# Patient Record
Sex: FEMALE | Race: WHITE | NOT HISPANIC OR LATINO | Employment: OTHER | URBAN - METROPOLITAN AREA
[De-identification: names, ages, dates, MRNs, and addresses within clinical notes are randomized per-mention and may not be internally consistent; named-entity substitution may affect disease eponyms.]

---

## 2017-04-11 ENCOUNTER — HOSPITAL ENCOUNTER (OUTPATIENT)
Dept: FAMILY MEDICINE CLINIC | Facility: CLINIC | Age: 72
Setting detail: SPECIMEN
Discharge: HOME | End: 2017-04-11
Attending: FAMILY MEDICINE | Admitting: FAMILY MEDICINE

## 2017-04-11 LAB
ALBUMIN SERPL-MCNC: 3.7 G/DL (ref 3.5–4.8)
ALBUMIN/GLOB SERPL: 1.6 {RATIO} (ref 1–1.7)
ALP SERPL-CCNC: 148 IU/L (ref 32–91)
ALT SERPL-CCNC: 21 IU/L (ref 14–54)
ANION GAP SERPL CALC-SCNC: 13.1 MMOL/L (ref 10–20)
AST SERPL-CCNC: 24 IU/L (ref 15–41)
BASOPHILS # BLD AUTO: 0 10*3/UL (ref 0–0.2)
BASOPHILS NFR BLD AUTO: 0 % (ref 0–2)
BILIRUB SERPL-MCNC: 0.7 MG/DL (ref 0.3–1.2)
BILIRUB UR QL STRIP: NEGATIVE MG/DL
BUN SERPL-MCNC: 17 MG/DL (ref 8–20)
BUN/CREAT SERPL: 18.9 (ref 5.4–26.2)
CALCIUM SERPL-MCNC: 9.5 MG/DL (ref 8.9–10.3)
CASTS URNS QL MICRO: ABNORMAL /[LPF]
CHLORIDE SERPL-SCNC: 105 MMOL/L (ref 101–111)
CHOLEST SERPL-MCNC: 179 MG/DL
CHOLEST/HDLC SERPL: 2.9 {RATIO}
COLOR UR: YELLOW
CONV BACTERIA IN URINE MICRO: NEGATIVE
CONV CLARITY OF URINE: CLEAR
CONV CO2: 27 MMOL/L (ref 22–32)
CONV HYALINE CASTS IN URINE MICRO: 0 /[LPF] (ref 0–5)
CONV LDL CHOLESTEROL DIRECT: 104 MG/DL (ref 0–100)
CONV PROTEIN IN URINE BY AUTOMATED TEST STRIP: NEGATIVE MG/DL
CONV SMALL ROUND CELLS: ABNORMAL /[HPF]
CONV TOTAL PROTEIN: 6 G/DL (ref 6.1–7.9)
CONV UROBILINOGEN IN URINE BY AUTOMATED TEST STRIP: 0.2 MG/DL
CREAT UR-MCNC: 0.9 MG/DL (ref 0.4–1)
CULTURE INDICATED?: ABNORMAL
DIFFERENTIAL METHOD BLD: (no result)
EOSINOPHIL # BLD AUTO: 0.1 10*3/UL (ref 0–0.3)
EOSINOPHIL # BLD AUTO: 2 % (ref 0–3)
ERYTHROCYTE [DISTWIDTH] IN BLOOD BY AUTOMATED COUNT: 14 % (ref 11.5–14.5)
GLOBULIN UR ELPH-MCNC: 2.3 G/DL (ref 2.5–3.8)
GLUCOSE SERPL-MCNC: 98 MG/DL (ref 65–99)
GLUCOSE UR QL: NEGATIVE MG/DL
HCT VFR BLD AUTO: 38.9 % (ref 35–49)
HDLC SERPL-MCNC: 63 MG/DL
HGB BLD-MCNC: 12.8 G/DL (ref 12–15)
HGB UR QL STRIP: NEGATIVE
KETONES UR QL STRIP: NEGATIVE MG/DL
LDLC/HDLC SERPL: 1.6 {RATIO}
LEUKOCYTE ESTERASE UR QL STRIP: ABNORMAL
LIPID INTERPRETATION: ABNORMAL
LYMPHOCYTES # BLD AUTO: 2.5 10*3/UL (ref 0.8–4.8)
LYMPHOCYTES NFR BLD AUTO: 40 % (ref 18–42)
MCH RBC QN AUTO: 28.7 PG (ref 26–32)
MCHC RBC AUTO-ENTMCNC: 32.9 G/DL (ref 32–36)
MCV RBC AUTO: 87.5 FL (ref 80–94)
MONOCYTES # BLD AUTO: 0.6 10*3/UL (ref 0.1–1.3)
MONOCYTES NFR BLD AUTO: 10 % (ref 2–11)
NEUTROPHILS # BLD AUTO: 3.1 10*3/UL (ref 2.3–8.6)
NEUTROPHILS NFR BLD AUTO: 48 % (ref 50–75)
NITRITE UR QL STRIP: NEGATIVE
NRBC BLD AUTO-RTO: 0 /100{WBCS}
NRBC/RBC NFR BLD MANUAL: 0 10*3/UL
PH UR STRIP.AUTO: 5.5 [PH] (ref 4.5–8)
PLATELET # BLD AUTO: 244 10*3/UL (ref 150–450)
PMV BLD AUTO: 9.4 FL (ref 7.4–10.4)
POTASSIUM SERPL-SCNC: 4.1 MMOL/L (ref 3.6–5.1)
RBC # BLD AUTO: 4.45 10*6/UL (ref 4–5.4)
RBC #/AREA URNS HPF: 1 /[HPF] (ref 0–3)
SODIUM SERPL-SCNC: 141 MMOL/L (ref 136–144)
SP GR UR: 1.02 (ref 1–1.03)
SPERM URNS QL MICRO: ABNORMAL /[HPF]
SQUAMOUS SPT QL MICRO: 6 /[HPF] (ref 0–5)
TRIGL SERPL-MCNC: 84 MG/DL
TSH SERPL-ACNC: 3.72 UIU/ML (ref 0.34–5.6)
UNIDENT CRYS URNS QL MICRO: ABNORMAL /[HPF]
VLDLC SERPL CALC-MCNC: 12.7 MG/DL
WBC # BLD AUTO: 6.4 10*3/UL (ref 4.5–11.5)
WBC #/AREA URNS HPF: 9 /[HPF] (ref 0–5)
YEAST SPEC QL WET PREP: ABNORMAL /[HPF]

## 2017-04-13 ENCOUNTER — ON CAMPUS - OUTPATIENT (AMBULATORY)
Dept: URBAN - METROPOLITAN AREA HOSPITAL 2 | Facility: HOSPITAL | Age: 72
End: 2017-04-13
Payer: MEDICARE

## 2017-04-13 VITALS
OXYGEN SATURATION: 97 % | SYSTOLIC BLOOD PRESSURE: 140 MMHG | SYSTOLIC BLOOD PRESSURE: 148 MMHG | SYSTOLIC BLOOD PRESSURE: 158 MMHG | DIASTOLIC BLOOD PRESSURE: 60 MMHG | SYSTOLIC BLOOD PRESSURE: 101 MMHG | HEIGHT: 66 IN | HEART RATE: 109 BPM | DIASTOLIC BLOOD PRESSURE: 57 MMHG | HEART RATE: 113 BPM | SYSTOLIC BLOOD PRESSURE: 174 MMHG | HEART RATE: 67 BPM | OXYGEN SATURATION: 99 % | DIASTOLIC BLOOD PRESSURE: 68 MMHG | TEMPERATURE: 97.4 F | HEART RATE: 98 BPM | HEART RATE: 105 BPM | SYSTOLIC BLOOD PRESSURE: 124 MMHG | DIASTOLIC BLOOD PRESSURE: 94 MMHG | HEART RATE: 104 BPM | RESPIRATION RATE: 16 BRPM | OXYGEN SATURATION: 95 % | RESPIRATION RATE: 18 BRPM | DIASTOLIC BLOOD PRESSURE: 105 MMHG | OXYGEN SATURATION: 96 % | DIASTOLIC BLOOD PRESSURE: 65 MMHG | HEART RATE: 66 BPM | SYSTOLIC BLOOD PRESSURE: 121 MMHG | WEIGHT: 254 LBS | OXYGEN SATURATION: 98 %

## 2017-04-13 DIAGNOSIS — Z86.010 PERSONAL HISTORY OF COLONIC POLYPS: ICD-10-CM

## 2017-04-13 PROCEDURE — G0105 COLORECTAL SCRN; HI RISK IND: HCPCS

## 2017-04-13 RX ADMIN — PROPOFOL: 10 INJECTION, EMULSION INTRAVENOUS at 09:14

## 2017-05-23 ENCOUNTER — HOSPITAL ENCOUNTER (OUTPATIENT)
Dept: FAMILY MEDICINE CLINIC | Facility: CLINIC | Age: 72
Setting detail: SPECIMEN
Discharge: HOME | End: 2017-05-23
Attending: FAMILY MEDICINE | Admitting: FAMILY MEDICINE

## 2017-05-23 LAB
ANION GAP SERPL CALC-SCNC: 15.2 MMOL/L (ref 10–20)
BUN SERPL-MCNC: 18 MG/DL (ref 8–20)
BUN/CREAT SERPL: 20 (ref 5.4–26.2)
CALCIUM SERPL-MCNC: 9.8 MG/DL (ref 8.9–10.3)
CHLORIDE SERPL-SCNC: 101 MMOL/L (ref 101–111)
CONV CO2: 29 MMOL/L (ref 22–32)
CREAT UR-MCNC: 0.9 MG/DL (ref 0.4–1)
GLUCOSE SERPL-MCNC: 101 MG/DL (ref 65–99)
POTASSIUM SERPL-SCNC: 4.2 MMOL/L (ref 3.6–5.1)
SODIUM SERPL-SCNC: 141 MMOL/L (ref 136–144)

## 2017-10-09 ENCOUNTER — HOSPITAL ENCOUNTER (OUTPATIENT)
Dept: FAMILY MEDICINE CLINIC | Facility: CLINIC | Age: 72
Setting detail: SPECIMEN
Discharge: HOME | End: 2017-10-09
Attending: FAMILY MEDICINE | Admitting: FAMILY MEDICINE

## 2017-10-09 LAB
AMPICILLIN SUSC ISLT: NORMAL
AZTREONAM SUSC ISLT: NORMAL
BACTERIA ISLT: NORMAL
BACTERIA SPEC AEROBE CULT: NORMAL
BILIRUB UR QL STRIP: NEGATIVE MG/DL
CASTS URNS QL MICRO: ABNORMAL /[LPF]
CEFAZOLIN SUSC ISLT: NORMAL
CEFEPIME SUSC ISLT: NORMAL
CEFTRIAXONE SUSC ISLT: NORMAL
CIPROFLOXACIN SUSC ISLT: NORMAL
COLONY COUNT: NORMAL
COLOR UR: YELLOW
CONV BACTERIA IN URINE MICRO: ABNORMAL
CONV CLARITY OF URINE: ABNORMAL
CONV HYALINE CASTS IN URINE MICRO: 4 /[LPF] (ref 0–5)
CONV PROTEIN IN URINE BY AUTOMATED TEST STRIP: NEGATIVE MG/DL
CONV SMALL ROUND CELLS: ABNORMAL /[HPF]
CONV UROBILINOGEN IN URINE BY AUTOMATED TEST STRIP: 0.2 MG/DL
CULTURE INDICATED?: ABNORMAL
ERTAPENEM SUSC ISLT: NORMAL
GLUCOSE UR QL: NEGATIVE MG/DL
HGB UR QL STRIP: ABNORMAL
KETONES UR QL STRIP: NEGATIVE MG/DL
LEUKOCYTE ESTERASE UR QL STRIP: ABNORMAL
LEVOFLOXACIN SUSC ISLT: NORMAL
Lab: NORMAL
MEROPENEM SUSC ISLT: NORMAL
MICRO REPORT STATUS: NORMAL
NITRITE UR QL STRIP: NEGATIVE
NITROFURANTOIN SUSC ISLT: NORMAL
PH UR STRIP.AUTO: 7 [PH] (ref 4.5–8)
PIP+TAZO SUSC ISLT: NORMAL
RBC #/AREA URNS HPF: 3 /[HPF] (ref 0–3)
SP GR UR: 1.01 (ref 1–1.03)
SPECIMEN SOURCE: NORMAL
SPERM URNS QL MICRO: ABNORMAL /[HPF]
SQUAMOUS SPT QL MICRO: 1 /[HPF] (ref 0–5)
SUSC METH SPEC: NORMAL
TETRACYCLINE SUSC ISLT: NORMAL
TOBRAMYCIN SUSC ISLT: NORMAL
TRIMETHOPRIM/SULFA: NORMAL
UNIDENT CRYS URNS QL MICRO: ABNORMAL /[HPF]
WBC #/AREA URNS HPF: ABNORMAL /[HPF] (ref 0–5)
YEAST SPEC QL WET PREP: ABNORMAL /[HPF]

## 2017-10-10 ENCOUNTER — HOSPITAL ENCOUNTER (OUTPATIENT)
Dept: FAMILY MEDICINE CLINIC | Facility: CLINIC | Age: 72
Setting detail: SPECIMEN
Discharge: HOME | End: 2017-10-10
Attending: FAMILY MEDICINE | Admitting: FAMILY MEDICINE

## 2017-10-10 LAB
ALBUMIN SERPL-MCNC: 4.1 G/DL (ref 3.5–4.8)
ALBUMIN/GLOB SERPL: 1.6 {RATIO} (ref 1–1.7)
ALP SERPL-CCNC: 162 IU/L (ref 32–91)
ALT SERPL-CCNC: 21 IU/L (ref 14–54)
ANION GAP SERPL CALC-SCNC: 13.9 MMOL/L (ref 10–20)
AST SERPL-CCNC: 32 IU/L (ref 15–41)
BILIRUB SERPL-MCNC: 1.1 MG/DL (ref 0.3–1.2)
BUN SERPL-MCNC: 13 MG/DL (ref 8–20)
BUN/CREAT SERPL: 13 (ref 5.4–26.2)
CALCIUM SERPL-MCNC: 9.5 MG/DL (ref 8.9–10.3)
CHLORIDE SERPL-SCNC: 101 MMOL/L (ref 101–111)
CHOLEST SERPL-MCNC: 161 MG/DL
CHOLEST/HDLC SERPL: 3.1 {RATIO}
CONV CO2: 26 MMOL/L (ref 22–32)
CONV LDL CHOLESTEROL DIRECT: 95 MG/DL (ref 0–100)
CONV TOTAL PROTEIN: 6.7 G/DL (ref 6.1–7.9)
CREAT UR-MCNC: 1 MG/DL (ref 0.4–1)
GLOBULIN UR ELPH-MCNC: 2.6 G/DL (ref 2.5–3.8)
GLUCOSE SERPL-MCNC: 116 MG/DL (ref 65–99)
HDLC SERPL-MCNC: 52 MG/DL
LDLC/HDLC SERPL: 1.8 {RATIO}
LIPID INTERPRETATION: NORMAL
POTASSIUM SERPL-SCNC: 3.9 MMOL/L (ref 3.6–5.1)
SODIUM SERPL-SCNC: 137 MMOL/L (ref 136–144)
TRIGL SERPL-MCNC: 135 MG/DL
TSH SERPL-ACNC: 2.24 UIU/ML (ref 0.34–5.6)
VLDLC SERPL CALC-MCNC: 14.1 MG/DL

## 2017-10-11 LAB
HCV AB SER DONR QL: NORMAL
HCV AB SER DONR QL: NORMAL

## 2017-10-24 ENCOUNTER — HOSPITAL ENCOUNTER (OUTPATIENT)
Dept: FAMILY MEDICINE CLINIC | Facility: CLINIC | Age: 72
Setting detail: SPECIMEN
Discharge: HOME | End: 2017-10-24
Attending: FAMILY MEDICINE | Admitting: FAMILY MEDICINE

## 2018-01-04 ENCOUNTER — HOSPITAL ENCOUNTER (OUTPATIENT)
Dept: ORTHOPEDIC SURGERY | Facility: CLINIC | Age: 73
Discharge: HOME | End: 2018-01-04
Attending: ORTHOPAEDIC SURGERY | Admitting: ORTHOPAEDIC SURGERY

## 2018-01-25 ENCOUNTER — HOSPITAL ENCOUNTER (OUTPATIENT)
Dept: FAMILY MEDICINE CLINIC | Facility: CLINIC | Age: 73
Setting detail: SPECIMEN
Discharge: HOME | End: 2018-01-25
Attending: FAMILY MEDICINE | Admitting: FAMILY MEDICINE

## 2018-01-25 LAB
AMPICILLIN SUSC ISLT: NORMAL
AZTREONAM SUSC ISLT: NORMAL
BACTERIA ISLT: NORMAL
BACTERIA SPEC AEROBE CULT: NORMAL
CEFAZOLIN SUSC ISLT: NORMAL
CEFEPIME SUSC ISLT: NORMAL
CEFTRIAXONE SUSC ISLT: NORMAL
CIPROFLOXACIN SUSC ISLT: NORMAL
COLONY COUNT: NORMAL
ERTAPENEM SUSC ISLT: NORMAL
LEVOFLOXACIN SUSC ISLT: NORMAL
Lab: NORMAL
MEROPENEM SUSC ISLT: NORMAL
MICRO REPORT STATUS: NORMAL
NITROFURANTOIN SUSC ISLT: NORMAL
PIP+TAZO SUSC ISLT: NORMAL
SPECIMEN SOURCE: NORMAL
SUSC METH SPEC: NORMAL
TETRACYCLINE SUSC ISLT: NORMAL
TOBRAMYCIN SUSC ISLT: NORMAL
TRIMETHOPRIM/SULFA: NORMAL

## 2018-04-09 ENCOUNTER — HOSPITAL ENCOUNTER (OUTPATIENT)
Dept: FAMILY MEDICINE CLINIC | Facility: CLINIC | Age: 73
Setting detail: SPECIMEN
Discharge: HOME | End: 2018-04-09
Attending: FAMILY MEDICINE | Admitting: FAMILY MEDICINE

## 2018-04-09 LAB
ALBUMIN SERPL-MCNC: 3.9 G/DL (ref 3.5–4.8)
ALBUMIN/GLOB SERPL: 1.4 {RATIO} (ref 1–1.7)
ALP SERPL-CCNC: 147 IU/L (ref 32–91)
ALT SERPL-CCNC: 24 IU/L (ref 14–54)
ANION GAP SERPL CALC-SCNC: 11.2 MMOL/L (ref 10–20)
AST SERPL-CCNC: 29 IU/L (ref 15–41)
BASOPHILS # BLD AUTO: 0 10*3/UL (ref 0–0.2)
BASOPHILS NFR BLD AUTO: 0 % (ref 0–2)
BILIRUB SERPL-MCNC: 0.7 MG/DL (ref 0.3–1.2)
BUN SERPL-MCNC: 18 MG/DL (ref 8–20)
BUN/CREAT SERPL: 18 (ref 5.4–26.2)
CALCIUM SERPL-MCNC: 9.7 MG/DL (ref 8.9–10.3)
CHLORIDE SERPL-SCNC: 103 MMOL/L (ref 101–111)
CHOLEST SERPL-MCNC: 193 MG/DL
CHOLEST/HDLC SERPL: 3.8 {RATIO}
CONV CO2: 30 MMOL/L (ref 22–32)
CONV LDL CHOLESTEROL DIRECT: 106 MG/DL (ref 0–100)
CONV TOTAL PROTEIN: 6.7 G/DL (ref 6.1–7.9)
CREAT UR-MCNC: 1 MG/DL (ref 0.4–1)
DIFFERENTIAL METHOD BLD: (no result)
EOSINOPHIL # BLD AUTO: 0.1 10*3/UL (ref 0–0.3)
EOSINOPHIL # BLD AUTO: 2 % (ref 0–3)
ERYTHROCYTE [DISTWIDTH] IN BLOOD BY AUTOMATED COUNT: 13.9 % (ref 11.5–14.5)
GLOBULIN UR ELPH-MCNC: 2.8 G/DL (ref 2.5–3.8)
GLUCOSE SERPL-MCNC: 104 MG/DL (ref 65–99)
HCT VFR BLD AUTO: 42.1 % (ref 35–49)
HDLC SERPL-MCNC: 50 MG/DL
HGB BLD-MCNC: 14 G/DL (ref 12–15)
LDLC/HDLC SERPL: 2.1 {RATIO}
LIPID INTERPRETATION: ABNORMAL
LYMPHOCYTES # BLD AUTO: 2 10*3/UL (ref 0.8–4.8)
LYMPHOCYTES NFR BLD AUTO: 35 % (ref 18–42)
MCH RBC QN AUTO: 29.1 PG (ref 26–32)
MCHC RBC AUTO-ENTMCNC: 33.3 G/DL (ref 32–36)
MCV RBC AUTO: 87.3 FL (ref 80–94)
MONOCYTES # BLD AUTO: 0.7 10*3/UL (ref 0.1–1.3)
MONOCYTES NFR BLD AUTO: 12 % (ref 2–11)
NEUTROPHILS # BLD AUTO: 2.9 10*3/UL (ref 2.3–8.6)
NEUTROPHILS NFR BLD AUTO: 51 % (ref 50–75)
NRBC BLD AUTO-RTO: 0 /100{WBCS}
NRBC/RBC NFR BLD MANUAL: 0 10*3/UL
PLATELET # BLD AUTO: 263 10*3/UL (ref 150–450)
PMV BLD AUTO: 8.9 FL (ref 7.4–10.4)
POTASSIUM SERPL-SCNC: 4.2 MMOL/L (ref 3.6–5.1)
RBC # BLD AUTO: 4.83 10*6/UL (ref 4–5.4)
SODIUM SERPL-SCNC: 140 MMOL/L (ref 136–144)
TRIGL SERPL-MCNC: 255 MG/DL
VLDLC SERPL CALC-MCNC: 36.8 MG/DL
WBC # BLD AUTO: 5.8 10*3/UL (ref 4.5–11.5)

## 2018-06-01 ENCOUNTER — HOSPITAL ENCOUNTER (OUTPATIENT)
Dept: FAMILY MEDICINE CLINIC | Facility: CLINIC | Age: 73
Setting detail: SPECIMEN
Discharge: HOME | End: 2018-06-01
Attending: FAMILY MEDICINE | Admitting: FAMILY MEDICINE

## 2018-11-12 ENCOUNTER — HOSPITAL ENCOUNTER (OUTPATIENT)
Dept: SLEEP MEDICINE | Facility: HOSPITAL | Age: 73
Discharge: HOME | End: 2018-11-12
Attending: INTERNAL MEDICINE | Admitting: INTERNAL MEDICINE

## 2018-11-27 ENCOUNTER — HOSPITAL ENCOUNTER (OUTPATIENT)
Dept: SLEEP MEDICINE | Facility: HOSPITAL | Age: 73
Discharge: HOME | End: 2018-11-27
Attending: INTERNAL MEDICINE | Admitting: INTERNAL MEDICINE

## 2018-12-03 ENCOUNTER — CONVERSION ENCOUNTER (OUTPATIENT)
Dept: LAB | Facility: HOSPITAL | Age: 73
End: 2018-12-03

## 2018-12-03 LAB
ALBUMIN SERPL-MCNC: 4 G/DL (ref 3.5–4.8)
ALBUMIN/GLOB SERPL: 1.5 {RATIO} (ref 1–1.7)
ALP SERPL-CCNC: 141 IU/L (ref 32–91)
ALT SERPL-CCNC: 27 IU/L (ref 14–54)
ANION GAP SERPL CALC-SCNC: 12.2 MMOL/L (ref 10–20)
AST SERPL-CCNC: 28 IU/L (ref 15–41)
BASOPHILS # BLD AUTO: 0 10*3/UL (ref 0–0.2)
BASOPHILS NFR BLD AUTO: 1 % (ref 0–2)
BILIRUB SERPL-MCNC: 0.9 MG/DL (ref 0.3–1.2)
BUN SERPL-MCNC: 17 MG/DL (ref 8–20)
BUN/CREAT SERPL: 15.5 (ref 5.4–26.2)
CALCIUM SERPL-MCNC: 9.4 MG/DL (ref 8.9–10.3)
CHLORIDE SERPL-SCNC: 102 MMOL/L (ref 101–111)
CONV CO2: 28 MMOL/L (ref 22–32)
CONV TOTAL PROTEIN: 6.7 G/DL (ref 6.1–7.9)
CREAT UR-MCNC: 1.1 MG/DL (ref 0.4–1)
DIFFERENTIAL METHOD BLD: (no result)
EOSINOPHIL # BLD AUTO: 0.1 10*3/UL (ref 0–0.3)
EOSINOPHIL # BLD AUTO: 1 % (ref 0–3)
ERYTHROCYTE [DISTWIDTH] IN BLOOD BY AUTOMATED COUNT: 14.5 % (ref 11.5–14.5)
GLOBULIN UR ELPH-MCNC: 2.7 G/DL (ref 2.5–3.8)
GLUCOSE SERPL-MCNC: 102 MG/DL (ref 65–99)
HCT VFR BLD AUTO: 42.4 % (ref 35–49)
HGB BLD-MCNC: 14.1 G/DL (ref 12–15)
INR PPP: 1.1
LYMPHOCYTES # BLD AUTO: 1.8 10*3/UL (ref 0.8–4.8)
LYMPHOCYTES NFR BLD AUTO: 29 % (ref 18–42)
MAGNESIUM SERPL-MCNC: 1.8 MG/DL (ref 1.8–2.5)
MCH RBC QN AUTO: 30.5 PG (ref 26–32)
MCHC RBC AUTO-ENTMCNC: 33.3 G/DL (ref 32–36)
MCV RBC AUTO: 91.5 FL (ref 80–94)
MONOCYTES # BLD AUTO: 0.6 10*3/UL (ref 0.1–1.3)
MONOCYTES NFR BLD AUTO: 10 % (ref 2–11)
NEUTROPHILS # BLD AUTO: 3.6 10*3/UL (ref 2.3–8.6)
NEUTROPHILS NFR BLD AUTO: 59 % (ref 50–75)
NRBC BLD AUTO-RTO: 0 /100{WBCS}
NRBC/RBC NFR BLD MANUAL: 0 10*3/UL
PLATELET # BLD AUTO: 276 10*3/UL (ref 150–450)
PMV BLD AUTO: 8.8 FL (ref 7.4–10.4)
POTASSIUM SERPL-SCNC: 4.2 MMOL/L (ref 3.6–5.1)
PROTHROMBIN TIME: 11 SEC (ref 9.6–11.7)
RBC # BLD AUTO: 4.64 10*6/UL (ref 4–5.4)
SODIUM SERPL-SCNC: 138 MMOL/L (ref 136–144)
TSH SERPL-ACNC: 1.88 UIU/ML (ref 0.34–5.6)
WBC # BLD AUTO: 6.1 10*3/UL (ref 4.5–11.5)

## 2018-12-31 ENCOUNTER — OFFICE VISIT (OUTPATIENT)
Dept: CARDIAC SURGERY | Facility: CLINIC | Age: 73
End: 2018-12-31

## 2018-12-31 VITALS
BODY MASS INDEX: 43.01 KG/M2 | HEIGHT: 66 IN | WEIGHT: 267.6 LBS | RESPIRATION RATE: 20 BRPM | OXYGEN SATURATION: 94 % | HEART RATE: 75 BPM | DIASTOLIC BLOOD PRESSURE: 84 MMHG | TEMPERATURE: 97.4 F | SYSTOLIC BLOOD PRESSURE: 136 MMHG

## 2018-12-31 DIAGNOSIS — I48.0 PAROXYSMAL ATRIAL FIBRILLATION (HCC): Primary | ICD-10-CM

## 2018-12-31 PROCEDURE — 99213 OFFICE O/P EST LOW 20 MIN: CPT | Performed by: THORACIC SURGERY (CARDIOTHORACIC VASCULAR SURGERY)

## 2019-01-03 NOTE — PROGRESS NOTES
Mrs. Vance returns for planned follow up. Our intial encounter was on 12/5/18. She has a well established history of recurrent atrial fibrillation/flutter and recently underwent an ablation procedure; she was placed on Tikosyn following her ablation. When evaluated by me on 12/5/18, she was in SR and her Eliquis had been restarted. Since that encounter, she has remained in SR (confirmed on recent visit to Dr. Case's office on 12/17/18). She denies any palpitations, dyspnea, or syncope.    T 97.4 P 75 /84 RR 18 O2sat 94% on RA.    NCAT  RRR, 2/6 systolic murmur.  CTA B/L  Soft, NT, ND, normal BS. Obese.  1+ B/L edema  GCS 15, alert and oriented x3    I explained to Mrs. Vance that should she fail medical treatment, she cold be considered for a Convergent procedure. She will continue to follow up with Dr. Case; I will discuss her case again with him.

## 2019-01-08 ENCOUNTER — HOSPITAL ENCOUNTER (OUTPATIENT)
Dept: FAMILY MEDICINE CLINIC | Facility: CLINIC | Age: 74
Setting detail: SPECIMEN
Discharge: HOME | End: 2019-01-08
Attending: FAMILY MEDICINE | Admitting: FAMILY MEDICINE

## 2019-01-08 LAB
CHOLEST SERPL-MCNC: 172 MG/DL
CHOLEST/HDLC SERPL: 2.6 {RATIO}
CONV LDL CHOLESTEROL DIRECT: 92 MG/DL (ref 0–100)
HDLC SERPL-MCNC: 67 MG/DL
LDLC/HDLC SERPL: 1.4 {RATIO}
LIPID INTERPRETATION: NORMAL
TRIGL SERPL-MCNC: 89 MG/DL
VLDLC SERPL CALC-MCNC: 12.7 MG/DL

## 2019-02-18 ENCOUNTER — HOSPITAL ENCOUNTER (OUTPATIENT)
Dept: SLEEP MEDICINE | Facility: HOSPITAL | Age: 74
Discharge: HOME | End: 2019-02-18
Attending: INTERNAL MEDICINE | Admitting: INTERNAL MEDICINE

## 2019-05-15 ENCOUNTER — HOSPITAL ENCOUNTER (OUTPATIENT)
Dept: SLEEP MEDICINE | Facility: HOSPITAL | Age: 74
Discharge: HOME | End: 2019-05-15
Attending: INTERNAL MEDICINE | Admitting: INTERNAL MEDICINE

## 2019-07-01 ENCOUNTER — OFFICE VISIT (OUTPATIENT)
Dept: CARDIOLOGY | Facility: CLINIC | Age: 74
End: 2019-07-01

## 2019-07-01 VITALS
DIASTOLIC BLOOD PRESSURE: 83 MMHG | BODY MASS INDEX: 38.09 KG/M2 | OXYGEN SATURATION: 96 % | SYSTOLIC BLOOD PRESSURE: 132 MMHG | WEIGHT: 236 LBS | HEART RATE: 73 BPM

## 2019-07-01 DIAGNOSIS — E78.2 MIXED HYPERLIPIDEMIA: ICD-10-CM

## 2019-07-01 DIAGNOSIS — E66.01 MORBIDLY OBESE (HCC): ICD-10-CM

## 2019-07-01 DIAGNOSIS — I10 ESSENTIAL HYPERTENSION: ICD-10-CM

## 2019-07-01 DIAGNOSIS — I48.0 PAROXYSMAL ATRIAL FIBRILLATION (HCC): Primary | ICD-10-CM

## 2019-07-01 PROBLEM — I48.91 ATRIAL FIBRILLATION: Status: ACTIVE | Noted: 2017-04-07

## 2019-07-01 PROBLEM — I51.9 LEFT VENTRICULAR DIASTOLIC DYSFUNCTION: Status: ACTIVE | Noted: 2017-05-23

## 2019-07-01 PROBLEM — E78.5 HYPERLIPIDEMIA: Status: ACTIVE | Noted: 2017-04-07

## 2019-07-01 PROCEDURE — 93000 ELECTROCARDIOGRAM COMPLETE: CPT | Performed by: INTERNAL MEDICINE

## 2019-07-01 PROCEDURE — 99214 OFFICE O/P EST MOD 30 MIN: CPT | Performed by: INTERNAL MEDICINE

## 2019-07-01 RX ORDER — AMLODIPINE BESYLATE 5 MG/1
5 TABLET ORAL DAILY
COMMUNITY
End: 2019-12-30

## 2019-07-01 RX ORDER — DOFETILIDE 0.5 MG/1
500 CAPSULE ORAL 2 TIMES DAILY
Qty: 180 CAPSULE | Refills: 3 | Status: SHIPPED | OUTPATIENT
Start: 2019-07-01 | End: 2020-05-22

## 2019-07-01 RX ORDER — FUROSEMIDE 40 MG/1
TABLET ORAL
Qty: 90 TABLET | Refills: 6 | Status: SHIPPED | OUTPATIENT
Start: 2019-07-01 | End: 2020-05-26

## 2019-07-01 RX ORDER — FUROSEMIDE 20 MG/1
20 TABLET ORAL 2 TIMES DAILY
COMMUNITY
End: 2019-07-09

## 2019-07-01 RX ORDER — DOFETILIDE 0.5 MG/1
500 CAPSULE ORAL 2 TIMES DAILY
COMMUNITY
End: 2019-07-01 | Stop reason: SDUPTHER

## 2019-07-01 RX ORDER — POTASSIUM CHLORIDE 20 MEQ/1
TABLET, EXTENDED RELEASE ORAL
Qty: 90 TABLET | Refills: 5 | Status: SHIPPED | OUTPATIENT
Start: 2019-07-01 | End: 2019-12-18

## 2019-07-01 NOTE — PROGRESS NOTES
CC--Atrial fibrillation    SUB-- patient is 74 years old white female whose past medical history significant for hypertension, Persistent atrial fibrillation,  obesity  underwent AF ablation with early recurrence with initiation of Tikosyn to sinus rhythm with resolution of symptoms and comes in for evaluation   patient failed prior cardioversion and amiodarone   during hospitalization CT surgery consult was also requested for possible future convergence procedure because of severe left atrial enlargement  In 2009, patient was diagnosed with paroxysmal atrial fibrillation.  Patient was treated with beta-blockers initially.  Patient was started on flecainide later on.  In  2017, patient underwent cardiac catheterization at [facility] and coronary arteries were normal.  LV function was preserved.  Patient was started on Eliquis because of recurrence of atrial fibrillation.   chads vasc  score---3   patient was hypertensive and started on amlodipine  and HAD lower extremity edema in the Past resolved with Lasix and potassium   no recurrent symptoms since last visit     assessment plan   recurrent persistent atrial fibrillation and failed medical treatment with symptoms--  post cryoablation to sinus rhythm with early recurrence on Tikosyn to sinus rhythm   obesity   hypertension-- on amlodipine   hypothyroidism   leg edema-- resolved current medications   follow-up in 3 months  Lasix and potassium to be taken as needed basis  Refill for Eliquis and Tikosyn sent to [facility]      Vital Signs:    Blood pressure 132/83, pulse rate is 64 bpm patient is afebrile and respiratory rate is 12 times a minute  Medications: Medications were reviewed with the patient during this visit.  Allergies: Allergies were reviewed with the patient during this visit.  No Known Allergy.        Past Medical History:     Reviewed history from 01/08/2019 and no changes required:        HTN        Paroxysmal  ALEXANDRA-pretty -   Parish        Seasonal allergies - on allergy shots per ENT        Hyperlipidemia        Hypothyroidism        Chronic low back pain        Colonoscopy in 4/13/2017        Mammogram in 7/31/2018 - negative        Pneumonia shot in the past - both Pneumovax and Prevnar        Shingle shot in the past        Osteopenia - DEXA on 6/23/2017 - L/S: +0.5 and Hip: -0.8        GYN HM per GYN - Dr. Hopkins        Negative Hepatitis C screening in 10/2017                            Past Surgical History:     Reviewed history from 01/25/2018 and no changes required:        Spinal fusion in 2016 - Dr. Lester        Knee repair in 2009        Cataract surgery in 2007        Bladder repair in 2001 in [place]        Partial hysterectomy in 1977 due to fibroids        Appendectomy        Heart cath in 4/2017 - Dr. Penelope Wilkins - no significant disease        cataract surgery 2007        Social History:     Reviewed history from 11/02/2018 and no changes required:        Patient has never smoked.        Passive Smoke: N        Alcohol Use: Y        Drug Use: N        HIV/High Risk: N        Regular Exercise: N            Review of Systems   General: No fatigue or tiredness, No change in weight   Eyes: No redness  Cardiovascular: No chest pain, no palpitations  Respiratory: No shortness of breath  Gastrointestinal: No nausea or vomiting, bleeding  Genitourinary: no hematuria or dysuria  Musculoskeletal: No arthralgia or myalgia  Skin: No rash  Neurologic: No numbness, tingling, syncope  Hematologic/Lymphatic: No abnormal bleeding      Physical Exam    General:      well developed, well nourished, in no acute distress.    Head:      normocephalic and atraumatic.    Eyes:      PERRL/EOM intact, conjunctiva and sclera clear with out nystagmus.    Neck:      no masses, thyromegaly, trachea central with normal respiratory effort  Lungs:      clear bilaterally to auscultation.    Heart:      non-displaced PMI, chest non-tender; regular rate  and rhythm, S1, S2 without murmurs, rubs, or gallops  Skin:      intact without lesions or rashes.    Psych:      alert and cooperative; normal mood and affect; normal attention span and concentration.      Extremities with trace ankle edema without any cyanosis or clubbing    Muscular skeletal patient walks with a cane with mild kyphosis    Neurological no focal deficits and alert oriented x3    Abdomen soft nontender no hepatomegaly    EKG  Sinus rhythm with left atrial enlargement with QTc interval of 453 ms

## 2019-07-09 ENCOUNTER — OFFICE VISIT (OUTPATIENT)
Dept: ORTHOPEDIC SURGERY | Facility: CLINIC | Age: 74
End: 2019-07-09

## 2019-07-09 VITALS
HEART RATE: 76 BPM | SYSTOLIC BLOOD PRESSURE: 129 MMHG | WEIGHT: 261 LBS | HEIGHT: 66 IN | DIASTOLIC BLOOD PRESSURE: 80 MMHG | BODY MASS INDEX: 41.95 KG/M2

## 2019-07-09 DIAGNOSIS — E66.01 MORBID OBESITY (HCC): ICD-10-CM

## 2019-07-09 DIAGNOSIS — M70.61 TROCHANTERIC BURSITIS OF RIGHT HIP: ICD-10-CM

## 2019-07-09 DIAGNOSIS — M16.11 OSTEOARTHRITIS OF RIGHT HIP, UNSPECIFIED OSTEOARTHRITIS TYPE: Primary | ICD-10-CM

## 2019-07-09 PROBLEM — M17.11 DEGENERATIVE JOINT DISEASE OF RIGHT KNEE: Status: ACTIVE | Noted: 2018-01-04

## 2019-07-09 PROBLEM — E03.9 HYPOTHYROIDISM: Status: ACTIVE | Noted: 2017-04-07

## 2019-07-09 PROBLEM — R73.9 HYPERGLYCEMIA: Status: ACTIVE | Noted: 2017-10-24

## 2019-07-09 PROBLEM — M85.80 OSTEOPENIA: Status: ACTIVE | Noted: 2017-04-07

## 2019-07-09 PROBLEM — K21.9 GASTROESOPHAGEAL REFLUX DISEASE: Status: ACTIVE | Noted: 2017-04-07

## 2019-07-09 PROBLEM — R60.0 EDEMA LEG: Status: ACTIVE | Noted: 2019-02-25

## 2019-07-09 PROCEDURE — 99213 OFFICE O/P EST LOW 20 MIN: CPT | Performed by: PHYSICIAN ASSISTANT

## 2019-07-09 RX ORDER — ELECTROLYTES/DEXTROSE
SOLUTION, ORAL ORAL
COMMUNITY
Start: 2018-01-04

## 2019-07-09 RX ORDER — METHYLPREDNISOLONE 4 MG/1
TABLET ORAL
Qty: 21 TABLET | Refills: 0 | Status: SHIPPED | OUTPATIENT
Start: 2019-07-09 | End: 2019-08-21

## 2019-07-09 NOTE — PROGRESS NOTES
Referring Provider: Establish patient  Primary Care Provider: Dr. Guerrier         Subjective:  Chief Complaint:  Chief Complaint   Patient presents with   • Right Hip - Pain       HPI:  Sheree Vance is a 74 y.o. female who presents for her initial visit with right hip pain ongoing since December.  She denies any injury.  She does have a history of lumbar spine surgery.  She describes sharp, shooting pain, mainly in the buttocks and outer thigh area, with radiation to the knee, but no numbness and tingling.  She reports soreness to the touch on her thigh and buttocks area.  She is unable to take anti-inflammatories on a regular basis due to blood thinners, however she has taken Aleve occasionally with very little benefit.  Stretching does seem to help.  She also has pain in the right knee area with a known history of right knee DJD.        Past Medical History:   Diagnosis Date   • A-fib (CMS/HCC) Since 2001 Dx in [place]   • Allergic rhinitis     Hx Allergy Shots x 4 Yrs   • Aortic valve calcification 12/04/2018    Noted on SARA   • Arrhythmia    • Arthritis     Lumbar Spine   • Atrial flutter (CMS/HCC)    • Breast mass seen on mammogram 03/19/2014    L 4CM Mass--Benign & Followed   • Bruxism, sleep-related    • Chronic anticoagulation     Eliquis    • Chronic low back pain     Hx Back Sx in 2016   • DJD (degenerative joint disease), lumbar    • Dyslipidemia     w/Hypertriglyceridemia--Statin/Fish Oils   • Family history of premature CAD    • Heart disease    • History of bone density study 03/19/14-[facility]    T-Score of 1.4 Indicating Osteopenia   • History of EKG 04/2018 & 08/2018 & 12/2018    First Degree AV Block/Multiple Atrial Premature Blocks Noted on All   • Hormone replacement therapy (HRT)     Premarin-0.625MG   • Hyperlipidemia     Controlled w/Meds   • Hypertension     Controlled w/Losartan   • Hypothyroidism     Synthroid   • Insomnia     Takes OTC Sleep Aid PRN   • Left atrial enlargement 12/04/2018     Severe Noted on Cardioversion Report/SARA   • Left breast mass 03/19/2014    4CM Noted on Mammogram--Benign    • Mild tricuspid valve regurgitation 12/04/2018    Noted on SARA   • Mitral valve annular calcification 12/04/2018    Moderate Noted on SARA   • Mitral valve regurgitation 12/04/2018    Moderate Noted on SARA   • Obesity 12/04/2018    BMI-43.3    • SHANAE (obstructive sleep apnea)     w/Snoring/Dyspnea @ Night/Occ Dreams/Seldom Nightmares/Day-Time Sleepiness & Dozing Off   • Osteopenia 03/19/2014    Noted on Dexa Report   • Restless sleeper     Tosses and Turns All Night Per Pt   • Sinus congestion    • Tricuspid leaflet thickened 12/04/2018    Noted on SARA       Past Surgical History:   Procedure Laterality Date   • APPENDECTOMY     • BACK SURGERY  2016    Spinal Fusion   • BLADDER REPAIR  2001 in [place]   • CARDIAC CATHETERIZATION  2015   • CARDIOVERSION  08/7/18 & 4/17/18-[facility]    Dr. Villegas--For Chronic A-Fib   • CARDIOVERSION  12/4/28-[facility]    Dr. Case--See Report   • CATARACT EXTRACTION  2007   • HYSTERECTOMY      Partial   • KNEE SURGERY Right 2009   • OTHER SURGICAL HISTORY  12/4/18-[facility]    Fluoroscopy/Trans-Septal Access to L Atrium/Selective Venography of L Superior Pulm Vein/Add Lienier Radiofrequency Ablation Along the Coronary Sinus/Synchronized Cardioversion--Dr. Case   • SARA  12/4/18-[facility]    Mild Concentric L Ventricular Hypertrophy Noted; EF 55-60%; L Atrial Moderate-Severely Dilated, Moderat Mitral Annular Calcification with Moderate MVR Noted; Mild TVR; Normal LV Function       Family History   Problem Relation Age of Onset   • Heart failure Mother    • Alcohol abuse Mother    • No Known Problems Father        Social History     Occupational History   • Not on file   Tobacco Use   • Smoking status: Never Smoker   • Smokeless tobacco: Never Used   Substance and Sexual Activity   • Alcohol use: Yes     Frequency: Monthly or less     Comment: Occ Wine w/Dinner   • Drug use: No   • Sexual activity:  Defer     Birth control/protection: Surgical     Comment: Hyst        Medications:    Current Outpatient Medications:   •  amLODIPine (NORVASC) 5 MG tablet, Take 5 mg by mouth Daily., Disp: , Rfl:   •  apixaban (ELIQUIS) 5 MG tablet tablet, Take 1 tablet by mouth Every 12 (Twelve) Hours., Disp: 180 tablet, Rfl: 3  •  atorvastatin (LIPITOR) 10 MG tablet, Take 10 mg by mouth Daily., Disp: , Rfl:   •  Cholecalciferol 2000 units capsule, Take  by mouth., Disp: , Rfl:   •  Chromium 200 MCG capsule, Take 100 mg by mouth Daily., Disp: , Rfl:   •  coenzyme Q10 100 MG capsule, Take 100 mg by mouth Daily., Disp: , Rfl:   •  dofetilide (TIKOSYN) 500 MCG capsule, Take 1 capsule by mouth 2 (Two) Times a Day., Disp: 180 capsule, Rfl: 3  •  estrogens, conjugated, (PREMARIN) 0.625 MG tablet, Take 0.625 mg by mouth Daily. Take daily for 21 days then do not take for 7 days., Disp: , Rfl:   •  furosemide (LASIX) 40 MG tablet, TAKE 1 TABLET BY MOUTH EVERY DAY AS NEEDED, Disp: 90 tablet, Rfl: 6  •  levothyroxine (SYNTHROID, LEVOTHROID) 137 MCG tablet, Take 137 mcg by mouth Daily., Disp: , Rfl:   •  losartan (COZAAR) 100 MG tablet, Take 100 mg by mouth Daily., Disp: , Rfl:   •  Magnesium 200 MG tablet, Take  by mouth Daily., Disp: , Rfl:   •  Multiple Vitamins-Minerals (MULTIVITAMIN ADULT) tablet, MULTIVITAMIN ADULT TABS, Disp: , Rfl:   •  Omega-3 1000 MG capsule, Take  by mouth Daily., Disp: , Rfl:   •  potassium chloride (K-DUR,KLOR-CON) 20 MEQ CR tablet, TAKE 1 TABLET BY MOUTH DAILY AS NEEDED, Disp: 90 tablet, Rfl: 5  •  vitamin B-12 (CYANOCOBALAMIN) 1000 MCG tablet, Take 1,000 mcg by mouth Daily., Disp: , Rfl:   •  Calcium-Magnesium-Vitamin D (CALCIUM 500 PO), Take  by mouth Daily., Disp: , Rfl:   •  methylPREDNISolone (MEDROL, SEE,) 4 MG tablet, Use as directed by package instructions, Disp: 21 tablet, Rfl: 0    Allergies:  No Known Allergies      Review of Systems:  Gen -no fever, chills , sweats, headache   Eyes - no irritation or  "discharge   ENT -  no ear pain , runny nose , sore throat , difficulty swallowing   Resp - no cough , congestion , excessive expectoration   CVS - no chest pain , palpitations.   Abd - no pain , nausea , vomiting , diarrhea   Skin - no rash , lesions.   Neuro - no dizziness       Objective   Objective:    /80 (BP Location: Left arm, Patient Position: Sitting, Cuff Size: Adult)   Pulse 76   Ht 167.6 cm (66\")   Wt 118 kg (261 lb)   BMI 42.13 kg/m²     Physical Examination:  Alert, oriented, morbidly obese individual in no acute distress, ambulating unassisted  Right lower extremity shows no appreciable swelling in the thigh area. It is grossly well aligned, and the patient is neurovascularly intact distally. Plantar and dorsiflexion is 5/5. There is mild tenderness to palpation over the greater troch and with range of motion, which is smooth and WNL. Negative KENTON and FADIR. Tenderness along the IT band.         Imaging:  Imaging done today shows mild to moderate DJD in the right hip.            Assessment:  1. Osteoarthritis of right hip, unspecified osteoarthritis type    2. Trochanteric bursitis of right hip    3. Morbid obesity (CMS/[facility])                 Plan:  We will try Medrol Dosepak and home exercises.  Weight loss is highly recommended.  We have also discussed formal physical therapy and she will call if she would like to proceed with this.  I would like to see her back in 6 weeks if there is no improvement.             SARBJIT Lopez  07/09/19  1:30 PM    "

## 2019-07-29 ENCOUNTER — OFFICE VISIT (OUTPATIENT)
Dept: ORTHOPEDIC SURGERY | Facility: CLINIC | Age: 74
End: 2019-07-29

## 2019-07-29 VITALS
HEIGHT: 66 IN | SYSTOLIC BLOOD PRESSURE: 125 MMHG | BODY MASS INDEX: 41.14 KG/M2 | WEIGHT: 256 LBS | DIASTOLIC BLOOD PRESSURE: 79 MMHG | HEART RATE: 79 BPM

## 2019-07-29 DIAGNOSIS — M16.11 PRIMARY OSTEOARTHRITIS OF RIGHT HIP: ICD-10-CM

## 2019-07-29 DIAGNOSIS — M47.818 ARTHRITIS OF RIGHT SACROILIAC JOINT: ICD-10-CM

## 2019-07-29 DIAGNOSIS — E66.01 MORBID OBESITY (HCC): ICD-10-CM

## 2019-07-29 DIAGNOSIS — M70.61 TROCHANTERIC BURSITIS OF RIGHT HIP: Primary | ICD-10-CM

## 2019-07-29 PROBLEM — M46.1 ARTHRITIS OF RIGHT SACROILIAC JOINT: Status: ACTIVE | Noted: 2019-07-29

## 2019-07-29 PROCEDURE — 99213 OFFICE O/P EST LOW 20 MIN: CPT | Performed by: PHYSICIAN ASSISTANT

## 2019-07-29 NOTE — PROGRESS NOTES
Subjective:    HPI:   Sheree Vance is a 74 y.o. female who presents in follow-up for her right hip bursitis and DJD.  She reports very little improvement with the Medrol Dosepak and home exercises.  She would like to proceed with formal physical therapy at this time.  Previous notes and imaging were reviewed.  She also reports that she has started trying to lose weight.    Past Medical History:   Diagnosis Date   • A-fib (CMS/HCC) Since 2001 Dx in [place]   • Allergic rhinitis     Hx Allergy Shots x 4 Yrs   • Aortic valve calcification 12/04/2018    Noted on SARA   • Arrhythmia    • Arthritis     Lumbar Spine   • Atrial flutter (CMS/HCC)    • Breast mass seen on mammogram 03/19/2014    L 4CM Mass--Benign & Followed   • Bruxism, sleep-related    • Chronic anticoagulation     Eliquis    • Chronic low back pain     Hx Back Sx in 2016   • DJD (degenerative joint disease), lumbar    • Dyslipidemia     w/Hypertriglyceridemia--Statin/Fish Oils   • Family history of premature CAD    • Heart disease    • History of bone density study 03/19/14-[facility]    T-Score of 1.4 Indicating Osteopenia   • History of EKG 04/2018 & 08/2018 & 12/2018    First Degree AV Block/Multiple Atrial Premature Blocks Noted on All   • Hormone replacement therapy (HRT)     Premarin-0.625MG   • Hyperlipidemia     Controlled w/Meds   • Hypertension     Controlled w/Losartan   • Hypothyroidism     Synthroid   • Insomnia     Takes OTC Sleep Aid PRN   • Left atrial enlargement 12/04/2018    Severe Noted on Cardioversion Report/SARA   • Left breast mass 03/19/2014    4CM Noted on Mammogram--Benign    • Mild tricuspid valve regurgitation 12/04/2018    Noted on SARA   • Mitral valve annular calcification 12/04/2018    Moderate Noted on SARA   • Mitral valve regurgitation 12/04/2018    Moderate Noted on SARA   • Obesity 12/04/2018    BMI-43.3    • SHANAE (obstructive sleep apnea)     w/Snoring/Dyspnea @ Night/Occ Dreams/Seldom Nightmares/Day-Time Sleepiness & Dozing  Off   • Osteoarthritis of right hip 7/9/2019   • Osteopenia 03/19/2014    Noted on Dexa Report   • Restless sleeper     Tosses and Turns All Night Per Pt   • Sinus congestion    • Tricuspid leaflet thickened 12/04/2018    Noted on SARA       Past Surgical History:   Procedure Laterality Date   • APPENDECTOMY     • BACK SURGERY  2016    Spinal Fusion   • BLADDER REPAIR  2001 in [place]   • CARDIAC CATHETERIZATION  2015   • CARDIOVERSION  08/7/18 & 4/17/18-[facility]    Dr. Vlilegas--For Chronic A-Fib   • CARDIOVERSION  12/4/28-[facility]    Dr. Case--See Report   • CATARACT EXTRACTION  2007   • HYSTERECTOMY      Partial   • KNEE SURGERY Right 2009   • OTHER SURGICAL HISTORY  12/4/18-[facility]    Fluoroscopy/Trans-Septal Access to L Atrium/Selective Venography of L Superior Pulm Vein/Add Lienier Radiofrequency Ablation Along the Coronary Sinus/Synchronized Cardioversion--Dr. Case   • SARA  12/4/18-[facility]    Mild Concentric L Ventricular Hypertrophy Noted; EF 55-60%; L Atrial Moderate-Severely Dilated, Moderat Mitral Annular Calcification with Moderate MVR Noted; Mild TVR; Normal LV Function       Social History     Occupational History   • Not on file   Tobacco Use   • Smoking status: Never Smoker   • Smokeless tobacco: Never Used   Substance and Sexual Activity   • Alcohol use: Yes     Frequency: Monthly or less     Comment: Occ Wine w/Dinner   • Drug use: No   • Sexual activity: Defer     Birth control/protection: Surgical     Comment: Hyst        Medications:    Current Outpatient Medications:   •  amLODIPine (NORVASC) 5 MG tablet, Take 5 mg by mouth Daily., Disp: , Rfl:   •  apixaban (ELIQUIS) 5 MG tablet tablet, Take 1 tablet by mouth Every 12 (Twelve) Hours., Disp: 180 tablet, Rfl: 3  •  atorvastatin (LIPITOR) 10 MG tablet, Take 10 mg by mouth Daily., Disp: , Rfl:   •  Calcium-Magnesium-Vitamin D (CALCIUM 500 PO), Take  by mouth Daily., Disp: , Rfl:   •  Cholecalciferol 2000 units capsule, Take  by mouth., Disp: , Rfl:   •  Chromium  "200 MCG capsule, Take 100 mg by mouth Daily., Disp: , Rfl:   •  coenzyme Q10 100 MG capsule, Take 100 mg by mouth Daily., Disp: , Rfl:   •  dofetilide (TIKOSYN) 500 MCG capsule, Take 1 capsule by mouth 2 (Two) Times a Day., Disp: 180 capsule, Rfl: 3  •  estrogens, conjugated, (PREMARIN) 0.625 MG tablet, Take 0.625 mg by mouth Daily. Take daily for 21 days then do not take for 7 days., Disp: , Rfl:   •  furosemide (LASIX) 40 MG tablet, TAKE 1 TABLET BY MOUTH EVERY DAY AS NEEDED, Disp: 90 tablet, Rfl: 6  •  levothyroxine (SYNTHROID, LEVOTHROID) 137 MCG tablet, Take 137 mcg by mouth Daily., Disp: , Rfl:   •  losartan (COZAAR) 100 MG tablet, Take 100 mg by mouth Daily., Disp: , Rfl:   •  Magnesium 200 MG tablet, Take  by mouth Daily., Disp: , Rfl:   •  methylPREDNISolone (MEDROL, SEE,) 4 MG tablet, Use as directed by package instructions, Disp: 21 tablet, Rfl: 0  •  Multiple Vitamins-Minerals (MULTIVITAMIN ADULT) tablet, MULTIVITAMIN ADULT TABS, Disp: , Rfl:   •  Omega-3 1000 MG capsule, Take  by mouth Daily., Disp: , Rfl:   •  potassium chloride (K-DUR,KLOR-CON) 20 MEQ CR tablet, TAKE 1 TABLET BY MOUTH DAILY AS NEEDED, Disp: 90 tablet, Rfl: 5  •  vitamin B-12 (CYANOCOBALAMIN) 1000 MCG tablet, Take 1,000 mcg by mouth Daily., Disp: , Rfl:     Allergies:  No Known Allergies    Review of Systems:  Gen -no fever, chills , sweats, headache   Eyes - no irritation or discharge   ENT -  no ear pain , runny nose , sore throat , difficulty swallowing   Resp - no cough , congestion , excessive expectoration   CVS - no chest pain , palpitations.   Abd - no pain , nausea , vomiting , diarrhea   Skin - no rash , lesions.   Neuro - no dizziness       Objective   Objective:    /79 (BP Location: Left arm, Patient Position: Sitting, Cuff Size: Adult)   Pulse 79   Ht 167.6 cm (66\")   Wt 116 kg (256 lb)   BMI 41.32 kg/m²     Physical Examination:  Alert, oriented, morbidly obese individual in no acute distress, ambulating " unassisted  Right lower extremity shows no appreciable swelling in the thigh area. It is grossly well aligned, and the patient is neurovascularly intact distally. Plantar and dorsiflexion is 5/5. There is mild tenderness to palpation over the greater troch and with range of motion, which is smooth and WNL.          Imaging:            Assessment:  1. Trochanteric bursitis of right hip    2. Primary osteoarthritis of right hip    3. Arthritis of right sacroiliac joint (CMS/HCC)    4. Morbid obesity (CMS/HCC)                 Plan:  She will continue her weight loss efforts.  We will try formal physical therapy at this time.  I would like to see her back in 3 months for recheck.  She may need to have a reevaluation by her spine surgeon at some point.             SARBJIT Lopez  07/29/19  10:40 AM

## 2019-07-29 NOTE — PATIENT INSTRUCTIONS
Preventing Health Risks of Being Overweight  Maintaining a healthy body weight is an important part of your overall health. Your healthy body weight depends on your age, gender, and height. Being overweight puts you at risk for many health problems, including:  · Heart disease.  · Diabetes.  · Problems sleeping.  · Joint problems.    You can make changes to your diet and lifestyle to prevent these risks. Consider working with a health care provider or a dietitian to make these changes.  What nutrition changes can be made?  · Eat only as much as your body needs. In most cases, this is about 2,000 calories a day, but the amount varies depending on your height, gender, and activity level. Ask your health care provider how many calories you should have each day. Eating more than your body needs on a regular basis can cause you to become overweight or obese.  · Eat slowly, and stop eating when you feel full.  · Choose healthy foods, including:  ? Fruits and vegetables.  ? Lean meats.  ? Low-fat dairy products.  ? High-fiber foods, such as whole grains and beans.  ? Healthy snacks like vegetable sticks, a piece of fruit, or a small amount of yogurt or cheese.  · Avoid foods and drinks that are high in sugar, salt (sodium), saturated fat, or trans fat. This includes:  ? Many desserts such as candy, cookies, and ice cream.  ? Soda.  ? Fried foods.  ? Processed meats such as hot dogs or lunch meats.  ? Prepackaged snack foods.  What lifestyle changes can be made?  · Exercise for at least 150 minutes a week to prevent weight gain, or as often as recommended by your health care provider. Do moderate-intensity exercise, such as brisk walking.  ? Spread it out by exercising for 30 minutes 5 days a week, or in short 10-minute bursts several times a day.  · Find other ways to stay active and burn calories, such as yard work or a hobby that involves physical activity.  · Get at least 8 hours of sleep each night. When you are  well-rested, you are more likely to be active and make healthy choices during the day. To sleep better:  ? Try to go to bed and wake up at about the same time every day.  ? Keep your bedroom dark, quiet, and cool.  ? Make sure that your bed is comfortable.  ? Avoid stimulating activities, such as watching television or exercising, for at least one hour before bedtime.  Why are these changes important?  Eating healthy and being active helps you lose weight and prevent health problems caused by being overweight. Making these changes can also help you manage stress, feel better mentally, and connect with friends and family.  What can happen if changes are not made?  Being overweight can affect you for your entire life. You may develop joint or bone problems that make it painful or difficult for you to play sports or do activities you enjoy. Being overweight puts stress on your heart and lungs and can lead to medical problems like diabetes, heart disease, and sleeping problems.  Where to find support  You can get support for preventing health risks of being overweight from:  · Your health care provider or a dietitian. They can provide guidance about healthy eating and healthy lifestyle choices.  · Weight loss support groups, online or in-person.    Where to find more information  · MyPlate: www.choosemyplate.gov  ? This an online tool that provides personalized recommendations about foods to eat each day.  · The Centers for Disease Control and Prevention: www.cdc.gov/healthyweight  ? This resource gives tips for managing weight and having an active lifestyle.  Summary  · To prevent unhealthy weight gain, it is important to maintain a healthy diet high in vegetables and whole grains, exercise regularly, and get at least 8 hours of sleep each night.  · Making these changes helps prevent many long-term (chronic) health conditions that can shorten your life, such as diabetes, heart disease, and stroke.  This information is  not intended to replace advice given to you by your health care provider. Make sure you discuss any questions you have with your health care provider.  Document Released: 11/14/2018 Document Revised: 11/14/2018 Document Reviewed: 11/14/2018  [employer] Interactive Patient Education © 2019 [employer]    Serving Sizes  A serving size is a measured amount of food or drink, such as one slice of bread, that has an associated nutrient content. Knowing the serving size of a food or drink can help you determine how much of that food you should consume.  What is the size of one serving?  The size of one healthy serving depends on the food or drink. To determine a serving size, read the food label. If the food or drink does not have a food label, try to find serving size information online. Or, use the following to estimate the size of one adult serving:  Grain  1 slice bread. ½ bagel. ½ cup pasta.  Vegetable  ½ cup cooked or canned vegetables. 1 cup raw, leafy greens.  Fruit  ½ cup canned fruit. 1 medium fruit. ¼ cup dried fruit.  Meat and Other Protein Sources  1 oz meat, poultry, or fish. ¼ cup cooked beans. 1 egg. ¼ cup nuts or seeds. 1 Tbsp nut butter. ¼ cup tofu or tempeh. 2 Tbsp hummus.  Dairy  An individual container of yogurt (6-8 oz). 1 piece of cheese the size of your thumb (1 oz). 1 cup (8 oz) milk or milk alternative.  Fat  A piece the size of one dice. 1 tsp soft margarine. 1 Tbsp mayonnaise. 1 tsp vegetable oil. 1 Tbsp regular salad dressing. 2 Tbsp low-fat salad dressing.  How many servings should I eat from each food group each day?  The following are the suggested number of servings to try and have every day from each food group. You can also look at your eating throughout the week and aim for meeting these requirements on most days for overall healthy eating.  Grain  6-8 servings. Try to have half of your grains from whole grains, such as whole wheat bread, corn tortillas, oatmeal, brown rice, whole wheat  pasta, and bulgur.  Vegetable  At least 2½-3 servings.  Fruit  2 servings.  Meat and Other Protein Foods  5-6 servings. Aim to have lean proteins, such as chicken, turkey, fish, beans, or tofu.  Dairy  3 servings. Choose low-fat or nonfat if you are trying to control your weight.  Fat  2-3 servings.  Is a serving the same thing as a portion?  No. A portion is the actual amount you eat, which may be more than one serving. Knowing the specific serving size of a food and the nutritional information that goes with it can help you make a healthy decision on what size portion to eat.  What are some tips to help me learn healthy serving sizes?  · Check food labels for serving sizes. Many foods that come as a single portion actually contain multiple servings.  · Determine the serving size of foods you commonly eat and figure out how large a portion you usually eat.  · Measure the number of servings that can be held by the bowls, glasses, cups, and plates you typically use. For example, pour your breakfast cereal into your regular bowl and then pour it into a measuring cup.  · For 1-2 days, measure the serving sizes of all the foods you eat.  · Practice estimating serving sizes and determining how big your portions should be.  This information is not intended to replace advice given to you by your health care provider. Make sure you discuss any questions you have with your health care provider.  Document Released: 09/15/2004 Document Revised: 08/12/2017 Document Reviewed: 03/17/2015  [employer] Interactive Patient Education © 2018 [employer]

## 2019-08-16 RX ORDER — LEVOTHYROXINE SODIUM 137 UG/1
TABLET ORAL
Qty: 90 TABLET | Refills: 0 | Status: SHIPPED | OUTPATIENT
Start: 2019-08-16 | End: 2019-11-05 | Stop reason: SDUPTHER

## 2019-08-20 PROBLEM — Z00.00 ENCOUNTER FOR GENERAL ADULT MEDICAL EXAMINATION WITHOUT ABNORMAL FINDINGS: Status: ACTIVE | Noted: 2018-04-09

## 2019-08-20 PROBLEM — N39.0 RECURRENT URINARY TRACT INFECTION: Status: ACTIVE | Noted: 2018-01-25

## 2019-08-20 PROBLEM — Z12.31 VISIT FOR SCREENING MAMMOGRAM: Status: ACTIVE | Noted: 2017-04-07

## 2019-08-20 PROBLEM — J30.9 ALLERGIC RHINITIS: Status: ACTIVE | Noted: 2017-04-07

## 2019-08-21 ENCOUNTER — OFFICE VISIT (OUTPATIENT)
Dept: FAMILY MEDICINE CLINIC | Facility: CLINIC | Age: 74
End: 2019-08-21

## 2019-08-21 VITALS
BODY MASS INDEX: 41.14 KG/M2 | DIASTOLIC BLOOD PRESSURE: 77 MMHG | HEIGHT: 66 IN | RESPIRATION RATE: 16 BRPM | HEART RATE: 80 BPM | OXYGEN SATURATION: 96 % | WEIGHT: 256 LBS | TEMPERATURE: 97.2 F | SYSTOLIC BLOOD PRESSURE: 138 MMHG

## 2019-08-21 DIAGNOSIS — R74.8 ELEVATED LIVER ENZYMES: Primary | ICD-10-CM

## 2019-08-21 DIAGNOSIS — R74.8 ELEVATED LIVER ENZYMES: ICD-10-CM

## 2019-08-21 DIAGNOSIS — E55.9 VITAMIN D DEFICIENCY: ICD-10-CM

## 2019-08-21 DIAGNOSIS — Z78.0 POSTMENOPAUSAL: ICD-10-CM

## 2019-08-21 DIAGNOSIS — Z00.01 ENCOUNTER FOR GENERAL ADULT MEDICAL EXAMINATION WITH ABNORMAL FINDINGS: Primary | ICD-10-CM

## 2019-08-21 DIAGNOSIS — E78.2 MIXED HYPERLIPIDEMIA: ICD-10-CM

## 2019-08-21 DIAGNOSIS — E03.9 ACQUIRED HYPOTHYROIDISM: ICD-10-CM

## 2019-08-21 DIAGNOSIS — N39.0 RECURRENT URINARY TRACT INFECTION: ICD-10-CM

## 2019-08-21 DIAGNOSIS — Z12.31 VISIT FOR SCREENING MAMMOGRAM: ICD-10-CM

## 2019-08-21 LAB
ALBUMIN SERPL-MCNC: 4.1 G/DL (ref 3.5–4.8)
ALBUMIN/GLOB SERPL: 1.5 G/DL (ref 1–1.7)
ALP SERPL-CCNC: 164 U/L (ref 32–91)
ALT SERPL W P-5'-P-CCNC: 20 U/L (ref 14–54)
ANION GAP SERPL CALCULATED.3IONS-SCNC: 16.4 MMOL/L (ref 5–15)
ARTICHOKE IGE QN: 100 MG/DL (ref 0–100)
AST SERPL-CCNC: 25 U/L (ref 15–41)
BACTERIA UR QL AUTO: ABNORMAL /HPF
BASOPHILS # BLD AUTO: 0 10*3/MM3 (ref 0–0.2)
BASOPHILS NFR BLD AUTO: 0.3 % (ref 0–1.5)
BILIRUB SERPL-MCNC: 0.9 MG/DL (ref 0.3–1.2)
BILIRUB UR QL STRIP: NEGATIVE
BUN BLD-MCNC: 12 MG/DL (ref 8–20)
BUN/CREAT SERPL: 15 (ref 5.4–26.2)
CALCIUM SPEC-SCNC: 9.6 MG/DL (ref 8.9–10.3)
CHLORIDE SERPL-SCNC: 105 MMOL/L (ref 101–111)
CHOLEST SERPL-MCNC: 165 MG/DL
CLARITY UR: ABNORMAL
CO2 SERPL-SCNC: 24 MMOL/L (ref 22–32)
COD CRY URNS QL: ABNORMAL /HPF
COLOR UR: YELLOW
CREAT BLD-MCNC: 0.8 MG/DL (ref 0.4–1)
DEPRECATED RDW RBC AUTO: 44.2 FL (ref 37–54)
EOSINOPHIL # BLD AUTO: 0 10*3/MM3 (ref 0–0.4)
EOSINOPHIL NFR BLD AUTO: 0.7 % (ref 0.3–6.2)
ERYTHROCYTE [DISTWIDTH] IN BLOOD BY AUTOMATED COUNT: 13.9 % (ref 12.3–15.4)
GFR SERPL CREATININE-BSD FRML MDRD: 71 ML/MIN/1.73
GGT SERPL-CCNC: 21 U/L (ref 7–50)
GLOBULIN UR ELPH-MCNC: 2.8 GM/DL (ref 2.5–3.8)
GLUCOSE BLD-MCNC: 100 MG/DL (ref 65–99)
GLUCOSE UR STRIP-MCNC: NEGATIVE MG/DL
HCT VFR BLD AUTO: 40.6 % (ref 34–46.6)
HDLC SERPL QL: 2.84
HDLC SERPL-MCNC: 58 MG/DL
HGB BLD-MCNC: 13.3 G/DL (ref 12–15.9)
HGB UR QL STRIP.AUTO: NEGATIVE
HYALINE CASTS UR QL AUTO: ABNORMAL /LPF
KETONES UR QL STRIP: NEGATIVE
LDLC/HDLC SERPL: 1.49 {RATIO}
LEUKOCYTE ESTERASE UR QL STRIP.AUTO: ABNORMAL
LYMPHOCYTES # BLD AUTO: 1.9 10*3/MM3 (ref 0.7–3.1)
LYMPHOCYTES NFR BLD AUTO: 29.2 % (ref 19.6–45.3)
MCH RBC QN AUTO: 29.6 PG (ref 26.6–33)
MCHC RBC AUTO-ENTMCNC: 32.8 G/DL (ref 31.5–35.7)
MCV RBC AUTO: 90.1 FL (ref 79–97)
MONOCYTES # BLD AUTO: 0.6 10*3/MM3 (ref 0.1–0.9)
MONOCYTES NFR BLD AUTO: 9.5 % (ref 5–12)
NEUTROPHILS # BLD AUTO: 4 10*3/MM3 (ref 1.7–7)
NEUTROPHILS NFR BLD AUTO: 60.3 % (ref 42.7–76)
NITRITE UR QL STRIP: NEGATIVE
NRBC BLD AUTO-RTO: 0.1 /100 WBC (ref 0–0.2)
PH UR STRIP.AUTO: 5.5 [PH] (ref 5–8)
PLATELET # BLD AUTO: 266 10*3/MM3 (ref 140–450)
PMV BLD AUTO: 9.8 FL (ref 6–12)
POTASSIUM BLD-SCNC: 4.4 MMOL/L (ref 3.6–5.1)
PROT SERPL-MCNC: 6.9 G/DL (ref 6.1–7.9)
PROT UR QL STRIP: NEGATIVE
RBC # BLD AUTO: 4.51 10*6/MM3 (ref 3.77–5.28)
RBC # UR: ABNORMAL /HPF
REF LAB TEST METHOD: ABNORMAL
SODIUM BLD-SCNC: 141 MMOL/L (ref 136–144)
SP GR UR STRIP: 1.02 (ref 1–1.03)
SQUAMOUS #/AREA URNS HPF: ABNORMAL /HPF
TRIGL SERPL-MCNC: 104 MG/DL
TSH SERPL DL<=0.05 MIU/L-ACNC: 0.62 MIU/ML (ref 0.34–5.6)
UROBILINOGEN UR QL STRIP: ABNORMAL
VLDLC SERPL-MCNC: 20.8 MG/DL
WBC NRBC COR # BLD: 6.6 10*3/MM3 (ref 3.4–10.8)
WBC UR QL AUTO: ABNORMAL /HPF

## 2019-08-21 PROCEDURE — 80061 LIPID PANEL: CPT | Performed by: FAMILY MEDICINE

## 2019-08-21 PROCEDURE — G0439 PPPS, SUBSEQ VISIT: HCPCS | Performed by: FAMILY MEDICINE

## 2019-08-21 PROCEDURE — 36415 COLL VENOUS BLD VENIPUNCTURE: CPT | Performed by: FAMILY MEDICINE

## 2019-08-21 PROCEDURE — 85025 COMPLETE CBC W/AUTO DIFF WBC: CPT | Performed by: FAMILY MEDICINE

## 2019-08-21 PROCEDURE — 80053 COMPREHEN METABOLIC PANEL: CPT | Performed by: FAMILY MEDICINE

## 2019-08-21 PROCEDURE — 87086 URINE CULTURE/COLONY COUNT: CPT | Performed by: FAMILY MEDICINE

## 2019-08-21 PROCEDURE — 81001 URINALYSIS AUTO W/SCOPE: CPT | Performed by: FAMILY MEDICINE

## 2019-08-21 PROCEDURE — 82306 VITAMIN D 25 HYDROXY: CPT | Performed by: FAMILY MEDICINE

## 2019-08-21 PROCEDURE — 84443 ASSAY THYROID STIM HORMONE: CPT | Performed by: FAMILY MEDICINE

## 2019-08-21 PROCEDURE — 82977 ASSAY OF GGT: CPT | Performed by: FAMILY MEDICINE

## 2019-08-21 PROCEDURE — 96160 PT-FOCUSED HLTH RISK ASSMT: CPT | Performed by: FAMILY MEDICINE

## 2019-08-21 RX ORDER — EPINEPHRINE 0.3 MG/.3ML
INJECTION SUBCUTANEOUS
Refills: 3 | COMMUNITY
Start: 2019-08-16

## 2019-08-21 RX ORDER — AZELASTINE HCL 205.5 UG/1
SPRAY NASAL
Refills: 3 | COMMUNITY
Start: 2019-08-16 | End: 2019-10-04

## 2019-08-21 RX ORDER — MONTELUKAST SODIUM 10 MG/1
TABLET ORAL
Refills: 3 | COMMUNITY
Start: 2019-08-16

## 2019-08-21 RX ORDER — TRIAMCINOLONE ACETONIDE 55 UG/1
SPRAY, METERED NASAL
Refills: 3 | COMMUNITY
Start: 2019-08-16 | End: 2019-10-04

## 2019-08-21 NOTE — PROGRESS NOTES
Subsequent Medicare Wellness Visit   The ABC's of the Annual Wellness Visit    Chief Complaint   Patient presents with   • Medicare Wellness-subsequent       HPI:  Sheree Vance, -1945, is a 74 y.o. female who presents for a Subsequent Medicare Wellness Visit.  She reports doing well and she denies any new issues.  She has some on and off issues with joint pains due to arthritis.  She will be starting physical therapy soon for her orthopedist.  She denies any issues with balance or falls.  She denies any memory or depression issues.  She denies any hearing issues.  Her medications are being reviewed.Patient denies any chest pain, shortness of breath, dizziness, nausea, vomiting, or diarrhea. No visual issues reported. No headaches, numbness or tingling. No urinary issues. Patient has good appetite and denies any weight changes. No swelling reported. No emotional issues.      Recent Hospitalizations:  No hospitalization(s) within the last year..    Current Medical Providers:  Patient Care Team:  Latia Guerrier MD as PCP - General  Latia Guerrier MD as PCP - Family Medicine  Dre Case MD (Cardiology)  Dre Case MD as Consulting Physician (Cardiology)  Yarelis Araya PA as Physician Assistant (Physician Assistant)  Jet Godwin MD as Consulting Physician (Ophthalmology)  Antonio Fine MD as Consulting Physician (Allergy)    Health Habits and Functional and Cognitive Screening and Depression Screening:  Functional & Cognitive Status 2019   Do you have difficulty preparing food and eating? No   Do you have difficulty bathing yourself, getting dressed or grooming yourself? No   Do you have difficulty using the toilet? No   Do you have difficulty moving around from place to place? No   Do you have trouble with steps or getting out of a bed or a chair? No   Current Diet Well Balanced Diet   Dental Exam Up to date   Eye Exam Up to date   Do you need help using the phone?   No   Are you deaf or do you have serious difficulty hearing?  No   Do you need help with transportation? No   Do you need help shopping? No   Do you need help preparing meals?  No   Do you need help with housework?  No   Do you need help with laundry? No   Do you need help taking your medications? No   Do you need help managing money? No   Have you felt unusual stress, anger or loneliness in the last month? No   Who do you live with? Spouse   If you need help, do you have trouble finding someone available to you? No   Have you been bothered in the last four weeks by sexual problems? No   Do you have difficulty concentrating, remembering or making decisions? No       Compared to one year ago, the patient feels her physical health is the same and her mental health is the same.    Depression Screen:  PHQ-2/PHQ-9 Depression Screening 8/21/2019   Little interest or pleasure in doing things 0   Feeling down, depressed, or hopeless 0   Total Score 0         Past Medical/Family/Social History:  The following portions of the patient's history were reviewed and updated as appropriate: allergies, current medications, past family history, past medical history, past social history, past surgical history and problem list.    No Known Allergies      Current Outpatient Medications:   •  amLODIPine (NORVASC) 5 MG tablet, Take 5 mg by mouth Daily., Disp: , Rfl:   •  apixaban (ELIQUIS) 5 MG tablet tablet, Take 1 tablet by mouth Every 12 (Twelve) Hours., Disp: 180 tablet, Rfl: 3  •  atorvastatin (LIPITOR) 10 MG tablet, Take 10 mg by mouth Daily., Disp: , Rfl:   •  azelastine (ASTEPRO) 0.15 % solution nasal spray, , Disp: , Rfl: 3  •  Cholecalciferol 2000 units capsule, Take  by mouth., Disp: , Rfl:   •  Chromium 200 MCG capsule, Take 100 mg by mouth Daily., Disp: , Rfl:   •  coenzyme Q10 100 MG capsule, Take 100 mg by mouth Daily., Disp: , Rfl:   •  dofetilide (TIKOSYN) 500 MCG capsule, Take 1 capsule by mouth 2 (Two) Times a Day.,  Disp: 180 capsule, Rfl: 3  •  EPINEPHrine (EPIPEN) 0.3 MG/0.3ML solution auto-injector injection, USE AS DIRECTED FOR ACUTE ALLERGIC REACTION, Disp: , Rfl: 3  •  furosemide (LASIX) 40 MG tablet, TAKE 1 TABLET BY MOUTH EVERY DAY AS NEEDED, Disp: 90 tablet, Rfl: 6  •  levothyroxine (SYNTHROID, LEVOTHROID) 137 MCG tablet, TAKE 1 TABLET BY MOUTH EVERY DAY, Disp: 90 tablet, Rfl: 0  •  losartan (COZAAR) 100 MG tablet, Take 100 mg by mouth Daily., Disp: , Rfl:   •  Magnesium 200 MG tablet, Take  by mouth Daily., Disp: , Rfl:   •  montelukast (SINGULAIR) 10 MG tablet, TK 1 T PO HS, Disp: , Rfl: 3  •  Multiple Vitamins-Minerals (MULTIVITAMIN ADULT) tablet, MULTIVITAMIN ADULT TABS, Disp: , Rfl:   •  NASACORT ALLERGY 24HR 55 MCG/ACT nasal inhaler, U 2 SPRAYS IEN D, Disp: , Rfl: 3  •  Omega-3 1000 MG capsule, Take  by mouth Daily., Disp: , Rfl:   •  potassium chloride (K-DUR,KLOR-CON) 20 MEQ CR tablet, TAKE 1 TABLET BY MOUTH DAILY AS NEEDED, Disp: 90 tablet, Rfl: 5  •  vitamin B-12 (CYANOCOBALAMIN) 1000 MCG tablet, Take 1,000 mcg by mouth Daily., Disp: , Rfl:     Aspirin use counseling: Taking ASA appropriately as indicated    Current medication list contains no high risk medications.  No harmful drug interactions have been identified.     Family History   Problem Relation Age of Onset   • Heart failure Mother    • Alcohol abuse Mother    • No Known Problems Father        Social History     Tobacco Use   • Smoking status: Never Smoker   • Smokeless tobacco: Never Used   Substance Use Topics   • Alcohol use: Yes     Frequency: Monthly or less     Comment: Occ Wine w/Dinner       Past Surgical History:   Procedure Laterality Date   • APPENDECTOMY     • BACK SURGERY  2016    Spinal Fusion   • BLADDER REPAIR  2001 in [place]   • CARDIAC CATHETERIZATION  2015   • CARDIOVERSION  08/7/18 & 4/17/18-[facility]    Dr. Villegas--For Chronic A-Fib   • CARDIOVERSION  12/4/28-[facility]    Dr. Case--See Report   • CATARACT EXTRACTION  2007   • COLONOSCOPY   04/13/2017   • HYSTERECTOMY      Partial   • KNEE SURGERY Right 2009   • OTHER SURGICAL HISTORY  12/4/18-[facility]    Fluoroscopy/Trans-Septal Access to L Atrium/Selective Venography of L Superior Pulm Vein/Add Lienier Radiofrequency Ablation Along the Coronary Sinus/Synchronized Cardioversion--Dr. Case   • SARA  12/4/18-[facility]    Mild Concentric L Ventricular Hypertrophy Noted; EF 55-60%; L Atrial Moderate-Severely Dilated, Moderat Mitral Annular Calcification with Moderate MVR Noted; Mild TVR; Normal LV Function       Patient Active Problem List   Diagnosis   • Atrial fibrillation (CMS/HCC)   • Hypertension   • Hyperlipidemia   • Left ventricular diastolic dysfunction   • Morbid obesity (CMS/HCC)   • Degenerative joint disease of right knee   • Gastroesophageal reflux disease   • Hyperglycemia   • Hypothyroidism   • Edema leg   • Knee pain, right   • Lumbosacral spondylosis without myelopathy   • Osteopenia   • Lumbar radiculopathy   • Spinal stenosis of lumbar region   • S/P lumbar fusion   • Primary osteoarthritis of right hip   • Trochanteric bursitis of right hip   • Arthritis of right sacroiliac joint (CMS/HCC)   • Allergic rhinitis   • Encounter for general adult medical examination with abnormal findings   • Recurrent urinary tract infection   • Visit for screening mammogram   • Postmenopausal   • Vitamin D deficiency       Review of Systems   Constitutional: Negative for activity change, fatigue and fever.   Respiratory: Negative for cough, shortness of breath and wheezing.    Cardiovascular: Negative for chest pain, palpitations and leg swelling.   Gastrointestinal: Negative for constipation, diarrhea and indigestion.   Skin: Negative for color change, dry skin and rash.   Neurological: Negative for tremors and headache.       Objective     Vitals:    08/21/19 1430   BP: 138/77   BP Location: Left arm   Patient Position: Sitting   Cuff Size: Adult   Pulse: 80   Resp: 16   Temp: 97.2 °F (36.2 °C)   TempSrc:  "Oral   SpO2: 96%   Weight: 116 kg (256 lb)   Height: 167.6 cm (66\")       Patient's Body mass index is 41.32 kg/m². BMI is above normal parameters. Recommendations include: exercise counseling.      No exam data present    The patient has no evidence of cognitve impairment.     Physical Exam   Constitutional: She is oriented to person, place, and time. She appears well-developed and well-nourished.   HENT:   Head: Normocephalic and atraumatic.   Eyes: Conjunctivae and EOM are normal. Pupils are equal, round, and reactive to light.   Neck: Normal range of motion. Neck supple.   Cardiovascular: Normal rate, regular rhythm, normal heart sounds and intact distal pulses. Exam reveals no gallop.   No murmur heard.  Pulmonary/Chest: Effort normal and breath sounds normal. No respiratory distress. She has no rales. She exhibits no tenderness.   Musculoskeletal: Normal range of motion. She exhibits no edema or deformity.   Neurological: She is alert and oriented to person, place, and time.   Skin: Skin is warm and dry.   Nursing note and vitals reviewed.      Recent Lab Results:     Lab Results   Component Value Date    CHOL 172 01/08/2019    TRIG 89 01/08/2019    HDL 67 01/08/2019    LDLHDL 1.4 01/08/2019       Assessment/Plan   Age-appropriate Screening Schedule:  Refer to the list below for future screening recommendations based on patient's age, sex and/or medical conditions.      Health Maintenance   Topic Date Due   • TDAP/TD VACCINES (1 - Tdap) 05/17/1966   • ZOSTER VACCINE (1 of 2) 05/17/1997   • PNEUMOCOCCAL VACCINES (65+ LOW/MEDIUM RISK) (1 of 2 - PCV13) 05/17/2012   • DXA SCAN  12/07/2018   • COLONOSCOPY  12/07/2018   • INFLUENZA VACCINE  08/01/2019   • LIPID PANEL  01/08/2020       Medicare Risks and Personalized Health Plan:  Advance Directive Discussion  Breast Cancer/Mammogram Screening  Cardiovascular risk  Colon Cancer Screening  Dementia/Memory   Depression/Dysphoria  Fall Risk  Hearing " Problem  Obesity/Overweight   Osteoprorosis Risk  Polypharmacy      CMS-Preventive Services Quick Reference  Medicare Preventive Services Addressed:  Annual Wellness Visit (AWV)  Bone Density Measurements  Screening Mammography     Advance Care Planning:  Patient does not have an advance directive - not interested in additional information    Diagnoses and all orders for this visit:    1. Encounter for general adult medical examination with abnormal findings (Primary)    2. Visit for screening mammogram  -     Mammo Screening Digital Tomosynthesis Bilateral With CAD; Future    3. Postmenopausal  -     DEXA Bone Density Axial; Future    4. Mixed hyperlipidemia  -     CBC Auto Differential  -     Comprehensive Metabolic Panel  -     Lipid Panel    5. Vitamin D deficiency  -     Vitamin D 25 Hydroxy    6. Acquired hypothyroidism  -     CBC Auto Differential  -     Comprehensive Metabolic Panel  -     TSH    7. Recurrent urinary tract infection  -     Urinalysis With Culture If Indicated - Urine, Clean Catch      Medicare wellness exam was done today.  I will be getting fasting blood work.  I reviewed her health maintenance.  I gave her order for mammogram and bone density and she is to schedule those tests.  She is up to speed with her colonoscopy.  We will also reviewed her immunization and she is up to speed with her pneumonia shots.  Shingrix was discussed and recommended and she was advised to talk to her pharmacist about that.  Healthy lifestyle was discussed in general and recommended.  Fall prevention was also discussed and stressed.      An After Visit Summary and PPPS with all of these plans were given to the patient.      Follow Up:  Return in about 6 months (around 2/21/2020) for Next scheduled follow up.

## 2019-08-22 LAB
25(OH)D3 SERPL-MCNC: 45.4 NG/ML (ref 30–100)
BACTERIA SPEC AEROBE CULT: NORMAL

## 2019-08-22 RX ORDER — CEPHALEXIN 500 MG/1
500 TABLET ORAL 2 TIMES DAILY
Qty: 14 TABLET | Refills: 0 | Status: SHIPPED | OUTPATIENT
Start: 2019-08-22 | End: 2019-10-04

## 2019-09-03 RX ORDER — LOSARTAN POTASSIUM 100 MG/1
TABLET ORAL
Qty: 90 TABLET | Refills: 1 | Status: SHIPPED | OUTPATIENT
Start: 2019-09-03 | End: 2019-12-30

## 2019-09-15 RX ORDER — ATORVASTATIN CALCIUM 10 MG/1
TABLET, FILM COATED ORAL
Qty: 90 TABLET | Refills: 1 | Status: SHIPPED | OUTPATIENT
Start: 2019-09-15 | End: 2019-12-30

## 2019-10-01 ENCOUNTER — TELEPHONE (OUTPATIENT)
Dept: ORTHOPEDIC SURGERY | Facility: CLINIC | Age: 74
End: 2019-10-01

## 2019-10-01 NOTE — TELEPHONE ENCOUNTER
Patient called requesting for a return call. I tried to call the patient back but had to leave a message.

## 2019-10-02 ENCOUNTER — TELEPHONE (OUTPATIENT)
Dept: ORTHOPEDIC SURGERY | Facility: CLINIC | Age: 74
End: 2019-10-02

## 2019-10-02 DIAGNOSIS — M47.818 ARTHRITIS OF RIGHT SACROILIAC JOINT: ICD-10-CM

## 2019-10-02 DIAGNOSIS — M70.61 TROCHANTERIC BURSITIS OF RIGHT HIP: Primary | ICD-10-CM

## 2019-10-02 DIAGNOSIS — M16.11 PRIMARY OSTEOARTHRITIS OF RIGHT HIP: ICD-10-CM

## 2019-10-02 NOTE — TELEPHONE ENCOUNTER
It is okay to go ahead and order the MRI for the patient.  However I would prefer that Yarelis speak with the patient because I do not know the details of her clinical presentation.

## 2019-10-02 NOTE — TELEPHONE ENCOUNTER
Patient called and stated PT is not helping and would like to get an MRI before her appt on 10/29/2019 with Yarelis. Please call patient when you get time please

## 2019-10-04 ENCOUNTER — OFFICE VISIT (OUTPATIENT)
Dept: CARDIOLOGY | Facility: CLINIC | Age: 74
End: 2019-10-04

## 2019-10-04 VITALS
HEART RATE: 83 BPM | OXYGEN SATURATION: 95 % | BODY MASS INDEX: 41.25 KG/M2 | DIASTOLIC BLOOD PRESSURE: 82 MMHG | SYSTOLIC BLOOD PRESSURE: 141 MMHG | RESPIRATION RATE: 18 BRPM | WEIGHT: 255.6 LBS

## 2019-10-04 DIAGNOSIS — I10 ESSENTIAL HYPERTENSION: ICD-10-CM

## 2019-10-04 DIAGNOSIS — E78.2 MIXED HYPERLIPIDEMIA: ICD-10-CM

## 2019-10-04 DIAGNOSIS — I48.19 PERSISTENT ATRIAL FIBRILLATION (HCC): Primary | ICD-10-CM

## 2019-10-04 PROCEDURE — 99213 OFFICE O/P EST LOW 20 MIN: CPT | Performed by: INTERNAL MEDICINE

## 2019-10-04 PROCEDURE — 93000 ELECTROCARDIOGRAM COMPLETE: CPT | Performed by: INTERNAL MEDICINE

## 2019-10-04 RX ORDER — LEVOCETIRIZINE DIHYDROCHLORIDE 5 MG/1
1 TABLET, FILM COATED ORAL DAILY
Refills: 0 | COMMUNITY
Start: 2019-08-21 | End: 2022-12-05

## 2019-10-04 RX ORDER — IPRATROPIUM BROMIDE 42 UG/1
2 SPRAY, METERED NASAL DAILY
Refills: 3 | COMMUNITY
Start: 2019-09-13 | End: 2020-10-05

## 2019-10-04 NOTE — PROGRESS NOTES
CC--Atrial fibrillation    SUB-- patient is 74 years old white female whose past medical history significant for hypertension, Persistent atrial fibrillation,  obesity  underwent AF ablation with early recurrence with initiation of Tikosyn to sinus rhythm with resolution of symptoms and comes in for evaluation   patient failed prior cardioversion and amiodarone   during hospitalization CT surgery consult was also requested for possible future convergence procedure because of severe left atrial enlargement  In 2009, patient was diagnosed with paroxysmal atrial fibrillation.  Patient was treated with beta-blockers initially.  Patient was started on flecainide later on.  In  2017, patient underwent cardiac catheterization at [facility] and coronary arteries were normal.  LV function was preserved.  Patient was started on Eliquis because of recurrence of atrial fibrillation.   chads vasc  score---3   patient was hypertensive and started on amlodipine  and HAD lower extremity edema in the Past resolved with Lasix and potassium   no recurrent symptoms since last visit     assessment plan   recurrent persistent atrial fibrillation and failed medical treatment with symptoms--  post cryoablation to sinus rhythm with early recurrence on Tikosyn to sinus rhythm   obesity   hypertension-- on amlodipine   hypothyroidism   leg edema-- resolved current medications   follow-up in 3 months  Lasix and potassium to be taken as needed basis        Vital Signs:    Blood pressure 141/82, pulse rate is 69 bpm patient is afebrile and respiratory rate is 12 times a minute  Medications: Medications were reviewed with the patient during this visit.  Allergies: Allergies were reviewed with the patient during this visit.  No Known Allergy.        Past Medical History:     Reviewed history from 01/08/2019 and no changes required:        HTN        Paroxysmal  A-fib - Dr. Lugo        Seasonal allergies - on allergy shots per ENT         Hyperlipidemia        Hypothyroidism        Chronic low back pain        Colonoscopy in 4/13/2017        Mammogram in 7/31/2018 - negative        Pneumonia shot in the past - both Pneumovax and Prevnar        Shingle shot in the past        Osteopenia - DEXA on 6/23/2017 - L/S: +0.5 and Hip: -0.8        GYN HM per GYN - Dr. Hopkins        Negative Hepatitis C screening in 10/2017                            Past Surgical History:     Reviewed history from 01/25/2018 and no changes required:        Spinal fusion in 2016 - Dr. Lester        Knee repair in 2009        Cataract surgery in 2007        Bladder repair in 2001 in [place]        Partial hysterectomy in 1977 due to fibroids        Appendectomy        Heart cath in 4/2017 - Dr. Penelope Wilkins - no significant disease        cataract surgery 2007        Social History:     Reviewed history from 11/02/2018 and no changes required:        Patient has never smoked.        Passive Smoke: N        Alcohol Use: Y        Drug Use: N        HIV/High Risk: N        Regular Exercise: N            Review of Systems   General: No fatigue or tiredness, No change in weight   Eyes: No redness  Cardiovascular: No chest pain, no palpitations  Respiratory: No shortness of breath  Gastrointestinal: No nausea or vomiting, bleeding  Genitourinary: no hematuria or dysuria  Musculoskeletal: No arthralgia or myalgia  Skin: No rash  Neurologic: No numbness, tingling, syncope  Hematologic/Lymphatic: No abnormal bleeding      Physical Exam    General:      well developed, well nourished, in no acute distress.    Head:      normocephalic and atraumatic.    Eyes:      PERRL/EOM intact, conjunctiva and sclera clear with out nystagmus.    Neck:      no masses, thyromegaly, trachea central with normal respiratory effort  Lungs:      clear bilaterally to auscultation.    Heart:      non-displaced PMI, chest non-tender; regular rate and rhythm, S1, S2 without murmurs, rubs, or gallops  Skin:       intact without lesions or rashes.    Psych:      alert and cooperative; normal mood and affect; normal attention span and concentration.      Extremities with trace ankle edema without any cyanosis or clubbing    Muscular skeletal patient walks with a cane with mild kyphosis    Neurological no focal deficits and alert oriented x3    Abdomen soft nontender no hepatomegaly    EKG  Sinus rhythm with left atrial enlargement with QTc interval of 432 ms--1 PAC noted and no significant changes compared to prior ECG and ECG indication includes atrial arrhythmia and Tikosyn therapy NJ interval is 199 heart rate of 73 with a normal axis

## 2019-10-29 ENCOUNTER — OFFICE VISIT (OUTPATIENT)
Dept: ORTHOPEDIC SURGERY | Facility: CLINIC | Age: 74
End: 2019-10-29

## 2019-10-29 VITALS
HEART RATE: 86 BPM | WEIGHT: 260.6 LBS | HEIGHT: 65 IN | SYSTOLIC BLOOD PRESSURE: 140 MMHG | DIASTOLIC BLOOD PRESSURE: 81 MMHG | BODY MASS INDEX: 43.42 KG/M2

## 2019-10-29 DIAGNOSIS — M47.818 ARTHRITIS OF RIGHT SACROILIAC JOINT: Primary | ICD-10-CM

## 2019-10-29 DIAGNOSIS — Z98.1 S/P LUMBAR FUSION: ICD-10-CM

## 2019-10-29 DIAGNOSIS — E66.01 MORBID OBESITY (HCC): ICD-10-CM

## 2019-10-29 DIAGNOSIS — M16.11 PRIMARY OSTEOARTHRITIS OF RIGHT HIP: ICD-10-CM

## 2019-10-29 DIAGNOSIS — M54.16 LUMBAR RADICULOPATHY: ICD-10-CM

## 2019-10-29 PROCEDURE — 99213 OFFICE O/P EST LOW 20 MIN: CPT | Performed by: PHYSICIAN ASSISTANT

## 2019-11-01 NOTE — PROGRESS NOTES
Subjective:    HPI:   Sheree Vance is a 74 y.o. female who presents in follow-up for her right hip pain.  She has been in formal PT and reports that her pain in the troch area has improved slightly but she continues to have pain in her entire right leg. She has spine hardware that has not been evaluated in years.    Past Medical History:   Diagnosis Date   • A-fib (CMS/HCC) Since 2001 Dx in [place]   • Allergic rhinitis     Hx Allergy Shots x 4 Yrs   • Aortic valve calcification 12/04/2018    Noted on SARA   • Arrhythmia    • Arthritis     Lumbar Spine   • Atrial flutter (CMS/HCC)    • Breast mass seen on mammogram 03/19/2014    L 4CM Mass--Benign & Followed   • Bruxism, sleep-related    • Chronic anticoagulation     Eliquis    • Chronic low back pain     Hx Back Sx in 2016   • DJD (degenerative joint disease), lumbar    • Dyslipidemia     w/Hypertriglyceridemia--Statin/Fish Oils   • Family history of premature CAD    • Heart disease    • History of bone density study 03/19/14-[facility]    T-Score of 1.4 Indicating Osteopenia   • History of EKG 04/2018 & 08/2018 & 12/2018    First Degree AV Block/Multiple Atrial Premature Blocks Noted on All   • Hormone replacement therapy (HRT)     Premarin-0.625MG   • Hyperlipidemia     Controlled w/Meds   • Hypertension     Controlled w/Losartan   • Hypothyroidism     Synthroid   • Insomnia     Takes OTC Sleep Aid PRN   • Left atrial enlargement 12/04/2018    Severe Noted on Cardioversion Report/SARA   • Left breast mass 03/19/2014    4CM Noted on Mammogram--Benign    • Mild tricuspid valve regurgitation 12/04/2018    Noted on SARA   • Mitral valve annular calcification 12/04/2018    Moderate Noted on SARA   • Mitral valve regurgitation 12/04/2018    Moderate Noted on SARA   • Obesity 12/04/2018    BMI-43.3    • SHANAE (obstructive sleep apnea)     w/Snoring/Dyspnea @ Night/Occ Dreams/Seldom Nightmares/Day-Time Sleepiness & Dozing Off   • Osteoarthritis of right hip 7/9/2019   •  Osteopenia 03/19/2014    Noted on Dexa Report   • Restless sleeper     Tosses and Turns All Night Per Pt   • Sinus congestion    • Tricuspid leaflet thickened 12/04/2018    Noted on SARA       Past Surgical History:   Procedure Laterality Date   • APPENDECTOMY     • BACK SURGERY  2016    Spinal Fusion   • BLADDER REPAIR  2001 in [place]   • CARDIAC CATHETERIZATION  2015   • CARDIOVERSION  08/7/18 & 4/17/18-[facility]    Dr. Villegas--For Chronic A-Fib   • CARDIOVERSION  12/4/28-[facility]    Dr. Case--See Report   • CATARACT EXTRACTION  2007   • COLONOSCOPY  04/13/2017   • HYSTERECTOMY      Partial   • KNEE SURGERY Right 2009   • OTHER SURGICAL HISTORY  12/4/18-[facility]    Fluoroscopy/Trans-Septal Access to L Atrium/Selective Venography of L Superior Pulm Vein/Add Lienier Radiofrequency Ablation Along the Coronary Sinus/Synchronized Cardioversion--Dr. Case   • SARA  12/4/18-[facility]    Mild Concentric L Ventricular Hypertrophy Noted; EF 55-60%; L Atrial Moderate-Severely Dilated, Moderat Mitral Annular Calcification with Moderate MVR Noted; Mild TVR; Normal LV Function       Social History     Occupational History   • Not on file   Tobacco Use   • Smoking status: Never Smoker   • Smokeless tobacco: Never Used   Substance and Sexual Activity   • Alcohol use: Yes     Frequency: Monthly or less     Comment: Occ Wine w/Dinner   • Drug use: No   • Sexual activity: Defer     Birth control/protection: Surgical     Comment: Hyst        Medications:    Current Outpatient Medications:   •  amLODIPine (NORVASC) 5 MG tablet, Take 5 mg by mouth Daily., Disp: , Rfl:   •  apixaban (ELIQUIS) 5 MG tablet tablet, Take 1 tablet by mouth Every 12 (Twelve) Hours., Disp: 180 tablet, Rfl: 3  •  atorvastatin (LIPITOR) 10 MG tablet, TAKE 1 TABLET AT BEDTIME, Disp: 90 tablet, Rfl: 1  •  Cholecalciferol 2000 units capsule, Take  by mouth., Disp: , Rfl:   •  Chromium 200 MCG capsule, Take 100 mg by mouth Daily., Disp: , Rfl:   •  coenzyme Q10 100 MG capsule, Take 100  "mg by mouth Daily., Disp: , Rfl:   •  dofetilide (TIKOSYN) 500 MCG capsule, Take 1 capsule by mouth 2 (Two) Times a Day., Disp: 180 capsule, Rfl: 3  •  EPINEPHrine (EPIPEN) 0.3 MG/0.3ML solution auto-injector injection, USE AS DIRECTED FOR ACUTE ALLERGIC REACTION, Disp: , Rfl: 3  •  ipratropium (ATROVENT) 0.06 % nasal spray, 2 sprays into the nostril(s) as directed by provider Daily., Disp: , Rfl: 3  •  levocetirizine (XYZAL) 5 MG tablet, Take 1 tablet by mouth Daily., Disp: , Rfl: 0  •  levothyroxine (SYNTHROID, LEVOTHROID) 137 MCG tablet, TAKE 1 TABLET BY MOUTH EVERY DAY, Disp: 90 tablet, Rfl: 0  •  losartan (COZAAR) 100 MG tablet, TAKE 1 TABLET EVERY DAY, Disp: 90 tablet, Rfl: 1  •  Magnesium 200 MG tablet, Take  by mouth Daily., Disp: , Rfl:   •  montelukast (SINGULAIR) 10 MG tablet, TK 1 T PO HS, Disp: , Rfl: 3  •  Multiple Vitamins-Minerals (MULTIVITAMIN ADULT) tablet, MULTIVITAMIN ADULT TABS, Disp: , Rfl:   •  Omega-3 1000 MG capsule, Take  by mouth Daily., Disp: , Rfl:   •  potassium chloride (K-DUR,KLOR-CON) 20 MEQ CR tablet, TAKE 1 TABLET BY MOUTH DAILY AS NEEDED, Disp: 90 tablet, Rfl: 5  •  vitamin B-12 (CYANOCOBALAMIN) 1000 MCG tablet, Take 1,000 mcg by mouth Daily., Disp: , Rfl:   •  furosemide (LASIX) 40 MG tablet, TAKE 1 TABLET BY MOUTH EVERY DAY AS NEEDED, Disp: 90 tablet, Rfl: 6    Allergies:  No Known Allergies    Review of Systems:  Gen -no fever, chills , sweats, headache   Eyes - no irritation or discharge   ENT -  no ear pain , runny nose , sore throat , difficulty swallowing   Resp - no cough , congestion , excessive expectoration   CVS - no chest pain , palpitations.   Abd - no pain , nausea , vomiting , diarrhea   Skin - no rash , lesions.   Neuro - no dizziness       Objective   Objective:    /81   Pulse 86   Ht 165.1 cm (65\")   Wt 118 kg (260 lb 9.6 oz)   BMI 43.37 kg/m²     Physical Examination:  Alert, oriented, morbidly obese individual in no acute distress, ambulating " unassisted  Right lower extremity shows no appreciable swelling in the thigh area. It is grossly well aligned, and the patient is neurovascularly intact distally. Plantar and dorsiflexion is 5/5. There is mild to moderate tenderness to palpation over the entire right leg and with range of motion, which is smooth and WNL.  Minimal tenderness to palpation over the right SI joint.         Imaging:            Assessment:  1. Arthritis of right sacroiliac joint    2. S/P lumbar fusion    3. Lumbar radiculopathy    4. Primary osteoarthritis of right hip    5. Morbid obesity (CMS/HCC)    ? fibromyalgia            Plan:  At this time, my recommendation is for her to have her spinal hardware evaluated by a spine surgeon, as well as her SI joint.  I do not think she needs an MRI of her hip at this time.  She should continue her weight loss efforts.  She may follow-up with us as needed.             SARBJIT Lopez  11/01/19  9:50 AM

## 2019-11-05 RX ORDER — LEVOTHYROXINE SODIUM 137 UG/1
TABLET ORAL
Qty: 90 TABLET | Refills: 0 | Status: SHIPPED | OUTPATIENT
Start: 2019-11-05 | End: 2019-12-30 | Stop reason: SDUPTHER

## 2019-11-13 ENCOUNTER — OFFICE VISIT (OUTPATIENT)
Dept: SLEEP MEDICINE | Facility: HOSPITAL | Age: 74
End: 2019-11-13

## 2019-11-13 VITALS
SYSTOLIC BLOOD PRESSURE: 151 MMHG | HEIGHT: 65 IN | DIASTOLIC BLOOD PRESSURE: 83 MMHG | WEIGHT: 240.6 LBS | HEART RATE: 82 BPM | BODY MASS INDEX: 40.08 KG/M2 | OXYGEN SATURATION: 95 %

## 2019-11-13 DIAGNOSIS — G47.33 OBSTRUCTIVE SLEEP APNEA, ADULT: Primary | ICD-10-CM

## 2019-11-13 PROCEDURE — G0463 HOSPITAL OUTPT CLINIC VISIT: HCPCS

## 2019-11-13 NOTE — PROGRESS NOTES
SLEEP MEDICINE CONSULT      Patient Identification:     Name: Sheree Vance   Age: 74 y.o.   Gender: female   : 1945   MRN: [id number]     Date of consult: 2019    PCP/Referring Physician(s):     PCP: Latia Guerrier MD  Referring Provider: Adenike Araujo MD      Chief Complaint:   Obstructive  of sleep apnea.  6 months follow-up for compliance.    History of Present Illness:   This is a very pleasant lady who is here for 6 months follow-up for compliance after using the mask with given pressures on regular basis.  Memory card has been downloaded.    She has used a full facemask which she has found quite uncomfortable.  Her mask is a year old.  Sometimes air leaks bothers her.  The pressures are quite comfortable for her.  She uses modifier on regular basis.    Her bedtime nowadays is between midnight to 1 AM.  She usually dozes off within 30 minutes.  She wakes up between 8 AM to 9 AM to start her day.  She has woken up once or twice at night with pain in her legs bothering her.  Knee hurts at night.  Occasionally she finds it difficult to sleep at night due to pain.  She denies symptoms of headache or heartburn at night or in the morning.  No dry mouth at night or in the morning.  Occasional postnasal drainage in the morning.  .  She does not snore with the mask in place.    She has woken up in the morning awake alert and rested if she has slept well the previous night.  She likes to have 3 cups of coffee in the morning.  She has occasionally dozed off around 4 PM.  She has felt much better after the nap.  She does not use her mask during her naps    Allergies, Medications, and Past History:   Allergies:    No Known Allergies  Current Medications:    Prior to Admission medications    Medication Sig Start Date End Date Taking? Authorizing Provider   amLODIPine (NORVASC) 5 MG tablet Take 5 mg by mouth Daily.   Yes Provider, MD César   apixaban (ELIQUIS) 5 MG tablet tablet Take 1 tablet  by mouth Every 12 (Twelve) Hours. 7/1/19  Yes Dre Case MD   atorvastatin (LIPITOR) 10 MG tablet TAKE 1 TABLET AT BEDTIME 9/15/19  Yes Latia Guerrier MD   Cholecalciferol 2000 units capsule Take  by mouth.   Yes César Martel MD   Chromium 200 MCG capsule Take 100 mg by mouth Daily.   Yes César Martel MD   coenzyme Q10 100 MG capsule Take 100 mg by mouth Daily.   Yes César Martel MD   dofetilide (TIKOSYN) 500 MCG capsule Take 1 capsule by mouth 2 (Two) Times a Day. 7/1/19  Yes Dre Case MD   EPINEPHrine (EPIPEN) 0.3 MG/0.3ML solution auto-injector injection USE AS DIRECTED FOR ACUTE ALLERGIC REACTION 8/16/19  Yes César Martel MD   furosemide (LASIX) 40 MG tablet TAKE 1 TABLET BY MOUTH EVERY DAY AS NEEDED 7/1/19  Yes Dre Case MD   ipratropium (ATROVENT) 0.06 % nasal spray 2 sprays into the nostril(s) as directed by provider Daily. 9/13/19  Yes César Martel MD   levocetirizine (XYZAL) 5 MG tablet Take 1 tablet by mouth Daily. 8/21/19  Yes César Martel MD   levothyroxine (SYNTHROID, LEVOTHROID) 137 MCG tablet TAKE 1 TABLET BY MOUTH EVERY DAY 11/5/19  Yes Latia Guerrier MD   losartan (COZAAR) 100 MG tablet TAKE 1 TABLET EVERY DAY 9/3/19  Yes Latia Guerrier MD   Magnesium 200 MG tablet Take  by mouth Daily.   Yes César Martel MD   montelukast (SINGULAIR) 10 MG tablet TK 1 T PO HS 8/16/19  Yes César Martel MD   Multiple Vitamins-Minerals (MULTIVITAMIN ADULT) tablet MULTIVITAMIN ADULT TABS 1/4/18  Yes César Martel MD   Omega-3 1000 MG capsule Take  by mouth Daily.   Yes César Martel MD   potassium chloride (K-DUR,KLOR-CON) 20 MEQ CR tablet TAKE 1 TABLET BY MOUTH DAILY AS NEEDED 7/1/19  Yes rDe Case MD   vitamin B-12 (CYANOCOBALAMIN) 1000 MCG tablet Take 1,000 mcg by mouth Daily.   Yes Provider, Historical, MD     Past Medical History:    Past Medical History:   Diagnosis Date   • A-fib  (CMS/HCC) Since 2001 Dx in [place]   • Allergic rhinitis     Hx Allergy Shots x 4 Yrs   • Aortic valve calcification 12/04/2018    Noted on SARA   • Arrhythmia    • Arthritis     Lumbar Spine   • Atrial flutter (CMS/HCC)    • Breast mass seen on mammogram 03/19/2014    L 4CM Mass--Benign & Followed   • Bruxism, sleep-related    • Chronic anticoagulation     Eliquis    • Chronic low back pain     Hx Back Sx in 2016   • DJD (degenerative joint disease), lumbar    • Dyslipidemia     w/Hypertriglyceridemia--Statin/Fish Oils   • Family history of premature CAD    • Heart disease    • History of bone density study 03/19/14-[facility]    T-Score of 1.4 Indicating Osteopenia   • History of EKG 04/2018 & 08/2018 & 12/2018    First Degree AV Block/Multiple Atrial Premature Blocks Noted on All   • Hormone replacement therapy (HRT)     Premarin-0.625MG   • Hyperlipidemia     Controlled w/Meds   • Hypertension     Controlled w/Losartan   • Hypothyroidism     Synthroid   • Insomnia     Takes OTC Sleep Aid PRN   • Left atrial enlargement 12/04/2018    Severe Noted on Cardioversion Report/SARA   • Left breast mass 03/19/2014    4CM Noted on Mammogram--Benign    • Mild tricuspid valve regurgitation 12/04/2018    Noted on SARA   • Mitral valve annular calcification 12/04/2018    Moderate Noted on SARA   • Mitral valve regurgitation 12/04/2018    Moderate Noted on SARA   • Obesity 12/04/2018    BMI-43.3    • SHANAE (obstructive sleep apnea)     w/Snoring/Dyspnea @ Night/Occ Dreams/Seldom Nightmares/Day-Time Sleepiness & Dozing Off   • Osteoarthritis of right hip 7/9/2019   • Osteopenia 03/19/2014    Noted on Dexa Report   • Restless sleeper     Tosses and Turns All Night Per Pt   • Sinus congestion    • Tricuspid leaflet thickened 12/04/2018    Noted on SARA      Past Surgical History:    Past Surgical History:   Procedure Laterality Date   • APPENDECTOMY     • BACK SURGERY  2016    Spinal Fusion   • BLADDER REPAIR  2001 in [place]   • CARDIAC CATHETERIZATION   2015   • CARDIOVERSION  08/7/18 & 4/17/18-[facility]    Dr. Villegas--For Chronic A-Fib   • CARDIOVERSION  12/4/28-[facility]    Dr. Case--See Report   • CATARACT EXTRACTION  2007   • COLONOSCOPY  04/13/2017   • HYSTERECTOMY      Partial   • KNEE SURGERY Right 2009   • OTHER SURGICAL HISTORY  12/4/18-[facility]    Fluoroscopy/Trans-Septal Access to L Atrium/Selective Venography of L Superior Pulm Vein/Add Lienier Radiofrequency Ablation Along the Coronary Sinus/Synchronized Cardioversion--Dr. Case   • SARA  12/4/18-[facility]    Mild Concentric L Ventricular Hypertrophy Noted; EF 55-60%; L Atrial Moderate-Severely Dilated, Moderat Mitral Annular Calcification with Moderate MVR Noted; Mild TVR; Normal LV Function      Social History:    Social History     Tobacco Use   • Smoking status: Never Smoker   • Smokeless tobacco: Never Used   Substance Use Topics   • Alcohol use: Yes     Frequency: Monthly or less     Comment: Occ Wine w/Dinner   • Drug use: No     Family Medical History:  Family History   Problem Relation Age of Onset   • Heart failure Mother    • Alcohol abuse Mother    • No Known Problems Father          Review of Systems:   Constitutional:  Denies fever or chills and weight gain  Eyes:  Denies change in visual acuity   HENT: She has nasal congestion, sore throat or post nasal drainage .  She has been tested for allergies.  She has been taking allergy immunization for last 2-1/2 months.  Respiratory:  Denies cough, shortness of breath or dyspnea on exertion    Cardiovascular:  Denies chest pain or edema .  Atrial fibrillation resolved.  She continues to take medications.  GI:  Denies abdominal pain, nausea, vomiting, bloody stools or diarrhea .  No aerophagia.  :  Denies dysuria or nocturia   Musculoskeletal:  Denies back pain or joint pain   Integument:  Denies rash   Neurologic:  Denies headache, focal weakness or sensory changes. No history of stroke or seizures.   Endocrine:  Denies polyuria or polydipsia   Lymphatic:   "Denies swollen glands   Psychiatric:  Denies depression, anxiety or claustrophobia      Physical Exam:     Vitals:    11/13/19 1251   BP: 151/83   Pulse: 82   SpO2: 95%   Weight: 109 kg (240 lb 9.6 oz)   Height: 165.1 cm (65\")     HEENT: Head is normocephalic, atraumatic, normal distribution of hair. Pupils reactive to light. Ocular movements intact. No nasal congestion on sniff test. No deviated nasal septum. No micrognathia.  Throat: Mallapatti stage 4, tongue midline, mucosa moist.  Neck: no JVD, thyromegaly, mass, +midline trachea, Neck supple no lymphadenopathy.  Lungs: clear, no wheeze, rhonchi, crackles  Cardiovascular: S1, S2, RRR no murmurs, rubs or gallops  Abd: +BS's soft NT/ND, no CVA tenderness.    Ext: no edema, cyanosis, clubbing, pulses intact  Neuro: Awake alert, oriented to time place and person. Grossly intact to motor, sensory cerebral, and cerebellar (without focal deficit)  Psych: No overt julio, depression, psychosis or inappropriate behavior  Skin: No rash, cellulitis, or ulcerations.  No facial rash or erosions    DIAGNOSTIC DATA  Memory card download shows data from 5/15/2019 to 11/12/2019.  Overall 182 days of data reviewed.  99.5% of use noted.  Maximum hours use 9 hours 25 minutes.  Minimum time used 4 hours 7 minutes.  99.5% of the time the mask has been used for more than 4 hours.  The average apnea hypopnea index is 2.8 events per hour an auto CPAP mode of therapy with a pressure range between 5-20 CWP.  Assessment/Plan:   Obstructive sleep apnea:  Is a very pleasant lady who has been diagnosed with obstructive sleep apnea.  She uses an auto CPAP mode of therapy.  She has remained compliant with therapy.  She is getting full benefit from it.    The results of the memory card download were discussed in detail with the patient all questions were answered.  Recommendation.  She is advised to continue using her mask with given pressures on a nightly basis.  She is advised to use the mask " with given pressures for at least 4 hours a minimum benefit or as long as she sleeps a maximum benefit.  She is advised to use the mask with given pressures if she takes a nap during the daytime.  Allergic rhinitis:  She has remained symptomatic.  She underwent allergy testing.  Presently she is on immunization.  She is getting some benefit from it.  Recommendation to continue immunization regimen.  Insomnia:  Most likely related to her underlying chronic pain syndrome.  Recommendation.  Aggressive therapy for chronic pain syndrome is recommended  Obesity;  Based on BMI of 40.  Recommendation.  Weight loss is recommended.  Driving instructions. Patient is advised to avoid driving if tired or somnolent. To avoid all alcoholic beverages or medications causing somnolence.         Diagnosis Plan   1. Obstructive sleep apnea, adult  CPAP Therapy     No orders of the defined types were placed in this encounter.    Orders Placed This Encounter   Procedures   • CPAP Therapy     Please provide DREAMWARE FULL FACE MASK. Size  To fit.     Order Specific Question:   PAP Machine Brand     Answer:   Respironics     Order Specific Question:   PAP Tubing (1 per 3 months)     Answer:    6' Standard tubing     Order Specific Question:   PAP Mask (1 per 3 months)     Answer:    Full face mask     Order Specific Question:   Mask interface (1 per month)     Answer:    Face Mask Interface     Order Specific Question:   PAP Accessories     Answer:    Water Chamber for PAP Humidifier (1 per 6 month) &  Filter PAP Disposable (2 per month) &  Filter PAP Non-Disposable (1 per 6 months) &  Chinstrap to be used with PAP (1 per 6 months) &  Headgear to be used with PAP (1 per 6 mo)     Order Specific Question:   Does the patient have Obstructive Sleep Apnea or other qualifying diagnosis for PAP ordered?     Answer:   Yes     Order Specific Question:   Does the patient require humidification?     Answer:    Yes     Order Specific Question:   Humidifier     Answer:    Humidifier Heated for CPAP or BiPAP     Order Specific Question:   The patient requires a PAP Device & continued use of Supplies to administer effective PAP therapy at the frequency of use ordered above     Answer:   Yes     Order Specific Question:   Initial Supplies and refills authorized for 12 months?     Answer:   Yes     Order Specific Question:   The face to face evaluation was performed on     Answer:   11/13/2019     Order Specific Question:   Which [employer] company is this being sent to?     Answer:   [facility] [[id number]]     Order Specific Question:   Length of Need (99 Months = Lifetime)     Answer:   99 Months = Lifetime       This document has been electronically signed by:  Adenike Araujo MD  11/13/2019  6:31 PM

## 2019-11-19 ENCOUNTER — OFFICE VISIT (OUTPATIENT)
Dept: ORTHOPEDIC SURGERY | Facility: CLINIC | Age: 74
End: 2019-11-19

## 2019-11-19 VITALS
WEIGHT: 257 LBS | HEIGHT: 65 IN | BODY MASS INDEX: 42.82 KG/M2 | HEART RATE: 87 BPM | DIASTOLIC BLOOD PRESSURE: 78 MMHG | SYSTOLIC BLOOD PRESSURE: 147 MMHG

## 2019-11-19 DIAGNOSIS — M25.561 CHRONIC PAIN OF RIGHT KNEE: ICD-10-CM

## 2019-11-19 DIAGNOSIS — G89.29 CHRONIC PAIN OF RIGHT KNEE: ICD-10-CM

## 2019-11-19 DIAGNOSIS — M43.16 SPONDYLOLISTHESIS OF LUMBAR REGION: ICD-10-CM

## 2019-11-19 DIAGNOSIS — M54.16 LUMBAR RADICULOPATHY: Primary | ICD-10-CM

## 2019-11-19 PROCEDURE — 20610 DRAIN/INJ JOINT/BURSA W/O US: CPT | Performed by: PHYSICIAN ASSISTANT

## 2019-11-19 PROCEDURE — 99214 OFFICE O/P EST MOD 30 MIN: CPT | Performed by: PHYSICIAN ASSISTANT

## 2019-11-19 NOTE — PROGRESS NOTES
[facility] New patient    Patient Name: Sheree Vance    History of Present Illness:  Sheree Vance is a 74 y.o. year old female here today with chief complaint of had concerns including Pain and Initial Evaluation of the Right Hip and Pain and Initial Evaluation of the Right Knee..history of back pain starting in 2009 got pretty bad in 2014 and proceeded with surgery at [facility]. L4-S1 fusion. She did really well. Began having rle pain in dec of last year. radiates from her right buttock to her right knee. No back pain now. She has been to physical therapy for five weeks with no progress.    Severe right knee pain with ambulation. Has been treated for knee arthritis in the past.  She had a previous right knee arthroscopic surgery for meniscal tear several years ago.    Imaging: My interpretation of AP and lateral lumbar x-ray Tlif L4-S1 solid. She has adjacent level degen with retrolisthesis of L3-4. significant right hip pain.  She has multiple level disc degeneration in the thoracolumbar region.    Impression:   1. Lumbar radiculopathy    2. Spondylolisthesis of lumbar region    3. Chronic pain of right knee           Plan: MRI of lumbar spine.  Because this patient also has severe right knee pain, popping and clicking in her right knee consistent with right knee arthritis I have offered to give her a right intra-articular knee injection for both pain relief and to help differentiate sciatica versus primary osteoarthritis the right knee.  Follow-up after MRI.    Right knee injection was performed using an anterior lateral approach inferior to the patella on the right side.  Area cleaned copiously with chlorhexidine, topical numbing spray was utilized and needle inserted without resistance.  Injected with a total of 80 mg Depo-Medrol and 4 cc of lidocaine without incident.  Dressed with Band-Aid.  Patient tolerated this well and was observed for several minutes prior to leaving the  office to ensure no adverse reaction.    Methylprednisolone 80 mg  NDC 9401-0498-55  LOT #[id number]  Expiration 11/20    Xylocaine MPF 5 ml  NDC 94588-158-45  Lot #[id number]  Expiration 1/23    Vitals:  Vitals:    11/19/19 0807   BP: 147/78   Pulse: 87       Patient's Family and Social History:  Family History   Problem Relation Age of Onset   • Heart failure Mother    • Alcohol abuse Mother    • No Known Problems Father      Social History     Socioeconomic History   • Marital status:      Spouse name: Papi   • Number of children: 1   • Years of education: Not on file   • Highest education level: Not on file   Tobacco Use   • Smoking status: Never Smoker   • Smokeless tobacco: Never Used   Substance and Sexual Activity   • Alcohol use: Yes     Frequency: Monthly or less     Comment: Occ Wine w/Dinner   • Drug use: No   • Sexual activity: Defer     Birth control/protection: Surgical     Comment: Hyst       Patients Medical and Surgical History:  Past Medical History:   Diagnosis Date   • A-fib (CMS/HCC) Since 2001 Dx in [place]   • Allergic rhinitis     Hx Allergy Shots x 4 Yrs   • Aortic valve calcification 12/04/2018    Noted on SARA   • Arrhythmia    • Arthritis     Lumbar Spine   • Atrial flutter (CMS/HCC)    • Breast mass seen on mammogram 03/19/2014    L 4CM Mass--Benign & Followed   • Bruxism, sleep-related    • Chronic anticoagulation     Eliquis    • Chronic low back pain     Hx Back Sx in 2016   • DJD (degenerative joint disease), lumbar    • Dyslipidemia     w/Hypertriglyceridemia--Statin/Fish Oils   • Family history of premature CAD    • Heart disease    • History of bone density study 03/19/14-[facility]    T-Score of 1.4 Indicating Osteopenia   • History of EKG 04/2018 & 08/2018 & 12/2018    First Degree AV Block/Multiple Atrial Premature Blocks Noted on All   • Hormone replacement therapy (HRT)     Premarin-0.625MG   • Hyperlipidemia     Controlled w/Meds   • Hypertension     Controlled w/Losartan   •  Hypothyroidism     Synthroid   • Insomnia     Takes OTC Sleep Aid PRN   • Left atrial enlargement 12/04/2018    Severe Noted on Cardioversion Report/SARA   • Left breast mass 03/19/2014    4CM Noted on Mammogram--Benign    • Mild tricuspid valve regurgitation 12/04/2018    Noted on SARA   • Mitral valve annular calcification 12/04/2018    Moderate Noted on SARA   • Mitral valve regurgitation 12/04/2018    Moderate Noted on SARA   • Obesity 12/04/2018    BMI-43.3    • SHANAE (obstructive sleep apnea)     w/Snoring/Dyspnea @ Night/Occ Dreams/Seldom Nightmares/Day-Time Sleepiness & Dozing Off   • Osteoarthritis of right hip 7/9/2019   • Osteopenia 03/19/2014    Noted on Dexa Report   • Restless sleeper     Tosses and Turns All Night Per Pt   • Sinus congestion    • Tricuspid leaflet thickened 12/04/2018    Noted on SARA     Past Surgical History:   Procedure Laterality Date   • APPENDECTOMY     • BACK SURGERY  2016    Spinal Fusion   • BLADDER REPAIR  2001 in [place]   • CARDIAC CATHETERIZATION  2015   • CARDIOVERSION  08/7/18 & 4/17/18-[facility]    Dr. Villegas--For Chronic A-Fib   • CARDIOVERSION  12/4/28-[facility]    Dr. Case--See Report   • CATARACT EXTRACTION  2007   • COLONOSCOPY  04/13/2017   • HYSTERECTOMY      Partial   • KNEE SURGERY Right 2009   • OTHER SURGICAL HISTORY  12/4/18-[facility]    Fluoroscopy/Trans-Septal Access to L Atrium/Selective Venography of L Superior Pulm Vein/Add Lienier Radiofrequency Ablation Along the Coronary Sinus/Synchronized Cardioversion--Dr. Case   • SARA  12/4/18-[facility]    Mild Concentric L Ventricular Hypertrophy Noted; EF 55-60%; L Atrial Moderate-Severely Dilated, Moderat Mitral Annular Calcification with Moderate MVR Noted; Mild TVR; Normal LV Function       Review of Systems   Constitutional: Negative for activity change, appetite change and fatigue.   HENT: Negative for congestion.    Eyes: Negative for visual disturbance.   Respiratory: Negative for shortness of breath.    Gastrointestinal: Negative  for abdominal pain.   Genitourinary: Negative for flank pain.   Musculoskeletal: Positive for arthralgias, back pain, gait problem and joint swelling.   Skin: Negative for wound.   Neurological: Negative for weakness and headaches.   Psychiatric/Behavioral: Negative for behavioral problems. The patient is not nervous/anxious.        Physical Exam   Constitutional: She is oriented to person, place, and time. She appears well-developed.   HENT:   Head: Normocephalic and atraumatic.   Eyes: Conjunctivae are normal.   Neck: Normal range of motion.   Cardiovascular: Normal rate.   Pulmonary/Chest: Effort normal.   Abdominal: There is no guarding.   Musculoskeletal: She exhibits tenderness.        Right hip: Normal.        Left hip: Normal.        Lumbar back: She exhibits decreased range of motion, tenderness and pain.   Neurological: She is oriented to person, place, and time.   Skin: Skin is warm.   Psychiatric: Her behavior is normal.   Vitals reviewed.    Right Knee Exam     Tenderness   The patient is experiencing tenderness in the lateral joint line and medial joint line.    Tests   Varus: positive Valgus: positive      Right Hip Exam   Right hip exam is normal.       Back Exam     Tenderness   The patient is experiencing tenderness in the lumbar.    Range of Motion   Extension: abnormal   Flexion: abnormal   Lateral bend right: abnormal   Lateral bend left: abnormal     Reflexes   Patellar: normal  Achilles: normal    Other   Toe walk: normal  Heel walk: normal  Gait: antalgic             Orders Placed This Encounter   Procedures   • XR Spine Lumbar 2 or 3 View     Scheduling Instructions:      rm 19      LSpine ap/lat      NP - LBP, mainly right hip and knee pain, referred from [facility], previous fusion years ago      8:08     Order Specific Question:   Reason for Exam:     Answer:   low back pain

## 2019-12-03 ENCOUNTER — HOSPITAL ENCOUNTER (OUTPATIENT)
Dept: MRI IMAGING | Facility: HOSPITAL | Age: 74
Discharge: HOME | End: 2019-12-03
Admitting: PHYSICIAN ASSISTANT

## 2019-12-03 PROCEDURE — 72148 MRI LUMBAR SPINE W/O DYE: CPT

## 2019-12-05 ENCOUNTER — HOSPITAL ENCOUNTER (OUTPATIENT)
Dept: OTHER | Facility: HOSPITAL | Age: 74
Discharge: HOME | End: 2019-12-05

## 2019-12-05 DIAGNOSIS — Z00.6 EXAMINATION FOR NORMAL COMPARISON OR CONTROL IN CLINICAL RESEARCH: ICD-10-CM

## 2019-12-18 ENCOUNTER — OFFICE VISIT (OUTPATIENT)
Dept: ORTHOPEDIC SURGERY | Facility: CLINIC | Age: 74
End: 2019-12-18

## 2019-12-18 VITALS
HEIGHT: 65 IN | WEIGHT: 257.2 LBS | DIASTOLIC BLOOD PRESSURE: 83 MMHG | BODY MASS INDEX: 42.85 KG/M2 | SYSTOLIC BLOOD PRESSURE: 149 MMHG | HEART RATE: 91 BPM

## 2019-12-18 DIAGNOSIS — M51.36 DDD (DEGENERATIVE DISC DISEASE), LUMBAR: Primary | ICD-10-CM

## 2019-12-18 PROCEDURE — 99213 OFFICE O/P EST LOW 20 MIN: CPT | Performed by: ORTHOPAEDIC SURGERY

## 2019-12-18 NOTE — PROGRESS NOTES
Visit Type: Follow Up  Referring Provider: No ref. provider found  Primary Care Provider: Latia Guerrier MD    Patient Name: Sheree Vance  Patient Age: 74 y.o.  Chief Complaint: had concerns including Follow-up of the Lumbar Spine (MRI Lspine).    Subjective   History of Present Illness: This patient is a pleasant 74 years old overweight white female who complains of axial lower back pain with radiation of pain to her right knee the patient is a status post L4-S1 fusion by Dr. Floyd a couple of years ago the patient had MRI which is demonstrating spinal stenosis at level of L3-L4 and L1-L2.  The patient tried physical therapy without any benefit the patient is here for further evaluation and treatment.      Review of Systems   Musculoskeletal: Positive for arthralgias, back pain and myalgias.   Neurological: Positive for weakness.       The following portions of the patient's history were reviewed and updated as appropriate: allergies, current medications, past family history, past medical history, past social history, past surgical history and problem list.    Past Medical History:   Diagnosis Date   • A-fib (CMS/[facility]) Since 2001 Dx in [place]   • Allergic rhinitis     Hx Allergy Shots x 4 Yrs   • Aortic valve calcification 12/04/2018    Noted on SARA   • Arrhythmia    • Arthritis     Lumbar Spine   • Atrial flutter (CMS/[facility])    • Breast mass seen on mammogram 03/19/2014    L 4CM Mass--Benign & Followed   • Bruxism, sleep-related    • Chronic anticoagulation     Eliquis    • Chronic low back pain     Hx Back Sx in 2016   • DJD (degenerative joint disease), lumbar    • Dyslipidemia     w/Hypertriglyceridemia--Statin/Fish Oils   • Family history of premature CAD    • Heart disease    • History of bone density study 03/19/14-[facility]    T-Score of 1.4 Indicating Osteopenia   • History of EKG 04/2018 & 08/2018 & 12/2018    First Degree AV Block/Multiple Atrial Premature Blocks Noted on All   • Hormone replacement  therapy (HRT)     Premarin-0.625MG   • Hyperlipidemia     Controlled w/Meds   • Hypertension     Controlled w/Losartan   • Hypothyroidism     Synthroid   • Insomnia     Takes OTC Sleep Aid PRN   • Left atrial enlargement 12/04/2018    Severe Noted on Cardioversion Report/SARA   • Left breast mass 03/19/2014    4CM Noted on Mammogram--Benign    • Mild tricuspid valve regurgitation 12/04/2018    Noted on SARA   • Mitral valve annular calcification 12/04/2018    Moderate Noted on SARA   • Mitral valve regurgitation 12/04/2018    Moderate Noted on SARA   • Obesity 12/04/2018    BMI-43.3    • SHANAE (obstructive sleep apnea)     w/Snoring/Dyspnea @ Night/Occ Dreams/Seldom Nightmares/Day-Time Sleepiness & Dozing Off   • Osteoarthritis of right hip 7/9/2019   • Osteopenia 03/19/2014    Noted on Dexa Report   • Restless sleeper     Tosses and Turns All Night Per Pt   • Sinus congestion    • Tricuspid leaflet thickened 12/04/2018    Noted on SARA       Past Surgical History:   Procedure Laterality Date   • APPENDECTOMY     • BACK SURGERY  2016    Spinal Fusion   • BLADDER REPAIR  2001 in [place]   • CARDIAC CATHETERIZATION  2015   • CARDIOVERSION  08/7/18 & 4/17/18-[facility]    Dr. Villegas--For Chronic A-Fib   • CARDIOVERSION  12/4/28-[facility]    Dr. Case--See Report   • CATARACT EXTRACTION  2007   • COLONOSCOPY  04/13/2017   • HYSTERECTOMY      Partial   • KNEE SURGERY Right 2009   • OTHER SURGICAL HISTORY  12/4/18-[facility]    Fluoroscopy/Trans-Septal Access to L Atrium/Selective Venography of L Superior Pulm Vein/Add Lienier Radiofrequency Ablation Along the Coronary Sinus/Synchronized Cardioversion--Dr. Case   • SARA  12/4/18-[facility]    Mild Concentric L Ventricular Hypertrophy Noted; EF 55-60%; L Atrial Moderate-Severely Dilated, Moderat Mitral Annular Calcification with Moderate MVR Noted; Mild TVR; Normal LV Function       Vitals:    12/18/19 1233   BP: 149/83   Pulse: 91       Patient Active Problem List   Diagnosis   • Atrial fibrillation  (CMS/[facility])   • Hypertension   • Hyperlipidemia   • Left ventricular diastolic dysfunction   • Morbid obesity (CMS/HCC)   • Degenerative joint disease of right knee   • Gastroesophageal reflux disease   • Hyperglycemia   • Hypothyroidism   • Edema leg   • Knee pain, right   • Lumbosacral spondylosis without myelopathy   • Osteopenia   • Lumbar radiculopathy   • Spinal stenosis of lumbar region   • S/P lumbar fusion   • Primary osteoarthritis of right hip   • Trochanteric bursitis of right hip   • Arthritis of right sacroiliac joint   • Allergic rhinitis   • Encounter for general adult medical examination with abnormal findings   • Recurrent urinary tract infection   • Visit for screening mammogram   • Postmenopausal   • Vitamin D deficiency       Family History Summary:  family history includes Alcohol abuse in her mother; Heart failure in her mother; No Known Problems in her father.    Social History     Socioeconomic History   • Marital status:      Spouse name: Papi   • Number of children: 1   • Years of education: Not on file   • Highest education level: Not on file   Tobacco Use   • Smoking status: Never Smoker   • Smokeless tobacco: Never Used   Substance and Sexual Activity   • Alcohol use: Yes     Frequency: Monthly or less     Comment: Occ Wine w/Dinner   • Drug use: No   • Sexual activity: Defer     Birth control/protection: Surgical     Comment: Hyst         Current Outpatient Medications:   •  amLODIPine (NORVASC) 5 MG tablet, Take 5 mg by mouth Daily., Disp: , Rfl:   •  apixaban (ELIQUIS) 5 MG tablet tablet, Take 1 tablet by mouth Every 12 (Twelve) Hours., Disp: 180 tablet, Rfl: 3  •  atorvastatin (LIPITOR) 10 MG tablet, TAKE 1 TABLET AT BEDTIME, Disp: 90 tablet, Rfl: 1  •  Cholecalciferol 2000 units capsule, Take  by mouth., Disp: , Rfl:   •  Chromium 200 MCG capsule, Take 100 mg by mouth Daily., Disp: , Rfl:   •  coenzyme Q10 100 MG capsule, Take 100 mg by mouth Daily., Disp: , Rfl:   •   dofetilide (TIKOSYN) 500 MCG capsule, Take 1 capsule by mouth 2 (Two) Times a Day., Disp: 180 capsule, Rfl: 3  •  EPINEPHrine (EPIPEN) 0.3 MG/0.3ML solution auto-injector injection, USE AS DIRECTED FOR ACUTE ALLERGIC REACTION, Disp: , Rfl: 3  •  furosemide (LASIX) 40 MG tablet, TAKE 1 TABLET BY MOUTH EVERY DAY AS NEEDED, Disp: 90 tablet, Rfl: 6  •  ipratropium (ATROVENT) 0.06 % nasal spray, 2 sprays into the nostril(s) as directed by provider Daily., Disp: , Rfl: 3  •  levocetirizine (XYZAL) 5 MG tablet, Take 1 tablet by mouth Daily., Disp: , Rfl: 0  •  levothyroxine (SYNTHROID, LEVOTHROID) 137 MCG tablet, TAKE 1 TABLET BY MOUTH EVERY DAY, Disp: 90 tablet, Rfl: 0  •  losartan (COZAAR) 100 MG tablet, TAKE 1 TABLET EVERY DAY, Disp: 90 tablet, Rfl: 1  •  Magnesium 200 MG tablet, Take  by mouth Daily., Disp: , Rfl:   •  montelukast (SINGULAIR) 10 MG tablet, TK 1 T PO HS, Disp: , Rfl: 3  •  Multiple Vitamins-Minerals (MULTIVITAMIN ADULT) tablet, MULTIVITAMIN ADULT TABS, Disp: , Rfl:   •  Omega-3 1000 MG capsule, Take  by mouth Daily., Disp: , Rfl:   •  vitamin B-12 (CYANOCOBALAMIN) 1000 MCG tablet, Take 1,000 mcg by mouth Daily., Disp: , Rfl:     No Known Allergies        Objective   Physical Exam  Back Exam     Tenderness   The patient is experiencing tenderness in the lumbar.    Range of Motion   Extension:  30 abnormal   Flexion:  80 abnormal   Lateral bend right:  50 abnormal   Lateral bend left:  50 abnormal   Rotation right:  40 abnormal   Rotation left:  40 abnormal     Muscle Strength   The patient has normal back strength.  Right Quadriceps:  4/5   Left Quadriceps:  5/5   Right Hamstrings:  5/5   Left Hamstrings:  5/5     Tests   Straight leg raise right: negative  Straight leg raise left: negative    Reflexes   Patellar: Hyporeflexic  Achilles: Hyporeflexic  Biceps: Hyporeflexic  Babinski's sign: normal     Other   Toe walk: normal  Gait: varus thrust   Scars: present              Impression and Plan: This  patient is here today for follow-up the patient is a status post L4-S1 fusion in another facility patient is complaining of axial lower back pain with radiation of pain to her right lower extremity the patient has significant right knee pain the patient has flexion contracture of her right knee about 10 degrees, her sagittal alignment of lumbar spine is not normal her 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  Her lumbar MRIs demonstrating stenosis of L3-L4 and L1- L2 and knee x-rays compatible with significant right knee osteoarthritis especially at lateral compartment.  Plan; my recommendation is to proceed with a lumbar epidural block of L3-L4 the patient is going to see a knee surgeon for possible total knee arthroplasty and if her back pain did not get better after arthroplasty the patient need surgical intervention for her lumbar spine the patient would like to see her previous spine surgeon.    DDD (degenerative disc disease), lumbar [M51.36]     Orders Placed This Encounter   Procedures   • Ambulatory Referral to Pain Management     Referral Priority:   Routine     Referral Type:   Pain Management     Referral Reason:   Specialty Services Required     Requested Specialty:   Pain Medicine     Number of Visits Requested:   1               Olga Carr MD  12/18/19  1:27 PM

## 2019-12-30 ENCOUNTER — OFFICE VISIT (OUTPATIENT)
Dept: FAMILY MEDICINE CLINIC | Facility: CLINIC | Age: 74
End: 2019-12-30

## 2019-12-30 VITALS
SYSTOLIC BLOOD PRESSURE: 174 MMHG | WEIGHT: 258 LBS | OXYGEN SATURATION: 94 % | DIASTOLIC BLOOD PRESSURE: 88 MMHG | HEIGHT: 65 IN | HEART RATE: 92 BPM | TEMPERATURE: 97.8 F | BODY MASS INDEX: 42.99 KG/M2

## 2019-12-30 DIAGNOSIS — N39.0 RECURRENT URINARY TRACT INFECTION: ICD-10-CM

## 2019-12-30 DIAGNOSIS — I10 ESSENTIAL HYPERTENSION: Primary | ICD-10-CM

## 2019-12-30 DIAGNOSIS — E03.9 ACQUIRED HYPOTHYROIDISM: ICD-10-CM

## 2019-12-30 DIAGNOSIS — R30.0 DYSURIA: ICD-10-CM

## 2019-12-30 DIAGNOSIS — N95.2 ATROPHIC VAGINITIS: ICD-10-CM

## 2019-12-30 LAB
BILIRUB BLD-MCNC: NEGATIVE MG/DL
CLARITY, POC: ABNORMAL
COLOR UR: YELLOW
GLUCOSE UR STRIP-MCNC: NEGATIVE MG/DL
KETONES UR QL: NEGATIVE
LEUKOCYTE EST, POC: ABNORMAL
NITRITE UR-MCNC: NEGATIVE MG/ML
PH UR: 5.5 [PH] (ref 5–8)
PROT UR STRIP-MCNC: NEGATIVE MG/DL
RBC # UR STRIP: ABNORMAL /UL
SP GR UR: 1.01 (ref 1–1.03)
UROBILINOGEN UR QL: NORMAL

## 2019-12-30 PROCEDURE — 99214 OFFICE O/P EST MOD 30 MIN: CPT | Performed by: FAMILY MEDICINE

## 2019-12-30 PROCEDURE — 81003 URINALYSIS AUTO W/O SCOPE: CPT | Performed by: FAMILY MEDICINE

## 2019-12-30 RX ORDER — LOSARTAN POTASSIUM 100 MG/1
TABLET ORAL
Qty: 90 TABLET | Refills: 1 | Status: SHIPPED | OUTPATIENT
Start: 2019-12-30 | End: 2020-02-21

## 2019-12-30 RX ORDER — AMLODIPINE BESYLATE 10 MG/1
10 TABLET ORAL DAILY
Qty: 30 TABLET | Refills: 0 | Status: SHIPPED | OUTPATIENT
Start: 2019-12-30 | End: 2020-01-27 | Stop reason: SDUPTHER

## 2019-12-30 RX ORDER — SULFAMETHOXAZOLE AND TRIMETHOPRIM 800; 160 MG/1; MG/1
1 TABLET ORAL 2 TIMES DAILY
Qty: 14 TABLET | Refills: 0 | Status: SHIPPED | OUTPATIENT
Start: 2019-12-30 | End: 2020-01-06

## 2019-12-30 RX ORDER — AMLODIPINE BESYLATE 10 MG/1
10 TABLET ORAL DAILY
Qty: 90 TABLET | OUTPATIENT
Start: 2019-12-30

## 2019-12-30 RX ORDER — LEVOTHYROXINE SODIUM 137 UG/1
137 TABLET ORAL DAILY
Qty: 90 TABLET | Refills: 0 | Status: SHIPPED | OUTPATIENT
Start: 2019-12-30 | End: 2020-03-09

## 2019-12-30 RX ORDER — ATORVASTATIN CALCIUM 10 MG/1
TABLET, FILM COATED ORAL
Qty: 90 TABLET | Refills: 1 | Status: SHIPPED | OUTPATIENT
Start: 2019-12-30 | End: 2020-09-07

## 2019-12-30 NOTE — PROGRESS NOTES
Subjective   Chief Complaint   Patient presents with   • Urinary Tract Infection   • Hypertension   • Hypothyroidism     refills     Sheree Vance is a 74 y.o. female.     Patient Care Team:  Latia Guerrier MD as PCP - General  Latia Guerrier MD as PCP - Family Medicine  Dre Case MD (Cardiology)  Yarelis Araya PA as Physician Assistant (Physician Assistant)  Jet Godwin MD as Consulting Physician (Ophthalmology)  Antonio Fine MD as Consulting Physician (Allergy)    She is coming in today to discuss few issues. She reports that she had sexual encounter with her  about 2 weeks ago and it was pretty painful due to vaginal dryness. She later on noted even some mild bloody discharge from her vagina. She used to be on vaginal estrogen cream in the past and she would like to go back on it.  Few days after a sexual encounter she started noticing some urinary symptoms including frequency, urgency, and some burning upon urination.  She denies any blood in urine.  She denies any abdominal pain, nausea, and vomiting.  While in the office today she also wants to follow-up on her hypertension and hypothyroidism.  She currently forgot her medication.  She has noted that her blood pressure has been running pretty high lately.  She is currently on amlodipine 5 mg a day.  She denies any chest pains or shortness of breath.  She denies any heat or cold intolerance.       The following portions of the patient's history were reviewed and updated as appropriate: allergies, current medications, past family history, past medical history, past social history, past surgical history and problem list.  Past Medical History:   Diagnosis Date   • A-fib (CMS/[facility]) Since 2001 Dx in [place]   • Allergic rhinitis     Hx Allergy Shots x 4 Yrs   • Aortic valve calcification 12/04/2018    Noted on SARA   • Arrhythmia    • Arthritis     Lumbar Spine   • Atrial flutter (CMS/[facility])    • Breast mass seen on mammogram  03/19/2014    L 4CM Mass--Benign & Followed   • Bruxism, sleep-related    • Chronic anticoagulation     Eliquis    • Chronic low back pain     Hx Back Sx in 2016   • DJD (degenerative joint disease), lumbar    • Dyslipidemia     w/Hypertriglyceridemia--Statin/Fish Oils   • Family history of premature CAD    • Heart disease    • History of bone density study 03/19/14-[facility]    T-Score of 1.4 Indicating Osteopenia   • History of EKG 04/2018 & 08/2018 & 12/2018    First Degree AV Block/Multiple Atrial Premature Blocks Noted on All   • Hormone replacement therapy (HRT)     Premarin-0.625MG   • Hyperlipidemia     Controlled w/Meds   • Hypertension     Controlled w/Losartan   • Hypothyroidism     Synthroid   • Insomnia     Takes OTC Sleep Aid PRN   • Left atrial enlargement 12/04/2018    Severe Noted on Cardioversion Report/SARA   • Left breast mass 03/19/2014    4CM Noted on Mammogram--Benign    • Mild tricuspid valve regurgitation 12/04/2018    Noted on SARA   • Mitral valve annular calcification 12/04/2018    Moderate Noted on SARA   • Mitral valve regurgitation 12/04/2018    Moderate Noted on SARA   • Obesity 12/04/2018    BMI-43.3    • SHANAE (obstructive sleep apnea)     w/Snoring/Dyspnea @ Night/Occ Dreams/Seldom Nightmares/Day-Time Sleepiness & Dozing Off   • Osteoarthritis of right hip 7/9/2019   • Osteopenia 03/19/2014    Noted on Dexa Report   • Restless sleeper     Tosses and Turns All Night Per Pt   • Sinus congestion    • Tricuspid leaflet thickened 12/04/2018    Noted on SARA     Past Surgical History:   Procedure Laterality Date   • APPENDECTOMY     • BACK SURGERY  2016    Spinal Fusion   • BLADDER REPAIR  2001 in [place]   • CARDIAC CATHETERIZATION  2015   • CARDIOVERSION  08/7/18 & 4/17/18-[facility]    Dr. Villegas--For Chronic A-Fib   • CARDIOVERSION  12/4/28-[facility]    Dr. Case--See Report   • CATARACT EXTRACTION  2007   • COLONOSCOPY  04/13/2017   • HYSTERECTOMY      Partial   • KNEE SURGERY Right 2009   • OTHER SURGICAL  "HISTORY  12/4/18-F    Fluoroscopy/Trans-Septal Access to L Atrium/Selective Venography of L Superior Pulm Vein/Add Lienier Radiofrequency Ablation Along the Coronary Sinus/Synchronized Cardioversion--Dr. Case   • SARA  12/4/18-F    Mild Concentric L Ventricular Hypertrophy Noted; EF 55-60%; L Atrial Moderate-Severely Dilated, Moderat Mitral Annular Calcification with Moderate MVR Noted; Mild TVR; Normal LV Function     The patient has a family history of  Family History   Problem Relation Age of Onset   • Heart failure Mother    • Alcohol abuse Mother    • No Known Problems Father      Social History     Socioeconomic History   • Marital status:      Spouse name: Papi   • Number of children: 1   • Years of education: Not on file   • Highest education level: Not on file   Tobacco Use   • Smoking status: Never Smoker   • Smokeless tobacco: Never Used   Substance and Sexual Activity   • Alcohol use: Yes     Frequency: Monthly or less     Comment: Occ Wine w/Dinner   • Drug use: No   • Sexual activity: Defer     Birth control/protection: Surgical     Comment: Hyst       Review of Systems   Constitutional: Negative for fatigue and fever.   Gastrointestinal: Negative for abdominal pain, diarrhea, nausea and vomiting.   Endocrine: Negative for cold intolerance and heat intolerance.   Genitourinary: Positive for dysuria, frequency, urgency and vaginal pain. Negative for flank pain, hematuria, pelvic pain, pelvic pressure, urinary incontinence, vaginal bleeding and vaginal discharge.   Skin: Negative for dry skin and rash.     Visit Vitals  /88 (BP Location: Left arm, Patient Position: Sitting, Cuff Size: Adult)   Pulse 92   Temp 97.8 °F (36.6 °C) (Axillary)   Ht 165.1 cm (65\")   Wt 117 kg (258 lb)   SpO2 94%   BMI 42.93 kg/m²       Current Outpatient Medications:   •  amLODIPine (NORVASC) 10 MG tablet, Take 1 tablet by mouth Daily., Disp: 30 tablet, Rfl: 0  •  apixaban (ELIQUIS) 5 MG tablet tablet, " Take 1 tablet by mouth Every 12 (Twelve) Hours., Disp: 180 tablet, Rfl: 3  •  atorvastatin (LIPITOR) 10 MG tablet, TAKE 1 TABLET AT BEDTIME, Disp: 90 tablet, Rfl: 1  •  Cholecalciferol 2000 units capsule, Take  by mouth., Disp: , Rfl:   •  Chromium 200 MCG capsule, Take 100 mg by mouth Daily., Disp: , Rfl:   •  coenzyme Q10 100 MG capsule, Take 100 mg by mouth Daily., Disp: , Rfl:   •  conjugated estrogens (PREMARIN) 0.625 MG/GM vaginal cream, Use per vagina 2-3 times a week, Disp: 3 each, Rfl: 0  •  dofetilide (TIKOSYN) 500 MCG capsule, Take 1 capsule by mouth 2 (Two) Times a Day., Disp: 180 capsule, Rfl: 3  •  EPINEPHrine (EPIPEN) 0.3 MG/0.3ML solution auto-injector injection, USE AS DIRECTED FOR ACUTE ALLERGIC REACTION, Disp: , Rfl: 3  •  furosemide (LASIX) 40 MG tablet, TAKE 1 TABLET BY MOUTH EVERY DAY AS NEEDED, Disp: 90 tablet, Rfl: 6  •  ipratropium (ATROVENT) 0.06 % nasal spray, 2 sprays into the nostril(s) as directed by provider Daily., Disp: , Rfl: 3  •  levocetirizine (XYZAL) 5 MG tablet, Take 1 tablet by mouth Daily., Disp: , Rfl: 0  •  levothyroxine (SYNTHROID, LEVOTHROID) 137 MCG tablet, Take 1 tablet by mouth Daily., Disp: 90 tablet, Rfl: 0  •  losartan (COZAAR) 100 MG tablet, TAKE 1 TABLET EVERY DAY, Disp: 90 tablet, Rfl: 1  •  Magnesium 200 MG tablet, Take  by mouth Daily., Disp: , Rfl:   •  montelukast (SINGULAIR) 10 MG tablet, TK 1 T PO HS, Disp: , Rfl: 3  •  Multiple Vitamins-Minerals (MULTIVITAMIN ADULT) tablet, MULTIVITAMIN ADULT TABS, Disp: , Rfl:   •  Omega-3 1000 MG capsule, Take  by mouth Daily., Disp: , Rfl:   •  sulfamethoxazole-trimethoprim (BACTRIM DS) 800-160 MG per tablet, Take 1 tablet by mouth 2 (Two) Times a Day for 7 days., Disp: 14 tablet, Rfl: 0  •  vitamin B-12 (CYANOCOBALAMIN) 1000 MCG tablet, Take 1,000 mcg by mouth Daily., Disp: , Rfl:     Objective   Physical Exam   Constitutional: She is oriented to person, place, and time. She appears well-developed and well-nourished.    HENT:   Head: Normocephalic and atraumatic.   Eyes: Pupils are equal, round, and reactive to light. Conjunctivae and EOM are normal.   Neck: Normal range of motion. Neck supple.   Cardiovascular: Normal rate, regular rhythm, normal heart sounds and intact distal pulses.   Pulmonary/Chest: Effort normal and breath sounds normal.   Abdominal: Soft. Bowel sounds are normal.   Musculoskeletal: Normal range of motion. She exhibits no edema or deformity.   Neurological: She is alert and oriented to person, place, and time.   Skin: Skin is warm and dry.   Nursing note and vitals reviewed.      Mri Lumbar Spine Without Contrast    Result Date: 12/6/2019  Impression:  1. Posterior and interbody fusion construct at L4-S1 without complicating features. There is mild residual spondylolisthesis. 2. Moderate multilevel disc and facet joint degeneration resulting in varying degrees of neural foraminal and spinal canal stenosis as described in detail above, most pronounced at the L4-L5 level, where there is moderate narrowing of the spinal canal and moderate to severe bilateral neural foraminal stenosis. 3. Atrophy of the paraspinal musculature.  Electronically Signed By-Bogdan Lopez On:12/6/2019 12:11 PM This report was finalized on [id number] by  Bogdan Lopez, .      Office Visit on 12/30/2019   Component Date Value Ref Range Status   • Color 12/30/2019 Yellow  Yellow, Straw, Dark Yellow, Aminata Final   • Clarity, UA 12/30/2019 Cloudy* Clear Final   • Specific Gravity  12/30/2019 1.015  1.005 - 1.030 Final   • pH, Urine 12/30/2019 5.5  5.0 - 8.0 Final   • Leukocytes 12/30/2019 Large (3+)* Negative Final   • Nitrite, UA 12/30/2019 Negative  Negative Final   • Protein, POC 12/30/2019 Negative  Negative mg/dL Final   • Glucose, UA 12/30/2019 Negative  Negative, 1000 mg/dL (3+) mg/dL Final   • Ketones, UA 12/30/2019 Negative  Negative Final   • Urobilinogen, UA 12/30/2019 Normal  Normal Final   • Bilirubin 12/30/2019 Negative   Negative Final   • Blood, UA 12/30/2019 Trace* Negative Final                 Assessment/Plan   Problems Addressed this Visit        Cardiovascular and Mediastinum    Hypertension - Primary    Relevant Medications    amLODIPine (NORVASC) 10 MG tablet       Endocrine    Hypothyroidism    Relevant Medications    levothyroxine (SYNTHROID, LEVOTHROID) 137 MCG tablet       Nervous and Auditory    Dysuria    Relevant Orders    POC Urinalysis Dipstick, Automated (Completed)    Urine Culture - Urine, Urine, Clean Catch       Genitourinary    Recurrent urinary tract infection    Atrophic vaginitis        Her urinalysis looks suspicious for UTI.  I will obtain urine culture.  I will start antibiotics.  She was advised to keep up with fluid intake.  I will start her back on vaginal estrogen due to worsening cough her atrophic vaginitis symptoms.  She is a status post hysterectomy.  Her blood pressure is not well controlled.  I will increase the dose of her amlodipine from 5mg to 10 mg and she will continue to monitor her blood pressure.  She is to follow-up with her cardiologist in about a month and her blood pressure will be reevaluated at that time.  Her hypothyroidism is well controlled with her current dose of medication.  She will continue the             Requested Prescriptions     Signed Prescriptions Disp Refills   • conjugated estrogens (PREMARIN) 0.625 MG/GM vaginal cream 3 each 0     Sig: Use per vagina 2-3 times a week   • sulfamethoxazole-trimethoprim (BACTRIM DS) 800-160 MG per tablet 14 tablet 0     Sig: Take 1 tablet by mouth 2 (Two) Times a Day for 7 days.   • levothyroxine (SYNTHROID, LEVOTHROID) 137 MCG tablet 90 tablet 0     Sig: Take 1 tablet by mouth Daily.   • amLODIPine (NORVASC) 10 MG tablet 30 tablet 0     Sig: Take 1 tablet by mouth Daily.

## 2019-12-31 PROCEDURE — 87186 SC STD MICRODIL/AGAR DIL: CPT | Performed by: FAMILY MEDICINE

## 2019-12-31 PROCEDURE — 87086 URINE CULTURE/COLONY COUNT: CPT | Performed by: FAMILY MEDICINE

## 2019-12-31 PROCEDURE — 87088 URINE BACTERIA CULTURE: CPT | Performed by: FAMILY MEDICINE

## 2020-01-02 LAB — BACTERIA SPEC AEROBE CULT: ABNORMAL

## 2020-01-27 ENCOUNTER — OFFICE VISIT (OUTPATIENT)
Dept: CARDIOLOGY | Facility: CLINIC | Age: 75
End: 2020-01-27

## 2020-01-27 VITALS
HEART RATE: 78 BPM | BODY MASS INDEX: 42.93 KG/M2 | WEIGHT: 258 LBS | DIASTOLIC BLOOD PRESSURE: 76 MMHG | SYSTOLIC BLOOD PRESSURE: 152 MMHG

## 2020-01-27 DIAGNOSIS — G47.33 OBSTRUCTIVE SLEEP APNEA SYNDROME: ICD-10-CM

## 2020-01-27 DIAGNOSIS — I10 ESSENTIAL HYPERTENSION: Primary | ICD-10-CM

## 2020-01-27 DIAGNOSIS — E78.2 MIXED HYPERLIPIDEMIA: ICD-10-CM

## 2020-01-27 DIAGNOSIS — I48.0 PAROXYSMAL ATRIAL FIBRILLATION (HCC): ICD-10-CM

## 2020-01-27 PROCEDURE — 93000 ELECTROCARDIOGRAM COMPLETE: CPT | Performed by: INTERNAL MEDICINE

## 2020-01-27 PROCEDURE — 99214 OFFICE O/P EST MOD 30 MIN: CPT | Performed by: INTERNAL MEDICINE

## 2020-01-27 RX ORDER — AMLODIPINE BESYLATE 10 MG/1
10 TABLET ORAL DAILY
Qty: 90 TABLET | Refills: 3 | Status: SHIPPED | OUTPATIENT
Start: 2020-01-27 | End: 2020-05-26 | Stop reason: SDUPTHER

## 2020-01-27 RX ORDER — SPIRONOLACTONE 25 MG/1
25 TABLET ORAL DAILY
Qty: 90 TABLET | Refills: 1 | Status: SHIPPED | OUTPATIENT
Start: 2020-01-27 | End: 2020-09-08

## 2020-01-27 RX ORDER — SPIRONOLACTONE 25 MG/1
25 TABLET ORAL DAILY
Qty: 30 TABLET | Refills: 11 | Status: SHIPPED | OUTPATIENT
Start: 2020-01-27 | End: 2020-01-27

## 2020-01-27 NOTE — PROGRESS NOTES
CC--Atrial fibrillation, hypertension    SUB-- patient is 74 years old white female whose past medical history significant for hypertension, Persistent atrial fibrillation,  obesity  underwent AF ablation with early recurrence with initiation of Tikosyn to sinus rhythm with resolution of symptoms and comes in for evaluation. patient failed prior cardioversion and amiodarone   during hospitalization CT surgery consult was also requested for possible future convergence procedure because of severe left atrial enlargement  In 2009, patient was diagnosed with paroxysmal atrial fibrillation.  Patient was treated with beta-blockers initially.  Patient was started on flecainide later on.  In  2017, patient underwent cardiac catheterization at [facility] and coronary arteries were normal.  LV function was preserved.  Patient was started on Eliquis because of recurrence of atrial fibrillation.   chads vasc  score---3   patient was hypertensive and started on amlodipine  and continues to have elevated blood pressure despite amlodipine, losartan   no recurrent symptoms since last visit     assessment plan   recurrent persistent atrial fibrillation and failed medical treatment with symptoms--  post cryoablation to sinus rhythm with early recurrence on Tikosyn to sinus rhythm   obesity   hypertension-- on amlodipine, losartan--hypertension not well optimized--- Aldactone added and BMP recommended in 2 weeks and refill of amlodipine given to optimize hypertension treatment   hypothyroidism   leg edema-- resolved current medications   follow-up in few months  Lasix and potassium to be taken as needed basis  Treated sleep apnea        Vital Signs: Blood pressure 152/76 pulse rate is 78 patient is afebrile respiration 12 times a minute  EKG shows sinus rhythm with artifacts with left atrial enlargement with heart rate of 78 QRS of 126  and indication for EKG includes Tikosyn therapy and atrial fibrillation and no  significant EKG changes compared to prior EKG        Past Medical History:     Reviewed history from 01/08/2019 and no changes required:        HTN        Paroxysmal  A-fib - Dr. Lugo        Seasonal allergies - on allergy shots per ENT        Hyperlipidemia        Hypothyroidism        Chronic low back pain        Colonoscopy in 4/13/2017        Mammogram in 7/31/2018 - negative        Pneumonia shot in the past - both Pneumovax and Prevnar        Shingle shot in the past        Osteopenia - DEXA on 6/23/2017 - L/S: +0.5 and Hip: -0.8        GYN HM per GYN - Dr. Hopkins        Negative Hepatitis C screening in 10/2017                            Past Surgical History:     Reviewed history from 01/25/2018 and no changes required:        Spinal fusion in 2016 - Dr. Lester        Knee repair in 2009        Cataract surgery in 2007        Bladder repair in 2001 in [place]        Partial hysterectomy in 1977 due to fibroids        Appendectomy        Heart cath in 4/2017 - Dr. Penelope Wilkins - no significant disease        cataract surgery 2007             Review of Systems   General: No fatigue or tiredness, No change in weight   Eyes: No redness  Cardiovascular: No chest pain, no palpitations  Respiratory: No shortness of breath  Gastrointestinal: No nausea or vomiting, bleeding  Genitourinary: no hematuria or dysuria  Musculoskeletal: Positive for arthritis without  myalgia  Skin: No rash  Neurologic: No numbness, tingling, syncope  Hematologic/Lymphatic: No abnormal bleeding      Physical Exam    General:      well developed, well nourished, in no acute distress.    Head:      normocephalic and atraumatic.    Eyes:      PERRL/EOM intact, conjunctiva and sclera clear with out nystagmus.    Neck:      no masses, thyromegaly, trachea central with normal respiratory effort  Lungs:      clear bilaterally to auscultation.    Heart:      non-displaced PMI, chest non-tender; regular rate and rhythm, S1, S2 without murmurs,  rubs, or gallops  Skin:      intact without lesions or rashes.    Psych:      alert and cooperative; normal mood and affect; normal attention span and concentration.      Extremities with trace ankle edema without any cyanosis or clubbing    Muscular skeletal patient walks with a cane with mild kyphosis    Neurological no focal deficits and alert oriented x3    Abdomen soft nontender no hepatomegaly    Electronically signed by Dre Case MD, 01/27/20, 3:15 PM.

## 2020-02-21 ENCOUNTER — OFFICE VISIT (OUTPATIENT)
Dept: FAMILY MEDICINE CLINIC | Facility: CLINIC | Age: 75
End: 2020-02-21

## 2020-02-21 VITALS
RESPIRATION RATE: 16 BRPM | OXYGEN SATURATION: 94 % | HEART RATE: 90 BPM | TEMPERATURE: 98 F | WEIGHT: 259 LBS | DIASTOLIC BLOOD PRESSURE: 81 MMHG | SYSTOLIC BLOOD PRESSURE: 161 MMHG | BODY MASS INDEX: 43.15 KG/M2 | HEIGHT: 65 IN

## 2020-02-21 DIAGNOSIS — N95.2 ATROPHIC VAGINITIS: ICD-10-CM

## 2020-02-21 DIAGNOSIS — E78.2 MIXED HYPERLIPIDEMIA: ICD-10-CM

## 2020-02-21 DIAGNOSIS — I10 ESSENTIAL HYPERTENSION: Primary | ICD-10-CM

## 2020-02-21 DIAGNOSIS — E55.9 VITAMIN D DEFICIENCY: ICD-10-CM

## 2020-02-21 DIAGNOSIS — E03.9 ACQUIRED HYPOTHYROIDISM: ICD-10-CM

## 2020-02-21 DIAGNOSIS — I48.0 PAROXYSMAL ATRIAL FIBRILLATION (HCC): ICD-10-CM

## 2020-02-21 PROBLEM — R30.0 DYSURIA: Status: RESOLVED | Noted: 2019-12-30 | Resolved: 2020-02-21

## 2020-02-21 PROBLEM — N39.0 RECURRENT URINARY TRACT INFECTION: Status: RESOLVED | Noted: 2018-01-25 | Resolved: 2020-02-21

## 2020-02-21 PROBLEM — M70.61 TROCHANTERIC BURSITIS OF RIGHT HIP: Status: RESOLVED | Noted: 2019-07-09 | Resolved: 2020-02-21

## 2020-02-21 PROBLEM — Z00.01 ENCOUNTER FOR GENERAL ADULT MEDICAL EXAMINATION WITH ABNORMAL FINDINGS: Status: RESOLVED | Noted: 2018-04-09 | Resolved: 2020-02-21

## 2020-02-21 PROCEDURE — 84443 ASSAY THYROID STIM HORMONE: CPT | Performed by: FAMILY MEDICINE

## 2020-02-21 PROCEDURE — 80048 BASIC METABOLIC PNL TOTAL CA: CPT | Performed by: FAMILY MEDICINE

## 2020-02-21 PROCEDURE — 99214 OFFICE O/P EST MOD 30 MIN: CPT | Performed by: FAMILY MEDICINE

## 2020-02-21 PROCEDURE — 36415 COLL VENOUS BLD VENIPUNCTURE: CPT | Performed by: FAMILY MEDICINE

## 2020-02-21 PROCEDURE — 80061 LIPID PANEL: CPT | Performed by: FAMILY MEDICINE

## 2020-02-21 PROCEDURE — 82306 VITAMIN D 25 HYDROXY: CPT | Performed by: FAMILY MEDICINE

## 2020-02-21 RX ORDER — TELMISARTAN 80 MG/1
80 TABLET ORAL DAILY
Qty: 30 TABLET | Refills: 0 | Status: SHIPPED | OUTPATIENT
Start: 2020-02-21 | End: 2020-03-16

## 2020-02-21 RX ORDER — TELMISARTAN 80 MG/1
80 TABLET ORAL DAILY
COMMUNITY
End: 2020-02-21 | Stop reason: SDUPTHER

## 2020-02-21 NOTE — PROGRESS NOTES
Subjective   Chief Complaint   Patient presents with   • Follow-up     6 month   • Hypertension   • Hyperlipidemia   • Coronary Artery Disease   • Atrial Fibrillation   • Hypothyroidism     Sheree Vance is a 74 y.o. female.     Patient Care Team:  Latia Guerrier MD as PCP - General  Latia Guerrier MD as PCP - Family Medicine  Dre Case MD (Cardiology)  Yarelis Araya PA as Physician Assistant (Physician Assistant)  Jet Godwin MD as Consulting Physician (Ophthalmology)  Antonio Fine MD as Consulting Physician (Allergy)    She is coming in today to follow-up on her medical problems.  We are addressing her hypertension, hyperlipidemia, heart issues including atrial fibrillation, hypothyroidism, and vitamin D deficiency.  She wants to talk about her hypertension.  Her blood pressure has been running elevated for some time.  She was seen here few months ago and the dose of amlodipine at that time was increased from 5 to 10 mg.  Later on she was seen by her cardiologist who added 25 mg of Spironolactone.  She has been monitoring her blood pressure and it still stays elevated, at this point she has been on Spironolactone for about 3 weeks.  Patient denies any chest pain, shortness of breath, dizziness, nausea, vomiting, or diarrhea. No visual issues reported. No headaches, numbness or tingling. No urinary issues. Patient has good appetite and denies any weight changes. No swelling reported. No emotional issues.  We are also addressing her atrophic vaginitis.  She was restarted on vaginal estrogen cream few months ago and she reports feeling much better since then.         The following portions of the patient's history were reviewed and updated as appropriate: allergies, current medications, past family history, past medical history, past social history, past surgical history and problem list.  Past Medical History:   Diagnosis Date   • A-fib (CMS/HCC) Since 2001 Dx in [place]   • Allergic  2014    Seasonal   • Allergic rhinitis     Hx Allergy Shots x 4 Yrs   • Aortic valve calcification 12/04/2018    Noted on SARA   • Arrhythmia    • Arthritis     Lumbar Spine   • Atrial flutter (CMS/HCC)    • Breast mass seen on mammogram 03/19/2014    L 4CM Mass--Benign & Followed   • Bruxism, sleep-related    • Cataract    • Chronic anticoagulation     Eliquis    • Chronic low back pain     Hx Back Sx in 2016   • Colon polyp    • Coronary artery disease 2009    AFIB   • DJD (degenerative joint disease), lumbar    • Dyslipidemia     w/Hypertriglyceridemia--Statin/Fish Oils   • Family history of premature CAD    • Glaucoma    • Heart disease    • History of bone density study 03/19/14-[facility]    T-Score of 1.4 Indicating Osteopenia   • History of EKG 04/2018 & 08/2018 & 12/2018    First Degree AV Block/Multiple Atrial Premature Blocks Noted on All   • Hormone replacement therapy (HRT)     Premarin-0.625MG   • Hyperlipidemia     Controlled w/Meds   • Hypertension     Controlled w/Losartan   • Hypothyroidism     Synthroid   • Insomnia     Takes OTC Sleep Aid PRN   • Left atrial enlargement 12/04/2018    Severe Noted on Cardioversion Report/SARA   • Left breast mass 03/19/2014    4CM Noted on Mammogram--Benign    • Mild tricuspid valve regurgitation 12/04/2018    Noted on SARA   • Mitral valve annular calcification 12/04/2018    Moderate Noted on SARA   • Mitral valve regurgitation 12/04/2018    Moderate Noted on SARA   • Obesity 12/04/2018    BMI-43.3    • SHANAE (obstructive sleep apnea)     w/Snoring/Dyspnea @ Night/Occ Dreams/Seldom Nightmares/Day-Time Sleepiness & Dozing Off   • Osteoarthritis of right hip 7/9/2019   • Osteopenia 03/19/2014    Noted on Dexa Report   • Restless sleeper     Tosses and Turns All Night Per Pt   • Sinus congestion    • Tricuspid leaflet thickened 12/04/2018    Noted on SARA   • Urinary tract infection      Past Surgical History:   Procedure Laterality Date   • APPENDECTOMY     • BACK SURGERY  2016     Spinal Fusion   • BLADDER REPAIR  2001 in [place]   • CARDIAC CATHETERIZATION  2015   • CARDIOVERSION  08/7/18 & 4/17/18-[facility]    Dr. Villegas--For Chronic A-Fib   • CARDIOVERSION  12/4/28-[facility]    Dr. Case--See Report   • CATARACT EXTRACTION  2007   • COLONOSCOPY  04/13/2017   • HYSTERECTOMY      Partial   • KNEE SURGERY Right 2009   • OTHER SURGICAL HISTORY  12/4/18-[facility]    Fluoroscopy/Trans-Septal Access to L Atrium/Selective Venography of L Superior Pulm Vein/Add Lienier Radiofrequency Ablation Along the Coronary Sinus/Synchronized Cardioversion--Dr. Case   • SPINE SURGERY     • SARA  12/4/18-[facility]    Mild Concentric L Ventricular Hypertrophy Noted; EF 55-60%; L Atrial Moderate-Severely Dilated, Moderat Mitral Annular Calcification with Moderate MVR Noted; Mild TVR; Normal LV Function   • TONSILLECTOMY       The patient has a family history of  Family History   Problem Relation Age of Onset   • Heart failure Mother    • Alcohol abuse Mother    • Early death Mother    • Miscarriages / Stillbirths Mother    • No Known Problems Father      Social History     Socioeconomic History   • Marital status:      Spouse name: Papi   • Number of children: 1   • Years of education: Not on file   • Highest education level: Not on file   Tobacco Use   • Smoking status: Never Smoker   • Smokeless tobacco: Never Used   Substance and Sexual Activity   • Alcohol use: Yes     Frequency: Monthly or less     Comment: Infrequently   • Drug use: No   • Sexual activity: Yes     Partners: Male     Birth control/protection: Surgical, None     Comment: Hyst       Review of Systems   Constitutional: Negative for activity change, fatigue and fever.   Respiratory: Negative for cough, shortness of breath and wheezing.    Cardiovascular: Negative for chest pain, palpitations and leg swelling.   Gastrointestinal: Negative for constipation, diarrhea and indigestion.   Skin: Negative for color change, dry skin and rash.   Neurological: Negative  "for tremors and headache.     Visit Vitals  /81 (BP Location: Left arm, Patient Position: Sitting, Cuff Size: Large Adult)   Pulse 90   Temp 98 °F (36.7 °C) (Oral)   Resp 16   Ht 165.1 cm (65\")   Wt 117 kg (259 lb)   SpO2 94%   BMI 43.10 kg/m²       Current Outpatient Medications:   •  telmisartan (MICARDIS) 80 MG tablet, Take 1 tablet by mouth Daily., Disp: 30 tablet, Rfl: 0  •  amLODIPine (NORVASC) 10 MG tablet, Take 1 tablet by mouth Daily., Disp: 90 tablet, Rfl: 3  •  apixaban (ELIQUIS) 5 MG tablet tablet, Take 1 tablet by mouth Every 12 (Twelve) Hours., Disp: 180 tablet, Rfl: 3  •  atorvastatin (LIPITOR) 10 MG tablet, TAKE 1 TABLET AT BEDTIME, Disp: 90 tablet, Rfl: 1  •  Cholecalciferol 2000 units capsule, Take  by mouth., Disp: , Rfl:   •  Chromium 200 MCG capsule, Take 100 mg by mouth Daily., Disp: , Rfl:   •  coenzyme Q10 100 MG capsule, Take 100 mg by mouth Daily., Disp: , Rfl:   •  conjugated estrogens (PREMARIN) 0.625 MG/GM vaginal cream, Use 0.5 grams per vagina 2-3 times a week, Disp: 3 each, Rfl: 0  •  dofetilide (TIKOSYN) 500 MCG capsule, Take 1 capsule by mouth 2 (Two) Times a Day., Disp: 180 capsule, Rfl: 3  •  EPINEPHrine (EPIPEN) 0.3 MG/0.3ML solution auto-injector injection, USE AS DIRECTED FOR ACUTE ALLERGIC REACTION, Disp: , Rfl: 3  •  furosemide (LASIX) 40 MG tablet, TAKE 1 TABLET BY MOUTH EVERY DAY AS NEEDED, Disp: 90 tablet, Rfl: 6  •  ipratropium (ATROVENT) 0.06 % nasal spray, 2 sprays into the nostril(s) as directed by provider Daily., Disp: , Rfl: 3  •  levocetirizine (XYZAL) 5 MG tablet, Take 1 tablet by mouth Daily., Disp: , Rfl: 0  •  levothyroxine (SYNTHROID, LEVOTHROID) 137 MCG tablet, Take 1 tablet by mouth Daily., Disp: 90 tablet, Rfl: 0  •  Magnesium 200 MG tablet, Take  by mouth Daily., Disp: , Rfl:   •  montelukast (SINGULAIR) 10 MG tablet, TK 1 T PO HS, Disp: , Rfl: 3  •  Multiple Vitamins-Minerals (MULTIVITAMIN ADULT) tablet, MULTIVITAMIN ADULT TABS, Disp: , Rfl:   •  " Omega-3 1000 MG capsule, Take  by mouth Daily., Disp: , Rfl:   •  spironolactone (ALDACTONE) 25 MG tablet, TAKE 1 TABLET BY MOUTH DAILY, Disp: 90 tablet, Rfl: 1  •  vitamin B-12 (CYANOCOBALAMIN) 1000 MCG tablet, Take 1,000 mcg by mouth Daily., Disp: , Rfl:     Objective   Physical Exam   Constitutional: She is oriented to person, place, and time. She appears well-developed and well-nourished.   HENT:   Head: Normocephalic and atraumatic.   Eyes: Pupils are equal, round, and reactive to light. Conjunctivae and EOM are normal.   Neck: Normal range of motion. Neck supple.   Cardiovascular: Normal rate, regular rhythm, normal heart sounds and intact distal pulses. Exam reveals no gallop.   No murmur heard.  Pulmonary/Chest: Effort normal and breath sounds normal. No respiratory distress. She has no rales. She exhibits no tenderness.   Musculoskeletal: Normal range of motion. She exhibits no edema or deformity.   Neurological: She is alert and oriented to person, place, and time.   Skin: Skin is warm and dry.   Nursing note and vitals reviewed.           No visits with results within 7 Day(s) from this visit.   Latest known visit with results is:   Office Visit on 12/30/2019   Component Date Value Ref Range Status   • Color 12/30/2019 Yellow  Yellow, Straw, Dark Yellow, Aminata Final   • Clarity, UA 12/30/2019 Cloudy* Clear Final   • Specific Gravity  12/30/2019 1.015  1.005 - 1.030 Final   • pH, Urine 12/30/2019 5.5  5.0 - 8.0 Final   • Leukocytes 12/30/2019 Large (3+)* Negative Final   • Nitrite, UA 12/30/2019 Negative  Negative Final   • Protein, POC 12/30/2019 Negative  Negative mg/dL Final   • Glucose, UA 12/30/2019 Negative  Negative, 1000 mg/dL (3+) mg/dL Final   • Ketones, UA 12/30/2019 Negative  Negative Final   • Urobilinogen, UA 12/30/2019 Normal  Normal Final   • Bilirubin 12/30/2019 Negative  Negative Final   • Blood, UA 12/30/2019 Trace* Negative Final   • Urine Culture 12/31/2019 50,000 CFU/mL Escherichia  coli*  Final             Advance Care Planning   ACP discussion was held with the patient during this visit.      Assessment/Plan   Problems Addressed this Visit        Cardiovascular and Mediastinum    Atrial fibrillation (CMS/HCC)    Hypertension - Primary    Relevant Medications    telmisartan (MICARDIS) 80 MG tablet    Hyperlipidemia    Relevant Orders    Basic Metabolic Panel    Lipid Panel    TSH       Digestive    Vitamin D deficiency    Relevant Orders    Vitamin D 25 Hydroxy       Endocrine    Hypothyroidism       Genitourinary    Atrophic vaginitis        I reviewed her medical problems and her medications.  Her blood pressure is still not well controlled.  I will be changing her losartan to telmisartan as this is more potent ARB to see if this will add to her blood pressure control.  She will continue amlodipine and also spironolactone which was added by her cardiologist few weeks ago.  I will be rechecking her blood work including metabolic panel and potassium.  We talked about the fact that her losartan which she was on up until now and spironolactone are potassium sparing medications and we need to keep very close eye on her potassium levels.  She will continue to monitor her blood pressure and I asked her to follow-up with me in a month for reevaluation.             Requested Prescriptions     Signed Prescriptions Disp Refills   • telmisartan (MICARDIS) 80 MG tablet 30 tablet 0     Sig: Take 1 tablet by mouth Daily.

## 2020-02-22 LAB
25(OH)D3 SERPL-MCNC: 62.4 NG/ML (ref 30–100)
ANION GAP SERPL CALCULATED.3IONS-SCNC: 14.2 MMOL/L (ref 5–15)
BUN BLD-MCNC: 18 MG/DL (ref 8–23)
BUN/CREAT SERPL: 20.2 (ref 7–25)
CALCIUM SPEC-SCNC: 10 MG/DL (ref 8.6–10.5)
CHLORIDE SERPL-SCNC: 99 MMOL/L (ref 98–107)
CHOLEST SERPL-MCNC: 181 MG/DL (ref 0–200)
CO2 SERPL-SCNC: 24.8 MMOL/L (ref 22–29)
CREAT BLD-MCNC: 0.89 MG/DL (ref 0.57–1)
GFR SERPL CREATININE-BSD FRML MDRD: 62 ML/MIN/1.73
GLUCOSE BLD-MCNC: 101 MG/DL (ref 65–99)
HDLC SERPL-MCNC: 68 MG/DL (ref 40–60)
LDLC SERPL CALC-MCNC: 96 MG/DL (ref 0–100)
LDLC/HDLC SERPL: 1.41 {RATIO}
POTASSIUM BLD-SCNC: 4.3 MMOL/L (ref 3.5–5.2)
SODIUM BLD-SCNC: 138 MMOL/L (ref 136–145)
TRIGL SERPL-MCNC: 87 MG/DL (ref 0–150)
TSH SERPL DL<=0.05 MIU/L-ACNC: 4.06 UIU/ML (ref 0.27–4.2)
VLDLC SERPL-MCNC: 17.4 MG/DL (ref 5–40)

## 2020-03-09 RX ORDER — LEVOTHYROXINE SODIUM 137 UG/1
TABLET ORAL
Qty: 90 TABLET | Refills: 0 | Status: SHIPPED | OUTPATIENT
Start: 2020-03-09 | End: 2020-04-03

## 2020-03-16 RX ORDER — TELMISARTAN 80 MG/1
80 TABLET ORAL DAILY
Qty: 90 TABLET | Refills: 0 | Status: SHIPPED | OUTPATIENT
Start: 2020-03-16 | End: 2020-05-13 | Stop reason: SDUPTHER

## 2020-03-16 RX ORDER — TELMISARTAN 80 MG/1
80 TABLET ORAL DAILY
Qty: 30 TABLET | Refills: 0 | Status: SHIPPED | OUTPATIENT
Start: 2020-03-16 | End: 2020-03-16

## 2020-04-03 RX ORDER — LEVOTHYROXINE SODIUM 137 UG/1
TABLET ORAL
Qty: 90 TABLET | Refills: 0 | Status: SHIPPED | OUTPATIENT
Start: 2020-04-03 | End: 2020-08-15

## 2020-05-13 RX ORDER — SPIRONOLACTONE 25 MG/1
25 TABLET ORAL DAILY
Qty: 90 TABLET | Refills: 1 | Status: CANCELLED | OUTPATIENT
Start: 2020-05-13

## 2020-05-13 RX ORDER — TELMISARTAN 80 MG/1
80 TABLET ORAL DAILY
Qty: 90 TABLET | Refills: 0 | Status: SHIPPED | OUTPATIENT
Start: 2020-05-13 | End: 2020-08-15

## 2020-05-20 ENCOUNTER — OFFICE VISIT (OUTPATIENT)
Dept: CARDIOLOGY | Facility: CLINIC | Age: 75
End: 2020-05-20

## 2020-05-20 VITALS
BODY MASS INDEX: 43.27 KG/M2 | OXYGEN SATURATION: 97 % | DIASTOLIC BLOOD PRESSURE: 81 MMHG | SYSTOLIC BLOOD PRESSURE: 150 MMHG | WEIGHT: 260 LBS | HEART RATE: 94 BPM

## 2020-05-20 DIAGNOSIS — E78.2 MIXED HYPERLIPIDEMIA: ICD-10-CM

## 2020-05-20 DIAGNOSIS — I10 ESSENTIAL HYPERTENSION: Primary | ICD-10-CM

## 2020-05-20 DIAGNOSIS — E66.01 MORBIDLY OBESE (HCC): ICD-10-CM

## 2020-05-20 DIAGNOSIS — I48.0 PAROXYSMAL ATRIAL FIBRILLATION (HCC): ICD-10-CM

## 2020-05-20 DIAGNOSIS — G47.33 OBSTRUCTIVE SLEEP APNEA SYNDROME: ICD-10-CM

## 2020-05-20 PROCEDURE — 99214 OFFICE O/P EST MOD 30 MIN: CPT | Performed by: INTERNAL MEDICINE

## 2020-05-20 PROCEDURE — 93000 ELECTROCARDIOGRAM COMPLETE: CPT | Performed by: INTERNAL MEDICINE

## 2020-05-20 NOTE — PROGRESS NOTES
CC--Atrial fibrillation, hypertension, preop evaluation for hip surgery    SUB-- patient is 75 years old white female whose past medical history significant for hypertension, Persistent atrial fibrillation,  obesity  underwent AF ablation in 2018 with early recurrence with initiation of Tikosyn to sinus rhythm with resolution of symptoms and comes in for evaluation. patient failed prior cardioversion and amiodarone--patient has severe left atrial enlargement needing antiarrhythmic treatment to optimize into sinus rhythm-- during past hospitalization CT surgery consult was also requested for possible future convergence procedure because of severe left atrial enlargement  In 2009, patient was diagnosed with paroxysmal atrial fibrillation.  Patient was treated with beta-blockers initially.  Patient was started on flecainide later on.  In  2017, patient underwent cardiac catheterization at [facility] and coronary arteries were normal.  LV function was preserved.  Patient was started on Eliquis because of recurrence of atrial fibrillation.   chads vasc  score---3   patient was hypertensive and started on amlodipine, micardia and spironolactone to optimize hypertension   no recurrent symptoms since last visit     assessment plan   recurrent persistent atrial fibrillation and failed medical treatment with symptoms--  post cryoablation to sinus rhythm with early recurrence on Tikosyn to sinus rhythm   obesity   hypertension--currently stable on medical regimen and medications reviewed    hypothyroidism   leg edema--mild and stable    Treated sleep apnea  Patient cleared for hip surgery  Medications reviewed  Follow-up in 3 months      Vital Signs: Blood pressure 150/81 pulse rate is 94 patient is afebrile respiration 12 times a minute  EKG shows sinus rhythm with artifacts with left atrial enlargement with heart rate of 85 QRS of 103  and indication for EKG includes Tikosyn therapy and atrial  fibrillation and no significant EKG changes compared to prior EKG      Past history is reviewed below and verified    Past Medical History:     Reviewed history from 01/08/2019 and no changes required:        HTN        Paroxysmal  A-fib - Dr. Lugo        Seasonal allergies - on allergy shots per ENT        Hyperlipidemia        Hypothyroidism        Chronic low back pain        Colonoscopy in 4/13/2017        Mammogram in 7/31/2018 - negative        Pneumonia shot in the past - both Pneumovax and Prevnar        Shingle shot in the past        Osteopenia - DEXA on 6/23/2017 - L/S: +0.5 and Hip: -0.8        GYN HM per GYN - Dr. Hopkins        Negative Hepatitis C screening in 10/2017                            Past Surgical History:     Reviewed history from 01/25/2018 and no changes required:        Spinal fusion in 2016 - Dr. Lester        Knee repair in 2009        Cataract surgery in 2007        Bladder repair in 2001 in [place]        Partial hysterectomy in 1977 due to fibroids        Appendectomy        Heart cath in 4/2017 - Dr. Penelope Wilkins - no significant disease        cataract surgery 2007             Review of Systems   General: No fatigue or tiredness, No change in weight   Eyes: No redness  Cardiovascular: No chest pain, no palpitations  Respiratory: No shortness of breath  Gastrointestinal: No nausea or vomiting, bleeding  Genitourinary: no hematuria or dysuria  Musculoskeletal: Positive for arthritis without  myalgia  Skin: No rash  Neurologic: No numbness, tingling, syncope  Hematologic/Lymphatic: No abnormal bleeding      Physical Exam    General:      well developed, well nourished, in no acute distress.    Head:      normocephalic and atraumatic.    Eyes:      PERRL/EOM intact, conjunctiva and sclera clear with out nystagmus.    Neck:      no masses, thyromegaly, trachea central with normal respiratory effort  Lungs:      clear bilaterally to auscultation.    Heart:      non-displaced PMI, chest  non-tender; regular rate and rhythm, S1, S2 without murmurs, rubs, or gallops  Skin:      intact without lesions or rashes.    Psych:      alert and cooperative; normal mood and affect; normal attention span and concentration.      Extremities with trace ankle edema without any cyanosis or clubbing    Muscular skeletal patient walks with a cane with mild kyphosis    Neurological no focal deficits and alert oriented x3    Electronically signed by Dre Case MD, 05/20/20, 12:03 PM.

## 2020-05-22 RX ORDER — APIXABAN 5 MG/1
TABLET, FILM COATED ORAL
Qty: 180 TABLET | Refills: 3 | Status: SHIPPED | OUTPATIENT
Start: 2020-05-22 | End: 2021-09-17

## 2020-05-22 RX ORDER — DOFETILIDE 0.5 MG/1
CAPSULE ORAL
Qty: 180 CAPSULE | Refills: 3 | Status: SHIPPED | OUTPATIENT
Start: 2020-05-22 | End: 2021-01-14

## 2020-05-26 ENCOUNTER — OFFICE VISIT (OUTPATIENT)
Dept: FAMILY MEDICINE CLINIC | Facility: CLINIC | Age: 75
End: 2020-05-26

## 2020-05-26 VITALS
OXYGEN SATURATION: 96 % | HEART RATE: 86 BPM | RESPIRATION RATE: 16 BRPM | SYSTOLIC BLOOD PRESSURE: 134 MMHG | HEIGHT: 65 IN | WEIGHT: 259 LBS | TEMPERATURE: 97.1 F | BODY MASS INDEX: 43.15 KG/M2 | DIASTOLIC BLOOD PRESSURE: 78 MMHG

## 2020-05-26 DIAGNOSIS — Z01.818 PRE-OP EXAM: Primary | ICD-10-CM

## 2020-05-26 DIAGNOSIS — I10 ESSENTIAL HYPERTENSION: ICD-10-CM

## 2020-05-26 DIAGNOSIS — I48.0 PAROXYSMAL ATRIAL FIBRILLATION (HCC): ICD-10-CM

## 2020-05-26 DIAGNOSIS — M16.11 PRIMARY OSTEOARTHRITIS OF RIGHT HIP: ICD-10-CM

## 2020-05-26 DIAGNOSIS — E03.9 ACQUIRED HYPOTHYROIDISM: ICD-10-CM

## 2020-05-26 PROCEDURE — 99214 OFFICE O/P EST MOD 30 MIN: CPT | Performed by: FAMILY MEDICINE

## 2020-05-26 RX ORDER — AMOXICILLIN 500 MG/1
TABLET, FILM COATED ORAL
COMMUNITY
Start: 2020-05-22 | End: 2020-09-04

## 2020-05-26 RX ORDER — AMLODIPINE BESYLATE 10 MG/1
10 TABLET ORAL DAILY
Qty: 90 TABLET | Refills: 3
Start: 2020-05-26 | End: 2020-12-14

## 2020-05-26 NOTE — PROGRESS NOTES
Subjective   Chief Complaint   Patient presents with   • surgery clearance   • Knee Pain   • Hypertension   • Coronary Artery Disease   • Hyperlipidemia   • Atrial Fibrillation     Sheree Vance is a 75 y.o. female.     Patient Care Team:  Latia Guerrier MD as PCP - General  Latia Guerrier MD as PCP - Family Medicine  Dre Case MD (Cardiology)  Yarelis Araya PA as Physician Assistant (Physician Assistant)  Jet Godwin MD as Consulting Physician (Ophthalmology)  Antonio Fine MD as Consulting Physician (Allergy)    She is coming in today for her preop evaluation prior to her right hip replacement.  She is scheduled for the hip replacement on 7/6/2020 and she was already seen and evaluated by her cardiologist for cardiac clearance.  She is scheduled for the preop testing including the blood work and EKG either later this week or early next week.  She is being treated for hypertension, hyperlipidemia, CAD, atrial fibrillation, hypothyroidism, and acid reflux.  Her medications are being reviewed today and she reports taking all of those as directed and denies any side effects. Patient denies any chest pain, shortness of breath, dizziness, nausea, vomiting, or diarrhea. No visual issues reported. No headaches, numbness or tingling. No urinary issues. Patient has good appetite and denies any weight changes. No swelling reported. No emotional issues.         The following portions of the patient's history were reviewed and updated as appropriate: allergies, current medications, past family history, past medical history, past social history, past surgical history and problem list.  Past Medical History:   Diagnosis Date   • A-fib (CMS/[facility]) Since 2001 Dx in [place]   • Allergic 2014    Seasonal   • Allergic rhinitis     Hx Allergy Shots x 4 Yrs   • Aortic valve calcification 12/04/2018    Noted on SARA   • Arrhythmia    • Arthritis     Lumbar Spine   • Atrial flutter (CMS/[facility])    • Breast mass seen  on mammogram 03/19/2014    L 4CM Mass--Benign & Followed   • Bruxism, sleep-related    • Cataract    • Chronic anticoagulation     Eliquis    • Chronic low back pain     Hx Back Sx in 2016   • Colon polyp    • Coronary artery disease 2009    AFIB   • DJD (degenerative joint disease), lumbar    • Dyslipidemia     w/Hypertriglyceridemia--Statin/Fish Oils   • Family history of premature CAD    • Glaucoma    • Heart disease    • History of bone density study 03/19/14-[facility]    T-Score of 1.4 Indicating Osteopenia   • History of EKG 04/2018 & 08/2018 & 12/2018    First Degree AV Block/Multiple Atrial Premature Blocks Noted on All   • Hormone replacement therapy (HRT)     Premarin-0.625MG   • Hyperlipidemia     Controlled w/Meds   • Hypertension     Controlled w/Losartan   • Hypothyroidism     Synthroid   • Insomnia     Takes OTC Sleep Aid PRN   • Left atrial enlargement 12/04/2018    Severe Noted on Cardioversion Report/SARA   • Left breast mass 03/19/2014    4CM Noted on Mammogram--Benign    • Mild tricuspid valve regurgitation 12/04/2018    Noted on SARA   • Mitral valve annular calcification 12/04/2018    Moderate Noted on SARA   • Mitral valve regurgitation 12/04/2018    Moderate Noted on SARA   • Obesity 12/04/2018    BMI-43.3    • SHANAE (obstructive sleep apnea)     w/Snoring/Dyspnea @ Night/Occ Dreams/Seldom Nightmares/Day-Time Sleepiness & Dozing Off   • Osteoarthritis of right hip 7/9/2019   • Osteopenia 03/19/2014    Noted on Dexa Report   • Restless sleeper     Tosses and Turns All Night Per Pt   • Sinus congestion    • Tricuspid leaflet thickened 12/04/2018    Noted on SARA   • Urinary tract infection      Past Surgical History:   Procedure Laterality Date   • APPENDECTOMY     • BACK SURGERY  2016    Spinal Fusion   • BLADDER REPAIR  2001 in [place]   • CARDIAC CATHETERIZATION  2015   • CARDIOVERSION  08/7/18 & 4/17/18-[facility]    Dr. Villegas--For Chronic A-Fib   • CARDIOVERSION  12/4/28-[facility]    Dr. Case--See Report   •  CATARACT EXTRACTION  2007   • COLONOSCOPY  04/13/2017   • HYSTERECTOMY      Partial   • KNEE SURGERY Right 2009   • OTHER SURGICAL HISTORY  12/4/18-[facility]    Fluoroscopy/Trans-Septal Access to L Atrium/Selective Venography of L Superior Pulm Vein/Add Lienier Radiofrequency Ablation Along the Coronary Sinus/Synchronized Cardioversion--Dr. Case   • SPINE SURGERY     • SARA  12/4/18-[facility]    Mild Concentric L Ventricular Hypertrophy Noted; EF 55-60%; L Atrial Moderate-Severely Dilated, Moderat Mitral Annular Calcification with Moderate MVR Noted; Mild TVR; Normal LV Function   • TONSILLECTOMY       The patient has a family history of  Family History   Problem Relation Age of Onset   • Heart failure Mother    • Alcohol abuse Mother    • Early death Mother    • Miscarriages / Stillbirths Mother    • No Known Problems Father      Social History     Socioeconomic History   • Marital status:      Spouse name: Papi   • Number of children: 1   • Years of education: Not on file   • Highest education level: Not on file   Tobacco Use   • Smoking status: Never Smoker   • Smokeless tobacco: Never Used   Substance and Sexual Activity   • Alcohol use: Yes     Frequency: Monthly or less     Comment: Infrequently   • Drug use: No   • Sexual activity: Yes     Partners: Male     Birth control/protection: Surgical, None     Comment: Hyst       Review of Systems   Constitutional: Negative for activity change, fatigue and fever.   Respiratory: Negative for cough, shortness of breath and wheezing.    Cardiovascular: Negative for chest pain, palpitations and leg swelling.   Gastrointestinal: Negative for constipation, diarrhea and indigestion.   Musculoskeletal: Positive for arthralgias and gait problem. Negative for back pain and joint swelling.   Skin: Negative for color change, dry skin and rash.   Neurological: Negative for tremors and headache.     Visit Vitals  /78 (BP Location: Left arm, Patient Position: Sitting, Cuff  "Size: Large Adult)   Pulse 86   Temp 97.1 °F (36.2 °C) (Temporal)   Resp 16   Ht 165.1 cm (65\")   Wt 117 kg (259 lb)   SpO2 96%   BMI 43.10 kg/m²       Current Outpatient Medications:   •  amLODIPine (NORVASC) 10 MG tablet, Take 1 tablet by mouth Daily., Disp: 90 tablet, Rfl: 3  •  amoxicillin (AMOXIL) 500 MG tablet, TK 1 T PO  TID UNTIL COMPLETE, Disp: , Rfl:   •  atorvastatin (LIPITOR) 10 MG tablet, TAKE 1 TABLET AT BEDTIME, Disp: 90 tablet, Rfl: 1  •  Cholecalciferol 2000 units capsule, Take  by mouth., Disp: , Rfl:   •  Chromium 200 MCG capsule, Take 100 mg by mouth Daily., Disp: , Rfl:   •  coenzyme Q10 100 MG capsule, Take 100 mg by mouth Daily., Disp: , Rfl:   •  conjugated estrogens (PREMARIN) 0.625 MG/GM vaginal cream, Use 0.5 grams per vagina 2-3 times a week, Disp: 3 each, Rfl: 0  •  dofetilide (TIKOSYN) 500 MCG capsule, TAKE 1 CAPSULE TWICE DAILY, Disp: 180 capsule, Rfl: 3  •  ELIQUIS 5 MG tablet tablet, TAKE 1 TABLET EVERY 12 HOURS, Disp: 180 tablet, Rfl: 3  •  EPINEPHrine (EPIPEN) 0.3 MG/0.3ML solution auto-injector injection, USE AS DIRECTED FOR ACUTE ALLERGIC REACTION, Disp: , Rfl: 3  •  ipratropium (ATROVENT) 0.06 % nasal spray, 2 sprays into the nostril(s) as directed by provider Daily., Disp: , Rfl: 3  •  levocetirizine (XYZAL) 5 MG tablet, Take 1 tablet by mouth Daily., Disp: , Rfl: 0  •  levothyroxine (SYNTHROID, LEVOTHROID) 137 MCG tablet, TAKE 1 TABLET EVERY DAY, Disp: 90 tablet, Rfl: 0  •  Magnesium 200 MG tablet, Take  by mouth Daily., Disp: , Rfl:   •  montelukast (SINGULAIR) 10 MG tablet, TK 1 T PO HS, Disp: , Rfl: 3  •  Multiple Vitamins-Minerals (MULTIVITAMIN ADULT) tablet, MULTIVITAMIN ADULT TABS, Disp: , Rfl:   •  Omega-3 1000 MG capsule, Take  by mouth Daily., Disp: , Rfl:   •  spironolactone (ALDACTONE) 25 MG tablet, TAKE 1 TABLET BY MOUTH DAILY, Disp: 90 tablet, Rfl: 1  •  telmisartan (MICARDIS) 80 MG tablet, Take 1 tablet by mouth Daily., Disp: 90 tablet, Rfl: 0  •  vitamin B-12 " (CYANOCOBALAMIN) 1000 MCG tablet, Take 1,000 mcg by mouth Daily., Disp: , Rfl:     Objective   Physical Exam   Constitutional: She is oriented to person, place, and time. She appears well-developed and well-nourished.   HENT:   Head: Normocephalic and atraumatic.   Eyes: Pupils are equal, round, and reactive to light. Conjunctivae and EOM are normal.   Neck: Normal range of motion. Neck supple.   Cardiovascular: Normal rate, regular rhythm, normal heart sounds and intact distal pulses. Exam reveals no gallop.   No murmur heard.  Pulmonary/Chest: Effort normal and breath sounds normal. No respiratory distress. She has no rales. She exhibits no tenderness.   Musculoskeletal: Normal range of motion. She exhibits no edema or deformity.   Neurological: She is alert and oriented to person, place, and time.   Skin: Skin is warm and dry.   Nursing note and vitals reviewed.           No visits with results within 7 Day(s) from this visit.   Latest known visit with results is:   Office Visit on 02/21/2020   Component Date Value Ref Range Status   • Glucose 02/21/2020 101* 65 - 99 mg/dL Final   • BUN 02/21/2020 18  8 - 23 mg/dL Final   • Creatinine 02/21/2020 0.89  0.57 - 1.00 mg/dL Final   • Sodium 02/21/2020 138  136 - 145 mmol/L Final   • Potassium 02/21/2020 4.3  3.5 - 5.2 mmol/L Final   • Chloride 02/21/2020 99  98 - 107 mmol/L Final   • CO2 02/21/2020 24.8  22.0 - 29.0 mmol/L Final   • Calcium 02/21/2020 10.0  8.6 - 10.5 mg/dL Final   • eGFR Non African Amer 02/21/2020 62  >60 mL/min/1.73 Final   • BUN/Creatinine Ratio 02/21/2020 20.2  7.0 - 25.0 Final   • Anion Gap 02/21/2020 14.2  5.0 - 15.0 mmol/L Final   • Total Cholesterol 02/21/2020 181  0 - 200 mg/dL Final   • Triglycerides 02/21/2020 87  0 - 150 mg/dL Final   • HDL Cholesterol 02/21/2020 68* 40 - 60 mg/dL Final   • LDL Cholesterol  02/21/2020 96  0 - 100 mg/dL Final   • VLDL Cholesterol 02/21/2020 17.4  5 - 40 mg/dL Final   • LDL/HDL Ratio 02/21/2020 1.41   Final    • TSH 02/21/2020 4.060  0.270 - 4.200 uIU/mL Final   • 25 Hydroxy, Vitamin D 02/21/2020 62.4  30.0 - 100.0 ng/ml Final                 Assessment/Plan   Problems Addressed this Visit        Cardiovascular and Mediastinum    Atrial fibrillation (CMS/HCC)    Relevant Medications    amLODIPine (NORVASC) 10 MG tablet    Hypertension    Relevant Medications    amLODIPine (NORVASC) 10 MG tablet    Other Relevant Orders    Basic Metabolic Panel    CBC Auto Differential       Endocrine    Hypothyroidism       Musculoskeletal and Integument    Primary osteoarthritis of right hip       Other    Pre-op exam - Primary        Patient was evaluated and seen in the office today.  Preop exam was done.  I reviewed her medical problems and her medications.  She was already seen by her cardiologist for preop clearance.  She is scheduled for the preop testing either later this week or next week.  I will be given her medical clearance providing that her upcoming labs are within normal limits.  Her chronic medical problems are stable and well-controlled.             Requested Prescriptions     Signed Prescriptions Disp Refills   • amLODIPine (NORVASC) 10 MG tablet 90 tablet 3     Sig: Take 1 tablet by mouth Daily.

## 2020-06-01 ENCOUNTER — TELEPHONE (OUTPATIENT)
Dept: FAMILY MEDICINE CLINIC | Facility: CLINIC | Age: 75
End: 2020-06-01

## 2020-06-01 NOTE — TELEPHONE ENCOUNTER
I do not see any results in her chart since her last appointment here last week.  Can you please ask the patient where she had it done and can we request those results.  Thank you.

## 2020-08-15 RX ORDER — TELMISARTAN 80 MG/1
TABLET ORAL
Qty: 90 TABLET | Refills: 0 | Status: SHIPPED | OUTPATIENT
Start: 2020-08-15 | End: 2020-09-07

## 2020-08-15 RX ORDER — LEVOTHYROXINE SODIUM 137 UG/1
TABLET ORAL
Qty: 90 TABLET | Refills: 0 | Status: SHIPPED | OUTPATIENT
Start: 2020-08-15 | End: 2020-09-07

## 2020-09-04 ENCOUNTER — OFFICE VISIT (OUTPATIENT)
Dept: FAMILY MEDICINE CLINIC | Facility: CLINIC | Age: 75
End: 2020-09-04

## 2020-09-04 VITALS
OXYGEN SATURATION: 96 % | SYSTOLIC BLOOD PRESSURE: 158 MMHG | WEIGHT: 256 LBS | RESPIRATION RATE: 16 BRPM | HEART RATE: 97 BPM | DIASTOLIC BLOOD PRESSURE: 76 MMHG | HEIGHT: 65 IN | BODY MASS INDEX: 42.65 KG/M2 | TEMPERATURE: 98 F

## 2020-09-04 DIAGNOSIS — R30.0 DYSURIA: Primary | ICD-10-CM

## 2020-09-04 PROBLEM — Z00.00 MEDICARE ANNUAL WELLNESS VISIT, SUBSEQUENT: Status: ACTIVE | Noted: 2020-09-04

## 2020-09-04 LAB
BILIRUB BLD-MCNC: NEGATIVE MG/DL
CLARITY, POC: ABNORMAL
COLOR UR: YELLOW
GLUCOSE UR STRIP-MCNC: NEGATIVE MG/DL
KETONES UR QL: NEGATIVE
LEUKOCYTE EST, POC: ABNORMAL
NITRITE UR-MCNC: NEGATIVE MG/ML
PH UR: 5.5 [PH] (ref 5–8)
PROT UR STRIP-MCNC: ABNORMAL MG/DL
RBC # UR STRIP: ABNORMAL /UL
SP GR UR: 1.02 (ref 1–1.03)
UROBILINOGEN UR QL: NORMAL

## 2020-09-04 PROCEDURE — 99213 OFFICE O/P EST LOW 20 MIN: CPT | Performed by: FAMILY MEDICINE

## 2020-09-04 PROCEDURE — 81003 URINALYSIS AUTO W/O SCOPE: CPT | Performed by: FAMILY MEDICINE

## 2020-09-04 PROCEDURE — 87086 URINE CULTURE/COLONY COUNT: CPT | Performed by: FAMILY MEDICINE

## 2020-09-04 RX ORDER — SULFAMETHOXAZOLE AND TRIMETHOPRIM 800; 160 MG/1; MG/1
1 TABLET ORAL 2 TIMES DAILY
Qty: 14 TABLET | Refills: 0 | Status: SHIPPED | OUTPATIENT
Start: 2020-09-04 | End: 2020-09-04

## 2020-09-04 RX ORDER — NITROFURANTOIN 25; 75 MG/1; MG/1
100 CAPSULE ORAL 2 TIMES DAILY
Qty: 14 CAPSULE | Refills: 0 | Status: SHIPPED | OUTPATIENT
Start: 2020-09-04 | End: 2020-09-11

## 2020-09-04 NOTE — PROGRESS NOTES
Subjective   Chief Complaint   Patient presents with   • Urinary Tract Infection     Sheree Vance is a 75 y.o. female.     Patient Care Team:  Latia Guerrier MD as PCP - General  Latia Guerrier MD as PCP - Family Medicine  Dre Case MD (Cardiology)  Yarelis Araya PA as Physician Assistant (Physician Assistant)  Jet Godwin MD as Consulting Physician (Ophthalmology)  Antonio Fine MD as Consulting Physician (Allergy)  Sandor Astorga MD as Consulting Physician (Orthopedic Surgery)    She is coming in today due to urinary symptoms.  She reports that for the last few days she has noted some discomfort upon urination and frequency.  She reports that her symptoms started shortly after she had sexual relations with her .  She has had some urinary tract infections in the past and she was treated with antibiotic.  She denies any blood in urine or abdominal pain.  She denies any flank pain, nausea, vomiting, diarrhea.  No fever reported.       The following portions of the patient's history were reviewed and updated as appropriate: allergies, current medications, past family history, past medical history, past social history, past surgical history and problem list.  Past Medical History:   Diagnosis Date   • A-fib (CMS/[facility]) Since 2001 Dx in [place]   • Allergic 2014    Seasonal   • Allergic rhinitis     Hx Allergy Shots x 4 Yrs   • Aortic valve calcification 12/04/2018    Noted on SARA   • Arrhythmia    • Arthritis     Lumbar Spine   • Atrial flutter (CMS/HCC)    • Breast mass seen on mammogram 03/19/2014    L 4CM Mass--Benign & Followed   • Bruxism, sleep-related    • Cataract    • Chronic anticoagulation     Eliquis    • Chronic low back pain     Hx Back Sx in 2016   • Colon polyp    • Coronary artery disease 2009    AFIB   • DJD (degenerative joint disease), lumbar    • Dyslipidemia     w/Hypertriglyceridemia--Statin/Fish Oils   • Family history of premature CAD    • Glaucoma    •  Heart disease    • History of bone density study 03/19/14-[facility]    T-Score of 1.4 Indicating Osteopenia   • History of EKG 04/2018 & 08/2018 & 12/2018    First Degree AV Block/Multiple Atrial Premature Blocks Noted on All   • Hormone replacement therapy (HRT)     Premarin-0.625MG   • Hyperlipidemia     Controlled w/Meds   • Hypertension     Controlled w/Losartan   • Hypothyroidism     Synthroid   • Insomnia     Takes OTC Sleep Aid PRN   • Left atrial enlargement 12/04/2018    Severe Noted on Cardioversion Report/SARA   • Left breast mass 03/19/2014    4CM Noted on Mammogram--Benign    • Mild tricuspid valve regurgitation 12/04/2018    Noted on SARA   • Mitral valve annular calcification 12/04/2018    Moderate Noted on SARA   • Mitral valve regurgitation 12/04/2018    Moderate Noted on SARA   • Obesity 12/04/2018    BMI-43.3    • SHANAE (obstructive sleep apnea)     w/Snoring/Dyspnea @ Night/Occ Dreams/Seldom Nightmares/Day-Time Sleepiness & Dozing Off   • Osteoarthritis of right hip 7/9/2019   • Osteopenia 03/19/2014    Noted on Dexa Report   • Restless sleeper     Tosses and Turns All Night Per Pt   • Sinus congestion    • Tricuspid leaflet thickened 12/04/2018    Noted on SARA   • Urinary tract infection      Past Surgical History:   Procedure Laterality Date   • APPENDECTOMY     • BACK SURGERY  2016    Spinal Fusion   • BLADDER REPAIR  2001 in [place]   • CARDIAC CATHETERIZATION  2015   • CARDIOVERSION  08/7/18 & 4/17/18-[facility]    Dr. Villegas--For Chronic A-Fib   • CARDIOVERSION  12/4/28-[facility]    Dr. Case--See Report   • CATARACT EXTRACTION  2007   • COLONOSCOPY  04/13/2017   • HYSTERECTOMY      Partial   • KNEE SURGERY Right 2009   • OTHER SURGICAL HISTORY  12/4/18-[facility]    Fluoroscopy/Trans-Septal Access to L Atrium/Selective Venography of L Superior Pulm Vein/Add Lienier Radiofrequency Ablation Along the Coronary Sinus/Synchronized Cardioversion--Dr. Case   • SPINE SURGERY     • SARA  12/4/18-[facility]    Mild Concentric L  "Ventricular Hypertrophy Noted; EF 55-60%; L Atrial Moderate-Severely Dilated, Moderat Mitral Annular Calcification with Moderate MVR Noted; Mild TVR; Normal LV Function   • TONSILLECTOMY     • TOTAL HIP ARTHROPLASTY Right 07/06/2020    Dr. Astorga     The patient has a family history of  Family History   Problem Relation Age of Onset   • Heart failure Mother    • Alcohol abuse Mother    • Early death Mother    • Miscarriages / Stillbirths Mother    • No Known Problems Father      Social History     Socioeconomic History   • Marital status:      Spouse name: Papi   • Number of children: 1   • Years of education: Not on file   • Highest education level: Not on file   Tobacco Use   • Smoking status: Never Smoker   • Smokeless tobacco: Never Used   Substance and Sexual Activity   • Alcohol use: Yes     Frequency: Monthly or less     Comment: Infrequently   • Drug use: No   • Sexual activity: Yes     Partners: Male     Birth control/protection: Surgical, None     Comment: Hyst       Review of Systems   Constitutional: Negative for chills, fatigue and fever.   Gastrointestinal: Negative for abdominal pain, diarrhea, nausea and vomiting.   Genitourinary: Positive for dysuria and frequency. Negative for difficulty urinating, dyspareunia, flank pain, hematuria, pelvic pain and urgency.     Visit Vitals  /76 (BP Location: Left arm, Patient Position: Sitting, Cuff Size: Large Adult)   Pulse 97   Temp 98 °F (36.7 °C) (Temporal)   Resp 16   Ht 165.1 cm (65\")   Wt 116 kg (256 lb)   SpO2 96%   BMI 42.60 kg/m²       Current Outpatient Medications:   •  amLODIPine (NORVASC) 10 MG tablet, Take 1 tablet by mouth Daily., Disp: 90 tablet, Rfl: 3  •  atorvastatin (LIPITOR) 10 MG tablet, TAKE 1 TABLET AT BEDTIME, Disp: 90 tablet, Rfl: 1  •  Cholecalciferol 2000 units capsule, Take  by mouth., Disp: , Rfl:   •  Chromium 200 MCG capsule, Take 100 mg by mouth Daily., Disp: , Rfl:   •  coenzyme Q10 100 MG capsule, Take 100 mg by " mouth Daily., Disp: , Rfl:   •  conjugated estrogens (PREMARIN) 0.625 MG/GM vaginal cream, Use 0.5 grams per vagina 2-3 times a week, Disp: 3 each, Rfl: 0  •  dofetilide (TIKOSYN) 500 MCG capsule, TAKE 1 CAPSULE TWICE DAILY, Disp: 180 capsule, Rfl: 3  •  ELIQUIS 5 MG tablet tablet, TAKE 1 TABLET EVERY 12 HOURS, Disp: 180 tablet, Rfl: 3  •  EPINEPHrine (EPIPEN) 0.3 MG/0.3ML solution auto-injector injection, USE AS DIRECTED FOR ACUTE ALLERGIC REACTION, Disp: , Rfl: 3  •  ipratropium (ATROVENT) 0.06 % nasal spray, 2 sprays into the nostril(s) as directed by provider Daily., Disp: , Rfl: 3  •  levocetirizine (XYZAL) 5 MG tablet, Take 1 tablet by mouth Daily., Disp: , Rfl: 0  •  levothyroxine (SYNTHROID, LEVOTHROID) 137 MCG tablet, TAKE 1 TABLET EVERY DAY, Disp: 90 tablet, Rfl: 0  •  Magnesium 200 MG tablet, Take  by mouth Daily., Disp: , Rfl:   •  montelukast (SINGULAIR) 10 MG tablet, TK 1 T PO HS, Disp: , Rfl: 3  •  Multiple Vitamins-Minerals (MULTIVITAMIN ADULT) tablet, MULTIVITAMIN ADULT TABS, Disp: , Rfl:   •  nitrofurantoin, macrocrystal-monohydrate, (Macrobid) 100 MG capsule, Take 1 capsule by mouth 2 (Two) Times a Day for 7 days., Disp: 14 capsule, Rfl: 0  •  Omega-3 1000 MG capsule, Take  by mouth Daily., Disp: , Rfl:   •  spironolactone (ALDACTONE) 25 MG tablet, TAKE 1 TABLET BY MOUTH DAILY, Disp: 90 tablet, Rfl: 1  •  telmisartan (MICARDIS) 80 MG tablet, TAKE 1 TABLET EVERY DAY, Disp: 90 tablet, Rfl: 0  •  vitamin B-12 (CYANOCOBALAMIN) 1000 MCG tablet, Take 1,000 mcg by mouth Daily., Disp: , Rfl:     Objective   Physical Exam   Constitutional: She is oriented to person, place, and time. She appears well-developed and well-nourished. No distress.   HENT:   Head: Normocephalic and atraumatic.   Eyes: Pupils are equal, round, and reactive to light. Conjunctivae and EOM are normal.   Neck: Normal range of motion. Neck supple.   Pulmonary/Chest: Effort normal. No respiratory distress.   Abdominal: Soft. Bowel  sounds are normal. She exhibits no distension and no mass. There is no tenderness. There is no guarding.   Musculoskeletal: Normal range of motion.   Neurological: She is alert and oriented to person, place, and time. No cranial nerve deficit.   Skin: She is not diaphoretic.   Psychiatric: She has a normal mood and affect. Judgment and thought content normal.            Office Visit on 09/04/2020   Component Date Value Ref Range Status   • Color 09/04/2020 Yellow  Yellow, Straw, Dark Yellow, Aminata Final   • Clarity, UA 09/04/2020 Cloudy* Clear Final   • Specific Gravity  09/04/2020 1.025  1.005 - 1.030 Final   • pH, Urine 09/04/2020 5.5  5.0 - 8.0 Final   • Leukocytes 09/04/2020 Large (3+)* Negative Final   • Nitrite, UA 09/04/2020 Negative  Negative Final   • Protein, POC 09/04/2020 2+* Negative mg/dL Final   • Glucose, UA 09/04/2020 Negative  Negative, 1000 mg/dL (3+) mg/dL Final   • Ketones, UA 09/04/2020 Negative  Negative Final   • Urobilinogen, UA 09/04/2020 Normal  Normal Final   • Bilirubin 09/04/2020 Negative  Negative Final   • Blood, UA 09/04/2020 Moderate* Negative Final         Lab Results   Component Value Date    WBC 10.77 07/07/2020    RBC 3.56 (L) 07/07/2020    HCT 32.1 (L) 07/07/2020    MCV 90.2 07/07/2020    MCH 28.4 07/07/2020          Assessment/Plan   Problems Addressed this Visit        Nervous and Auditory    Dysuria - Primary    Relevant Orders    Urine Culture - Urine, Urine, Clean Catch    POC Urinalysis Dipstick, Automated (Completed)        Urine dip done today is suspicious for urinary tract infection.  I will be getting urine culture and I will be starting her on antibiotic as well.  She was advised to increase p.o. fluid intake for good hydration.  We will notify her about culture results next week.             Requested Prescriptions     Signed Prescriptions Disp Refills   • nitrofurantoin, macrocrystal-monohydrate, (Macrobid) 100 MG capsule 14 capsule 0     Sig: Take 1 capsule by  mouth 2 (Two) Times a Day for 7 days.

## 2020-09-05 LAB — BACTERIA SPEC AEROBE CULT: NORMAL

## 2020-09-07 RX ORDER — LEVOTHYROXINE SODIUM 137 UG/1
TABLET ORAL
Qty: 90 TABLET | Refills: 1 | Status: SHIPPED | OUTPATIENT
Start: 2020-09-07 | End: 2020-10-15

## 2020-09-07 RX ORDER — TELMISARTAN 80 MG/1
TABLET ORAL
Qty: 90 TABLET | Refills: 1 | Status: SHIPPED | OUTPATIENT
Start: 2020-09-07 | End: 2021-04-06 | Stop reason: SDUPTHER

## 2020-09-07 RX ORDER — ATORVASTATIN CALCIUM 10 MG/1
TABLET, FILM COATED ORAL
Qty: 90 TABLET | Refills: 1 | Status: SHIPPED | OUTPATIENT
Start: 2020-09-07 | End: 2021-04-06 | Stop reason: SDUPTHER

## 2020-09-08 RX ORDER — SPIRONOLACTONE 25 MG/1
TABLET ORAL
Qty: 90 TABLET | Refills: 1 | Status: SHIPPED | OUTPATIENT
Start: 2020-09-08 | End: 2021-01-14

## 2020-10-05 ENCOUNTER — OFFICE VISIT (OUTPATIENT)
Dept: CARDIOLOGY | Facility: CLINIC | Age: 75
End: 2020-10-05

## 2020-10-05 VITALS
BODY MASS INDEX: 42.93 KG/M2 | OXYGEN SATURATION: 95 % | HEART RATE: 81 BPM | SYSTOLIC BLOOD PRESSURE: 146 MMHG | DIASTOLIC BLOOD PRESSURE: 83 MMHG | WEIGHT: 258 LBS

## 2020-10-05 DIAGNOSIS — E78.2 MIXED HYPERLIPIDEMIA: ICD-10-CM

## 2020-10-05 DIAGNOSIS — G47.33 OBSTRUCTIVE SLEEP APNEA SYNDROME: ICD-10-CM

## 2020-10-05 DIAGNOSIS — I10 ESSENTIAL HYPERTENSION: ICD-10-CM

## 2020-10-05 DIAGNOSIS — I48.0 PAROXYSMAL ATRIAL FIBRILLATION (HCC): Primary | ICD-10-CM

## 2020-10-05 PROCEDURE — 93000 ELECTROCARDIOGRAM COMPLETE: CPT | Performed by: INTERNAL MEDICINE

## 2020-10-05 PROCEDURE — 99213 OFFICE O/P EST LOW 20 MIN: CPT | Performed by: INTERNAL MEDICINE

## 2020-10-05 RX ORDER — FLUTICASONE PROPIONATE 50 MCG
2 SPRAY, SUSPENSION (ML) NASAL DAILY
COMMUNITY

## 2020-10-05 NOTE — PROGRESS NOTES
CC--Atrial fibrillation, hypertension      SUB-- patient is 75 years old white female whose past medical history significant for hypertension, Persistent atrial fibrillation,  obesity  underwent AF ablation in 2018 with early recurrence with initiation of Tikosyn to sinus rhythm with resolution of symptoms and comes in for evaluation. patient failed prior cardioversion and amiodarone--patient has severe left atrial enlargement needing antiarrhythmic treatment to optimize into sinus rhythm-- during past hospitalization CT surgery consult was also requested for possible future convergence procedure because of severe left atrial enlargement  In 2009, patient was diagnosed with paroxysmal atrial fibrillation.  Patient was treated with beta-blockers initially.  Patient was started on flecainide later on.  In  2017, patient underwent cardiac catheterization at [facility] and coronary arteries were normal.  LV function was preserved.  Patient was started on Eliquis because of recurrence of atrial fibrillation.   chads vasc  score---3   patient was hypertensive and started on amlodipine, micardia and spironolactone to optimize hypertension   no recurrent symptoms since last visit  Post hip surgery and no new cardiac symptoms     assessment plan   recurrent persistent atrial fibrillation and failed medical treatment with symptoms--  post cryoablation to sinus rhythm with early recurrence on Tikosyn to sinus rhythm   obesity   hypertension--currently stable on medical regimen and medications reviewed    hypothyroidism   leg edema--mild and stable    Treated sleep apnea  Medications reviewed  Follow-up in 3 months      Vital Signs: Blood pressure 146/83 pulse rate is 81 patient is afebrile respiration 12 times a minute    Past history is reviewed below and verified    Past Medical History:     Reviewed history from 01/08/2019 and no changes required:        HTN        Paroxysmal  A-fib - Dr. Lugo         Seasonal allergies - on allergy shots per ENT        Hyperlipidemia        Hypothyroidism        Chronic low back pain        Colonoscopy in 4/13/2017        Mammogram in 7/31/2018 - negative        Pneumonia shot in the past - both Pneumovax and Prevnar        Shingle shot in the past        Osteopenia - DEXA on 6/23/2017 - L/S: +0.5 and Hip: -0.8        GYN HM per GYN - Dr. Hopkins        Negative Hepatitis C screening in 10/2017                            Past Surgical History:     Reviewed history from 01/25/2018 and no changes required:        Spinal fusion in 2016 - Dr. Lester        Knee repair in 2009        Cataract surgery in 2007        Bladder repair in 2001 in [place]        Partial hysterectomy in 1977 due to fibroids        Appendectomy        Heart cath in 4/2017 - Dr. Penelope Wilkins - no significant disease        cataract surgery 2007             Review of Systems   General: No fatigue or tiredness, No change in weight   Eyes: No redness  Cardiovascular: No chest pain, no palpitations  Respiratory: No shortness of breath  Gastrointestinal: No nausea or vomiting, bleeding  Genitourinary: no hematuria or dysuria  Musculoskeletal: Positive for arthritis without  myalgia  Skin: No rash  Neurologic: No numbness, tingling, syncope  Hematologic/Lymphatic: No abnormal bleeding      Physical Exam    General:      well developed, well nourished, in no acute distress.    Head:      normocephalic and atraumatic.    Eyes:      PERRL/EOM intact, conjunctiva and sclera clear with out nystagmus.    Neck:      no masses, thyromegaly, trachea central with normal respiratory effort  Lungs:      clear bilaterally to auscultation.    Heart:      non-displaced PMI, chest non-tender; regular rate and rhythm, S1, S2 without murmurs, rubs, or gallops  Skin:      intact without lesions or rashes.    Psych:      alert and cooperative; normal mood and affect; normal attention span and concentration.      Extremities with trace  ankle edema without any cyanosis or clubbing    Neurological no focal deficits and alert oriented x3        ECG 12 Lead    Date/Time: 10/5/2020 2:15 PM  Performed by: Dre Case MD  Authorized by: Dre Case MD   Comparison: compared with previous ECG   Similar to previous ECG  Rhythm: sinus rhythm  Rate: normal  Conduction: conduction normal  Conduction: 1st degree AV block  QRS axis: normal  Other findings: non-specific ST-T wave changes and left atrial abnormality    Clinical impression: non-specific ECG          Electronically signed by Dre Case MD, 10/05/20, 2:15 PM EDT.

## 2020-10-06 ENCOUNTER — LAB (OUTPATIENT)
Dept: FAMILY MEDICINE CLINIC | Facility: CLINIC | Age: 75
End: 2020-10-06

## 2020-10-06 ENCOUNTER — OFFICE VISIT (OUTPATIENT)
Dept: FAMILY MEDICINE CLINIC | Facility: CLINIC | Age: 75
End: 2020-10-06

## 2020-10-06 VITALS
HEART RATE: 84 BPM | DIASTOLIC BLOOD PRESSURE: 84 MMHG | TEMPERATURE: 97.5 F | HEIGHT: 65 IN | BODY MASS INDEX: 42.65 KG/M2 | OXYGEN SATURATION: 95 % | WEIGHT: 256 LBS | RESPIRATION RATE: 16 BRPM | SYSTOLIC BLOOD PRESSURE: 155 MMHG

## 2020-10-06 DIAGNOSIS — E78.2 MIXED HYPERLIPIDEMIA: ICD-10-CM

## 2020-10-06 DIAGNOSIS — R35.0 URINARY FREQUENCY: ICD-10-CM

## 2020-10-06 DIAGNOSIS — Z00.00 MEDICARE ANNUAL WELLNESS VISIT, SUBSEQUENT: Primary | ICD-10-CM

## 2020-10-06 DIAGNOSIS — Z23 NEED FOR VACCINATION: ICD-10-CM

## 2020-10-06 DIAGNOSIS — Z12.31 VISIT FOR SCREENING MAMMOGRAM: ICD-10-CM

## 2020-10-06 DIAGNOSIS — E55.9 VITAMIN D DEFICIENCY: ICD-10-CM

## 2020-10-06 LAB
25(OH)D3 SERPL-MCNC: 55.6 NG/ML (ref 30–100)
ALBUMIN SERPL-MCNC: 4.6 G/DL (ref 3.5–5.2)
ALBUMIN/GLOB SERPL: 1.7 G/DL
ALP SERPL-CCNC: 212 U/L (ref 39–117)
ALT SERPL W P-5'-P-CCNC: 16 U/L (ref 1–33)
ANION GAP SERPL CALCULATED.3IONS-SCNC: 11.4 MMOL/L (ref 5–15)
AST SERPL-CCNC: 23 U/L (ref 1–32)
BACTERIA UR QL AUTO: ABNORMAL /HPF
BASOPHILS # BLD AUTO: 0.03 10*3/MM3 (ref 0–0.2)
BASOPHILS NFR BLD AUTO: 0.4 % (ref 0–1.5)
BILIRUB SERPL-MCNC: 0.3 MG/DL (ref 0–1.2)
BILIRUB UR QL STRIP: NEGATIVE
BUN SERPL-MCNC: 19 MG/DL (ref 8–23)
BUN/CREAT SERPL: 21.3 (ref 7–25)
CALCIUM SPEC-SCNC: 10.1 MG/DL (ref 8.6–10.5)
CHLORIDE SERPL-SCNC: 101 MMOL/L (ref 98–107)
CHOLEST SERPL-MCNC: 180 MG/DL (ref 0–200)
CLARITY UR: ABNORMAL
CO2 SERPL-SCNC: 27.6 MMOL/L (ref 22–29)
COLOR UR: YELLOW
CREAT SERPL-MCNC: 0.89 MG/DL (ref 0.57–1)
DEPRECATED RDW RBC AUTO: 41 FL (ref 37–54)
EOSINOPHIL # BLD AUTO: 0.15 10*3/MM3 (ref 0–0.4)
EOSINOPHIL NFR BLD AUTO: 2.1 % (ref 0.3–6.2)
ERYTHROCYTE [DISTWIDTH] IN BLOOD BY AUTOMATED COUNT: 13.1 % (ref 12.3–15.4)
GFR SERPL CREATININE-BSD FRML MDRD: 62 ML/MIN/1.73
GLOBULIN UR ELPH-MCNC: 2.7 GM/DL
GLUCOSE SERPL-MCNC: 107 MG/DL (ref 65–99)
GLUCOSE UR STRIP-MCNC: NEGATIVE MG/DL
HCT VFR BLD AUTO: 39.8 % (ref 34–46.6)
HDLC SERPL-MCNC: 63 MG/DL (ref 40–60)
HGB BLD-MCNC: 12.8 G/DL (ref 12–15.9)
HGB UR QL STRIP.AUTO: ABNORMAL
HYALINE CASTS UR QL AUTO: ABNORMAL /LPF
IMM GRANULOCYTES # BLD AUTO: 0.02 10*3/MM3 (ref 0–0.05)
IMM GRANULOCYTES NFR BLD AUTO: 0.3 % (ref 0–0.5)
KETONES UR QL STRIP: ABNORMAL
LDLC SERPL CALC-MCNC: 97 MG/DL (ref 0–100)
LDLC/HDLC SERPL: 1.54 {RATIO}
LEUKOCYTE ESTERASE UR QL STRIP.AUTO: ABNORMAL
LYMPHOCYTES # BLD AUTO: 2.27 10*3/MM3 (ref 0.7–3.1)
LYMPHOCYTES NFR BLD AUTO: 31.4 % (ref 19.6–45.3)
MCH RBC QN AUTO: 27.9 PG (ref 26.6–33)
MCHC RBC AUTO-ENTMCNC: 32.2 G/DL (ref 31.5–35.7)
MCV RBC AUTO: 86.7 FL (ref 79–97)
MONOCYTES # BLD AUTO: 0.67 10*3/MM3 (ref 0.1–0.9)
MONOCYTES NFR BLD AUTO: 9.3 % (ref 5–12)
NEUTROPHILS NFR BLD AUTO: 4.1 10*3/MM3 (ref 1.7–7)
NEUTROPHILS NFR BLD AUTO: 56.5 % (ref 42.7–76)
NITRITE UR QL STRIP: NEGATIVE
NRBC BLD AUTO-RTO: 0 /100 WBC (ref 0–0.2)
PH UR STRIP.AUTO: 5.5 [PH] (ref 5–8)
PLATELET # BLD AUTO: 305 10*3/MM3 (ref 140–450)
PMV BLD AUTO: 11.5 FL (ref 6–12)
POTASSIUM SERPL-SCNC: 5.2 MMOL/L (ref 3.5–5.2)
PROT SERPL-MCNC: 7.3 G/DL (ref 6–8.5)
PROT UR QL STRIP: ABNORMAL
RBC # BLD AUTO: 4.59 10*6/MM3 (ref 3.77–5.28)
RBC # UR: ABNORMAL /HPF
REF LAB TEST METHOD: ABNORMAL
SODIUM SERPL-SCNC: 140 MMOL/L (ref 136–145)
SP GR UR STRIP: 1.02 (ref 1–1.03)
SQUAMOUS #/AREA URNS HPF: ABNORMAL /HPF
TRIGL SERPL-MCNC: 101 MG/DL (ref 0–150)
TSH SERPL DL<=0.05 MIU/L-ACNC: 5.26 UIU/ML (ref 0.27–4.2)
UROBILINOGEN UR QL STRIP: ABNORMAL
VLDLC SERPL-MCNC: 20.2 MG/DL (ref 5–40)
WBC # BLD AUTO: 7.24 10*3/MM3 (ref 3.4–10.8)
WBC UR QL AUTO: ABNORMAL /HPF

## 2020-10-06 PROCEDURE — 85025 COMPLETE CBC W/AUTO DIFF WBC: CPT | Performed by: FAMILY MEDICINE

## 2020-10-06 PROCEDURE — 81001 URINALYSIS AUTO W/SCOPE: CPT | Performed by: FAMILY MEDICINE

## 2020-10-06 PROCEDURE — 87147 CULTURE TYPE IMMUNOLOGIC: CPT | Performed by: FAMILY MEDICINE

## 2020-10-06 PROCEDURE — 36415 COLL VENOUS BLD VENIPUNCTURE: CPT | Performed by: FAMILY MEDICINE

## 2020-10-06 PROCEDURE — 87086 URINE CULTURE/COLONY COUNT: CPT | Performed by: FAMILY MEDICINE

## 2020-10-06 PROCEDURE — G0439 PPPS, SUBSEQ VISIT: HCPCS | Performed by: FAMILY MEDICINE

## 2020-10-06 PROCEDURE — 84443 ASSAY THYROID STIM HORMONE: CPT | Performed by: FAMILY MEDICINE

## 2020-10-06 PROCEDURE — 82306 VITAMIN D 25 HYDROXY: CPT | Performed by: FAMILY MEDICINE

## 2020-10-06 PROCEDURE — 87186 SC STD MICRODIL/AGAR DIL: CPT | Performed by: FAMILY MEDICINE

## 2020-10-06 PROCEDURE — 80053 COMPREHEN METABOLIC PANEL: CPT | Performed by: FAMILY MEDICINE

## 2020-10-06 PROCEDURE — 90694 VACC AIIV4 NO PRSRV 0.5ML IM: CPT | Performed by: FAMILY MEDICINE

## 2020-10-06 PROCEDURE — G0008 ADMIN INFLUENZA VIRUS VAC: HCPCS | Performed by: FAMILY MEDICINE

## 2020-10-06 PROCEDURE — 80061 LIPID PANEL: CPT | Performed by: FAMILY MEDICINE

## 2020-10-06 PROCEDURE — 87088 URINE BACTERIA CULTURE: CPT | Performed by: FAMILY MEDICINE

## 2020-10-06 PROCEDURE — 96160 PT-FOCUSED HLTH RISK ASSMT: CPT | Performed by: FAMILY MEDICINE

## 2020-10-06 RX ORDER — OXYBUTYNIN CHLORIDE 5 MG/1
5 TABLET, EXTENDED RELEASE ORAL DAILY
Qty: 90 TABLET | Refills: 1 | Status: SHIPPED | OUTPATIENT
Start: 2020-10-06 | End: 2021-01-11

## 2020-10-06 NOTE — PROGRESS NOTES
Subsequent Medicare Wellness Visit   The ABC's of the Annual Wellness Visit    Chief Complaint   Patient presents with   • Medicare Wellness-subsequent       HPI:  Sheree Vance, -1945, is a 75 y.o. female who presents for a Subsequent Medicare Wellness Visit.  She reports having issues with urinary frequency and urgency.  She has had those symptoms since her right hip replacement in July of this year.  She has to get up sometimes several times at night due to urination need.  She denies however any discomfort upon urination or blood in urine.  She denies any issues with balance or any falls.  She denies any problems with memory or depressive symptoms. Patient denies any chest pain, shortness of breath, dizziness, nausea, vomiting, or diarrhea. No visual issues reported. No headaches, numbness or tingling. No urinary issues. Patient has good appetite and denies any weight changes. No swelling reported. No emotional issues.      Recent Hospitalizations:  No hospitalization(s) within the last year..    Current Medical Providers:  Patient Care Team:  Latia Guerrier MD as PCP - General  Latia Guerrier MD as PCP - Family Medicine  Dre Case MD (Cardiology)  Yarelis Araya PA as Physician Assistant (Physician Assistant)  Jet Godwin MD as Consulting Physician (Ophthalmology)  Antonio Fine MD as Consulting Physician (Allergy)  Sandor Astorga MD as Consulting Physician (Orthopedic Surgery)    Health Habits and Functional and Cognitive Screening and Depression Screening:  Functional & Cognitive Status 10/6/2020   Do you have difficulty preparing food and eating? No   Do you have difficulty bathing yourself, getting dressed or grooming yourself? No   Do you have difficulty using the toilet? No   Do you have difficulty moving around from place to place? No   Do you have trouble with steps or getting out of a bed or a chair? No   Current Diet Well Balanced Diet   Dental Exam Up to  date   Eye Exam -   Exercise (times per week) 0 times per week   Current Exercise Activities Include None   Do you need help using the phone?  No   Are you deaf or do you have serious difficulty hearing?  No   Do you need help with transportation? No   Do you need help shopping? No   Do you need help preparing meals?  No   Do you need help with housework?  No   Do you need help with laundry? No   Do you need help taking your medications? No   Do you need help managing money? No   Do you ever drive or ride in a car without wearing a seat belt? No   Have you felt unusual stress, anger or loneliness in the last month? No   Who do you live with? Spouse   If you need help, do you have trouble finding someone available to you? No   Have you been bothered in the last four weeks by sexual problems? No   Do you have difficulty concentrating, remembering or making decisions? No       Compared to one year ago, the patient feels her physical health is the same and her mental health is the same.    Depression Screen:  PHQ-2/PHQ-9 Depression Screening 9/4/2020   Little interest or pleasure in doing things 0   Feeling down, depressed, or hopeless 0   Total Score 0         Past Medical/Family/Social History:  The following portions of the patient's history were reviewed and updated as appropriate: allergies, current medications, past family history, past medical history, past social history, past surgical history and problem list.    No Known Allergies      Current Outpatient Medications:   •  amLODIPine (NORVASC) 10 MG tablet, Take 1 tablet by mouth Daily., Disp: 90 tablet, Rfl: 3  •  atorvastatin (LIPITOR) 10 MG tablet, TAKE 1 TABLET AT BEDTIME, Disp: 90 tablet, Rfl: 1  •  Cholecalciferol 2000 units capsule, Take  by mouth., Disp: , Rfl:   •  Chromium 200 MCG capsule, Take 100 mg by mouth Daily., Disp: , Rfl:   •  coenzyme Q10 100 MG capsule, Take 100 mg by mouth Daily., Disp: , Rfl:   •  conjugated estrogens (PREMARIN) 0.625  MG/GM vaginal cream, Use 0.5 grams per vagina 2-3 times a week, Disp: 3 each, Rfl: 0  •  dofetilide (TIKOSYN) 500 MCG capsule, TAKE 1 CAPSULE TWICE DAILY, Disp: 180 capsule, Rfl: 3  •  ELIQUIS 5 MG tablet tablet, TAKE 1 TABLET EVERY 12 HOURS, Disp: 180 tablet, Rfl: 3  •  EPINEPHrine (EPIPEN) 0.3 MG/0.3ML solution auto-injector injection, USE AS DIRECTED FOR ACUTE ALLERGIC REACTION, Disp: , Rfl: 3  •  fluticasone (FLONASE) 50 MCG/ACT nasal spray, 2 sprays into the nostril(s) as directed by provider Daily., Disp: , Rfl:   •  levocetirizine (XYZAL) 5 MG tablet, Take 1 tablet by mouth Daily., Disp: , Rfl: 0  •  levothyroxine (SYNTHROID, LEVOTHROID) 137 MCG tablet, TAKE 1 TABLET EVERY DAY, Disp: 90 tablet, Rfl: 1  •  Magnesium 200 MG tablet, Take  by mouth Daily., Disp: , Rfl:   •  montelukast (SINGULAIR) 10 MG tablet, TK 1 T PO HS, Disp: , Rfl: 3  •  Multiple Vitamins-Minerals (MULTIVITAMIN ADULT) tablet, MULTIVITAMIN ADULT TABS, Disp: , Rfl:   •  NIACIN CR PO, Take 100 mg by mouth., Disp: , Rfl:   •  Omega-3 1000 MG capsule, Take  by mouth Daily., Disp: , Rfl:   •  oxybutynin XL (DITROPAN-XL) 5 MG 24 hr tablet, Take 1 tablet by mouth Daily., Disp: 90 tablet, Rfl: 1  •  spironolactone (ALDACTONE) 25 MG tablet, TAKE 1 TABLET EVERY DAY, Disp: 90 tablet, Rfl: 1  •  telmisartan (MICARDIS) 80 MG tablet, TAKE 1 TABLET EVERY DAY, Disp: 90 tablet, Rfl: 1  •  vitamin B-12 (CYANOCOBALAMIN) 1000 MCG tablet, Take 1,000 mcg by mouth Daily., Disp: , Rfl:     Aspirin use counseling: Does not need ASA (and currently is not on it)    Current medication list contains no high risk medications. Some potential harmful drug interactions identified. Plan of action: monitoring    Family History   Problem Relation Age of Onset   • Heart failure Mother    • Alcohol abuse Mother    • Early death Mother    • Miscarriages / Stillbirths Mother    • No Known Problems Father        Social History     Tobacco Use   • Smoking status: Never Smoker   •  Smokeless tobacco: Never Used   Substance Use Topics   • Alcohol use: Yes     Frequency: Monthly or less     Comment: Infrequently       Past Surgical History:   Procedure Laterality Date   • APPENDECTOMY     • BACK SURGERY  2016    Spinal Fusion   • BLADDER REPAIR  2001 in [place]   • CARDIAC CATHETERIZATION  2015   • CARDIOVERSION  08/7/18 & 4/17/18-Located within [facility]    Dr. Villegas--For Chronic A-Fib   • CARDIOVERSION  12/4/28-Located within [facility]    Dr. Case--See Report   • CATARACT EXTRACTION  2007   • COLONOSCOPY  04/13/2017   • HYSTERECTOMY      Partial   • KNEE SURGERY Right 2009   • OTHER SURGICAL HISTORY  12/4/18-Located within [facility]    Fluoroscopy/Trans-Septal Access to L Atrium/Selective Venography of L Superior Pulm Vein/Add Lienier Radiofrequency Ablation Along the Coronary Sinus/Synchronized Cardioversion--Dr. Case   • SPINE SURGERY     • SARA  12/4/18-Located within [facility]    Mild Concentric L Ventricular Hypertrophy Noted; EF 55-60%; L Atrial Moderate-Severely Dilated, Moderat Mitral Annular Calcification with Moderate MVR Noted; Mild TVR; Normal LV Function   • TONSILLECTOMY     • TOTAL HIP ARTHROPLASTY Right 07/06/2020    Dr. Astorga       Patient Active Problem List   Diagnosis   • Atrial fibrillation (CMS/HCC)   • Hypertension   • Hyperlipidemia   • Left ventricular diastolic dysfunction   • Morbid obesity (CMS/HCC)   • Degenerative joint disease of right knee   • Gastroesophageal reflux disease   • Hyperglycemia   • Hypothyroidism   • Edema leg   • Knee pain, right   • Lumbosacral spondylosis without myelopathy   • Osteopenia   • Lumbar radiculopathy   • Spinal stenosis of lumbar region   • S/P lumbar fusion   • Primary osteoarthritis of right hip   • Arthritis of right sacroiliac joint   • Allergic rhinitis   • Visit for screening mammogram   • Postmenopausal   • Vitamin D deficiency   • Atrophic vaginitis   • Dysuria   • Pre-op exam   • Medicare annual wellness visit, subsequent       Review of Systems   Constitutional: Negative for activity change, fatigue  "and fever.   Respiratory: Negative for cough, shortness of breath and wheezing.    Cardiovascular: Negative for chest pain, palpitations and leg swelling.   Gastrointestinal: Negative for constipation, diarrhea and indigestion.   Genitourinary: Positive for frequency and urgency. Negative for dysuria, hematuria and pelvic pain.   Musculoskeletal: Positive for arthralgias.   Skin: Negative for color change, dry skin and rash.   Neurological: Negative for tremors and headache.       Objective     Vitals:    10/06/20 0855   BP: 155/84   BP Location: Left arm   Patient Position: Sitting   Cuff Size: Large Adult   Pulse: 84   Resp: 16   Temp: 97.5 °F (36.4 °C)   TempSrc: Temporal   SpO2: 95%   Weight: 116 kg (256 lb)   Height: 165.1 cm (65\")       Patient's Body mass index is 42.6 kg/m². BMI is above normal parameters. Recommendations include: educational material.      No exam data present    The patient has no evidence of cognitve impairment.     Physical Exam  Vitals signs and nursing note reviewed.   Constitutional:       Appearance: Normal appearance. She is well-developed.   HENT:      Head: Normocephalic and atraumatic.   Eyes:      Conjunctiva/sclera: Conjunctivae normal.      Pupils: Pupils are equal, round, and reactive to light.   Neck:      Musculoskeletal: Normal range of motion and neck supple.   Cardiovascular:      Rate and Rhythm: Normal rate and regular rhythm.      Heart sounds: Normal heart sounds. No murmur. No gallop.    Pulmonary:      Effort: Pulmonary effort is normal. No respiratory distress.      Breath sounds: Normal breath sounds. No wheezing, rhonchi or rales.   Chest:      Chest wall: No tenderness.   Musculoskeletal: Normal range of motion.         General: No swelling or deformity.   Skin:     General: Skin is warm and dry.   Neurological:      General: No focal deficit present.      Mental Status: She is alert and oriented to person, place, and time.         Recent Lab Results:  Lab " Results   Component Value Date     (H) 05/28/2020     Lab Results   Component Value Date    CHOL 181 02/21/2020    TRIG 87 02/21/2020    HDL 68 (H) 02/21/2020    VLDL 17.4 02/21/2020    LDLHDL 1.41 02/21/2020       Assessment/Plan   Age-appropriate Screening Schedule:  Refer to the list below for future screening recommendations based on patient's age, sex and/or medical conditions.      Health Maintenance   Topic Date Due   • LIPID PANEL  02/21/2021   • MAMMOGRAM  09/06/2021   • DXA SCAN  09/06/2021   • TDAP/TD VACCINES (2 - Td) 04/14/2025   • COLONOSCOPY  04/13/2027   • INFLUENZA VACCINE  Completed   • ZOSTER VACCINE  Completed       Medicare Risks and Personalized Health Plan:  Advance Directive Discussion  Breast Cancer/Mammogram Screening  Cardiovascular risk  Chronic Pain   Colon Cancer Screening  Dementia/Memory   Depression/Dysphoria  Diabetic Lab Screening   Fall Risk  Immunizations Discussed/Encouraged (specific immunizations; Influenza )  Osteoprorosis Risk  Polypharmacy      CMS-Preventive Services Quick Reference  Medicare Preventive Services Addressed:  Annual Wellness Visit (AWV)  Bone Density Measurements  Cardiovascular Disease Screening Tests (may do this order every 5 years in beneficiaries without signs or symptoms of cardiovascular disease)  Colorectal Cancer Screening, Colonoscopy  Screening Mammography     Advance Care Planning:  ACP discussion was held with the patient during this visit. Patient has an advance directive in EMR which is still valid.     Diagnoses and all orders for this visit:    1. Medicare annual wellness visit, subsequent (Primary)    2. Vitamin D deficiency  -     Vitamin D 25 Hydroxy    3. Mixed hyperlipidemia  -     CBC Auto Differential  -     Comprehensive Metabolic Panel  -     Lipid Panel  -     TSH    4. Urinary frequency  -     Urinalysis With Culture If Indicated - Urine, Clean Catch    5. Visit for screening mammogram  -     Mammo Screening Digital  Tomosynthesis Bilateral With CAD; Future    6. Need for vaccination  -     Fluad Quad 65+ yrs ([id number])    Other orders  -     oxybutynin XL (DITROPAN-XL) 5 MG 24 hr tablet; Take 1 tablet by mouth Daily.  Dispense: 90 tablet; Refill: 1    Medicare wellness exam was done today.  I reviewed her medical problems and her medications.  I will be getting fasting blood work.  I also reviewed her health maintenance.  Her last colonoscopy was in April 2017 and she is telling me that no recall was recommended.  Her last mammogram was in September 2019 and she will be scheduling mammogram in near future.  Her last bone density was in September 2019.  I also reviewed her immunization.  She was given a flu shot today.  She had Prevnar in 2015.  She tells me that she got her Pneumovax after she turned 67 by her previous doctor in [place].  I do not have records of that shot and she will try to provide a copy.  Healthy lifestyle was discussed and reinforced.  I will be starting her on oxybutynin due to ongoing and increased issues with urinary frequency.    An After Visit Summary and PPPS with all of these plans were given to the patient.      Follow Up:  Return in about 6 months (around 4/6/2021) for Next scheduled follow up.

## 2020-10-08 LAB
BACTERIA SPEC AEROBE CULT: ABNORMAL
BACTERIA SPEC AEROBE CULT: ABNORMAL
STREP GROUPING: ABNORMAL

## 2020-10-08 RX ORDER — SULFAMETHOXAZOLE AND TRIMETHOPRIM 800; 160 MG/1; MG/1
1 TABLET ORAL 2 TIMES DAILY
Qty: 14 TABLET | Refills: 0 | Status: SHIPPED | OUTPATIENT
Start: 2020-10-08 | End: 2020-10-15

## 2020-10-15 RX ORDER — LEVOTHYROXINE SODIUM 0.15 MG/1
150 TABLET ORAL DAILY
Qty: 90 TABLET | Refills: 0 | Status: SHIPPED | OUTPATIENT
Start: 2020-10-15 | End: 2020-12-18

## 2020-11-09 DIAGNOSIS — Z12.31 VISIT FOR SCREENING MAMMOGRAM: ICD-10-CM

## 2020-12-14 RX ORDER — AMLODIPINE BESYLATE 10 MG/1
TABLET ORAL
Qty: 90 TABLET | Refills: 3 | Status: SHIPPED | OUTPATIENT
Start: 2020-12-14 | End: 2021-12-29

## 2020-12-18 RX ORDER — LEVOTHYROXINE SODIUM 0.15 MG/1
150 TABLET ORAL DAILY
Qty: 90 TABLET | Refills: 0 | Status: SHIPPED | OUTPATIENT
Start: 2020-12-18 | End: 2021-02-17 | Stop reason: SDUPTHER

## 2021-01-11 ENCOUNTER — TELEPHONE (OUTPATIENT)
Dept: FAMILY MEDICINE CLINIC | Facility: CLINIC | Age: 76
End: 2021-01-11

## 2021-01-11 RX ORDER — FESOTERODINE FUMARATE 4 MG/1
4 TABLET, FILM COATED, EXTENDED RELEASE ORAL
Qty: 30 TABLET | Refills: 5 | Status: SHIPPED | OUTPATIENT
Start: 2021-01-11 | End: 2021-02-11 | Stop reason: SDUPTHER

## 2021-01-11 RX ORDER — LEVOTHYROXINE SODIUM 137 UG/1
TABLET ORAL
COMMUNITY
Start: 2020-10-24 | End: 2021-01-14

## 2021-01-11 NOTE — TELEPHONE ENCOUNTER
THE PATIENT CALLED IN AND STATED THAT SHE HAS BEEN TAKING A MEDICATION FOR AN OVERACTIVE BLADDER. SHE SAID IT IS NOT WORKING. SHE WOULD LIKE ANOTHER PRESCRIPTION TO TREAT THIS.    PLEASE ADVISE.    CALLBACK NUMBER: [phone]

## 2021-01-11 NOTE — TELEPHONE ENCOUNTER
She is currently on oxybutynin.  If this is not helping I can send in prescription for Vesicare or possibly Toviaz to see if these medications will help.  Which pharmacy?

## 2021-01-14 ENCOUNTER — OFFICE VISIT (OUTPATIENT)
Dept: CARDIOLOGY | Facility: CLINIC | Age: 76
End: 2021-01-14

## 2021-01-14 VITALS
DIASTOLIC BLOOD PRESSURE: 100 MMHG | BODY MASS INDEX: 43.27 KG/M2 | OXYGEN SATURATION: 96 % | SYSTOLIC BLOOD PRESSURE: 180 MMHG | HEART RATE: 65 BPM | WEIGHT: 260 LBS

## 2021-01-14 DIAGNOSIS — E78.2 MIXED HYPERLIPIDEMIA: ICD-10-CM

## 2021-01-14 DIAGNOSIS — I48.0 PAROXYSMAL ATRIAL FIBRILLATION (HCC): Primary | ICD-10-CM

## 2021-01-14 DIAGNOSIS — I10 ESSENTIAL HYPERTENSION: ICD-10-CM

## 2021-01-14 DIAGNOSIS — G47.33 OBSTRUCTIVE SLEEP APNEA SYNDROME: ICD-10-CM

## 2021-01-14 PROCEDURE — 93000 ELECTROCARDIOGRAM COMPLETE: CPT | Performed by: INTERNAL MEDICINE

## 2021-01-14 PROCEDURE — 99214 OFFICE O/P EST MOD 30 MIN: CPT | Performed by: INTERNAL MEDICINE

## 2021-01-14 RX ORDER — TRIAMTERENE AND HYDROCHLOROTHIAZIDE 75; 50 MG/1; MG/1
0.5 TABLET ORAL DAILY
Qty: 15 TABLET | Refills: 3 | Status: SHIPPED | OUTPATIENT
Start: 2021-01-14 | End: 2021-03-04 | Stop reason: ALTCHOICE

## 2021-01-14 NOTE — PROGRESS NOTES
CC--Atrial fibrillation, hypertension      SUB-- patient is 75 years old white female whose past medical history significant for hypertension, Persistent atrial fibrillation,  obesity  underwent AF ablation in 2018 with early recurrence with initiation of Tikosyn to sinus rhythm with resolution of symptoms and comes in for evaluation. patient failed prior cardioversion and amiodarone--patient has severe left atrial enlargement needing antiarrhythmic treatment to optimize into sinus rhythm-- during past hospitalization CT surgery consult was also requested for possible future convergence procedure because of severe left atrial enlargement.In 2009, patient was diagnosed with paroxysmal atrial fibrillation.  Patient was treated with beta-blockers initially.  Patient was started on flecainide later on.  In  2017, patient underwent cardiac catheterization at [facility] and coronary arteries were normal.  LV function was preserved.  Patient was started on Eliquis because of recurrence of atrial fibrillation.   chads vasc  score---3   patient was hypertensive and started on amlodipine, micardis and spironolactone to optimize hypertension   no recurrent symptoms since last visit  Patient has noticed increasing blood pressure despite taking medications and denies any symptoms of arrhythmias         assessment plan   recurrent persistent atrial fibrillation and failed medical treatment with symptoms--  post cryoablation in December 2018 to sinus rhythm with early recurrence on Tikosyn to sinus rhythm--patient remains in sinus rhythm and Tikosyn can be stopped   obesity   hypertension--stop Aldactone and start Maxide and check BMP in 2 weeks and reevaluate this patient in 1 month to make sure the patient's hypertension is well optimized   hypothyroidism   leg edema--mild and stable    Treated sleep apnea  Medications reviewed  Lab work-up from October reviewed with the TSH of 5.2 and potassium of 5.2 with normal  renal functions        Past Medical History:     Reviewed history from 01/08/2019 and no changes required:        HTN        Paroxysmal  A-fib - Dr. Lugo        Seasonal allergies - on allergy shots per ENT        Hyperlipidemia        Hypothyroidism        Chronic low back pain        Colonoscopy in 4/13/2017        Mammogram in 7/31/2018 - negative        Pneumonia shot in the past - both Pneumovax and Prevnar        Shingle shot in the past        Osteopenia - DEXA on 6/23/2017 - L/S: +0.5 and Hip: -0.8        GYN HM per GYN - Dr. Hopkins        Negative Hepatitis C screening in 10/2017               Physical Exam    General:      well developed, well nourished, in no acute distress.    Head:      normocephalic and atraumatic.    Eyes:      PERRL/EOM intact, conjunctiva and sclera clear with out nystagmus.    Neck:      no masses, thyromegaly, trachea central with normal respiratory effort  Lungs:      clear bilaterally to auscultation.    Heart:      non-displaced PMI, chest non-tender; regular rate and rhythm, S1, S2 without murmurs, rubs, or gallops        ECG 12 Lead    Date/Time: 1/14/2021 5:34 PM  Performed by: Dre Case MD  Authorized by: Dre Case MD   Comparison: compared with previous ECG   Similar to previous ECG  Rhythm: sinus rhythm  Rate: normal  QRS axis: normal  Other findings: non-specific ST-T wave changes, low voltage and left atrial abnormality          Electronically signed by Dre Case MD, 01/14/21, 5:34 PM EST.

## 2021-02-11 ENCOUNTER — TELEPHONE (OUTPATIENT)
Dept: FAMILY MEDICINE CLINIC | Facility: CLINIC | Age: 76
End: 2021-02-11

## 2021-02-11 RX ORDER — FESOTERODINE FUMARATE 4 MG/1
4 TABLET, FILM COATED, EXTENDED RELEASE ORAL
Qty: 30 TABLET | Refills: 5 | Status: CANCELLED | OUTPATIENT
Start: 2021-02-11

## 2021-02-11 RX ORDER — FESOTERODINE FUMARATE 4 MG/1
4 TABLET, FILM COATED, EXTENDED RELEASE ORAL
Qty: 90 TABLET | Refills: 1 | Status: SHIPPED | OUTPATIENT
Start: 2021-02-11 | End: 2022-05-19

## 2021-02-11 NOTE — TELEPHONE ENCOUNTER
Caller: Sheree Vance    Relationship: Self    Best call back number: [phone]    Medication needed:   Requested Prescriptions     Pending Prescriptions Disp Refills   • fesoterodine fumarate (Toviaz) 4 MG tablet sustained-release 24 hour tablet 30 tablet 5     Sig: Take 1 tablet by mouth Daily.       When do you need the refill by: 02/11    What details did the patient provide when requesting the medication: PATIENT REQUESTING THIS BE SENT TO A NEW PHARMACY.  PATIENT REQUESTED A 90 DAY SUPPLY.  MEDICATION IS CHEAPER THROUGH  L3.    Does the patient have less than a 3 day supply:  [x] Yes  [] No    What is the patient's preferred pharmacy: [facility] - [facility] [address] [facility] - [phone] [facility] [phone]

## 2021-02-12 ENCOUNTER — LAB (OUTPATIENT)
Dept: LAB | Facility: HOSPITAL | Age: 76
End: 2021-02-12

## 2021-02-12 DIAGNOSIS — I10 ESSENTIAL HYPERTENSION: ICD-10-CM

## 2021-02-12 DIAGNOSIS — I48.0 PAROXYSMAL ATRIAL FIBRILLATION (HCC): ICD-10-CM

## 2021-02-12 LAB
ANION GAP SERPL CALCULATED.3IONS-SCNC: 10.7 MMOL/L (ref 5–15)
BUN SERPL-MCNC: 18 MG/DL (ref 8–23)
BUN/CREAT SERPL: 18.6 (ref 7–25)
CALCIUM SPEC-SCNC: 10.1 MG/DL (ref 8.6–10.5)
CHLORIDE SERPL-SCNC: 99 MMOL/L (ref 98–107)
CO2 SERPL-SCNC: 29.3 MMOL/L (ref 22–29)
CREAT SERPL-MCNC: 0.97 MG/DL (ref 0.57–1)
GFR SERPL CREATININE-BSD FRML MDRD: 56 ML/MIN/1.73
GLUCOSE SERPL-MCNC: 102 MG/DL (ref 65–99)
POTASSIUM SERPL-SCNC: 4 MMOL/L (ref 3.5–5.2)
SODIUM SERPL-SCNC: 139 MMOL/L (ref 136–145)

## 2021-02-12 PROCEDURE — 36415 COLL VENOUS BLD VENIPUNCTURE: CPT

## 2021-02-12 PROCEDURE — 80048 BASIC METABOLIC PNL TOTAL CA: CPT

## 2021-02-17 RX ORDER — LEVOTHYROXINE SODIUM 0.15 MG/1
150 TABLET ORAL DAILY
Qty: 90 TABLET | Refills: 0 | Status: SHIPPED | OUTPATIENT
Start: 2021-02-17 | End: 2021-05-19

## 2021-02-27 RX ORDER — CONJUGATED ESTROGENS 0.62 MG/G
CREAM VAGINAL
Qty: 30 G | Refills: 1 | Status: SHIPPED | OUTPATIENT
Start: 2021-02-27 | End: 2022-03-27

## 2021-03-04 ENCOUNTER — OFFICE VISIT (OUTPATIENT)
Dept: CARDIOLOGY | Facility: CLINIC | Age: 76
End: 2021-03-04

## 2021-03-04 VITALS
WEIGHT: 259 LBS | BODY MASS INDEX: 43.15 KG/M2 | RESPIRATION RATE: 18 BRPM | SYSTOLIC BLOOD PRESSURE: 146 MMHG | HEART RATE: 90 BPM | OXYGEN SATURATION: 94 % | DIASTOLIC BLOOD PRESSURE: 89 MMHG | HEIGHT: 65 IN

## 2021-03-04 DIAGNOSIS — E78.2 MIXED HYPERLIPIDEMIA: ICD-10-CM

## 2021-03-04 DIAGNOSIS — I10 ESSENTIAL HYPERTENSION: ICD-10-CM

## 2021-03-04 DIAGNOSIS — R00.2 PALPITATIONS: ICD-10-CM

## 2021-03-04 DIAGNOSIS — G47.33 OBSTRUCTIVE SLEEP APNEA SYNDROME: ICD-10-CM

## 2021-03-04 DIAGNOSIS — I48.19 PERSISTENT ATRIAL FIBRILLATION (HCC): Primary | ICD-10-CM

## 2021-03-04 PROCEDURE — 93000 ELECTROCARDIOGRAM COMPLETE: CPT | Performed by: INTERNAL MEDICINE

## 2021-03-04 PROCEDURE — 99214 OFFICE O/P EST MOD 30 MIN: CPT | Performed by: INTERNAL MEDICINE

## 2021-03-04 RX ORDER — SPIRONOLACTONE 50 MG/1
50 TABLET, FILM COATED ORAL DAILY
Qty: 90 TABLET | Refills: 3 | Status: SHIPPED | OUTPATIENT
Start: 2021-03-04 | End: 2021-12-27

## 2021-03-04 RX ORDER — DOFETILIDE 0.5 MG/1
500 CAPSULE ORAL 2 TIMES DAILY
Qty: 180 CAPSULE | Refills: 1 | Status: SHIPPED | OUTPATIENT
Start: 2021-03-04 | End: 2021-11-22

## 2021-03-04 NOTE — PROGRESS NOTES
CC--Atrial fibrillation, hypertension      SUB-- patient is 75 years old white female whose past medical history significant for hypertension, Persistent atrial fibrillation,  obesity  underwent AF ablation in 2018 with early recurrence with initiation of Tikosyn to sinus rhythm with resolution of symptoms and Tikosyn was stopped with the last office visit and started noticing recurrent atrial fibrillation in last 2 to 3 weeks with symptoms of palpitations and mild fatigue --patient has severe left atrial enlargement needing antiarrhythmic treatment to optimize into sinus rhythm-- during past hospitalization CT surgery consult was also requested for possible future convergence procedure because of severe left atrial enlargement.In 2009, patient was diagnosed with paroxysmal atrial fibrillation.  Patient was treated with beta-blockers initially.  Patient was started on flecainide later on.  In  2017, patient underwent cardiac catheterization at [facility] and coronary arteries were normal.  LV function was preserved.  Patient was started on Eliquis because of recurrence of atrial fibrillation.   chads vasc  score---3   patient was hypertensive and is on multiple medication        Past Medical History:     Reviewed history from 01/08/2019 and no changes required:        HTN        Paroxysmal  A-fib - Dr. Lugo        Seasonal allergies - on allergy shots per ENT        Hyperlipidemia        Hypothyroidism        Chronic low back pain        Colonoscopy in 4/13/2017        Mammogram in 7/31/2018 - negative        Pneumonia shot in the past - both Pneumovax and Prevnar        Shingle shot in the past        Osteopenia - DEXA on 6/23/2017 - L/S: +0.5 and Hip: -0.8        GYN HM per GYN - Dr. Hopkins        Negative Hepatitis C screening in 10/2017               Physical Exam    General:      well developed, well nourished, in no acute distress.    Head:      normocephalic and atraumatic.    Eyes:       PERRL/EOM intact, conjunctiva and sclera clear with out nystagmus.    Neck:      no masses, thyromegaly, trachea central with normal respiratory effort  Lungs:      clear bilaterally to auscultation.    Heart:     irregular rate and rhythm, S1, S2 without murmurs, rubs, or gallops           assessment plan   recurrent persistent atrial fibrillation and failed medical treatment with symptoms--  post cryoablation in December 2018 to sinus rhythm with early recurrence on Tikosyn to sinus rhythm--patient has recurrent atrial fibrillation after Tikosyn has been stopped few months ago with reinitiation of atrial fibrillation in the last 3 weeks --Tikosyn to be resumed at 500 mcg twice daily which she was taking in the past and check an EKG next week  Follow-up closely in 1 month  Because of reinitiation of Tikosyn, Maxide to be stopped and to be started on spironolactone 50 mg a day to optimize hypertension   obesity with a BMI of 43.10   hypertension   hypothyroidism   leg edema--mild and stable    Treated sleep apnea  Medications reviewed        ECG 12 Lead    Date/Time: 3/4/2021 4:34 PM  Performed by: Dre Case MD  Authorized by: Dre Case MD   Comparison: compared with previous ECG   Comparison to previous ECG: Compared to previous  EKG when patient in sinus rhythm current EKG shows atypical flutter with rapid ventricular rate  Rhythm: atrial flutter and atrial fibrillation  Rate: tachycardic  Conduction: conduction normal  Other findings: non-specific ST-T wave changes and low voltage    Clinical impression: abnormal EKG          Electronically signed by Dre Case MD, 03/04/21, 4:34 PM EST.

## 2021-03-09 ENCOUNTER — OFFICE VISIT (OUTPATIENT)
Dept: SLEEP MEDICINE | Facility: CLINIC | Age: 76
End: 2021-03-09

## 2021-03-09 VITALS
HEART RATE: 82 BPM | HEIGHT: 65 IN | SYSTOLIC BLOOD PRESSURE: 132 MMHG | OXYGEN SATURATION: 96 % | WEIGHT: 259 LBS | BODY MASS INDEX: 43.15 KG/M2 | DIASTOLIC BLOOD PRESSURE: 74 MMHG

## 2021-03-09 DIAGNOSIS — E66.01 CLASS 3 SEVERE OBESITY WITHOUT SERIOUS COMORBIDITY WITH BODY MASS INDEX (BMI) OF 40.0 TO 44.9 IN ADULT, UNSPECIFIED OBESITY TYPE (HCC): ICD-10-CM

## 2021-03-09 DIAGNOSIS — G47.33 OSA ON CPAP: Primary | ICD-10-CM

## 2021-03-09 DIAGNOSIS — Z99.89 OSA ON CPAP: Primary | ICD-10-CM

## 2021-03-09 PROBLEM — E66.813 CLASS 3 SEVERE OBESITY WITH BODY MASS INDEX (BMI) OF 40.0 TO 44.9 IN ADULT: Status: ACTIVE | Noted: 2019-07-01

## 2021-03-09 PROCEDURE — G0463 HOSPITAL OUTPT CLINIC VISIT: HCPCS

## 2021-03-09 PROCEDURE — 99203 OFFICE O/P NEW LOW 30 MIN: CPT | Performed by: INTERNAL MEDICINE

## 2021-03-09 NOTE — PROGRESS NOTES
[facility]  [address] [place] [address]  Phone   Fax       Sheree Vance  1945  75 y.o.  female      PCP:Latia Guerrier MD    Type of service: Initial New Patient Office Visit  Date of service: 3/9/2021    Chief Complaint   Patient presents with   • Sleep Apnea   • Follow-up       History of present illness;  Sheree Vance is a new patient for me and was seen today for sleep related problems.  Patient has a history of sleep apnea for the past 3 years and she is on auto CPAP.  She also has a history of atrial fibrillation and is followed by cardiology.  She is a new patient to me I have reviewed her medical records along with the diagnostic polysomnography which was conducted on 27 November 2018 which showed mild sleep apnea with AHI of 10.5/h but in supine position AHI is 30/h.  Patient reports that she is feeling much better after starting using the CPAP and feels well rested.  Her symptoms are improved.  She is retired for many years used to work in [employer].    Patient gives the following sleep history.  Sleep schedule:  Bedtime: 12 midnight  Wake time: 8 AM  Normally takes about less than 30 minutes to fall asleep  Average hours of sleep 8-9        Past Medical History:   Diagnosis Date   • A-fib (CMS/HCC) Since 2001 Dx in [place]   • Allergic 2014    Seasonal   • Allergic rhinitis     Hx Allergy Shots x 4 Yrs   • Aortic valve calcification 12/04/2018    Noted on SARA   • Arrhythmia    • Arthritis     Lumbar Spine   • Atrial flutter (CMS/HCC)    • Breast mass seen on mammogram 03/19/2014    L 4CM Mass--Benign & Followed   • Bruxism, sleep-related    • Cataract    • Chronic anticoagulation     Eliquis    • Chronic low back pain     Hx Back Sx in 2016   • Colon polyp    • Coronary artery disease 2009    AFIB   • DJD (degenerative joint disease), lumbar    • Dyslipidemia     w/Hypertriglyceridemia--Statin/Fish Oils   • Family history of  premature CAD    • Glaucoma    • Heart disease    • History of bone density study 03/19/14-[facility]    T-Score of 1.4 Indicating Osteopenia   • History of EKG 04/2018 & 08/2018 & 12/2018    First Degree AV Block/Multiple Atrial Premature Blocks Noted on All   • Hormone replacement therapy (HRT)     Premarin-0.625MG   • Hyperlipidemia     Controlled w/Meds   • Hypertension     Controlled w/Losartan   • Hypothyroidism     Synthroid   • Insomnia     Takes OTC Sleep Aid PRN   • Left atrial enlargement 12/04/2018    Severe Noted on Cardioversion Report/SARA   • Left breast mass 03/19/2014    4CM Noted on Mammogram--Benign    • Mild tricuspid valve regurgitation 12/04/2018    Noted on SARA   • Mitral valve annular calcification 12/04/2018    Moderate Noted on SARA   • Mitral valve regurgitation 12/04/2018    Moderate Noted on SARA   • Obesity 12/04/2018    BMI-43.3    • SHANAE on CPAP 11/27/2018    AHI 10.5/h, supine 30/h   • Osteoarthritis of right hip 7/9/2019   • Osteopenia 03/19/2014    Noted on Dexa Report   • Restless sleeper     Tosses and Turns All Night Per Pt   • Sinus congestion    • Tricuspid leaflet thickened 12/04/2018    Noted on SARA   • Urinary tract infection         has a past surgical history that includes Appendectomy; Knee surgery (Right, 2009); Hysterectomy; Bladder repair (2001 in [place]); Cardiac catheterization (2015); Back surgery (2016); Cardioversion (08/7/18 & 4/17/18-[facility]); Other surgical history (12/4/18-F); Cardioversion (12/4/28-F); SARA (12/4/18-[facility]); Cataract extraction (2007); Colonoscopy (04/13/2017); Spine surgery; Tonsillectomy; and Total hip arthroplasty (Right, 07/06/2020).     Social history:  Shift work: Retired  Tobacco use: No  Alcohol use: No  Caffeinated drinks: 2  Over-the-counter sleeping aid and medications: No  Narcotic medications: No    Family Hx  Family history of sleep apnea negative  Family History   Problem Relation Age of Onset   • Heart failure Mother    • Alcohol abuse Mother     • Early death Mother    • Miscarriages / Stillbirths Mother    • No Known Problems Father        Medications: reviewed    Current Outpatient Medications:   •  amLODIPine (NORVASC) 10 MG tablet, TAKE 1 TABLET EVERY DAY, Disp: 90 tablet, Rfl: 3  •  atorvastatin (LIPITOR) 10 MG tablet, TAKE 1 TABLET AT BEDTIME, Disp: 90 tablet, Rfl: 1  •  Cholecalciferol 2000 units capsule, Take  by mouth., Disp: , Rfl:   •  Chromium 200 MCG capsule, Take 100 mg by mouth Daily., Disp: , Rfl:   •  coenzyme Q10 100 MG capsule, Take 100 mg by mouth Daily., Disp: , Rfl:   •  conjugated estrogens (Premarin) 0.625 MG/GM vaginal cream, USE 1/2 GRAM PER VAGINA 2 TO 3 TIMES A WEEK, Disp: 30 g, Rfl: 1  •  dofetilide (TIKOSYN) 500 MCG capsule, Take 1 capsule by mouth 2 (Two) Times a Day., Disp: 180 capsule, Rfl: 1  •  ELIQUIS 5 MG tablet tablet, TAKE 1 TABLET EVERY 12 HOURS, Disp: 180 tablet, Rfl: 3  •  EPINEPHrine (EPIPEN) 0.3 MG/0.3ML solution auto-injector injection, USE AS DIRECTED FOR ACUTE ALLERGIC REACTION, Disp: , Rfl: 3  •  fesoterodine fumarate (Toviaz) 4 MG tablet sustained-release 24 hour tablet, Take 1 tablet by mouth Daily., Disp: 90 tablet, Rfl: 1  •  fluticasone (FLONASE) 50 MCG/ACT nasal spray, 2 sprays into the nostril(s) as directed by provider Daily., Disp: , Rfl:   •  levocetirizine (XYZAL) 5 MG tablet, Take 1 tablet by mouth Daily., Disp: , Rfl: 0  •  levothyroxine (SYNTHROID, LEVOTHROID) 150 MCG tablet, Take 1 tablet by mouth Daily., Disp: 90 tablet, Rfl: 0  •  Magnesium 200 MG tablet, Take  by mouth Daily., Disp: , Rfl:   •  montelukast (SINGULAIR) 10 MG tablet, TK 1 T PO HS, Disp: , Rfl: 3  •  Multiple Vitamins-Minerals (MULTIVITAMIN ADULT) tablet, MULTIVITAMIN ADULT TABS, Disp: , Rfl:   •  NIACIN CR PO, Take 100 mg by mouth., Disp: , Rfl:   •  Omega-3 1000 MG capsule, Take  by mouth Daily., Disp: , Rfl:   •  spironolactone (Aldactone) 50 MG tablet, Take 1 tablet by mouth Daily., Disp: 90 tablet, Rfl: 3  •  telmisartan  "(MICARDIS) 80 MG tablet, TAKE 1 TABLET EVERY DAY, Disp: 90 tablet, Rfl: 1  •  vitamin B-12 (CYANOCOBALAMIN) 1000 MCG tablet, Take 1,000 mcg by mouth Daily., Disp: , Rfl:     Review of systems:  [place] Sleepiness Scale: Total score: 2   Review of Systems   Constitutional: Negative for fatigue and fever.   HENT: Negative for congestion, nosebleeds and postnasal drip.    Eyes: Negative for discharge and itching.   Respiratory: Positive for apnea. Negative for cough and choking.    Cardiovascular: Negative for chest pain, palpitations and leg swelling.   Gastrointestinal: Negative for nausea and vomiting.   Endocrine: Negative for cold intolerance and heat intolerance.   Genitourinary: Negative for difficulty urinating and frequency.   Musculoskeletal: Negative for gait problem, neck pain and neck stiffness.   Skin: Negative for color change and pallor.   Neurological: Negative for dizziness and seizures.   Hematological: Negative for adenopathy. Does not bruise/bleed easily.   Psychiatric/Behavioral: Positive for sleep disturbance. Negative for agitation and confusion.       Physical exam:  Vitals:    03/09/21 1427   BP: 132/74   BP Location: Right arm   Patient Position: Sitting   Cuff Size: Adult   Pulse: 82   SpO2: 96%   Weight: 117 kg (259 lb)   Height: 165.1 cm (65\")    Body mass index is 43.1 kg/m².    HEENT: Head is atraumatic, normocephalic  Eyes: pupils are round equal and reacting to light and accommodation, conjunctiva normal  Nose: no nasal septal defects or deviation and the nasal passages are clear, no nasal polyps,  Throat: tonsils are not enlarged, tongue normal size, oral airway Mallampati class 3  NECK: No lymphadenopathy, trachea is in the midline, thyroid not enlarged  RESPIRATORY SYSTEM: Breath sounds are equal on both sides, there are no wheezes or crackles  CARDIOVASULAR SYSTEM: Heart sounds are regular rhythm and sixto rate, there are no murmurs or thrills  ABDOMEN: Soft, no hepatosplenomegaly, " no evidence of ascites  EXTREMITES: No cyanosis, clubbing or edema   NEUROLOGICAL SYSTEM: Oriented x 3, no gross motor defects, gait normal    Medical records polysomnography done on 27th November 8, 2018.  It was done at [facility] and it is in the old medical records    The Smart card downloaded on 3/9/2021 has been independently reviewed by me and discussed the data with the patient. It shows the following..  Compliance; 99.5%  > 4 hr use, 97.3%  Average use of the device 6 hours and 25 minutes per night  Residual AHI: 2.9 /hr (normal less than 5)  Mask type: Full facemask  DME: [employer]      Assessment and plan:  Obstructive sleep apnea, patient is using the device with good compliance for treatment of sleep apnea.  I have reviewed the smart card down load and discussed with the patient the download data and encouarged the patient to continue to use the device. The residual AHI is acceptable.  The device is benefiting the patient and it is medically necessary.  The patient is also instructed to get the supplies from the DME company and see me in 1 year for follow-up.  The prescription for supplies has been sent to the DME company.   · Obesity, patient's BMI is Body mass index is 43.1 kg/m².. I have discussed the relationship between weight and sleep apnea. Weight reduction is encouraged.  I will also discussed with the patient diet and exercise.  · History of atrial fibrillation on medical management    I made a return appointment for her to come back and see me in about 1 year for follow-up for annual compliance check      Yaw Allen MD  Sleep Medicine  Medical Director, [facility]  @

## 2021-03-10 DIAGNOSIS — I48.0 PAROXYSMAL ATRIAL FIBRILLATION (HCC): Primary | ICD-10-CM

## 2021-03-10 PROCEDURE — 93000 ELECTROCARDIOGRAM COMPLETE: CPT | Performed by: INTERNAL MEDICINE

## 2021-04-06 ENCOUNTER — OFFICE VISIT (OUTPATIENT)
Dept: FAMILY MEDICINE CLINIC | Facility: CLINIC | Age: 76
End: 2021-04-06

## 2021-04-06 ENCOUNTER — LAB (OUTPATIENT)
Dept: FAMILY MEDICINE CLINIC | Facility: CLINIC | Age: 76
End: 2021-04-06

## 2021-04-06 VITALS
HEART RATE: 85 BPM | WEIGHT: 256 LBS | DIASTOLIC BLOOD PRESSURE: 79 MMHG | RESPIRATION RATE: 16 BRPM | SYSTOLIC BLOOD PRESSURE: 145 MMHG | TEMPERATURE: 97.1 F | BODY MASS INDEX: 42.65 KG/M2 | OXYGEN SATURATION: 94 % | HEIGHT: 65 IN

## 2021-04-06 DIAGNOSIS — M15.9 OSTEOARTHRITIS, GENERALIZED: ICD-10-CM

## 2021-04-06 DIAGNOSIS — I10 ESSENTIAL HYPERTENSION: Primary | ICD-10-CM

## 2021-04-06 DIAGNOSIS — E03.9 ACQUIRED HYPOTHYROIDISM: ICD-10-CM

## 2021-04-06 DIAGNOSIS — E78.2 MIXED HYPERLIPIDEMIA: ICD-10-CM

## 2021-04-06 DIAGNOSIS — R73.9 HYPERGLYCEMIA: ICD-10-CM

## 2021-04-06 DIAGNOSIS — I48.0 PAROXYSMAL ATRIAL FIBRILLATION (HCC): ICD-10-CM

## 2021-04-06 PROBLEM — R60.0 EDEMA LEG: Status: RESOLVED | Noted: 2019-02-25 | Resolved: 2021-04-06

## 2021-04-06 PROBLEM — Z01.818 PRE-OP EXAM: Status: RESOLVED | Noted: 2020-05-26 | Resolved: 2021-04-06

## 2021-04-06 LAB
ALBUMIN SERPL-MCNC: 4.3 G/DL (ref 3.5–5.2)
ALBUMIN/GLOB SERPL: 1.7 G/DL
ALP SERPL-CCNC: 159 U/L (ref 39–117)
ALT SERPL W P-5'-P-CCNC: 18 U/L (ref 1–33)
ANION GAP SERPL CALCULATED.3IONS-SCNC: 10 MMOL/L (ref 5–15)
AST SERPL-CCNC: 23 U/L (ref 1–32)
BILIRUB SERPL-MCNC: 0.3 MG/DL (ref 0–1.2)
BUN SERPL-MCNC: 16 MG/DL (ref 8–23)
BUN/CREAT SERPL: 18.2 (ref 7–25)
CALCIUM SPEC-SCNC: 9.3 MG/DL (ref 8.6–10.5)
CHLORIDE SERPL-SCNC: 108 MMOL/L (ref 98–107)
CHOLEST SERPL-MCNC: 155 MG/DL (ref 0–200)
CO2 SERPL-SCNC: 24 MMOL/L (ref 22–29)
CREAT SERPL-MCNC: 0.88 MG/DL (ref 0.57–1)
GFR SERPL CREATININE-BSD FRML MDRD: 63 ML/MIN/1.73
GLOBULIN UR ELPH-MCNC: 2.6 GM/DL
GLUCOSE SERPL-MCNC: 102 MG/DL (ref 65–99)
HDLC SERPL-MCNC: 56 MG/DL (ref 40–60)
LDLC SERPL CALC-MCNC: 75 MG/DL (ref 0–100)
LDLC/HDLC SERPL: 1.28 {RATIO}
POTASSIUM SERPL-SCNC: 4.2 MMOL/L (ref 3.5–5.2)
PROT SERPL-MCNC: 6.9 G/DL (ref 6–8.5)
SODIUM SERPL-SCNC: 142 MMOL/L (ref 136–145)
TRIGL SERPL-MCNC: 138 MG/DL (ref 0–150)
TSH SERPL DL<=0.05 MIU/L-ACNC: 3.37 UIU/ML (ref 0.27–4.2)
VLDLC SERPL-MCNC: 24 MG/DL (ref 5–40)

## 2021-04-06 PROCEDURE — 80053 COMPREHEN METABOLIC PANEL: CPT | Performed by: FAMILY MEDICINE

## 2021-04-06 PROCEDURE — 80061 LIPID PANEL: CPT | Performed by: FAMILY MEDICINE

## 2021-04-06 PROCEDURE — 84443 ASSAY THYROID STIM HORMONE: CPT | Performed by: FAMILY MEDICINE

## 2021-04-06 PROCEDURE — 36415 COLL VENOUS BLD VENIPUNCTURE: CPT | Performed by: FAMILY MEDICINE

## 2021-04-06 PROCEDURE — 83036 HEMOGLOBIN GLYCOSYLATED A1C: CPT | Performed by: FAMILY MEDICINE

## 2021-04-06 PROCEDURE — 99214 OFFICE O/P EST MOD 30 MIN: CPT | Performed by: FAMILY MEDICINE

## 2021-04-06 RX ORDER — TELMISARTAN 80 MG/1
80 TABLET ORAL DAILY
Qty: 90 TABLET | Refills: 1 | Status: SHIPPED | OUTPATIENT
Start: 2021-04-06 | End: 2021-10-11 | Stop reason: SDUPTHER

## 2021-04-06 RX ORDER — ATORVASTATIN CALCIUM 10 MG/1
10 TABLET, FILM COATED ORAL
Qty: 90 TABLET | Refills: 1 | Status: SHIPPED | OUTPATIENT
Start: 2021-04-06 | End: 2021-10-13

## 2021-04-06 NOTE — PROGRESS NOTES
Subjective   Chief Complaint   Patient presents with   • Follow-up     6 month   • Med Refill   • she is due for laabs   • Hypertension   • Hypothyroidism   • Hyperlipidemia   • Atrial Fibrillation     Sheree Vance is a 75 y.o. female.     Patient Care Team:  Latia Guerrier MD as PCP - General  Latia Guerrier MD as PCP - Family Medicine  Dre Case MD (Cardiology)  Yarelis Araya PA as Physician Assistant (Physician Assistant)  Jet Godwin MD as Consulting Physician (Ophthalmology)  Antonio Fine MD as Consulting Physician (Allergy)  Sandor Astorga MD as Consulting Physician (Orthopedic Surgery)    She is coming in today to follow-up on her medical problems and her medications including hypertension, hyperlipidemia, hypothyroidism, atrial fibrillation, and some arthritis issues.  Her medications are being reviewed today.  She is due for fasting blood work.  She is followed by cardiologist due to her atrial fibrillation issues and she reports some recent changes in the medication.  She was apparently taken off Tikosyn temporarily and then put back on it again.  She reports feeling pretty well and she is in her regular state of health.  She has some aches and pains, but she simply tries to deal with it. Patient does not report any chest pain, shortness of breath, dizziness, nausea, vomiting, or diarrhea, visual issues, headaches, numbness or tingling. No urinary issues reported like urgency, frequency, or discomfort upon urination.  No significant weight changes reported.  No swelling reported.  No rashes or any other skin issues reported. No emotional issues or insomnia.         The following portions of the patient's history were reviewed and updated as appropriate: allergies, current medications, past family history, past medical history, past social history, past surgical history and problem list.  Past Medical History:   Diagnosis Date   • A-fib (CMS/HCC) Since 2001 Dx in [place]    • Allergic 2014    Seasonal   • Allergic rhinitis     Hx Allergy Shots x 4 Yrs   • Aortic valve calcification 12/04/2018    Noted on SARA   • Arrhythmia    • Arthritis     Lumbar Spine   • Atrial flutter (CMS/HCC)    • Breast mass seen on mammogram 03/19/2014    L 4CM Mass--Benign & Followed   • Bruxism, sleep-related    • Cataract    • Chronic anticoagulation     Eliquis    • Chronic low back pain     Hx Back Sx in 2016   • Colon polyp    • Coronary artery disease 2009    AFIB   • DJD (degenerative joint disease), lumbar    • Dyslipidemia     w/Hypertriglyceridemia--Statin/Fish Oils   • Family history of premature CAD    • Glaucoma    • Heart disease    • History of bone density study 03/19/14-[facility]    T-Score of 1.4 Indicating Osteopenia   • History of EKG 04/2018 & 08/2018 & 12/2018    First Degree AV Block/Multiple Atrial Premature Blocks Noted on All   • Hormone replacement therapy (HRT)     Premarin-0.625MG   • Hyperlipidemia     Controlled w/Meds   • Hypertension     Controlled w/Losartan   • Hypothyroidism     Synthroid   • Insomnia     Takes OTC Sleep Aid PRN   • Left atrial enlargement 12/04/2018    Severe Noted on Cardioversion Report/SARA   • Left breast mass 03/19/2014    4CM Noted on Mammogram--Benign    • Mild tricuspid valve regurgitation 12/04/2018    Noted on SARA   • Mitral valve annular calcification 12/04/2018    Moderate Noted on SARA   • Mitral valve regurgitation 12/04/2018    Moderate Noted on SARA   • Obesity 12/04/2018    BMI-43.3    • SHANAE on CPAP 11/27/2018    AHI 10.5/h, supine 30/h   • Osteoarthritis of right hip 7/9/2019   • Osteopenia 03/19/2014    Noted on Dexa Report   • Restless sleeper     Tosses and Turns All Night Per Pt   • Sinus congestion    • Tricuspid leaflet thickened 12/04/2018    Noted on SARA   • Urinary tract infection      Past Surgical History:   Procedure Laterality Date   • APPENDECTOMY     • BACK SURGERY  2016    Spinal Fusion   • BLADDER REPAIR  2001 in [place]   • CARDIAC  CATHETERIZATION  2015   • CARDIOVERSION  08/7/18 & 4/17/18-[facility]    Dr. Villegas--For Chronic A-Fib   • CARDIOVERSION  12/4/28-[facility]    Dr. Case--See Report   • CATARACT EXTRACTION  2007   • COLONOSCOPY  04/13/2017   • HYSTERECTOMY      Partial   • KNEE SURGERY Right 2009   • OTHER SURGICAL HISTORY  12/4/18-[facility]    Fluoroscopy/Trans-Septal Access to L Atrium/Selective Venography of L Superior Pulm Vein/Add Lienier Radiofrequency Ablation Along the Coronary Sinus/Synchronized Cardioversion--Dr. Case   • SPINE SURGERY     • SARA  12/4/18-[facility]    Mild Concentric L Ventricular Hypertrophy Noted; EF 55-60%; L Atrial Moderate-Severely Dilated, Moderat Mitral Annular Calcification with Moderate MVR Noted; Mild TVR; Normal LV Function   • TONSILLECTOMY     • TOTAL HIP ARTHROPLASTY Right 07/06/2020    Dr. Astorga     The patient has a family history of  Family History   Problem Relation Age of Onset   • Heart failure Mother    • Alcohol abuse Mother    • Early death Mother    • Miscarriages / Stillbirths Mother    • No Known Problems Father      Social History     Socioeconomic History   • Marital status:      Spouse name: Papi   • Number of children: 1   • Years of education: Not on file   • Highest education level: Not on file   Tobacco Use   • Smoking status: Never Smoker   • Smokeless tobacco: Never Used   Vaping Use   • Vaping Use: Never used   Substance and Sexual Activity   • Alcohol use: Yes     Comment: Infrequently   • Drug use: No   • Sexual activity: Yes     Partners: Male     Birth control/protection: Surgical, None     Comment: Hyst       Review of Systems   Constitutional: Negative for activity change, fatigue and fever.   Respiratory: Negative for shortness of breath and wheezing.    Cardiovascular: Negative for chest pain, palpitations and leg swelling.   Musculoskeletal: Positive for arthralgias and back pain.   Skin: Negative for rash.   Neurological: Negative for tremors and headache.     Visit  "Vitals  /79 (BP Location: Left arm, Patient Position: Sitting, Cuff Size: Adult)   Pulse 85   Temp 97.1 °F (36.2 °C) (Temporal)   Resp 16   Ht 165.1 cm (65\")   Wt 116 kg (256 lb)   SpO2 94%   BMI 42.60 kg/m²       Current Outpatient Medications:   •  amLODIPine (NORVASC) 10 MG tablet, TAKE 1 TABLET EVERY DAY, Disp: 90 tablet, Rfl: 3  •  atorvastatin (LIPITOR) 10 MG tablet, Take 1 tablet by mouth every night at bedtime., Disp: 90 tablet, Rfl: 1  •  Cholecalciferol 2000 units capsule, Take  by mouth., Disp: , Rfl:   •  Chromium 200 MCG capsule, Take 100 mg by mouth Daily., Disp: , Rfl:   •  coenzyme Q10 100 MG capsule, Take 100 mg by mouth Daily., Disp: , Rfl:   •  conjugated estrogens (Premarin) 0.625 MG/GM vaginal cream, USE 1/2 GRAM PER VAGINA 2 TO 3 TIMES A WEEK, Disp: 30 g, Rfl: 1  •  dofetilide (TIKOSYN) 500 MCG capsule, Take 1 capsule by mouth 2 (Two) Times a Day., Disp: 180 capsule, Rfl: 1  •  ELIQUIS 5 MG tablet tablet, TAKE 1 TABLET EVERY 12 HOURS, Disp: 180 tablet, Rfl: 3  •  EPINEPHrine (EPIPEN) 0.3 MG/0.3ML solution auto-injector injection, USE AS DIRECTED FOR ACUTE ALLERGIC REACTION, Disp: , Rfl: 3  •  fesoterodine fumarate (Toviaz) 4 MG tablet sustained-release 24 hour tablet, Take 1 tablet by mouth Daily., Disp: 90 tablet, Rfl: 1  •  fluticasone (FLONASE) 50 MCG/ACT nasal spray, 2 sprays into the nostril(s) as directed by provider Daily., Disp: , Rfl:   •  levocetirizine (XYZAL) 5 MG tablet, Take 1 tablet by mouth Daily., Disp: , Rfl: 0  •  levothyroxine (SYNTHROID, LEVOTHROID) 150 MCG tablet, Take 1 tablet by mouth Daily., Disp: 90 tablet, Rfl: 0  •  Magnesium 200 MG tablet, Take  by mouth Daily., Disp: , Rfl:   •  montelukast (SINGULAIR) 10 MG tablet, TK 1 T PO HS, Disp: , Rfl: 3  •  Multiple Vitamins-Minerals (MULTIVITAMIN ADULT) tablet, MULTIVITAMIN ADULT TABS, Disp: , Rfl:   •  NIACIN CR PO, Take 100 mg by mouth., Disp: , Rfl:   •  Omega-3 1000 MG capsule, Take  by mouth Daily., Disp: , Rfl: "   •  spironolactone (Aldactone) 50 MG tablet, Take 1 tablet by mouth Daily., Disp: 90 tablet, Rfl: 3  •  telmisartan (MICARDIS) 80 MG tablet, Take 1 tablet by mouth Daily., Disp: 90 tablet, Rfl: 1  •  vitamin B-12 (CYANOCOBALAMIN) 1000 MCG tablet, Take 1,000 mcg by mouth Daily., Disp: , Rfl:     Objective   Physical Exam  Vitals and nursing note reviewed.   Constitutional:       General: She is not in acute distress.     Appearance: Normal appearance. She is well-developed. She is not ill-appearing.   HENT:      Head: Normocephalic and atraumatic.   Cardiovascular:      Rate and Rhythm: Normal rate and regular rhythm.      Heart sounds: Normal heart sounds. No murmur heard.   No gallop.    Pulmonary:      Effort: Pulmonary effort is normal. No respiratory distress.      Breath sounds: Normal breath sounds. No wheezing, rhonchi or rales.   Chest:      Chest wall: No tenderness.   Musculoskeletal:      Cervical back: Normal range of motion and neck supple.   Neurological:      General: No focal deficit present.      Mental Status: She is alert and oriented to person, place, and time. Mental status is at baseline.   Psychiatric:         Mood and Affect: Mood normal.         FOLLOWING LABS WERE REVIEWED TODAY:  CMP    CMP 5/28/20 10/6/20 2/12/21   Glucose  107 (A) 102 (A)   Glucose 120 (A)     BUN 17 19 18   Creatinine 1.0 0.89 0.97   eGFR Non African Am  62 56 (A)   Sodium 137 140 139   Potassium 4.3 5.2 4.0   Chloride 102 101 99   Calcium 10.1 10.1 10.1   Albumin  4.60    Total Bilirubin  0.3    Alkaline Phosphatase  212 (A)    AST (SGOT)  23    ALT (SGPT)  16    (A) Abnormal value            CBC    CBC 5/28/20 7/7/20 10/6/20   WBC 7.08 10.77 7.24   RBC 4.73 3.56 (A) 4.59   Hemoglobin 13.9 10.1 (A) 12.8   Hematocrit 43.0 32.1 (A) 39.8   MCV 90.9 90.2 86.7   MCH 29.4 28.4 27.9   MCHC 32.3 31.5 32.2   RDW 13.8 14.4 13.1   Platelets 328 285 305   (A) Abnormal value            Lipid Panel    Lipid Panel 10/6/20   Total  Cholesterol 180   Triglycerides 101   HDL Cholesterol 63 (A)   VLDL Cholesterol 20.2   LDL Cholesterol  97   LDL/HDL Ratio 1.54   (A) Abnormal value              Assessment/Plan   Diagnoses and all orders for this visit:    1. Essential hypertension (Primary)  -     telmisartan (MICARDIS) 80 MG tablet; Take 1 tablet by mouth Daily.  Dispense: 90 tablet; Refill: 1    2. Mixed hyperlipidemia  -     Comprehensive Metabolic Panel  -     Lipid Panel  -     atorvastatin (LIPITOR) 10 MG tablet; Take 1 tablet by mouth every night at bedtime.  Dispense: 90 tablet; Refill: 1    3. Acquired hypothyroidism  -     TSH    4. Hyperglycemia  -     Hemoglobin A1c    5. Paroxysmal atrial fibrillation (CMS/HCC)    6. Osteoarthritis, generalized      I reviewed her medical problems and her medications.  I also reviewed her previous labs and the new set of fasting labs is being done today.  She is doing pretty well and her chronic medical problems are stable and well-controlled.  She will continue her current medications and healthy lifestyle was discussed and reinforced.          Return in about 6 months (around 10/6/2021) for Medicare Wellness.    Requested Prescriptions     Signed Prescriptions Disp Refills   • atorvastatin (LIPITOR) 10 MG tablet 90 tablet 1     Sig: Take 1 tablet by mouth every night at bedtime.   • telmisartan (MICARDIS) 80 MG tablet 90 tablet 1     Sig: Take 1 tablet by mouth Daily.

## 2021-04-07 LAB — HBA1C MFR BLD: 5.7 % (ref 3.5–5.6)

## 2021-05-03 ENCOUNTER — OFFICE VISIT (OUTPATIENT)
Dept: CARDIOLOGY | Facility: CLINIC | Age: 76
End: 2021-05-03

## 2021-05-03 VITALS
HEART RATE: 73 BPM | DIASTOLIC BLOOD PRESSURE: 84 MMHG | OXYGEN SATURATION: 94 % | SYSTOLIC BLOOD PRESSURE: 164 MMHG | WEIGHT: 256.6 LBS | BODY MASS INDEX: 42.7 KG/M2

## 2021-05-03 DIAGNOSIS — G47.33 OBSTRUCTIVE SLEEP APNEA SYNDROME: ICD-10-CM

## 2021-05-03 DIAGNOSIS — E78.2 MIXED HYPERLIPIDEMIA: ICD-10-CM

## 2021-05-03 DIAGNOSIS — I10 ESSENTIAL HYPERTENSION: ICD-10-CM

## 2021-05-03 DIAGNOSIS — I48.0 PAROXYSMAL ATRIAL FIBRILLATION (HCC): Primary | ICD-10-CM

## 2021-05-03 PROCEDURE — 93000 ELECTROCARDIOGRAM COMPLETE: CPT | Performed by: INTERNAL MEDICINE

## 2021-05-03 PROCEDURE — 99214 OFFICE O/P EST MOD 30 MIN: CPT | Performed by: INTERNAL MEDICINE

## 2021-05-03 NOTE — PROGRESS NOTES
CC--Atrial fibrillation, hypertension      SUB-- patient is 75 years old white female whose past medical history significant for hypertension, Persistent atrial fibrillation,  obesity  underwent AF ablation in 2018 with early recurrence with initiation of Tikosyn to sinus rhythm with resolution of symptoms and Tikosyn was stopped with the last office visit and started noticing recurrent atrial fibrillation in last 2 to 3 weeks with symptoms of palpitations and mild fatigue --patient has severe left atrial enlargement needing antiarrhythmic treatment to optimize into sinus rhythm-- during past hospitalization CT surgery consult was also requested for possible future convergence procedure because of severe left atrial enlargement.In 2009, patient was diagnosed with paroxysmal atrial fibrillation.  Patient was treated with beta-blockers initially.  Patient was started on flecainide later on.  In  2017, patient underwent cardiac catheterization at [facility] and coronary arteries were normal.  LV function was preserved.  Patient was started on Eliquis because of recurrence of atrial fibrillation.   chads vasc  score---3   patient was hypertensive and is on multiple medication  Post re initiation of tikosyn to sinus and feels well        Past Medical History:     Reviewed history from 01/08/2019 and no changes required:        HTN        Paroxysmal  A-fib - Dr. Lugo        Seasonal allergies - on allergy shots per ENT        Hyperlipidemia        Hypothyroidism        Chronic low back pain        Colonoscopy in 4/13/2017        Mammogram in 7/31/2018 - negative        Pneumonia shot in the past - both Pneumovax and Prevnar        Shingle shot in the past        Osteopenia - DEXA on 6/23/2017 - L/S: +0.5 and Hip: -0.8        GYN HM per GYN - Dr. Hopkins        Negative Hepatitis C screening in 10/2017               Physical Exam    General:      well developed, well nourished, in no acute distress.     Head:      normocephalic and atraumatic.    Eyes:      PERRL/EOM intact, conjunctiva and sclera clear with out nystagmus.    Neck:      no masses, thyromegaly, trachea central with normal respiratory effort  Lungs:      clear bilaterally to auscultation.    Heart:     irregular rate and rhythm, S1, S2 without murmurs, rubs, or gallops           assessment plan   recurrent persistent atrial fibrillation and failed medical treatment with symptoms--  post cryoablation in December 2018 to sinus rhythm with early recurrence on Tikosyn to sinus rhythm--patient has recurrent atrial fibrillation after Tikosyn has been stopped few months ago with reinitiation of atrial fibrillation resolved after re initiation ofTikosyn    hypertension--home monitoring educated   hypothyroidism   leg edema--mild and stable    Treated sleep apnea  Medications reviewed  K is 4.2 and normal TSh    Follow up in 3 months      ECG 12 Lead    Date/Time: 5/3/2021 3:14 PM  Performed by: Dre Case MD  Authorized by: Dre Case MD   Comparison: compared with previous ECG   Similar to previous ECG  Rhythm: sinus rhythm  Rate: normal  Conduction: conduction normal  QRS axis: normal  Other findings: non-specific ST-T wave changes            Electronically signed by Dre Case MD, 05/03/21, 3:14 PM EDT.

## 2021-05-19 RX ORDER — LEVOTHYROXINE SODIUM 0.15 MG/1
TABLET ORAL
Qty: 90 TABLET | Refills: 0 | Status: SHIPPED | OUTPATIENT
Start: 2021-05-19 | End: 2021-07-02 | Stop reason: SDUPTHER

## 2021-07-02 ENCOUNTER — OFFICE VISIT (OUTPATIENT)
Dept: FAMILY MEDICINE CLINIC | Facility: CLINIC | Age: 76
End: 2021-07-02

## 2021-07-02 VITALS
DIASTOLIC BLOOD PRESSURE: 72 MMHG | SYSTOLIC BLOOD PRESSURE: 161 MMHG | HEIGHT: 65 IN | BODY MASS INDEX: 42.55 KG/M2 | HEART RATE: 82 BPM | RESPIRATION RATE: 16 BRPM | TEMPERATURE: 97.1 F | OXYGEN SATURATION: 92 % | WEIGHT: 255.4 LBS

## 2021-07-02 DIAGNOSIS — M17.11 PRIMARY OSTEOARTHRITIS OF RIGHT KNEE: ICD-10-CM

## 2021-07-02 DIAGNOSIS — E03.9 ACQUIRED HYPOTHYROIDISM: ICD-10-CM

## 2021-07-02 DIAGNOSIS — I48.0 PAROXYSMAL ATRIAL FIBRILLATION (HCC): ICD-10-CM

## 2021-07-02 DIAGNOSIS — I10 ESSENTIAL HYPERTENSION: ICD-10-CM

## 2021-07-02 DIAGNOSIS — Z01.818 PRE-OP EXAM: Primary | ICD-10-CM

## 2021-07-02 PROBLEM — E66.01 CLASS 3 SEVERE OBESITY WITH BODY MASS INDEX (BMI) OF 40.0 TO 44.9 IN ADULT: Status: RESOLVED | Noted: 2019-07-01 | Resolved: 2021-07-02

## 2021-07-02 PROBLEM — E66.813 CLASS 3 SEVERE OBESITY WITH BODY MASS INDEX (BMI) OF 40.0 TO 44.9 IN ADULT: Status: RESOLVED | Noted: 2019-07-01 | Resolved: 2021-07-02

## 2021-07-02 PROCEDURE — 99214 OFFICE O/P EST MOD 30 MIN: CPT | Performed by: FAMILY MEDICINE

## 2021-07-02 RX ORDER — LEVOTHYROXINE SODIUM 0.15 MG/1
150 TABLET ORAL DAILY
Qty: 90 TABLET | Refills: 0 | Status: SHIPPED | OUTPATIENT
Start: 2021-07-02 | End: 2021-10-13

## 2021-07-02 NOTE — PROGRESS NOTES
Subjective   Chief Complaint   Patient presents with   • Pre-op Exam     RIGHT KNEE REPLACEMENT   • Osteoarthritis   • Hypertension   • Hyperlipidemia   • Atrial Fibrillation     Sheree Vance is a 76 y.o. female.     Patient Care Team:  Latia Guerrier MD as PCP - General  Latia Guerrier MD as PCP - Family Medicine  Dre Case MD (Cardiology)  Yarelis Araya PA as Physician Assistant (Physician Assistant)  Jet Godwin MD as Consulting Physician (Ophthalmology)  Antonio Fine MD as Consulting Physician (Allergy)  Sandor Astorga MD as Consulting Physician (Orthopedic Surgery)    She is coming in today for her preop evaluation.  She is scheduled for right knee replacement on 8/30/2021 by Dr. Sandor Astorga.  She is scheduled for preop testing down the road.  She already contacted her cardiologist as she will need clearance from them as well.  She is currently being treated for hypertension, hyperlipidemia, hypothyroidism, atrial fibrillation.  She takes her medications as directed and she denies any issues or side effects.  She is currently on Tikosyn due to atrial fibrillation.  She was started on it a while back and Tikosyn was discontinued in 1/2021, however due to recurrence of the atrial fibrillation she was restarted back on it.  She tells me that she checks her blood pressure at home quite regularly and she gets very good readings, her blood pressure is noted to be elevated today. Patient does not report any chest pain, shortness of breath, dizziness, nausea, vomiting, or diarrhea, visual issues, headaches, numbness or tingling. No urinary issues reported like urgency, frequency, or discomfort upon urination.  No significant weight changes reported.  No swelling reported.  No rashes or any other skin issues reported. No emotional issues or insomnia.         The following portions of the patient's history were reviewed and updated as appropriate: allergies, current medications,  past family history, past medical history, past social history, past surgical history and problem list.  Past Medical History:   Diagnosis Date   • A-fib (CMS/HCC) Since 2001 Dx in [place]   • Allergic 2014    Seasonal   • Allergic rhinitis     Hx Allergy Shots x 4 Yrs   • Aortic valve calcification 12/04/2018    Noted on SARA   • Arrhythmia    • Arthritis     Lumbar Spine   • Atrial flutter (CMS/HCC)    • Breast mass seen on mammogram 03/19/2014    L 4CM Mass--Benign & Followed   • Bruxism, sleep-related    • Cataract    • Chronic anticoagulation     Eliquis    • Chronic low back pain     Hx Back Sx in 2016   • Colon polyp    • Coronary artery disease 2009    AFIB   • DJD (degenerative joint disease), lumbar    • Dyslipidemia     w/Hypertriglyceridemia--Statin/Fish Oils   • Family history of premature CAD    • Glaucoma    • Heart disease    • History of bone density study 03/19/14-[facility]    T-Score of 1.4 Indicating Osteopenia   • History of EKG 04/2018 & 08/2018 & 12/2018    First Degree AV Block/Multiple Atrial Premature Blocks Noted on All   • Hormone replacement therapy (HRT)     Premarin-0.625MG   • Hyperlipidemia     Controlled w/Meds   • Hypertension     Controlled w/Losartan   • Hypothyroidism     Synthroid   • Insomnia     Takes OTC Sleep Aid PRN   • Left atrial enlargement 12/04/2018    Severe Noted on Cardioversion Report/SARA   • Left breast mass 03/19/2014    4CM Noted on Mammogram--Benign    • Mild tricuspid valve regurgitation 12/04/2018    Noted on SARA   • Mitral valve annular calcification 12/04/2018    Moderate Noted on SARA   • Mitral valve regurgitation 12/04/2018    Moderate Noted on SARA   • Obesity 12/04/2018    BMI-43.3    • SHANAE on CPAP 11/27/2018    AHI 10.5/h, supine 30/h   • Osteoarthritis of right hip 7/9/2019   • Osteopenia 03/19/2014    Noted on Dexa Report   • Restless sleeper     Tosses and Turns All Night Per Pt   • Sinus congestion    • Tricuspid leaflet thickened 12/04/2018    Noted on SARA    • Urinary tract infection      Past Surgical History:   Procedure Laterality Date   • APPENDECTOMY     • BACK SURGERY  2016    Spinal Fusion   • BLADDER REPAIR  2001 in [place]   • CARDIAC CATHETERIZATION  2015   • CARDIOVERSION  08/7/18 & 4/17/18-[facility]    Dr. Villegas--For Chronic A-Fib   • CARDIOVERSION  12/4/28-[facility]    Dr. Case--See Report   • CATARACT EXTRACTION  2007   • COLONOSCOPY  04/13/2017   • HYSTERECTOMY      Partial   • KNEE SURGERY Right 2009   • OTHER SURGICAL HISTORY  12/4/18-[facility]    Fluoroscopy/Trans-Septal Access to L Atrium/Selective Venography of L Superior Pulm Vein/Add Lienier Radiofrequency Ablation Along the Coronary Sinus/Synchronized Cardioversion--Dr. Case   • SPINE SURGERY     • SARA  12/4/18-[facility]    Mild Concentric L Ventricular Hypertrophy Noted; EF 55-60%; L Atrial Moderate-Severely Dilated, Moderat Mitral Annular Calcification with Moderate MVR Noted; Mild TVR; Normal LV Function   • TONSILLECTOMY     • TOTAL HIP ARTHROPLASTY Right 07/06/2020    Dr. Astorga     The patient has a family history of  Family History   Problem Relation Age of Onset   • Heart failure Mother    • Alcohol abuse Mother    • Early death Mother    • Miscarriages / Stillbirths Mother    • No Known Problems Father      Social History     Socioeconomic History   • Marital status:      Spouse name: Papi   • Number of children: 1   • Years of education: Not on file   • Highest education level: Not on file   Tobacco Use   • Smoking status: Never Smoker   • Smokeless tobacco: Never Used   Vaping Use   • Vaping Use: Never used   Substance and Sexual Activity   • Alcohol use: Yes     Comment: Infrequently   • Drug use: No   • Sexual activity: Yes     Partners: Male     Birth control/protection: Surgical, None     Comment: Hyst       Review of Systems   Constitutional: Negative for activity change, fatigue and fever.   Respiratory: Negative for cough, shortness of breath and wheezing.    Cardiovascular: Negative for  "chest pain, palpitations and leg swelling.   Gastrointestinal: Negative for constipation, diarrhea and indigestion.   Musculoskeletal: Positive for arthralgias.   Skin: Negative for color change, dry skin and rash.   Neurological: Negative for tremors and headache.     Visit Vitals  /72   Pulse 82   Temp 97.1 °F (36.2 °C) (Temporal)   Resp 16   Ht 165.1 cm (65\")   Wt 116 kg (255 lb 6.4 oz)   SpO2 92%   BMI 42.50 kg/m²       Current Outpatient Medications:   •  amLODIPine (NORVASC) 10 MG tablet, TAKE 1 TABLET EVERY DAY, Disp: 90 tablet, Rfl: 3  •  atorvastatin (LIPITOR) 10 MG tablet, Take 1 tablet by mouth every night at bedtime., Disp: 90 tablet, Rfl: 1  •  Cholecalciferol 2000 units capsule, Take  by mouth., Disp: , Rfl:   •  Chromium 200 MCG capsule, Take 100 mg by mouth Daily., Disp: , Rfl:   •  coenzyme Q10 100 MG capsule, Take 100 mg by mouth Daily., Disp: , Rfl:   •  conjugated estrogens (Premarin) 0.625 MG/GM vaginal cream, USE 1/2 GRAM PER VAGINA 2 TO 3 TIMES A WEEK, Disp: 30 g, Rfl: 1  •  dofetilide (TIKOSYN) 500 MCG capsule, Take 1 capsule by mouth 2 (Two) Times a Day., Disp: 180 capsule, Rfl: 1  •  ELIQUIS 5 MG tablet tablet, TAKE 1 TABLET EVERY 12 HOURS, Disp: 180 tablet, Rfl: 3  •  EPINEPHrine (EPIPEN) 0.3 MG/0.3ML solution auto-injector injection, USE AS DIRECTED FOR ACUTE ALLERGIC REACTION, Disp: , Rfl: 3  •  fesoterodine fumarate (Toviaz) 4 MG tablet sustained-release 24 hour tablet, Take 1 tablet by mouth Daily., Disp: 90 tablet, Rfl: 1  •  fluticasone (FLONASE) 50 MCG/ACT nasal spray, 2 sprays into the nostril(s) as directed by provider Daily., Disp: , Rfl:   •  levocetirizine (XYZAL) 5 MG tablet, Take 1 tablet by mouth Daily., Disp: , Rfl: 0  •  levothyroxine (SYNTHROID, LEVOTHROID) 150 MCG tablet, Take 1 tablet by mouth Daily., Disp: 90 tablet, Rfl: 0  •  Magnesium 200 MG tablet, Take  by mouth Daily., Disp: , Rfl:   •  montelukast (SINGULAIR) 10 MG tablet, TK 1 T PO HS, Disp: , Rfl: 3  •  " Multiple Vitamins-Minerals (MULTIVITAMIN ADULT) tablet, MULTIVITAMIN ADULT TABS, Disp: , Rfl:   •  NIACIN CR PO, Take 100 mg by mouth., Disp: , Rfl:   •  Omega-3 1000 MG capsule, Take  by mouth Daily., Disp: , Rfl:   •  spironolactone (Aldactone) 50 MG tablet, Take 1 tablet by mouth Daily., Disp: 90 tablet, Rfl: 3  •  telmisartan (MICARDIS) 80 MG tablet, Take 1 tablet by mouth Daily., Disp: 90 tablet, Rfl: 1  •  vitamin B-12 (CYANOCOBALAMIN) 1000 MCG tablet, Take 1,000 mcg by mouth Daily., Disp: , Rfl:     Objective   Physical Exam  Vitals and nursing note reviewed.   Constitutional:       General: She is not in acute distress.     Appearance: Normal appearance. She is well-developed. She is not ill-appearing.   HENT:      Head: Normocephalic and atraumatic.   Cardiovascular:      Rate and Rhythm: Normal rate and regular rhythm.      Heart sounds: Normal heart sounds. No murmur heard.   No gallop.    Pulmonary:      Effort: Pulmonary effort is normal. No respiratory distress.      Breath sounds: Normal breath sounds. No wheezing, rhonchi or rales.   Chest:      Chest wall: No tenderness.   Musculoskeletal:      Cervical back: Normal range of motion and neck supple.   Neurological:      General: No focal deficit present.      Mental Status: She is alert and oriented to person, place, and time. Mental status is at baseline.   Psychiatric:         Mood and Affect: Mood normal.           FOLLOWING LABS WERE REVIEWED TODAY:  CMP    CMP 10/6/20 2/12/21 4/6/21   Glucose 107 (A) 102 (A) 102 (A)   BUN 19 18 16   Creatinine 0.89 0.97 0.88   eGFR Non  Am 62 56 (A) 63   Sodium 140 139 142   Potassium 5.2 4.0 4.2   Chloride 101 99 108 (A)   Calcium 10.1 10.1 9.3   Albumin 4.60  4.30   Total Bilirubin 0.3  0.3   Alkaline Phosphatase 212 (A)  159 (A)   AST (SGOT) 23  23   ALT (SGPT) 16  18   (A) Abnormal value            CBC    CBC 10/6/20   WBC 7.24   RBC 4.59   Hemoglobin 12.8   Hematocrit 39.8   MCV 86.7   MCH 27.9    MCHC 32.2   RDW 13.1   Platelets 305           TSH    TSH 10/6/20 4/6/21   TSH 5.260 (A) 3.370   (A) Abnormal value            Most Recent A1C    HGBA1C Most Recent 4/6/21   Hemoglobin A1C 5.7 (A)   (A) Abnormal value              Assessment/Plan   Diagnoses and all orders for this visit:    1. Pre-op exam (Primary)    2. Primary osteoarthritis of right knee    3. Essential hypertension    4. Paroxysmal atrial fibrillation (CMS/HCC)    5. Acquired hypothyroidism  -     levothyroxine (SYNTHROID, LEVOTHROID) 150 MCG tablet; Take 1 tablet by mouth Daily.  Dispense: 90 tablet; Refill: 0      I reviewed her medical problems and her medications.  I also reviewed her most recent blood work results.  Her blood pressure is elevated today, however she reports getting very good readings at home and she regularly checks her blood pressure.  She will continue all of her current medicines and periodically monitor her blood pressure.  She is to get a cardiac clearance from her cardiologist.  She is scheduled for a total right knee replacement on 8/30/2021 by Dr. Sandor Astorga.      Return in about 4 years (around 7/2/2025) for Medicare Wellness.    Requested Prescriptions     Signed Prescriptions Disp Refills   • levothyroxine (SYNTHROID, LEVOTHROID) 150 MCG tablet 90 tablet 0     Sig: Take 1 tablet by mouth Daily.

## 2021-08-05 ENCOUNTER — OFFICE VISIT (OUTPATIENT)
Dept: CARDIOLOGY | Facility: CLINIC | Age: 76
End: 2021-08-05

## 2021-08-05 VITALS
OXYGEN SATURATION: 98 % | BODY MASS INDEX: 42.43 KG/M2 | WEIGHT: 255 LBS | DIASTOLIC BLOOD PRESSURE: 75 MMHG | HEART RATE: 70 BPM | SYSTOLIC BLOOD PRESSURE: 144 MMHG

## 2021-08-05 DIAGNOSIS — G47.33 OBSTRUCTIVE SLEEP APNEA SYNDROME: ICD-10-CM

## 2021-08-05 DIAGNOSIS — E78.2 MIXED HYPERLIPIDEMIA: ICD-10-CM

## 2021-08-05 DIAGNOSIS — I48.0 PAROXYSMAL ATRIAL FIBRILLATION (HCC): Primary | ICD-10-CM

## 2021-08-05 DIAGNOSIS — I10 ESSENTIAL HYPERTENSION: ICD-10-CM

## 2021-08-05 PROCEDURE — 99214 OFFICE O/P EST MOD 30 MIN: CPT | Performed by: INTERNAL MEDICINE

## 2021-08-05 PROCEDURE — 93000 ELECTROCARDIOGRAM COMPLETE: CPT | Performed by: INTERNAL MEDICINE

## 2021-08-05 NOTE — PROGRESS NOTES
CC--Atrial fibrillation, hypertension      SUB-- patient is 76 years old white female whose past medical history significant for hypertension, Persistent atrial fibrillation,  obesity  underwent AF ablation in 2018 with early recurrence with initiation of Tikosyn to sinus rhythm with resolution of symptoms and Tikosyn was stopped with the last office visit and started noticing recurrent atrial fibrillation in last 2 to 3 weeks with symptoms of palpitations and mild fatigue --patient has severe left atrial enlargement needing antiarrhythmic treatment to optimize into sinus rhythm-- during past hospitalization CT surgery consult was also requested for possible future convergence procedure because of severe left atrial enlargement.In 2009, patient was diagnosed with paroxysmal atrial fibrillation.  Patient was treated with beta-blockers initially.  Patient was started on flecainide later on.  In  2017, patient underwent cardiac catheterization at [facility] and coronary arteries were normal.  LV function was preserved.  Patient was started on Eliquis because of recurrence of atrial fibrillation.   chads vasc  score---3   patient was hypertensive and is on multiple medication  Post re initiation of tikosyn to sinus and feels well  Needs knee surgery        Past Medical History:     Reviewed history from 01/08/2019 and no changes required:        HTN        Paroxysmal  A-fib - Dr. Lugo        Seasonal allergies - on allergy shots per ENT        Hyperlipidemia        Hypothyroidism        Chronic low back pain        Colonoscopy in 4/13/2017        Mammogram in 7/31/2018 - negative        Pneumonia shot in the past - both Pneumovax and Prevnar        Shingle shot in the past        Osteopenia - DEXA on 6/23/2017 - L/S: +0.5 and Hip: -0.8        GYN HM per GYN - Dr. Hopkins        Negative Hepatitis C screening in 10/2017               Physical Exam    General:      well developed, well nourished, in no  acute distress.    Head:      normocephalic and atraumatic.    Eyes:      PERRL/EOM intact, conjunctiva and sclera clear with out nystagmus.    Neck:      no masses, thyromegaly, trachea central with normal respiratory effort  Lungs:      clear bilaterally to auscultation.    Heart:     irregular rate and rhythm, S1, S2 without murmurs, rubs, or gallops           assessment plan  Patient cleared for knee surgery   recurrent persistent atrial fibrillation and failed medical treatment with symptoms--  post cryoablation in December 2018 to sinus rhythm with early recurrence on Tikosyn to sinus rhythm--patient has recurrent atrial fibrillation after Tikosyn has been stopped few months ago with reinitiation of atrial fibrillation resolved after re initiation ofTikosyn    hypertension--controlled   hypothyroidism   leg edema--mild and stable    Treated sleep apnea  Medications reviewed  K is 4.5 and normal TSh, Cr--0.94    Follow up in 3 months        ECG 12 Lead    Date/Time: 8/5/2021 3:00 PM  Performed by: Dre Case MD  Authorized by: Dre Case MD   Comparison: compared with previous ECG   Similar to previous ECG  Rhythm: sinus rhythm  Rate: normal  Conduction: conduction normal  QRS axis: normal  Other findings: non-specific ST-T wave changes and low voltage            Electronically signed by Dre Case MD, 08/05/21, 2:59 PM EDT.

## 2021-08-30 ENCOUNTER — TRANSCRIBE ORDERS (OUTPATIENT)
Dept: HOME HEALTH SERVICES | Facility: HOME HEALTHCARE | Age: 76
End: 2021-08-30

## 2021-08-30 ENCOUNTER — HOME HEALTH ADMISSION (OUTPATIENT)
Dept: HOME HEALTH SERVICES | Facility: HOME HEALTHCARE | Age: 76
End: 2021-08-30

## 2021-08-30 DIAGNOSIS — Z47.1 AFTERCARE FOLLOWING RIGHT KNEE JOINT REPLACEMENT SURGERY: Primary | ICD-10-CM

## 2021-08-30 DIAGNOSIS — Z96.651 AFTERCARE FOLLOWING RIGHT KNEE JOINT REPLACEMENT SURGERY: Primary | ICD-10-CM

## 2021-08-31 ENCOUNTER — HOME CARE VISIT (OUTPATIENT)
Dept: HOME HEALTH SERVICES | Facility: HOME HEALTHCARE | Age: 76
End: 2021-08-31

## 2021-08-31 VITALS
TEMPERATURE: 98.3 F | RESPIRATION RATE: 18 BRPM | OXYGEN SATURATION: 95 % | HEART RATE: 60 BPM | DIASTOLIC BLOOD PRESSURE: 70 MMHG | SYSTOLIC BLOOD PRESSURE: 120 MMHG

## 2021-08-31 PROCEDURE — G0151 HHCP-SERV OF PT,EA 15 MIN: HCPCS

## 2021-08-31 NOTE — HOME HEALTH
Thurs/friday, tues thurs fri, mon  PT EVAL  Patient is a  year old  male,referred for skilled PT interventions after undergoing right/left total knee replacement.     SOCIAL SUPPORT/HOME LIVING SITUATION. Lives with _, steps to get in and out of the home, ambulates with a RW for safety  PLOF. Px reports being independent without an AD prior to hospitalization  Vital Signs. Please see oasis/eval on this visit  Homebound status. Due to dec act tolerance, weakness, decline in transfers and ambulation. Patient requires a taxing effort to leave home, putting patient at risk for falls or injury  Skilled PT needed to improve patient's functional mobility, improve safety for transfers and ambulation and return patient to PLOF.  Upon eval, patient requiring for transfers, _A for gait with use of a RW. R Knee AROM 28-75 degrees. Patient exhibiting an antalgic gait pattern with dec hip and knee flexion and dec ghassan

## 2021-09-02 ENCOUNTER — HOME CARE VISIT (OUTPATIENT)
Dept: HOME HEALTH SERVICES | Facility: HOME HEALTHCARE | Age: 76
End: 2021-09-02

## 2021-09-02 PROCEDURE — G0157 HHC PT ASSISTANT EA 15: HCPCS

## 2021-09-03 ENCOUNTER — HOME CARE VISIT (OUTPATIENT)
Dept: HOME HEALTH SERVICES | Facility: HOME HEALTHCARE | Age: 76
End: 2021-09-03

## 2021-09-03 VITALS
RESPIRATION RATE: 17 BRPM | TEMPERATURE: 98.1 F | OXYGEN SATURATION: 96 % | HEART RATE: 96 BPM | DIASTOLIC BLOOD PRESSURE: 64 MMHG | SYSTOLIC BLOOD PRESSURE: 130 MMHG

## 2021-09-03 PROCEDURE — G0157 HHC PT ASSISTANT EA 15: HCPCS

## 2021-09-03 NOTE — HOME HEALTH
pt sitting up and feeling well with no new c/o's.  pt with R knee soreness but overall well managed.   pt is taking meds prn and using ice as indicated.    incision clean and dry with no adverse s/s noted.     pt states she is motivated to improve and to return to plf     pt was compliant throughout PT session but needed instruction to properly perform ther ex with cues to fully contract/hold at end rom.  pt was encouraged to resume use of the walker and was educated on proper use.  pt reported only mild pain increases with hep review

## 2021-09-04 VITALS
OXYGEN SATURATION: 96 % | RESPIRATION RATE: 17 BRPM | DIASTOLIC BLOOD PRESSURE: 62 MMHG | SYSTOLIC BLOOD PRESSURE: 134 MMHG | TEMPERATURE: 97.6 F

## 2021-09-04 NOTE — HOME HEALTH
pt sitting up and feeling fair today,   no new c/o's reported but with continued soreness in the R knee.  pt continues to use her walker at all times and continues to feel she is improving.      pt was compliant today but struggled at times with ROM activities and with limited knee flex/extension. ROM was slightly improved from last session but still very limited.  pt was educated on proper sequencing during gait trg with cues to improve and focus on R knee flexion during swing phase.      pt continues to have edema and was therefore encouraged to use ice prn with elevation. pt was given several cues today to fully contract/hold at end rom and struggled with this at times

## 2021-09-07 ENCOUNTER — HOME CARE VISIT (OUTPATIENT)
Dept: HOME HEALTH SERVICES | Facility: HOME HEALTHCARE | Age: 76
End: 2021-09-07

## 2021-09-07 VITALS
SYSTOLIC BLOOD PRESSURE: 136 MMHG | HEART RATE: 78 BPM | RESPIRATION RATE: 16 BRPM | OXYGEN SATURATION: 97 % | DIASTOLIC BLOOD PRESSURE: 70 MMHG | TEMPERATURE: 97.7 F

## 2021-09-07 PROCEDURE — G0157 HHC PT ASSISTANT EA 15: HCPCS

## 2021-09-08 NOTE — HOME HEALTH
pt sitting up with her LEs elevated on arrival.  pt states she feels well today and had earlier pain meds.  pt is sleeping in her recliner at times stating this has been more comfortable for her.  pt is motivated to improve at this time.     pt reports she had very minimal drainage on 9/5 but this has subsided.     pt was guarded of the RLE at times with limitations noted with hep review and during gait trg.  pt was educated on proper use of the walker, with cues to remain upright and to improve R side knee flex/extension as able.   rom was slightly improved but with continued limitations.   pt was encouraged to ice/elevate LEs following PT session.

## 2021-09-09 ENCOUNTER — HOME CARE VISIT (OUTPATIENT)
Dept: HOME HEALTH SERVICES | Facility: HOME HEALTHCARE | Age: 76
End: 2021-09-09

## 2021-09-09 PROCEDURE — G0157 HHC PT ASSISTANT EA 15: HCPCS

## 2021-09-10 ENCOUNTER — HOME CARE VISIT (OUTPATIENT)
Dept: HOME HEALTH SERVICES | Facility: HOME HEALTHCARE | Age: 76
End: 2021-09-10

## 2021-09-10 VITALS
SYSTOLIC BLOOD PRESSURE: 126 MMHG | HEART RATE: 77 BPM | RESPIRATION RATE: 15 BRPM | DIASTOLIC BLOOD PRESSURE: 70 MMHG | TEMPERATURE: 97.9 F | OXYGEN SATURATION: 97 %

## 2021-09-10 PROCEDURE — G0157 HHC PT ASSISTANT EA 15: HCPCS

## 2021-09-10 NOTE — HOME HEALTH
pt up and prepared for PT session and states she feels well today with only minimal knee pain.   pt has been performing hep as able/tolerated but admits continued deficits.   pt is motivated to continue to improve     pt was compliant throughout PT session but needed cues to remain on task and to perform the activities properly and within the correct plane with instruction to sustain contractions as able.  pt was encouraged to ice/elevate following PT session

## 2021-09-11 VITALS
RESPIRATION RATE: 17 BRPM | DIASTOLIC BLOOD PRESSURE: 72 MMHG | SYSTOLIC BLOOD PRESSURE: 134 MMHG | TEMPERATURE: 97.8 F | OXYGEN SATURATION: 96 % | HEART RATE: 78 BPM

## 2021-09-11 NOTE — HOME HEALTH
pt sitting up and feeling well today, better today as she had low level energy yesterday.  pt states she has been performing at times.  pt took pain meds this morning at 5 am.     pt has OP PT scheduled for 9/14            pt is using her walker most times but has been encouraged to use her cane more often as able.      pt was compliant throughout PT session but with weakness and rom deficits,  however slowly improving to goals.    incision remains intact with no adverse s/s, clean and dry.    pt was encouraged to ice/elevate RLE following PT session.    R knee rom continues to slowly improve.

## 2021-09-13 ENCOUNTER — HOME CARE VISIT (OUTPATIENT)
Dept: HOME HEALTH SERVICES | Facility: HOME HEALTHCARE | Age: 76
End: 2021-09-13

## 2021-09-13 PROCEDURE — G0151 HHCP-SERV OF PT,EA 15 MIN: HCPCS

## 2021-09-14 VITALS
RESPIRATION RATE: 18 BRPM | HEART RATE: 72 BPM | SYSTOLIC BLOOD PRESSURE: 124 MMHG | DIASTOLIC BLOOD PRESSURE: 62 MMHG | OXYGEN SATURATION: 95 % | TEMPERATURE: 97.9 F

## 2021-09-14 NOTE — HOME HEALTH
Patient is independent and compliant with illustrated home exercise program. She surpassed range of motion goal and met all functional goals including all transfers and indoor/outdoor ambulation with std cane. Patient agreed with PT discharge this visit and she begins outpatient PT tomorrow including f/u with surgeon just before PT appt.

## 2021-09-17 RX ORDER — APIXABAN 5 MG/1
TABLET, FILM COATED ORAL
Qty: 180 TABLET | Refills: 3 | Status: SHIPPED | OUTPATIENT
Start: 2021-09-17 | End: 2022-08-29

## 2021-10-11 ENCOUNTER — OFFICE VISIT (OUTPATIENT)
Dept: FAMILY MEDICINE CLINIC | Facility: CLINIC | Age: 76
End: 2021-10-11

## 2021-10-11 ENCOUNTER — LAB (OUTPATIENT)
Dept: LAB | Facility: HOSPITAL | Age: 76
End: 2021-10-11

## 2021-10-11 VITALS
WEIGHT: 231 LBS | SYSTOLIC BLOOD PRESSURE: 140 MMHG | OXYGEN SATURATION: 95 % | DIASTOLIC BLOOD PRESSURE: 85 MMHG | BODY MASS INDEX: 38.49 KG/M2 | RESPIRATION RATE: 16 BRPM | HEART RATE: 89 BPM | TEMPERATURE: 97.1 F | HEIGHT: 65 IN

## 2021-10-11 DIAGNOSIS — E78.2 MIXED HYPERLIPIDEMIA: ICD-10-CM

## 2021-10-11 DIAGNOSIS — I10 ESSENTIAL HYPERTENSION: ICD-10-CM

## 2021-10-11 DIAGNOSIS — Z78.0 POSTMENOPAUSAL: ICD-10-CM

## 2021-10-11 DIAGNOSIS — E03.9 ACQUIRED HYPOTHYROIDISM: ICD-10-CM

## 2021-10-11 DIAGNOSIS — Z00.00 MEDICARE ANNUAL WELLNESS VISIT, SUBSEQUENT: Primary | ICD-10-CM

## 2021-10-11 DIAGNOSIS — Z23 NEEDS FLU SHOT: ICD-10-CM

## 2021-10-11 DIAGNOSIS — R73.9 HYPERGLYCEMIA: ICD-10-CM

## 2021-10-11 DIAGNOSIS — Z12.31 VISIT FOR SCREENING MAMMOGRAM: ICD-10-CM

## 2021-10-11 LAB
ALBUMIN SERPL-MCNC: 4.7 G/DL (ref 3.5–5.2)
ALBUMIN/GLOB SERPL: 1.6 G/DL
ALP SERPL-CCNC: 199 U/L (ref 39–117)
ALT SERPL W P-5'-P-CCNC: 10 U/L (ref 1–33)
ANION GAP SERPL CALCULATED.3IONS-SCNC: 11.6 MMOL/L (ref 5–15)
AST SERPL-CCNC: 22 U/L (ref 1–32)
BILIRUB SERPL-MCNC: 0.4 MG/DL (ref 0–1.2)
BUN SERPL-MCNC: 13 MG/DL (ref 8–23)
BUN/CREAT SERPL: 13.1 (ref 7–25)
CALCIUM SPEC-SCNC: 10.2 MG/DL (ref 8.6–10.5)
CHLORIDE SERPL-SCNC: 100 MMOL/L (ref 98–107)
CHOLEST SERPL-MCNC: 174 MG/DL (ref 0–200)
CO2 SERPL-SCNC: 25.4 MMOL/L (ref 22–29)
CREAT SERPL-MCNC: 0.99 MG/DL (ref 0.57–1)
GFR SERPL CREATININE-BSD FRML MDRD: 55 ML/MIN/1.73
GLOBULIN UR ELPH-MCNC: 2.9 GM/DL
GLUCOSE SERPL-MCNC: 119 MG/DL (ref 65–99)
HBA1C MFR BLD: 5.2 % (ref 3.5–5.6)
HDLC SERPL-MCNC: 51 MG/DL (ref 40–60)
LDLC SERPL CALC-MCNC: 104 MG/DL (ref 0–100)
LDLC/HDLC SERPL: 2.01 {RATIO}
POTASSIUM SERPL-SCNC: 4.4 MMOL/L (ref 3.5–5.2)
PROT SERPL-MCNC: 7.6 G/DL (ref 6–8.5)
SODIUM SERPL-SCNC: 137 MMOL/L (ref 136–145)
TRIGL SERPL-MCNC: 103 MG/DL (ref 0–150)
TSH SERPL DL<=0.05 MIU/L-ACNC: 1.66 UIU/ML (ref 0.27–4.2)
VLDLC SERPL-MCNC: 19 MG/DL (ref 5–40)

## 2021-10-11 PROCEDURE — G0008 ADMIN INFLUENZA VIRUS VAC: HCPCS | Performed by: FAMILY MEDICINE

## 2021-10-11 PROCEDURE — 1170F FXNL STATUS ASSESSED: CPT | Performed by: FAMILY MEDICINE

## 2021-10-11 PROCEDURE — 80053 COMPREHEN METABOLIC PANEL: CPT | Performed by: FAMILY MEDICINE

## 2021-10-11 PROCEDURE — 83036 HEMOGLOBIN GLYCOSYLATED A1C: CPT | Performed by: FAMILY MEDICINE

## 2021-10-11 PROCEDURE — 96160 PT-FOCUSED HLTH RISK ASSMT: CPT | Performed by: FAMILY MEDICINE

## 2021-10-11 PROCEDURE — 80061 LIPID PANEL: CPT | Performed by: FAMILY MEDICINE

## 2021-10-11 PROCEDURE — 1159F MED LIST DOCD IN RCRD: CPT | Performed by: FAMILY MEDICINE

## 2021-10-11 PROCEDURE — G0439 PPPS, SUBSEQ VISIT: HCPCS | Performed by: FAMILY MEDICINE

## 2021-10-11 PROCEDURE — 84443 ASSAY THYROID STIM HORMONE: CPT | Performed by: FAMILY MEDICINE

## 2021-10-11 PROCEDURE — 36415 COLL VENOUS BLD VENIPUNCTURE: CPT | Performed by: FAMILY MEDICINE

## 2021-10-11 PROCEDURE — 90662 IIV NO PRSV INCREASED AG IM: CPT | Performed by: FAMILY MEDICINE

## 2021-10-11 RX ORDER — TELMISARTAN 80 MG/1
80 TABLET ORAL DAILY
Qty: 90 TABLET | Refills: 1 | Status: SHIPPED | OUTPATIENT
Start: 2021-10-11 | End: 2022-03-10

## 2021-10-11 NOTE — PROGRESS NOTES
Subsequent Medicare Wellness Visit   The ABC's of the Annual Wellness Visit    Chief Complaint   Patient presents with   • Medicare Wellness-subsequent   • Hypertension   • Hyperlipidemia   • Hypothyroidism       HPI:  Sheree Vance, -1945, is a 76 y.o. female who presents for a Subsequent Medicare Wellness Visit.  She is been treated for multiple medical problems and she is on several different medications.  She had a total right knee replacement in 2021 and she has been recovering pretty well.  She broke her tooth about a week before the surgery and has been having ongoing issues since then due to a lot of pain.  She was already evaluated by her dentist and referred to the oral surgeon, but she has not been able to get in for the appointment.  She cannot eat solid food well and she has been pretty much drinking Ensure and V8 juices.  She has lost about 20 pounds.  She hopes to see the surgeon within the next week. Patient does not report any chest pain, shortness of breath, dizziness, nausea, vomiting, or diarrhea, visual issues, headaches, numbness or tingling. No urinary issues reported like urgency, frequency, or discomfort upon urination.  No significant weight changes reported.  No swelling reported.  No rashes or any other skin issues reported. No emotional issues or insomnia.      Recent Hospitalizations:  No hospitalization(s) within the last year..    Current Medical Providers:  Patient Care Team:  Latia Guerrier MD as PCP - General  Latia Guerrier MD as PCP - Family Medicine  Dre Case MD (Cardiology)  Yarelis Araya PA as Physician Assistant (Physician Assistant)  Jet Godwin MD as Consulting Physician (Ophthalmology)  Antonio Fine MD as Consulting Physician (Allergy)  Sandor Astorga MD as Consulting Physician (Orthopedic Surgery)    Health Habits and Functional and Cognitive Screening and Depression Screening:  Functional & Cognitive Status 10/11/2021    Do you have difficulty preparing food and eating? No   Do you have difficulty bathing yourself, getting dressed or grooming yourself? No   Do you have difficulty using the toilet? No   Do you have difficulty moving around from place to place? No   Do you have trouble with steps or getting out of a bed or a chair? No   Current Diet Well Balanced Diet   Dental Exam Up to date   Eye Exam Up to date   Exercise (times per week) 0 times per week   Current Exercises Include No Regular Exercise   Current Exercise Activities Include -   Do you need help using the phone?  No   Are you deaf or do you have serious difficulty hearing?  No   Do you need help with transportation? No   Do you need help shopping? No   Do you need help preparing meals?  No   Do you need help with housework?  No   Do you need help with laundry? No   Do you need help taking your medications? No   Do you need help managing money? No   Do you ever drive or ride in a car without wearing a seat belt? No   Have you felt unusual stress, anger or loneliness in the last month? No   Who do you live with? Spouse   If you need help, do you have trouble finding someone available to you? No   Have you been bothered in the last four weeks by sexual problems? -   Do you have difficulty concentrating, remembering or making decisions? No       Compared to one year ago, the patient feels her physical health is the same and her mental health is the same.    Depression Screen:  PHQ-2/PHQ-9 Depression Screening 10/11/2021   Little interest or pleasure in doing things 0   Feeling down, depressed, or hopeless 0   Total Score 0         Past Medical/Family/Social History:  The following portions of the patient's history were reviewed and updated as appropriate: allergies, current medications, past family history, past medical history, past social history, past surgical history and problem list.    No Known Allergies      Current Outpatient Medications:   •  amLODIPine  (NORVASC) 10 MG tablet, TAKE 1 TABLET EVERY DAY, Disp: 90 tablet, Rfl: 3  •  atorvastatin (LIPITOR) 10 MG tablet, Take 1 tablet by mouth every night at bedtime., Disp: 90 tablet, Rfl: 1  •  Cholecalciferol 2000 units capsule, Take  by mouth., Disp: , Rfl:   •  Chromium 200 MCG capsule, Take 100 mg by mouth Daily., Disp: , Rfl:   •  coenzyme Q10 100 MG capsule, Take 100 mg by mouth Daily., Disp: , Rfl:   •  conjugated estrogens (Premarin) 0.625 MG/GM vaginal cream, USE 1/2 GRAM PER VAGINA 2 TO 3 TIMES A WEEK, Disp: 30 g, Rfl: 1  •  dofetilide (TIKOSYN) 500 MCG capsule, Take 1 capsule by mouth 2 (Two) Times a Day., Disp: 180 capsule, Rfl: 1  •  Eliquis 5 MG tablet tablet, TAKE 1 TABLET EVERY 12 HOURS, Disp: 180 tablet, Rfl: 3  •  EPINEPHrine (EPIPEN) 0.3 MG/0.3ML solution auto-injector injection, USE AS DIRECTED FOR ACUTE ALLERGIC REACTION, Disp: , Rfl: 3  •  fesoterodine fumarate (Toviaz) 4 MG tablet sustained-release 24 hour tablet, Take 1 tablet by mouth Daily., Disp: 90 tablet, Rfl: 1  •  fluticasone (FLONASE) 50 MCG/ACT nasal spray, 2 sprays into the nostril(s) as directed by provider Daily., Disp: , Rfl:   •  HYDROcodone-acetaminophen (Norco) 5-325 MG per tablet, Take 2 tablets by mouth Every 6 (Six) Hours As Needed., Disp: , Rfl:   •  levocetirizine (XYZAL) 5 MG tablet, Take 1 tablet by mouth Daily., Disp: , Rfl: 0  •  levothyroxine (SYNTHROID, LEVOTHROID) 150 MCG tablet, Take 1 tablet by mouth Daily., Disp: 90 tablet, Rfl: 0  •  Magnesium 200 MG tablet, Take  by mouth Daily., Disp: , Rfl:   •  montelukast (SINGULAIR) 10 MG tablet, TK 1 T PO HS, Disp: , Rfl: 3  •  Multiple Vitamins-Minerals (MULTIVITAMIN ADULT) tablet, MULTIVITAMIN ADULT TABS, Disp: , Rfl:   •  NIACIN CR PO, Take 100 mg by mouth., Disp: , Rfl:   •  Omega-3 1000 MG capsule, Take  by mouth Daily., Disp: , Rfl:   •  spironolactone (Aldactone) 50 MG tablet, Take 1 tablet by mouth Daily., Disp: 90 tablet, Rfl: 3  •  telmisartan (MICARDIS) 80 MG  tablet, Take 1 tablet by mouth Daily., Disp: 90 tablet, Rfl: 1  •  vitamin B-12 (CYANOCOBALAMIN) 1000 MCG tablet, Take 1,000 mcg by mouth Daily., Disp: , Rfl:     Aspirin use counseling: Does not need ASA (and currently is not on it)    Current medication list contains no high risk medications. Some potential harmful drug interactions identified. Plan of action: monitoring    Family History   Problem Relation Age of Onset   • Heart failure Mother    • Alcohol abuse Mother    • Early death Mother    • Miscarriages / Stillbirths Mother    • No Known Problems Father        Social History     Tobacco Use   • Smoking status: Never Smoker   • Smokeless tobacco: Never Used   Substance Use Topics   • Alcohol use: Yes     Alcohol/week: 0.0 standard drinks     Comment: Infrequently       Past Surgical History:   Procedure Laterality Date   • APPENDECTOMY     • BACK SURGERY  2016    Spinal Fusion   • BLADDER REPAIR  2001 in [place]   • CARDIAC CATHETERIZATION  2015   • CARDIOVERSION  08/7/18 & 4/17/18-[facility]    Dr. Villegas--For Chronic A-Fib   • CARDIOVERSION  12/4/28-[facility]    Dr. Case--See Report   • CATARACT EXTRACTION  2007   • COLONOSCOPY  04/13/2017   • HYSTERECTOMY      Partial   • KNEE SURGERY Right 2009   • OTHER SURGICAL HISTORY  12/4/18-[facility]    Fluoroscopy/Trans-Septal Access to L Atrium/Selective Venography of L Superior Pulm Vein/Add Lienier Radiofrequency Ablation Along the Coronary Sinus/Synchronized Cardioversion--Dr. Case   • REPLACEMENT TOTAL KNEE Right 08/30/2021    Dr. Astorga   • SPINE SURGERY     • SARA  12/4/18-[facility]    Mild Concentric L Ventricular Hypertrophy Noted; EF 55-60%; L Atrial Moderate-Severely Dilated, Moderat Mitral Annular Calcification with Moderate MVR Noted; Mild TVR; Normal LV Function   • TONSILLECTOMY     • TOTAL HIP ARTHROPLASTY Right 07/06/2020    Dr. Astorga       Patient Active Problem List   Diagnosis   • Atrial fibrillation (HCC)   • Essential hypertension   • Hyperlipidemia   • Left  "ventricular diastolic dysfunction   • Degenerative joint disease of right knee   • Gastroesophageal reflux disease   • Hyperglycemia   • Hypothyroidism   • Knee pain, right   • Lumbosacral spondylosis without myelopathy   • Osteopenia   • Lumbar radiculopathy   • Spinal stenosis of lumbar region   • Primary osteoarthritis of right hip   • Arthritis of right sacroiliac joint   • Allergic rhinitis   • Visit for screening mammogram   • Postmenopausal   • Vitamin D deficiency   • Atrophic vaginitis   • Dysuria   • Medicare annual wellness visit, subsequent   • SHANAE on CPAP   • Osteoarthritis, generalized   • Pre-op exam   • Needs flu shot       Review of Systems   Constitutional: Negative for activity change, fatigue and fever.   Respiratory: Negative for cough, shortness of breath and wheezing.    Cardiovascular: Negative for chest pain, palpitations and leg swelling.   Gastrointestinal: Negative for constipation, diarrhea and indigestion.   Skin: Negative for color change, dry skin and rash.   Neurological: Negative for tremors and headache.       Objective     Vitals:    10/11/21 1023   BP: 140/85   BP Location: Right arm   Patient Position: Sitting   Cuff Size: Adult   Pulse: 89   Resp: 16   Temp: 97.1 °F (36.2 °C)   SpO2: 95%   Weight: 105 kg (231 lb)   Height: 165.1 cm (65\")   PainSc: 0-No pain       Patient's Body mass index is 38.44 kg/m². indicating that she is obese (BMI >30). Obesity-related health conditions include the following: hypertension, coronary heart disease, dyslipidemias, GERD and osteoarthritis. Obesity is unchanged. BMI is is above average; BMI management plan is completed. We discussed portion control and increasing exercise..      No exam data present    The patient has no evidence of cognitve impairment.     Physical Exam  Vitals and nursing note reviewed.   Constitutional:       General: She is not in acute distress.     Appearance: Normal appearance. She is well-developed. She is not " ill-appearing.   HENT:      Head: Normocephalic and atraumatic.   Cardiovascular:      Rate and Rhythm: Normal rate and regular rhythm.      Heart sounds: Normal heart sounds. No murmur heard.  No gallop.    Pulmonary:      Effort: Pulmonary effort is normal. No respiratory distress.      Breath sounds: Normal breath sounds. No wheezing, rhonchi or rales.   Chest:      Chest wall: No tenderness.   Musculoskeletal:      Cervical back: Normal range of motion and neck supple.   Neurological:      General: No focal deficit present.      Mental Status: She is alert and oriented to person, place, and time. Mental status is at baseline.   Psychiatric:         Mood and Affect: Mood normal.         Recent Lab Results:  Lab Results   Component Value Date     (H) 05/28/2020     Lab Results   Component Value Date    CHOL 155 04/06/2021    TRIG 138 04/06/2021    HDL 56 04/06/2021    VLDL 24 04/06/2021    LDLHDL 1.28 04/06/2021       Assessment/Plan   Age-appropriate Screening Schedule:  Refer to the list below for future screening recommendations based on patient's age, sex and/or medical conditions.      Health Maintenance   Topic Date Due   • DXA SCAN  09/06/2021   • LIPID PANEL  04/06/2022   • MAMMOGRAM  11/06/2022   • TDAP/TD VACCINES (2 - Td or Tdap) 04/14/2025   • INFLUENZA VACCINE  Completed   • ZOSTER VACCINE  Completed       Medicare Risks and Personalized Health Plan:  Advance Directive Discussion  Breast Cancer/Mammogram Screening  Cardiovascular risk  Colon Cancer Screening  Dementia/Memory   Depression/Dysphoria  Diabetic Lab Screening   Fall Risk  Immunizations Discussed/Encouraged (specific immunizations; Influenza )  Obesity/Overweight   Osteoporosis Risk      CMS-Preventive Services Quick Reference  Medicare Preventive Services Addressed:  Annual Wellness Visit (AWV)  Bone Density Measurements  Cardiovascular Disease Screening Tests (may do this order every 5 years in beneficiaries without signs or  symptoms of cardiovascular disease)  Colorectal Cancer Screening, Colonoscopy  Screening Mammography     Advance Care Planning:  ACP discussion was held with the patient during this visit. Patient has an advance directive (not in EMR), copy requested.    Diagnoses and all orders for this visit:    1. Medicare annual wellness visit, subsequent (Primary)    2. Visit for screening mammogram  -     Mammo Screening Digital Tomosynthesis Bilateral With CAD; Future    3. Postmenopausal  -     DEXA Bone Density Axial; Future    4. Mixed hyperlipidemia  -     Comprehensive Metabolic Panel  -     Lipid Panel    5. Hyperglycemia  -     Hemoglobin A1c    6. Acquired hypothyroidism  -     Comprehensive Metabolic Panel  -     TSH    7. Essential hypertension  -     telmisartan (MICARDIS) 80 MG tablet; Take 1 tablet by mouth Daily.  Dispense: 90 tablet; Refill: 1    8. Needs flu shot  -     Fluzone High-Dose 65+yrs (4590-2388)    Medicare wellness exam was done today.  I reviewed her medical problems and her medications.  I also reviewed her health maintenance.  Her last mammogram was in 11/2020 and the new order was given and she will be scheduling the test.  I also gave her order for DEXA scan as her last one was in 9/2019.  Her last colonoscopy was in 4/2017 and I will be getting a copy of the report.  Patient states that she was advised by GI doctor that she does not need any more colonoscopies.  I also reviewed her immunization.  She is fully vaccinated against COVID-19 and she already had her booster shot.  She is up to speed with her pneumonia shots and she completed her Shingrix vaccination series.  She was given flu shot today.  Healthy lifestyle was reinforced.  She will be seeing oral surgeon in near future to resolve her ongoing dental problems.    An After Visit Summary and PPPS with all of these plans were given to the patient.      Follow Up:  Return in about 6 months (around 4/11/2022) for Next scheduled follow up.         Requested Prescriptions     Signed Prescriptions Disp Refills   • telmisartan (MICARDIS) 80 MG tablet 90 tablet 1     Sig: Take 1 tablet by mouth Daily.

## 2021-10-13 DIAGNOSIS — E78.2 MIXED HYPERLIPIDEMIA: ICD-10-CM

## 2021-10-13 DIAGNOSIS — E03.9 ACQUIRED HYPOTHYROIDISM: ICD-10-CM

## 2021-10-13 RX ORDER — LEVOTHYROXINE SODIUM 0.15 MG/1
TABLET ORAL
Qty: 90 TABLET | Refills: 0 | Status: SHIPPED | OUTPATIENT
Start: 2021-10-13 | End: 2021-12-23 | Stop reason: SDUPTHER

## 2021-10-13 RX ORDER — ATORVASTATIN CALCIUM 10 MG/1
TABLET, FILM COATED ORAL
Qty: 90 TABLET | Refills: 1 | Status: SHIPPED | OUTPATIENT
Start: 2021-10-13 | End: 2022-03-10

## 2021-11-15 DIAGNOSIS — R92.8 ABNORMAL MAMMOGRAM OF BOTH BREASTS: Primary | ICD-10-CM

## 2021-11-22 RX ORDER — DOFETILIDE 0.5 MG/1
CAPSULE ORAL
Qty: 180 CAPSULE | Refills: 1 | Status: SHIPPED | OUTPATIENT
Start: 2021-11-22 | End: 2022-04-18

## 2021-11-24 ENCOUNTER — TELEPHONE (OUTPATIENT)
Dept: FAMILY MEDICINE CLINIC | Facility: CLINIC | Age: 76
End: 2021-11-24

## 2021-12-20 ENCOUNTER — OFFICE VISIT (OUTPATIENT)
Dept: CARDIOLOGY | Facility: CLINIC | Age: 76
End: 2021-12-20

## 2021-12-20 VITALS
HEART RATE: 80 BPM | WEIGHT: 228 LBS | DIASTOLIC BLOOD PRESSURE: 72 MMHG | RESPIRATION RATE: 18 BRPM | HEIGHT: 65 IN | SYSTOLIC BLOOD PRESSURE: 118 MMHG | BODY MASS INDEX: 37.99 KG/M2 | OXYGEN SATURATION: 100 %

## 2021-12-20 DIAGNOSIS — E78.2 MIXED HYPERLIPIDEMIA: ICD-10-CM

## 2021-12-20 DIAGNOSIS — I48.0 PAROXYSMAL ATRIAL FIBRILLATION (HCC): Primary | ICD-10-CM

## 2021-12-20 DIAGNOSIS — G47.33 OBSTRUCTIVE SLEEP APNEA SYNDROME: ICD-10-CM

## 2021-12-20 DIAGNOSIS — I10 ESSENTIAL HYPERTENSION: ICD-10-CM

## 2021-12-20 PROCEDURE — 93000 ELECTROCARDIOGRAM COMPLETE: CPT | Performed by: INTERNAL MEDICINE

## 2021-12-20 PROCEDURE — 99214 OFFICE O/P EST MOD 30 MIN: CPT | Performed by: INTERNAL MEDICINE

## 2021-12-20 NOTE — PROGRESS NOTES
CC--Atrial fibrillation, hypertension      SUB-- patient is 76 years old white female whose past medical history significant for hypertension, Persistent atrial fibrillation,  obesity  underwent AF ablation in 2018 with early recurrence with initiation of Tikosyn to sinus rhythm with resolution of symptoms and Tikosyn was stopped with the last office visit and started noticing recurrent atrial fibrillation in last 2 to 3 weeks with symptoms of palpitations and mild fatigue --patient has severe left atrial enlargement needing antiarrhythmic treatment to optimize into sinus rhythm-- during past hospitalization CT surgery consult was also requested for possible future convergence procedure because of severe left atrial enlargement.In 2009, patient was diagnosed with paroxysmal atrial fibrillation.  Patient was treated with beta-blockers initially.  Patient was started on flecainide later on.  In  2017, patient underwent cardiac catheterization at [facility] and coronary arteries were normal.  LV function was preserved.  Patient was started on Eliquis because of recurrence of atrial fibrillation.   chads vasc  score---3   patient was hypertensive and is on multiple medication  Post re initiation of tikosyn to sinus and feels well  No new sx        Past Medical History:     Reviewed history from 01/08/2019 and no changes required:        HTN        Paroxysmal  A-fib - Dr. Lugo        Seasonal allergies - on allergy shots per ENT        Hyperlipidemia        Hypothyroidism        Chronic low back pain        Colonoscopy in 4/13/2017        Mammogram in 7/31/2018 - negative        Pneumonia shot in the past - both Pneumovax and Prevnar        Shingle shot in the past        Osteopenia - DEXA on 6/23/2017 - L/S: +0.5 and Hip: -0.8        GYN HM per GYN - Dr. Hopkins        Negative Hepatitis C screening in 10/2017               Physical Exam    General:      well developed, well nourished, in no acute  distress.    Head:      normocephalic and atraumatic.    Eyes:      PERRL/EOM intact, conjunctiva and sclera clear with out nystagmus.    Neck:      no masses, thyromegaly, trachea central with normal respiratory effort  Lungs:      clear bilaterally to auscultation.    Heart:     irregular rate and rhythm, S1, S2 without murmurs, rubs, or gallops           assessment plan   recurrent persistent atrial fibrillation and failed medical treatment with symptoms--  post cryoablation in December 2018 to sinus rhythm with early recurrence on Tikosyn to sinus rhythm--patient has recurrent atrial fibrillation after Tikosyn has been stopped few months ago with reinitiation of atrial fibrillation resolved after re initiation ofTikosyn    hypertension--controlled   hypothyroidism   leg edema--mild and stable    Treated sleep apnea--off CPAP because of refill  Medications reviewed  Follow up in 3 months  K is 4.4, CR_normal and LDL--101--on statins        ECG 12 Lead    Date/Time: 12/20/2021 2:04 PM  Performed by: Dre Case MD  Authorized by: Dre Case MD   Comparison: compared with previous ECG   Similar to previous ECG  Rhythm: sinus rhythm  Rate: normal  Conduction: 1st degree AV block  QRS axis: normal  Other findings: non-specific ST-T wave changes          Electronically signed by Dre Case MD, 12/20/21, 2:04 PM EST.

## 2021-12-23 DIAGNOSIS — E03.9 ACQUIRED HYPOTHYROIDISM: ICD-10-CM

## 2021-12-23 RX ORDER — LEVOTHYROXINE SODIUM 0.15 MG/1
TABLET ORAL
Qty: 90 TABLET | Refills: 0 | Status: SHIPPED | OUTPATIENT
Start: 2021-12-23 | End: 2022-03-10

## 2021-12-27 RX ORDER — SPIRONOLACTONE 50 MG/1
TABLET, FILM COATED ORAL
Qty: 90 TABLET | Refills: 3 | Status: SHIPPED | OUTPATIENT
Start: 2021-12-27 | End: 2023-02-27

## 2021-12-29 RX ORDER — AMLODIPINE BESYLATE 10 MG/1
TABLET ORAL
Qty: 90 TABLET | Refills: 3 | Status: SHIPPED | OUTPATIENT
Start: 2021-12-29 | End: 2022-10-20

## 2022-03-10 DIAGNOSIS — E78.2 MIXED HYPERLIPIDEMIA: ICD-10-CM

## 2022-03-10 DIAGNOSIS — E03.9 ACQUIRED HYPOTHYROIDISM: ICD-10-CM

## 2022-03-10 DIAGNOSIS — I10 ESSENTIAL HYPERTENSION: ICD-10-CM

## 2022-03-10 RX ORDER — ATORVASTATIN CALCIUM 10 MG/1
TABLET, FILM COATED ORAL
Qty: 90 TABLET | Refills: 1 | Status: SHIPPED | OUTPATIENT
Start: 2022-03-10 | End: 2022-10-20

## 2022-03-10 RX ORDER — LEVOTHYROXINE SODIUM 0.15 MG/1
TABLET ORAL
Qty: 90 TABLET | Refills: 0 | Status: SHIPPED | OUTPATIENT
Start: 2022-03-10 | End: 2022-05-20

## 2022-03-10 RX ORDER — TELMISARTAN 80 MG/1
TABLET ORAL
Qty: 90 TABLET | Refills: 1 | Status: SHIPPED | OUTPATIENT
Start: 2022-03-10 | End: 2022-10-20

## 2022-03-27 RX ORDER — CONJUGATED ESTROGENS 0.62 MG/G
CREAM VAGINAL
Qty: 30 G | Refills: 1 | Status: SHIPPED | OUTPATIENT
Start: 2022-03-27

## 2022-04-18 RX ORDER — DOFETILIDE 0.5 MG/1
CAPSULE ORAL
Qty: 180 CAPSULE | Refills: 1 | Status: SHIPPED | OUTPATIENT
Start: 2022-04-18 | End: 2022-10-20

## 2022-04-26 ENCOUNTER — OFFICE VISIT (OUTPATIENT)
Dept: SLEEP MEDICINE | Facility: CLINIC | Age: 77
End: 2022-04-26

## 2022-04-26 VITALS
HEART RATE: 96 BPM | WEIGHT: 220 LBS | DIASTOLIC BLOOD PRESSURE: 69 MMHG | HEIGHT: 65 IN | BODY MASS INDEX: 36.65 KG/M2 | OXYGEN SATURATION: 97 % | SYSTOLIC BLOOD PRESSURE: 119 MMHG

## 2022-04-26 DIAGNOSIS — E66.9 CLASS 2 OBESITY: ICD-10-CM

## 2022-04-26 DIAGNOSIS — I10 ESSENTIAL HYPERTENSION: ICD-10-CM

## 2022-04-26 DIAGNOSIS — I48.0 PAROXYSMAL ATRIAL FIBRILLATION: ICD-10-CM

## 2022-04-26 DIAGNOSIS — G47.33 OSA (OBSTRUCTIVE SLEEP APNEA): Primary | ICD-10-CM

## 2022-04-26 PROBLEM — E66.812 CLASS 2 OBESITY: Status: ACTIVE | Noted: 2022-04-26

## 2022-04-26 PROCEDURE — 99214 OFFICE O/P EST MOD 30 MIN: CPT | Performed by: INTERNAL MEDICINE

## 2022-04-26 PROCEDURE — G0463 HOSPITAL OUTPT CLINIC VISIT: HCPCS

## 2022-04-26 NOTE — PROGRESS NOTES
"  [address] [address]  Phone   Fax       SLEEP CLINIC FOLLOW UP PROGRESS NOTE.    Sheree Vance  [id number]   1945  76 y.o.  female      PCP: Latia Guerrier MD      Date of visit: 4/26/2022    Chief Complaint   Patient presents with   • Sleep Apnea   • Obesity       HPI:  This is a 76 y.o. years old patient is here for the management of obstructive sleep apnea.  She has mild sleep apnea with AHI of 10.5/h and she was on auto CPAP.  In the last 6 months patient has lost more than 40 pounds and she wants to reevaluate sleep apnea because she has stopped using the CPAP and does not have any symptoms.    Bedtime 11 PM  Wake time 8 AM    Medications and allergies are reviewed by me and documented in the encounter.     SOCIAL (habits pertaining to sleep medicine)  History tobacco use:No   History of alcohol use: 3 per week  Caffeine use: 2     REVIEW OF SYSTEMS:   [place] Sleepiness Scale :Total score: 2   Nasal congestion:No   Dry mouth/nose:No   Post nasal drip; No   Acid reflux/Heartburn:No   Abd bloating:No   Morning headache:No   Anxiety:No   Depression:No    PHYSICAL EXAMINATION:  CONSTITUTIONAL:  Vitals:    04/26/22 1348   BP: 119/69   BP Location: Left arm   Patient Position: Sitting   Cuff Size: Adult   Pulse: 96   SpO2: 97%   Weight: 99.8 kg (220 lb)   Height: 165.1 cm (65\")    Body mass index is 36.61 kg/m².   NOSE: nasal passages are clear, No deformities noted   RESP SYSTEM: Not in any respiratory distress, no chest deformities noted,   CARDIOVASULAR: No edema noted  NEURO: Oriented x 3, gait normal,  Mood and affect appeared appropriate          ASSESSMENT AND PLAN:  · Obstructive sleep apnea ( G 47.33).  Patient has lost about 40 pounds in weight.  She needs reevaluation.  I talked to the patient about the home sleep test.  After the home sleep test will determine whether she she still needs CPAP.  However she reports " that she has already received a replacement CPAP from [facility] because it was on a recall.  · Obesity  2 with BMI is Body mass index is 36.61 kg/m².. I have discuss the relationship between the weight and sleep apnea. The benefit of weight loss in reducing severity of sleep apnea was discussed. Discussed diet and exercise with the patient to achieve ideal BMI.  Has lost about 40 pounds in weight  · Atrial fibrillation  · Return for 31 to 90 days after PAP setup with down load. . Patient's questions were answered.      Yaw Allen MD  Sleep Medicine.  Medical Director, [facility], [place] [facility]  4/26/2022 ,

## 2022-05-19 ENCOUNTER — OFFICE VISIT (OUTPATIENT)
Dept: FAMILY MEDICINE CLINIC | Facility: CLINIC | Age: 77
End: 2022-05-19

## 2022-05-19 VITALS
OXYGEN SATURATION: 98 % | BODY MASS INDEX: 35.65 KG/M2 | HEART RATE: 78 BPM | WEIGHT: 214 LBS | RESPIRATION RATE: 16 BRPM | HEIGHT: 65 IN | TEMPERATURE: 98.2 F | SYSTOLIC BLOOD PRESSURE: 150 MMHG | DIASTOLIC BLOOD PRESSURE: 92 MMHG
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
## 2022-05-19 DIAGNOSIS — R21 RASH: Primary | ICD-10-CM

## 2022-05-19 DIAGNOSIS — S80.862A TICK BITE OF LEFT LOWER LEG, INITIAL ENCOUNTER: ICD-10-CM

## 2022-05-19 PROCEDURE — 99213 OFFICE O/P EST LOW 20 MIN: CPT | Performed by: FAMILY MEDICINE

## 2022-05-19 RX ORDER — DOXYCYCLINE HYCLATE 100 MG/1
200 CAPSULE ORAL ONCE
Qty: 2 CAPSULE | Refills: 0 | Status: SHIPPED | OUTPATIENT
Start: 2022-05-19 | End: 2022-05-19

## 2022-05-19 NOTE — PROGRESS NOTES
Subjective   Chief Complaint   Patient presents with   • Insect Bite     Left thigh     Sheree Vance is a 77 y.o. female.     Patient Care Team:  Latia Guerrier MD as PCP - General  Latia Guerrier MD as PCP - Family Medicine  Dre Case MD (Cardiology)  Yarelis Araya PA as Physician Assistant (Physician Assistant)  Jet Godwin MD as Consulting Physician (Ophthalmology)  Antonio Fine MD as Consulting Physician (Allergy)  Sandor Astorga MD as Consulting Physician (Orthopedic Surgery)    She is coming in today due to a tick bite.  She reports that today in the morning she noted a tick being embedded in the upper part of her left thigh just beneath the groin area.  Her  removed it.  The area is very slightly red, it does not cause any discomfort.  She is not sure how long the tick was there, but it was quite big.  No fever, chills, body aches, nausea, vomiting, rashes, or headaches reported.       The following portions of the patient's history were reviewed and updated as appropriate: allergies, current medications, past family history, past medical history, past social history, past surgical history and problem list.  Past Medical History:   Diagnosis Date   • A-fib (HCC) Since 2001 Dx in [place]   • Allergic 2014    Seasonal   • Allergic rhinitis     Hx Allergy Shots x 4 Yrs   • Aortic valve calcification 12/04/2018    Noted on SARA   • Arrhythmia    • Arthritis     Lumbar Spine   • Atrial flutter (HCC)    • Breast mass seen on mammogram 03/19/2014    L 4CM Mass--Benign & Followed   • Bruxism, sleep-related    • Cataract    • Chronic anticoagulation     Eliquis    • Chronic low back pain     Hx Back Sx in 2016   • Colon polyp    • Coronary artery disease 2009    AFIB   • DJD (degenerative joint disease), lumbar    • Dyslipidemia     w/Hypertriglyceridemia--Statin/Fish Oils   • Family history of premature CAD    • Glaucoma    • Heart disease    • History of bone density study  03/19/14-[facility]    T-Score of 1.4 Indicating Osteopenia   • History of EKG 04/2018 & 08/2018 & 12/2018    First Degree AV Block/Multiple Atrial Premature Blocks Noted on All   • Hormone replacement therapy (HRT)     Premarin-0.625MG   • Hyperlipidemia     Controlled w/Meds   • Hypertension     Controlled w/Losartan   • Hypothyroidism     Synthroid   • Insomnia     Takes OTC Sleep Aid PRN   • Left atrial enlargement 12/04/2018    Severe Noted on Cardioversion Report/SARA   • Left breast mass 03/19/2014    4CM Noted on Mammogram--Benign    • Mild tricuspid valve regurgitation 12/04/2018    Noted on SARA   • Mitral valve annular calcification 12/04/2018    Moderate Noted on SARA   • Mitral valve regurgitation 12/04/2018    Moderate Noted on SARA   • Obesity 12/04/2018    BMI-43.3    • SHANAE on CPAP 11/27/2018    AHI 10.5/h, supine 30/h   • Osteoarthritis of right hip 7/9/2019   • Osteopenia 03/19/2014    Noted on Dexa Report   • Restless sleeper     Tosses and Turns All Night Per Pt   • Sinus congestion    • Tricuspid leaflet thickened 12/04/2018    Noted on SARA   • Urinary tract infection      Past Surgical History:   Procedure Laterality Date   • APPENDECTOMY     • BACK SURGERY  2016    Spinal Fusion   • BLADDER REPAIR  2001 in [place]   • CARDIAC CATHETERIZATION  2015   • CARDIOVERSION  08/7/18 & 4/17/18-[facility]    Dr. Villegas--For Chronic A-Fib   • CARDIOVERSION  12/4/28-[facility]    Dr. Case--See Report   • CATARACT EXTRACTION  2007   • COLONOSCOPY  04/13/2017   • HYSTERECTOMY      Partial   • KNEE SURGERY Right 2009   • OTHER SURGICAL HISTORY  12/4/18-[facility]    Fluoroscopy/Trans-Septal Access to L Atrium/Selective Venography of L Superior Pulm Vein/Add Lienier Radiofrequency Ablation Along the Coronary Sinus/Synchronized Cardioversion--Dr. Case   • REPLACEMENT TOTAL KNEE Right 08/30/2021    Dr. Astorga   • SPINE SURGERY     • SARA  12/4/18-[facility]    Mild Concentric L Ventricular Hypertrophy Noted; EF 55-60%; L Atrial Moderate-Severely  "Dilated, Moderat Mitral Annular Calcification with Moderate MVR Noted; Mild TVR; Normal LV Function   • TONSILLECTOMY     • TOTAL HIP ARTHROPLASTY Right 07/06/2020    Dr. Astorga     The patient has a family history of  Family History   Problem Relation Age of Onset   • Heart failure Mother    • Alcohol abuse Mother    • Early death Mother    • Miscarriages / Stillbirths Mother    • No Known Problems Father      Social History     Socioeconomic History   • Marital status:      Spouse name: Papi   • Number of children: 1   Tobacco Use   • Smoking status: Never Smoker   • Smokeless tobacco: Never Used   Vaping Use   • Vaping Use: Never used   Substance and Sexual Activity   • Alcohol use: Yes     Comment: Infrequently   • Drug use: No   • Sexual activity: Yes     Partners: Male     Birth control/protection: Surgical, None     Comment: Hyst       Review of Systems   Constitutional: Negative for chills and fever.   Gastrointestinal: Negative for abdominal pain, diarrhea, nausea and vomiting.   Skin: Positive for color change and rash. Negative for pallor and skin lesions.     Visit Vitals  /92 (BP Location: Left arm, Patient Position: Sitting, Cuff Size: Adult)   Pulse 78   Temp 98.2 °F (36.8 °C)   Resp 16   Ht 165.1 cm (65\")   Wt 97.1 kg (214 lb)   SpO2 98%   BMI 35.61 kg/m²       Current Outpatient Medications:   •  amLODIPine (NORVASC) 10 MG tablet, TAKE 1 TABLET EVERY DAY, Disp: 90 tablet, Rfl: 3  •  atorvastatin (LIPITOR) 10 MG tablet, TAKE 1 TABLET AT BEDTIME, Disp: 90 tablet, Rfl: 1  •  Cholecalciferol 2000 units capsule, Take  by mouth., Disp: , Rfl:   •  Chromium 200 MCG capsule, Take 100 mg by mouth Daily., Disp: , Rfl:   •  coenzyme Q10 100 MG capsule, Take 100 mg by mouth Daily., Disp: , Rfl:   •  dofetilide (TIKOSYN) 500 MCG capsule, TAKE 1 CAPSULE TWICE DAILY, Disp: 180 capsule, Rfl: 1  •  doxycycline (VIBRAMYCIN) 100 MG capsule, Take 2 capsules by mouth 1 (One) Time for 1 dose., Disp: 2 " capsule, Rfl: 0  •  Eliquis 5 MG tablet tablet, TAKE 1 TABLET EVERY 12 HOURS, Disp: 180 tablet, Rfl: 3  •  EPINEPHrine (EPIPEN) 0.3 MG/0.3ML solution auto-injector injection, USE AS DIRECTED FOR ACUTE ALLERGIC REACTION, Disp: , Rfl: 3  •  fluticasone (FLONASE) 50 MCG/ACT nasal spray, 2 sprays into the nostril(s) as directed by provider Daily., Disp: , Rfl:   •  levocetirizine (XYZAL) 5 MG tablet, Take 1 tablet by mouth Daily., Disp: , Rfl: 0  •  levothyroxine (SYNTHROID, LEVOTHROID) 150 MCG tablet, TAKE 1 TABLET EVERY DAY, Disp: 90 tablet, Rfl: 0  •  Magnesium 200 MG tablet, Take  by mouth Daily., Disp: , Rfl:   •  montelukast (SINGULAIR) 10 MG tablet, TK 1 T PO HS, Disp: , Rfl: 3  •  Multiple Vitamins-Minerals (MULTIVITAMIN ADULT) tablet, MULTIVITAMIN ADULT TABS, Disp: , Rfl:   •  NIACIN CR PO, Take 100 mg by mouth., Disp: , Rfl:   •  Omega-3 1000 MG capsule, Take  by mouth Daily., Disp: , Rfl:   •  Premarin 0.625 MG/GM vaginal cream, USE 1/2 GRAM PER VAGINA 2 TO 3 TIMES A WEEK, Disp: 30 g, Rfl: 1  •  spironolactone (ALDACTONE) 50 MG tablet, TAKE 1 TABLET EVERY DAY, Disp: 90 tablet, Rfl: 3  •  telmisartan (MICARDIS) 80 MG tablet, TAKE 1 TABLET EVERY DAY, Disp: 90 tablet, Rfl: 1  •  vitamin B-12 (CYANOCOBALAMIN) 1000 MCG tablet, Take 1,000 mcg by mouth Daily., Disp: , Rfl:     Objective   Physical Exam  Constitutional:       General: She is not in acute distress.     Appearance: Normal appearance. She is well-developed. She is not ill-appearing or diaphoretic.      Comments: Patient is in no distress, patient has normal voice and speech.  Normal respiratory effort.   HENT:      Head: Normocephalic and atraumatic.   Pulmonary:      Effort: Pulmonary effort is normal.   Musculoskeletal:      Cervical back: Normal range of motion and neck supple.   Skin:     Comments: There is a small area of skin induration and redness noted in the left upper thigh just below the inguinal area, it is about 0.5 cm in diameter, no signs  of infection, no petechiae.   Neurological:      General: No focal deficit present.      Mental Status: She is alert and oriented to person, place, and time. Mental status is at baseline.   Psychiatric:         Mood and Affect: Mood normal.           Assessment & Plan   Diagnoses and all orders for this visit:    1. Rash (Primary)  -     doxycycline (VIBRAMYCIN) 100 MG capsule; Take 2 capsules by mouth 1 (One) Time for 1 dose.  Dispense: 2 capsule; Refill: 0    2. Tick bite of left lower leg, initial encounter  -     doxycycline (VIBRAMYCIN) 100 MG capsule; Take 2 capsules by mouth 1 (One) Time for 1 dose.  Dispense: 2 capsule; Refill: 0      Good tick was already removed.  I gave her prescription for a prophylactic dose of doxycycline.  Possible concerning symptoms triggered by tickborne illness were discussed with the patient.  She is to monitor for any new development and contact us back if any concerns.        Return if symptoms worsen or fail to improve, for Recheck.    Requested Prescriptions     Signed Prescriptions Disp Refills   • doxycycline (VIBRAMYCIN) 100 MG capsule 2 capsule 0     Sig: Take 2 capsules by mouth 1 (One) Time for 1 dose.

## 2022-05-20 DIAGNOSIS — E03.9 ACQUIRED HYPOTHYROIDISM: ICD-10-CM

## 2022-05-20 RX ORDER — LEVOTHYROXINE SODIUM 0.15 MG/1
TABLET ORAL
Qty: 90 TABLET | Refills: 0 | Status: SHIPPED | OUTPATIENT
Start: 2022-05-20 | End: 2022-10-20

## 2022-06-02 ENCOUNTER — HOSPITAL ENCOUNTER (OUTPATIENT)
Dept: SLEEP MEDICINE | Facility: HOSPITAL | Age: 77
Discharge: HOME | End: 2022-06-02
Admitting: INTERNAL MEDICINE

## 2022-06-02 DIAGNOSIS — I10 ESSENTIAL HYPERTENSION: ICD-10-CM

## 2022-06-02 DIAGNOSIS — I48.0 PAROXYSMAL ATRIAL FIBRILLATION: ICD-10-CM

## 2022-06-02 DIAGNOSIS — G47.33 OSA (OBSTRUCTIVE SLEEP APNEA): ICD-10-CM

## 2022-06-02 DIAGNOSIS — E66.9 CLASS 2 OBESITY: ICD-10-CM

## 2022-06-02 PROCEDURE — 95806 SLEEP STUDY UNATT&RESP EFFT: CPT | Performed by: INTERNAL MEDICINE

## 2022-06-02 PROCEDURE — 95806 SLEEP STUDY UNATT&RESP EFFT: CPT

## 2022-06-20 ENCOUNTER — OFFICE VISIT (OUTPATIENT)
Dept: CARDIOLOGY | Facility: CLINIC | Age: 77
End: 2022-06-20

## 2022-06-20 VITALS
SYSTOLIC BLOOD PRESSURE: 135 MMHG | OXYGEN SATURATION: 97 % | WEIGHT: 214 LBS | DIASTOLIC BLOOD PRESSURE: 72 MMHG | HEART RATE: 68 BPM | HEIGHT: 65 IN | BODY MASS INDEX: 35.65 KG/M2

## 2022-06-20 DIAGNOSIS — I10 ESSENTIAL HYPERTENSION: ICD-10-CM

## 2022-06-20 DIAGNOSIS — G47.33 OBSTRUCTIVE SLEEP APNEA SYNDROME: ICD-10-CM

## 2022-06-20 DIAGNOSIS — I48.0 PAROXYSMAL ATRIAL FIBRILLATION: Primary | ICD-10-CM

## 2022-06-20 DIAGNOSIS — E78.2 MIXED HYPERLIPIDEMIA: ICD-10-CM

## 2022-06-20 PROCEDURE — 93000 ELECTROCARDIOGRAM COMPLETE: CPT | Performed by: INTERNAL MEDICINE

## 2022-06-20 PROCEDURE — 99214 OFFICE O/P EST MOD 30 MIN: CPT | Performed by: INTERNAL MEDICINE

## 2022-06-20 NOTE — PROGRESS NOTES
CC--Atrial fibrillation, hypertension      SUB-- patient is 77 years old white female whose past medical history significant for hypertension, Persistent atrial fibrillation,  obesity  underwent AF ablation in 2018 with early recurrence with initiation of Tikosyn to sinus rhythm with resolution of symptoms and Tikosyn was stopped with the last office visit and started noticing recurrent atrial fibrillation in last 2 to 3 weeks with symptoms of palpitations and mild fatigue --patient has severe left atrial enlargement needing antiarrhythmic treatment to optimize into sinus rhythm-- during past hospitalization CT surgery consult was also requested for possible future convergence procedure because of severe left atrial enlargement.In 2009, patient was diagnosed with paroxysmal atrial fibrillation.  Patient was treated with beta-blockers initially.  Patient was started on flecainide later on.  In  2017, patient underwent cardiac catheterization at [facility] and coronary arteries were normal.  LV function was preserved.  Patient was started on Eliquis because of recurrence of atrial fibrillation.   chads vasc  score---3   patient was hypertensive and is on multiple medication  Post re initiation of tikosyn to sinus and feels well  No new sx  Patient remains in functional class I with no new symptoms and came for follow-up        Past Medical History:     Reviewed history from 01/08/2019 and no changes required:        HTN        Paroxysmal  A-fib - Dr. Lugo        Seasonal allergies - on allergy shots per ENT        Hyperlipidemia        Hypothyroidism        Chronic low back pain        Colonoscopy in 4/13/2017        Mammogram in 7/31/2018 - negative        Pneumonia shot in the past - both Pneumovax and Prevnar        Shingle shot in the past        Osteopenia - DEXA on 6/23/2017 - L/S: +0.5 and Hip: -0.8        GYN HM per GYN - Dr. Hopkins        Negative Hepatitis C screening in 10/2017                Physical Exam    General:      well developed, well nourished, in no acute distress.    Head:      normocephalic and atraumatic.    Eyes:      PERRL/EOM intact, conjunctiva and sclera clear with out nystagmus.    Neck:      no masses, thyromegaly, trachea central with normal respiratory effort  Lungs:      clear bilaterally to auscultation.    Heart:     irregular rate and rhythm, S1, S2 without murmurs, rubs, or gallops           assessment plan   recurrent persistent atrial fibrillation and failed medical treatment with symptoms--  post cryoablation in December 2018 to sinus rhythm with early recurrence on Tikosyn to sinus rhythm--patient has recurrent atrial fibrillation after Tikosyn has been stopped few months ago with reinitiation of atrial fibrillation resolved after re initiation ofTikosyn    hypertension--controlled   hypothyroidism   leg edema--mild and stable    Treated sleep apnea--off CPAP because of very mild apnea being followed by sleep apnea specialist  Medications reviewed  Follow up in 6 months  TSH last year within normal limits    ECG 12 Lead    Date/Time: 6/20/2022 3:10 PM  Performed by: Dre Case MD  Authorized by: Dre Case MD   Comparison: compared with previous ECG   Similar to previous ECG  Rhythm: sinus rhythm  Rate: normal  Conduction: conduction normal  Conduction: 1st degree AV block  QRS axis: right            Electronically signed by Dre Case MD, 06/20/22, 3:09 PM EDT.

## 2022-08-19 ENCOUNTER — TELEPHONE (OUTPATIENT)
Dept: CARDIOLOGY | Facility: CLINIC | Age: 77
End: 2022-08-19

## 2022-08-19 NOTE — TELEPHONE ENCOUNTER
Attempted  to call back- message left on  to call us back or send a message in [employer] to discuss her concerns

## 2022-08-29 RX ORDER — APIXABAN 5 MG/1
TABLET, FILM COATED ORAL
Qty: 180 TABLET | Refills: 3 | Status: SHIPPED | OUTPATIENT
Start: 2022-08-29

## 2022-10-20 DIAGNOSIS — E03.9 ACQUIRED HYPOTHYROIDISM: ICD-10-CM

## 2022-10-20 DIAGNOSIS — E78.2 MIXED HYPERLIPIDEMIA: ICD-10-CM

## 2022-10-20 DIAGNOSIS — I10 ESSENTIAL HYPERTENSION: ICD-10-CM

## 2022-10-20 RX ORDER — ATORVASTATIN CALCIUM 10 MG/1
TABLET, FILM COATED ORAL
Qty: 90 TABLET | Refills: 1 | Status: SHIPPED | OUTPATIENT
Start: 2022-10-20 | End: 2023-03-23

## 2022-10-20 RX ORDER — LEVOTHYROXINE SODIUM 0.15 MG/1
TABLET ORAL
Qty: 90 TABLET | Refills: 0 | Status: SHIPPED | OUTPATIENT
Start: 2022-10-20 | End: 2023-03-23

## 2022-10-20 RX ORDER — DOFETILIDE 0.5 MG/1
CAPSULE ORAL
Qty: 180 CAPSULE | Refills: 1 | Status: SHIPPED | OUTPATIENT
Start: 2022-10-20

## 2022-10-20 RX ORDER — TELMISARTAN 80 MG/1
TABLET ORAL
Qty: 90 TABLET | Refills: 1 | Status: SHIPPED | OUTPATIENT
Start: 2022-10-20

## 2022-10-20 RX ORDER — AMLODIPINE BESYLATE 10 MG/1
TABLET ORAL
Qty: 90 TABLET | Refills: 3 | Status: SHIPPED | OUTPATIENT
Start: 2022-10-20

## 2022-11-02 ENCOUNTER — OFFICE VISIT (OUTPATIENT)
Dept: FAMILY MEDICINE CLINIC | Facility: CLINIC | Age: 77
End: 2022-11-02

## 2022-11-02 VITALS
HEIGHT: 65 IN | WEIGHT: 214 LBS | BODY MASS INDEX: 35.65 KG/M2 | OXYGEN SATURATION: 96 % | SYSTOLIC BLOOD PRESSURE: 144 MMHG | DIASTOLIC BLOOD PRESSURE: 76 MMHG | HEART RATE: 70 BPM | RESPIRATION RATE: 16 BRPM | TEMPERATURE: 97.5 F

## 2022-11-02 DIAGNOSIS — Z23 NEED FOR VACCINATION: ICD-10-CM

## 2022-11-02 DIAGNOSIS — Z12.31 VISIT FOR SCREENING MAMMOGRAM: ICD-10-CM

## 2022-11-02 DIAGNOSIS — Z00.00 MEDICARE ANNUAL WELLNESS VISIT, SUBSEQUENT: Primary | ICD-10-CM

## 2022-11-02 DIAGNOSIS — E78.2 MIXED HYPERLIPIDEMIA: ICD-10-CM

## 2022-11-02 PROCEDURE — 1170F FXNL STATUS ASSESSED: CPT | Performed by: FAMILY MEDICINE

## 2022-11-02 PROCEDURE — 1159F MED LIST DOCD IN RCRD: CPT | Performed by: FAMILY MEDICINE

## 2022-11-02 PROCEDURE — 1126F AMNT PAIN NOTED NONE PRSNT: CPT | Performed by: FAMILY MEDICINE

## 2022-11-02 PROCEDURE — G0439 PPPS, SUBSEQ VISIT: HCPCS | Performed by: FAMILY MEDICINE

## 2022-11-02 PROCEDURE — 90662 IIV NO PRSV INCREASED AG IM: CPT | Performed by: FAMILY MEDICINE

## 2022-11-02 PROCEDURE — 96160 PT-FOCUSED HLTH RISK ASSMT: CPT | Performed by: FAMILY MEDICINE

## 2022-11-02 PROCEDURE — G0008 ADMIN INFLUENZA VIRUS VAC: HCPCS | Performed by: FAMILY MEDICINE

## 2022-11-02 NOTE — PROGRESS NOTES
Subsequent Medicare Wellness Visit   The ABC's of the Annual Wellness Visit    Chief Complaint   Patient presents with   • Medicare Wellness-subsequent       HPI:  Sheree Vance, -1945, is a 77 y.o. female who presents for a Subsequent Medicare Wellness Visit.  She lives with her , she is fully independent with her activities of daily living.  She stays pretty active and walks on regular basis.  No issues with depression or memory problems are being reported.  She is followed by several different specialists including cardiology.  Over the last 2 years she has lost about 40 pounds and she feels very good about the weight loss. Patient does not report any chest pain, shortness of breath, dizziness, nausea, vomiting, or diarrhea, visual issues, headaches, numbness or tingling. No urinary issues reported like urgency, frequency, or discomfort upon urination.  No significant weight changes reported.  No swelling reported.  No rashes or any other skin issues reported. No emotional issues or insomnia.    Recent Hospitalizations:  No hospitalization(s) within the last year..    Current Medical Providers:  Patient Care Team:  Latia Guerrier MD as PCP - General  Latia Guerrier MD as PCP - Family Medicine  Dre Case MD (Cardiology)  Jet Godwin MD as Consulting Physician (Ophthalmology)  Antonio Fine MD as Consulting Physician (Allergy)  Sandor Astorga MD as Consulting Physician (Orthopedic Surgery)    Health Habits and Functional and Cognitive Screening and Depression Screening:  Functional & Cognitive Status 2022   Do you have difficulty preparing food and eating? No   Do you have difficulty bathing yourself, getting dressed or grooming yourself? No   Do you have difficulty using the toilet? No   Do you have difficulty moving around from place to place? No   Do you have trouble with steps or getting out of a bed or a chair? No   Current Diet Well Balanced Diet   Dental  Exam Up to date   Eye Exam Up to date   Exercise (times per week) 4 times per week   Current Exercises Include Walking   Current Exercise Activities Include -   Do you need help using the phone?  No   Are you deaf or do you have serious difficulty hearing?  No   Do you need help with transportation? No   Do you need help shopping? No   Do you need help preparing meals?  No   Do you need help with housework?  No   Do you need help with laundry? No   Do you need help taking your medications? No   Do you need help managing money? No   Do you ever drive or ride in a car without wearing a seat belt? No   Have you felt unusual stress, anger or loneliness in the last month? No   Who do you live with? Spouse   If you need help, do you have trouble finding someone available to you? No   Have you been bothered in the last four weeks by sexual problems? -   Do you have difficulty concentrating, remembering or making decisions? No       Compared to one year ago, the patient feels her physical health is better and her mental health is better.    Depression Screen:  PHQ-2/PHQ-9 Depression Screening 11/2/2022   Retired PHQ-9 Total Score -   Retired Total Score -   Little Interest or Pleasure in Doing Things 0-->not at all   Feeling Down, Depressed or Hopeless 0-->not at all   PHQ-9: Brief Depression Severity Measure Score 0         Past Medical/Family/Social History:  The following portions of the patient's history were reviewed and updated as appropriate: allergies, current medications, past family history, past medical history, past social history, past surgical history and problem list.    No Known Allergies      Current Outpatient Medications:   •  amLODIPine (NORVASC) 10 MG tablet, TAKE 1 TABLET EVERY DAY, Disp: 90 tablet, Rfl: 3  •  atorvastatin (LIPITOR) 10 MG tablet, TAKE 1 TABLET AT BEDTIME, Disp: 90 tablet, Rfl: 1  •  Cholecalciferol 2000 units capsule, Take  by mouth., Disp: , Rfl:   •  Chromium 200 MCG capsule, Take  100 mg by mouth Daily., Disp: , Rfl:   •  coenzyme Q10 100 MG capsule, Take 100 mg by mouth Daily., Disp: , Rfl:   •  dofetilide (TIKOSYN) 500 MCG capsule, TAKE 1 CAPSULE TWICE DAILY, Disp: 180 capsule, Rfl: 1  •  Eliquis 5 MG tablet tablet, TAKE 1 TABLET EVERY 12 HOURS, Disp: 180 tablet, Rfl: 3  •  EPINEPHrine (EPIPEN) 0.3 MG/0.3ML solution auto-injector injection, USE AS DIRECTED FOR ACUTE ALLERGIC REACTION, Disp: , Rfl: 3  •  fluticasone (FLONASE) 50 MCG/ACT nasal spray, 2 sprays into the nostril(s) as directed by provider Daily., Disp: , Rfl:   •  levocetirizine (XYZAL) 5 MG tablet, Take 1 tablet by mouth Daily., Disp: , Rfl: 0  •  levothyroxine (SYNTHROID, LEVOTHROID) 150 MCG tablet, TAKE 1 TABLET EVERY DAY, Disp: 90 tablet, Rfl: 0  •  Magnesium 200 MG tablet, Take  by mouth Daily., Disp: , Rfl:   •  montelukast (SINGULAIR) 10 MG tablet, TK 1 T PO HS, Disp: , Rfl: 3  •  Multiple Vitamins-Minerals (MULTIVITAMIN ADULT) tablet, MULTIVITAMIN ADULT TABS, Disp: , Rfl:   •  NIACIN CR PO, Take 100 mg by mouth., Disp: , Rfl:   •  Omega-3 1000 MG capsule, Take  by mouth Daily., Disp: , Rfl:   •  Premarin 0.625 MG/GM vaginal cream, USE 1/2 GRAM PER VAGINA 2 TO 3 TIMES A WEEK, Disp: 30 g, Rfl: 1  •  spironolactone (ALDACTONE) 50 MG tablet, TAKE 1 TABLET EVERY DAY, Disp: 90 tablet, Rfl: 3  •  telmisartan (MICARDIS) 80 MG tablet, TAKE 1 TABLET EVERY DAY, Disp: 90 tablet, Rfl: 1  •  vitamin B-12 (CYANOCOBALAMIN) 1000 MCG tablet, Take 1,000 mcg by mouth Daily., Disp: , Rfl:     Aspirin use counseling: Does not need ASA (and currently is not on it)    Current medication list contains no high risk medications. Some potential harmful drug interactions identified. Plan of action: monitoring    Family History   Problem Relation Age of Onset   • Heart failure Mother    • Alcohol abuse Mother    • Early death Mother    • Miscarriages / Stillbirths Mother    • No Known Problems Father        Social History     Tobacco Use   • Smoking  status: Never   • Smokeless tobacco: Never   Substance Use Topics   • Alcohol use: Yes     Comment: Infrequently       Past Surgical History:   Procedure Laterality Date   • ABLATION OF DYSRHYTHMIC FOCUS  2018   • APPENDECTOMY     • BACK SURGERY  2016    Spinal Fusion   • BLADDER REPAIR  2001 in [place]   • CARDIAC CATHETERIZATION  2015   • CARDIOVERSION  08/7/18 & 4/17/18-[facility]    Dr. Villegas--For Chronic A-Fib   • CARDIOVERSION  12/4/28-[facility]    Dr. Case--See Report   • CATARACT EXTRACTION  2007   • COLONOSCOPY  04/13/2017   • HYSTERECTOMY      Partial   • KNEE SURGERY Right 2009   • OTHER SURGICAL HISTORY  12/4/18-[facility]    Fluoroscopy/Trans-Septal Access to L Atrium/Selective Venography of L Superior Pulm Vein/Add Lienier Radiofrequency Ablation Along the Coronary Sinus/Synchronized Cardioversion--Dr. Case   • REPLACEMENT TOTAL KNEE Right 08/30/2021    Dr. Astorga   • SPINE SURGERY     • SARA  12/4/18-[facility]    Mild Concentric L Ventricular Hypertrophy Noted; EF 55-60%; L Atrial Moderate-Severely Dilated, Moderat Mitral Annular Calcification with Moderate MVR Noted; Mild TVR; Normal LV Function   • TONSILLECTOMY     • TOTAL HIP ARTHROPLASTY Right 07/06/2020    Dr. Astorga       Patient Active Problem List   Diagnosis   • Atrial fibrillation (HCC)   • Essential hypertension   • Hyperlipidemia   • Left ventricular diastolic dysfunction   • Degenerative joint disease of right knee   • Gastroesophageal reflux disease   • Hyperglycemia   • Hypothyroidism   • Knee pain, right   • Lumbosacral spondylosis without myelopathy   • Osteopenia   • Lumbar radiculopathy   • Spinal stenosis of lumbar region   • Primary osteoarthritis of right hip   • Arthritis of right sacroiliac joint   • Allergic rhinitis   • Visit for screening mammogram   • Postmenopausal   • Vitamin D deficiency   • Atrophic vaginitis   • Dysuria   • Medicare annual wellness visit, subsequent   • SHANAE (obstructive sleep apnea)   • Osteoarthritis, generalized   • Pre-op  "exam   • Needs flu shot   • Class 2 obesity   • Rash   • Tick bite of left lower leg       Review of Systems   Constitutional: Negative for activity change, fatigue and fever.   Respiratory: Negative for shortness of breath and wheezing.    Cardiovascular: Negative for chest pain, palpitations and leg swelling.   Musculoskeletal: Negative for arthralgias and back pain.   Skin: Negative for rash.   Neurological: Negative for tremors and headache.       Objective     Vitals:    11/02/22 1444   BP: 144/76   BP Location: Left arm   Patient Position: Sitting   Cuff Size: Adult   Pulse: 70   Resp: 16   Temp: 97.5 °F (36.4 °C)   TempSrc: Temporal   SpO2: 96%   Weight: 97.1 kg (214 lb)   Height: 165.1 cm (65\")   PainSc: 0-No pain       Class 2 Severe Obesity (BMI >=35 and <=39.9). Obesity-related health conditions include the following: hypertension and dyslipidemias. Obesity is improving with lifestyle modifications. BMI is is above average; BMI management plan is completed. We discussed portion control and increasing exercise.      No results found.    The patient has no evidence of cognitve impairment.     Physical Exam  Vitals and nursing note reviewed.   Constitutional:       General: She is not in acute distress.     Appearance: Normal appearance. She is well-developed. She is not ill-appearing.   HENT:      Head: Normocephalic and atraumatic.   Cardiovascular:      Rate and Rhythm: Normal rate and regular rhythm.      Heart sounds: Normal heart sounds. No murmur heard.    No gallop.   Pulmonary:      Effort: Pulmonary effort is normal. No respiratory distress.      Breath sounds: Normal breath sounds. No wheezing, rhonchi or rales.   Chest:      Chest wall: No tenderness.   Musculoskeletal:      Cervical back: Normal range of motion and neck supple.   Neurological:      General: No focal deficit present.      Mental Status: She is alert and oriented to person, place, and time. Mental status is at baseline. "   Psychiatric:         Mood and Affect: Mood normal.         Recent Lab Results:  Lab Results   Component Value Date     (H) 05/28/2020     Lab Results   Component Value Date    CHOL 174 10/11/2021    TRIG 103 10/11/2021    HDL 51 10/11/2021    VLDL 19 10/11/2021    LDLHDL 2.01 10/11/2021       Assessment & Plan   Age-appropriate Screening Schedule:  Refer to the list below for future screening recommendations based on patient's age, sex and/or medical conditions.      Health Maintenance   Topic Date Due   • LIPID PANEL  10/11/2022   • DXA SCAN  11/12/2023   • TDAP/TD VACCINES (2 - Td or Tdap) 04/14/2025   • INFLUENZA VACCINE  Completed   • ZOSTER VACCINE  Completed       Medicare Risks and Personalized Health Plan:  Advance Directive Discussion  Breast Cancer/Mammogram Screening  Cardiovascular risk  Colon Cancer Screening  Dementia/Memory   Depression/Dysphoria  Diabetic Lab Screening   Fall Risk  Obesity/Overweight       CMS-Preventive Services Quick Reference  Medicare Preventive Services Addressed:  Annual Wellness Visit (AWV)  Bone Density Measurements  Cardiovascular Disease Screening Tests (may do this order every 5 years in beneficiaries without signs or symptoms of cardiovascular disease)  Colorectal Cancer Screening, Colonoscopy  Screening Mammography     Advance Care Planning:  ACP discussion was held with the patient during this visit. Patient has an advance directive in EMR which is still valid.     Diagnoses and all orders for this visit:    1. Medicare annual wellness visit, subsequent (Primary)    2. Visit for screening mammogram  -     Mammo Screening Digital Tomosynthesis Bilateral With CAD; Future    3. Mixed hyperlipidemia  -     Comprehensive Metabolic Panel  -     Lipid Panel  -     TSH    4. Need for vaccination  -     Fluzone High-Dose 65+yrs (0551-6292)      Medicare wellness exam was done today.  I will be getting fasting blood work.  I reviewed her health maintenance.  Her last  colonoscopy was in 4/2017 and I will be getting a copy of the report.  Her last mammogram was in 12/2021 and the new order was given and she will be scheduling the test when due.  Her last DEXA scan was in 11/2021.  She is vaccinated and booster against COVID-19 and she was advised to get a new booster shot.  She was given flu shot today.  She is up to speed with her pneumonia shot and Shingrix vaccination.  Healthy lifestyle was reinforced.    An After Visit Summary and PPPS with all of these plans were given to the patient.      Follow Up:  Return in about 6 months (around 5/2/2023) for Next scheduled follow up.        Requested Prescriptions      No prescriptions requested or ordered in this encounter

## 2022-11-07 ENCOUNTER — LAB (OUTPATIENT)
Dept: FAMILY MEDICINE CLINIC | Facility: CLINIC | Age: 77
End: 2022-11-07

## 2022-11-07 LAB
ALBUMIN SERPL-MCNC: 4.2 G/DL (ref 3.5–5.2)
ALBUMIN/GLOB SERPL: 1.9 G/DL
ALP SERPL-CCNC: 170 U/L (ref 39–117)
ALT SERPL W P-5'-P-CCNC: 13 U/L (ref 1–33)
ANION GAP SERPL CALCULATED.3IONS-SCNC: 8 MMOL/L (ref 5–15)
AST SERPL-CCNC: 18 U/L (ref 1–32)
BILIRUB SERPL-MCNC: 0.4 MG/DL (ref 0–1.2)
BUN SERPL-MCNC: 20 MG/DL (ref 8–23)
BUN/CREAT SERPL: 26 (ref 7–25)
CALCIUM SPEC-SCNC: 9.6 MG/DL (ref 8.6–10.5)
CHLORIDE SERPL-SCNC: 104 MMOL/L (ref 98–107)
CHOLEST SERPL-MCNC: 165 MG/DL (ref 0–200)
CO2 SERPL-SCNC: 28 MMOL/L (ref 22–29)
CREAT SERPL-MCNC: 0.77 MG/DL (ref 0.57–1)
EGFRCR SERPLBLD CKD-EPI 2021: 80.6 ML/MIN/1.73
GLOBULIN UR ELPH-MCNC: 2.2 GM/DL
GLUCOSE SERPL-MCNC: 93 MG/DL (ref 65–99)
HDLC SERPL-MCNC: 72 MG/DL (ref 40–60)
LDLC SERPL CALC-MCNC: 84 MG/DL (ref 0–100)
LDLC/HDLC SERPL: 1.17 {RATIO}
POTASSIUM SERPL-SCNC: 4.2 MMOL/L (ref 3.5–5.2)
PROT SERPL-MCNC: 6.4 G/DL (ref 6–8.5)
SODIUM SERPL-SCNC: 140 MMOL/L (ref 136–145)
TRIGL SERPL-MCNC: 43 MG/DL (ref 0–150)
TSH SERPL DL<=0.05 MIU/L-ACNC: 1.99 UIU/ML (ref 0.27–4.2)
VLDLC SERPL-MCNC: 9 MG/DL (ref 5–40)

## 2022-11-07 PROCEDURE — 80061 LIPID PANEL: CPT | Performed by: FAMILY MEDICINE

## 2022-11-07 PROCEDURE — 80053 COMPREHEN METABOLIC PANEL: CPT | Performed by: FAMILY MEDICINE

## 2022-11-07 PROCEDURE — 84443 ASSAY THYROID STIM HORMONE: CPT | Performed by: FAMILY MEDICINE

## 2022-11-07 PROCEDURE — 36415 COLL VENOUS BLD VENIPUNCTURE: CPT | Performed by: FAMILY MEDICINE

## 2022-11-09 ENCOUNTER — PATIENT ROUNDING (BHMG ONLY) (OUTPATIENT)
Dept: FAMILY MEDICINE CLINIC | Facility: CLINIC | Age: 77
End: 2022-11-09

## 2022-11-09 NOTE — PROGRESS NOTES
November 9, 2022    [employer] MESSAGE SENT TO [facility]IENT [facility] [address] [place] [id number].

## 2022-12-05 ENCOUNTER — OFFICE VISIT (OUTPATIENT)
Dept: CARDIOLOGY | Facility: CLINIC | Age: 77
End: 2022-12-05

## 2022-12-05 VITALS
HEART RATE: 74 BPM | HEIGHT: 65 IN | DIASTOLIC BLOOD PRESSURE: 75 MMHG | RESPIRATION RATE: 18 BRPM | SYSTOLIC BLOOD PRESSURE: 125 MMHG | WEIGHT: 217 LBS | BODY MASS INDEX: 36.15 KG/M2 | OXYGEN SATURATION: 98 %

## 2022-12-05 DIAGNOSIS — I10 ESSENTIAL HYPERTENSION: ICD-10-CM

## 2022-12-05 DIAGNOSIS — I48.0 PAROXYSMAL ATRIAL FIBRILLATION: Primary | ICD-10-CM

## 2022-12-05 DIAGNOSIS — E78.2 MIXED HYPERLIPIDEMIA: ICD-10-CM

## 2022-12-05 PROCEDURE — 99214 OFFICE O/P EST MOD 30 MIN: CPT | Performed by: INTERNAL MEDICINE

## 2022-12-05 PROCEDURE — 93000 ELECTROCARDIOGRAM COMPLETE: CPT | Performed by: INTERNAL MEDICINE

## 2022-12-05 NOTE — PROGRESS NOTES
CC--Atrial fibrillation, hypertension      SUB--77-year-old female patient with history of atrial fibrillation hypertension comes in for follow-up.  She underwent AF ablation back in 2018.  She had early recurrence after AF ablation and was placed on Tikosyn.  She remains active and functional class I.  Patient has been stopped in the past with reinitiation of AF and restarted on Tikosyn with resolution.  Prior cardiac catheterization without significant stenosis and she has normal EF.    My previous history is attached below for reference only which has been reviewed       patient is 77 years old white female whose past medical history significant for hypertension, Persistent atrial fibrillation,  obesity  underwent AF ablation in 2018 with early recurrence with initiation of Tikosyn to sinus rhythm with resolution of symptoms and Tikosyn was stopped with the last office visit and started noticing recurrent atrial fibrillation in last 2 to 3 weeks with symptoms of palpitations and mild fatigue --patient has severe left atrial enlargement needing antiarrhythmic treatment to optimize into sinus rhythm-- during past hospitalization CT surgery consult was also requested for possible future convergence procedure because of severe left atrial enlargement.In 2009, patient was diagnosed with paroxysmal atrial fibrillation.  Patient was treated with beta-blockers initially.  Patient was started on flecainide later on.  In  2017, patient underwent cardiac catheterization at [facility] and coronary arteries were normal.  LV function was preserved.  Patient was started on Eliquis because of recurrence of atrial fibrillation.   chads vasc  score---3   patient was hypertensive and is on multiple medication  Post re initiation of tikosyn to sinus and feels well  No new sx  Patient remains in functional class I with no new symptoms and came for follow-up        Past Medical History:     Reviewed history from  01/08/2019 and no changes required:        HTN        Paroxysmal  A-fib - Dr. Lugo        Seasonal allergies - on allergy shots per ENT        Hyperlipidemia        Hypothyroidism        Chronic low back pain        Colonoscopy in 4/13/2017        Mammogram in 7/31/2018 - negative        Pneumonia shot in the past - both Pneumovax and Prevnar        Shingle shot in the past        Osteopenia - DEXA on 6/23/2017 - L/S: +0.5 and Hip: -0.8        GYN HM per GYN - Dr. Hopkins        Negative Hepatitis C screening in 10/2017                 Physical Exam    General:      well developed, well nourished, in no acute distress.    Head:      normocephalic and atraumatic.    Eyes:      PERRL/EOM intact, conjunctivae and sclerae clear without nystagmus.    Neck:      no  thyromegaly, trachea central with normal respiratory effort  Lungs:      clear bilaterally to auscultation.    Heart:       regular rate and rhythm, S1, S2 without murmurs, rubs, or gallops  Skin:      intact without lesions or rashes.    Psych:      alert and cooperative; normal mood and affect; normal attention span and concentration.           assessment plan    Recurrent persistent atrial fibrillation and failed medical management with prior cryoablation in December 2018 with relapse on Tikosyn doing well.  Essential hypertension controlled  Hypothyroidism supplemented  Mild stable edema  Mild apnea and off CPAP because of very mild in nature followed by C5 placed  Medications reviewed and follow-up appointments made  TSH is normal with potassium 4.2 and normal creatinine      ECG 12 Lead    Date/Time: 12/5/2022 2:49 PM  Performed by: Dre Case MD  Authorized by: Dre Case MD   Comparison: compared with previous ECG   Similar to previous ECG  Rhythm: sinus rhythm  Ectopy: infrequent PVCs  Rate: normal  Other findings: left atrial abnormality           Electronically signed by Dre Case MD, 12/05/22, 2:48 PM EST.

## 2023-01-09 ENCOUNTER — TELEPHONE (OUTPATIENT)
Dept: CARDIOLOGY | Facility: CLINIC | Age: 78
End: 2023-01-09
Payer: MEDICARE

## 2023-01-09 NOTE — TELEPHONE ENCOUNTER
Caller: Sheree Vance     Relationship: [unfilled]     Best call back number: [phone]    What is your medical concern? PATIENT REPORTS SHES BEEN HAVING AFIB AFTER HAVING COVID.     How long has this issue been going on? A COUPLE OF DAYS.     Is your provider already aware of this issue? YES    Have you been treated for this issue? YES

## 2023-01-10 ENCOUNTER — LAB (OUTPATIENT)
Dept: LAB | Facility: HOSPITAL | Age: 78
End: 2023-01-10
Payer: MEDICARE

## 2023-01-10 ENCOUNTER — OFFICE VISIT (OUTPATIENT)
Dept: CARDIOLOGY | Facility: CLINIC | Age: 78
End: 2023-01-10
Payer: MEDICARE

## 2023-01-10 VITALS
BODY MASS INDEX: 36.32 KG/M2 | HEART RATE: 70 BPM | OXYGEN SATURATION: 98 % | SYSTOLIC BLOOD PRESSURE: 118 MMHG | HEIGHT: 65 IN | WEIGHT: 218 LBS | DIASTOLIC BLOOD PRESSURE: 79 MMHG

## 2023-01-10 DIAGNOSIS — I48.0 PAROXYSMAL ATRIAL FIBRILLATION: Primary | ICD-10-CM

## 2023-01-10 DIAGNOSIS — R00.2 PALPITATIONS: ICD-10-CM

## 2023-01-10 DIAGNOSIS — Z79.899 VISIT FOR MONITORING TIKOSYN THERAPY: ICD-10-CM

## 2023-01-10 DIAGNOSIS — Z51.81 VISIT FOR MONITORING TIKOSYN THERAPY: ICD-10-CM

## 2023-01-10 LAB
ANION GAP SERPL CALCULATED.3IONS-SCNC: 9.9 MMOL/L (ref 5–15)
BUN SERPL-MCNC: 19 MG/DL (ref 8–23)
BUN/CREAT SERPL: 19.8 (ref 7–25)
CALCIUM SPEC-SCNC: 9.8 MG/DL (ref 8.6–10.5)
CHLORIDE SERPL-SCNC: 101 MMOL/L (ref 98–107)
CO2 SERPL-SCNC: 28.1 MMOL/L (ref 22–29)
CREAT SERPL-MCNC: 0.96 MG/DL (ref 0.57–1)
EGFRCR SERPLBLD CKD-EPI 2021: 61.8 ML/MIN/1.73
GLUCOSE SERPL-MCNC: 108 MG/DL (ref 65–99)
MAGNESIUM SERPL-MCNC: 2.1 MG/DL (ref 1.6–2.4)
POTASSIUM SERPL-SCNC: 4.2 MMOL/L (ref 3.5–5.2)
SODIUM SERPL-SCNC: 139 MMOL/L (ref 136–145)

## 2023-01-10 PROCEDURE — 36415 COLL VENOUS BLD VENIPUNCTURE: CPT | Performed by: NURSE PRACTITIONER

## 2023-01-10 PROCEDURE — 83735 ASSAY OF MAGNESIUM: CPT | Performed by: NURSE PRACTITIONER

## 2023-01-10 PROCEDURE — 80048 BASIC METABOLIC PNL TOTAL CA: CPT | Performed by: NURSE PRACTITIONER

## 2023-01-10 PROCEDURE — 99214 OFFICE O/P EST MOD 30 MIN: CPT | Performed by: NURSE PRACTITIONER

## 2023-01-10 PROCEDURE — 93000 ELECTROCARDIOGRAM COMPLETE: CPT | Performed by: NURSE PRACTITIONER

## 2023-01-10 NOTE — PROGRESS NOTES
Cardiology Office Follow Up Visit      Primary Care Provider:  Latia Guerrier MD    Reason for f/u:     Palpitations      Subjective       History of Present Illness       Sheree Vance is a 77 y.o. female seen in clinic today for continued cardiac care of atrial fibrillation.  Patient had persistent afib with failed flecainide therapy underwent ablation in 2018.  She had early reoccurrence and started on tikosyn with conversion to sinus.  Later she was discontinued from tikosyn and developed recurrent afib and restarted on tikosyn.  SARA 2018 showed EF = 55-60% with moderate MR, mild TR, and moderate to severe LAE and mild to moderate APRYL.  Cath in 2017 showed no obstructive CAD.    Patient tells me that she had COVID infection 2 weeks ago.  Following COVID infection she reports 3 consecutive nights of palpitations similar to her prior afib.  This was not accompanied by dizziness or chest pain.  She denies any dyspnea or PND/orthopnea.  She reports no change in chronic BLE edema.  She had not experienced further palpitations beyond this 3 day period and feels well today.  EKG shows sinus rhythm.  She states that she can always tell when she is back in afib.      ASSESSMENT/PLAN:      Diagnoses and all orders for this visit:    1. Paroxysmal atrial fibrillation (HCC) (Primary)  -     ECG 12 Lead  -     Basic Metabolic Panel  -     Magnesium    2. Visit for monitoring Tikosyn therapy  -     Basic Metabolic Panel  -     Magnesium    3. Palpitations            MEDICAL DECISION MAKIN) Palpitations / PAF  - EKG shows sinus rhythm today and palpitations have since resolved post COVID.  QT is WNL on tikosyn therapy.  Will check BMP, Mg.  Continue on anticoagulation with eliquis.  I have asked patient to call the office if she has recurrent palpitations and then will plan to place ambulatory cardiac monitor for afib burden and/or adjust medical therapy.      Otherwise RTC in 6 months    Past Medical History:    Diagnosis Date   • A-fib (HCC) Since 2001 Dx in [place]   • Abnormal ECG 2009   • Allergic 2014    Seasonal   • Allergic rhinitis     Hx Allergy Shots x 4 Yrs   • Aortic valve calcification 12/04/2018    Noted on SARA   • Arrhythmia    • Arthritis     Lumbar Spine   • Atrial flutter (HCC)    • Breast mass seen on mammogram 03/19/2014    L 4CM Mass--Benign & Followed   • Bruxism, sleep-related    • Cataract    • Chronic anticoagulation     Eliquis    • Chronic low back pain     Hx Back Sx in 2016   • Colon polyp    • Coronary artery disease 2009    AFIB   • DJD (degenerative joint disease), lumbar    • Dyslipidemia     w/Hypertriglyceridemia--Statin/Fish Oils   • Family history of premature CAD    • Glaucoma    • Heart disease    • History of bone density study 03/19/14-[facility]    T-Score of 1.4 Indicating Osteopenia   • History of EKG 04/2018 & 08/2018 & 12/2018    First Degree AV Block/Multiple Atrial Premature Blocks Noted on All   • Hormone replacement therapy (HRT)     Premarin-0.625MG   • Hyperlipidemia     Controlled w/Meds   • Hypertension     Controlled w/Losartan   • Hypothyroidism     Synthroid   • Insomnia     Takes OTC Sleep Aid PRN   • Left atrial enlargement 12/04/2018    Severe Noted on Cardioversion Report/SARA   • Left breast mass 03/19/2014    4CM Noted on Mammogram--Benign    • Mild tricuspid valve regurgitation 12/04/2018    Noted on SARA   • Mitral valve annular calcification 12/04/2018    Moderate Noted on SARA   • Mitral valve regurgitation 12/04/2018    Moderate Noted on SARA   • Obesity 12/04/2018    BMI-43.3    • SHANAE on CPAP 11/27/2018    AHI 10.5/h, supine 30/h   • Osteoarthritis of right hip 07/09/2019   • Osteopenia 03/19/2014    Noted on Dexa Report   • Restless sleeper     Tosses and Turns All Night Per Pt   • Sinus congestion    • Tricuspid leaflet thickened 12/04/2018    Noted on SARA   • Urinary tract infection        Past Surgical History:   Procedure Laterality Date   • ABLATION OF DYSRHYTHMIC  FOCUS  2018   • APPENDECTOMY     • BACK SURGERY  2016    Spinal Fusion   • BLADDER REPAIR  2001 in [place]   • CARDIAC CATHETERIZATION  2015   • CARDIOVERSION  08/7/18 & 4/17/18-[facility]    Dr. Villegas--For Chronic A-Fib   • CARDIOVERSION  12/4/28-[facility]    Dr. Case--See Report   • CATARACT EXTRACTION  2007   • COLONOSCOPY  04/13/2017   • HYSTERECTOMY      Partial   • KNEE SURGERY Right 2009   • OTHER SURGICAL HISTORY  12/4/18-[facility]    Fluoroscopy/Trans-Septal Access to L Atrium/Selective Venography of L Superior Pulm Vein/Add Lienier Radiofrequency Ablation Along the Coronary Sinus/Synchronized Cardioversion--Dr. Case   • REPLACEMENT TOTAL KNEE Right 08/30/2021    Dr. Astorga   • SPINE SURGERY     • SARA  12/4/18-[facility]    Mild Concentric L Ventricular Hypertrophy Noted; EF 55-60%; L Atrial Moderate-Severely Dilated, Moderat Mitral Annular Calcification with Moderate MVR Noted; Mild TVR; Normal LV Function   • TONSILLECTOMY     • TOTAL HIP ARTHROPLASTY Right 07/06/2020    Dr. Astorga         Current Outpatient Medications:   •  amLODIPine (NORVASC) 10 MG tablet, TAKE 1 TABLET EVERY DAY, Disp: 90 tablet, Rfl: 3  •  atorvastatin (LIPITOR) 10 MG tablet, TAKE 1 TABLET AT BEDTIME, Disp: 90 tablet, Rfl: 1  •  Cholecalciferol 2000 units capsule, Take  by mouth., Disp: , Rfl:   •  Chromium 200 MCG capsule, Take 100 mg by mouth Daily., Disp: , Rfl:   •  coenzyme Q10 100 MG capsule, Take 100 mg by mouth Daily., Disp: , Rfl:   •  dofetilide (TIKOSYN) 500 MCG capsule, TAKE 1 CAPSULE TWICE DAILY, Disp: 180 capsule, Rfl: 1  •  Eliquis 5 MG tablet tablet, TAKE 1 TABLET EVERY 12 HOURS, Disp: 180 tablet, Rfl: 3  •  EPINEPHrine (EPIPEN) 0.3 MG/0.3ML solution auto-injector injection, USE AS DIRECTED FOR ACUTE ALLERGIC REACTION, Disp: , Rfl: 3  •  fluticasone (FLONASE) 50 MCG/ACT nasal spray, 2 sprays into the nostril(s) as directed by provider Daily., Disp: , Rfl:   •  levothyroxine (SYNTHROID, LEVOTHROID) 150 MCG tablet, TAKE 1 TABLET EVERY DAY,  Disp: 90 tablet, Rfl: 0  •  Magnesium 200 MG tablet, Take  by mouth Daily., Disp: , Rfl:   •  montelukast (SINGULAIR) 10 MG tablet, TK 1 T PO HS, Disp: , Rfl: 3  •  Multiple Vitamins-Minerals (MULTIVITAMIN ADULT) tablet, MULTIVITAMIN ADULT TABS, Disp: , Rfl:   •  NIACIN CR PO, Take 100 mg by mouth., Disp: , Rfl:   •  Omega-3 1000 MG capsule, Take  by mouth Daily., Disp: , Rfl:   •  Premarin 0.625 MG/GM vaginal cream, USE 1/2 GRAM PER VAGINA 2 TO 3 TIMES A WEEK, Disp: 30 g, Rfl: 1  •  spironolactone (ALDACTONE) 50 MG tablet, TAKE 1 TABLET EVERY DAY, Disp: 90 tablet, Rfl: 3  •  telmisartan (MICARDIS) 80 MG tablet, TAKE 1 TABLET EVERY DAY, Disp: 90 tablet, Rfl: 1  •  vitamin B-12 (CYANOCOBALAMIN) 1000 MCG tablet, Take 1,000 mcg by mouth Daily., Disp: , Rfl:     Social History     Socioeconomic History   • Marital status:      Spouse name: Papi   • Number of children: 1   Tobacco Use   • Smoking status: Never   • Smokeless tobacco: Never   Vaping Use   • Vaping Use: Never used   Substance and Sexual Activity   • Alcohol use: Yes     Alcohol/week: 4.0 standard drinks     Types: 2 Glasses of wine, 2 Drinks containing 0.5 oz of alcohol per week     Comment: Infrequently   • Drug use: No   • Sexual activity: Yes     Partners: Male     Birth control/protection: Surgical     Comment: Hyst       Family History   Problem Relation Age of Onset   • Heart failure Mother    • Alcohol abuse Mother    • Early death Mother    • Miscarriages / Stillbirths Mother    • No Known Problems Father        The following portions of the patient's history were reviewed and updated as appropriate: allergies, current medications, past family history, past medical history, past social history, past surgical history and problem list.    ROS  /79   Pulse 70   Ht 165.1 cm (65\")   Wt 98.9 kg (218 lb)   SpO2 98%   BMI 36.28 kg/m² .  Objective     Physical Exam    Physical Exam:  Neuro:  CV:  Resp:  GI:  Ext:  Pysch: AAOx3, no gross  deficits  S1S2 RRR, no murmur  Non-labored, CTA  BS+, abd soft  Pedal pulses palp, 1+ non-pitting BLE edema  Calm and cooperative       Procedures    EKG ordered by and reviewed by me in office  EKG shows normal sinus rhythm with non-specific T wave abnormalities.  QT is WNL.

## 2023-02-27 RX ORDER — SPIRONOLACTONE 50 MG/1
TABLET, FILM COATED ORAL
Qty: 90 TABLET | Refills: 3 | Status: SHIPPED | OUTPATIENT
Start: 2023-02-27

## 2023-03-23 DIAGNOSIS — E03.9 ACQUIRED HYPOTHYROIDISM: ICD-10-CM

## 2023-03-23 DIAGNOSIS — E78.2 MIXED HYPERLIPIDEMIA: ICD-10-CM

## 2023-03-23 RX ORDER — LEVOTHYROXINE SODIUM 0.15 MG/1
TABLET ORAL
Qty: 90 TABLET | Refills: 0 | Status: SHIPPED | OUTPATIENT
Start: 2023-03-23

## 2023-03-23 RX ORDER — ATORVASTATIN CALCIUM 10 MG/1
TABLET, FILM COATED ORAL
Qty: 90 TABLET | Refills: 0 | Status: SHIPPED | OUTPATIENT
Start: 2023-03-23

## 2023-06-05 ENCOUNTER — OFFICE VISIT (OUTPATIENT)
Dept: CARDIOLOGY | Facility: CLINIC | Age: 78
End: 2023-06-05
Payer: MEDICARE

## 2023-06-05 VITALS
HEIGHT: 65 IN | HEART RATE: 73 BPM | OXYGEN SATURATION: 97 % | DIASTOLIC BLOOD PRESSURE: 79 MMHG | RESPIRATION RATE: 18 BRPM | SYSTOLIC BLOOD PRESSURE: 133 MMHG | BODY MASS INDEX: 36.99 KG/M2 | WEIGHT: 222 LBS

## 2023-06-05 DIAGNOSIS — E78.2 MIXED HYPERLIPIDEMIA: ICD-10-CM

## 2023-06-05 DIAGNOSIS — G47.33 OBSTRUCTIVE SLEEP APNEA SYNDROME: ICD-10-CM

## 2023-06-05 DIAGNOSIS — I48.0 PAROXYSMAL ATRIAL FIBRILLATION: Primary | ICD-10-CM

## 2023-06-05 DIAGNOSIS — R60.0 BILATERAL LEG EDEMA: ICD-10-CM

## 2023-06-05 DIAGNOSIS — I10 ESSENTIAL HYPERTENSION: ICD-10-CM

## 2023-06-05 PROCEDURE — 99214 OFFICE O/P EST MOD 30 MIN: CPT | Performed by: INTERNAL MEDICINE

## 2023-06-05 PROCEDURE — 1159F MED LIST DOCD IN RCRD: CPT | Performed by: INTERNAL MEDICINE

## 2023-06-05 PROCEDURE — 3078F DIAST BP <80 MM HG: CPT | Performed by: INTERNAL MEDICINE

## 2023-06-05 PROCEDURE — 1160F RVW MEDS BY RX/DR IN RCRD: CPT | Performed by: INTERNAL MEDICINE

## 2023-06-05 PROCEDURE — 93000 ELECTROCARDIOGRAM COMPLETE: CPT | Performed by: INTERNAL MEDICINE

## 2023-06-05 PROCEDURE — 3075F SYST BP GE 130 - 139MM HG: CPT | Performed by: INTERNAL MEDICINE

## 2023-06-05 RX ORDER — ALBUTEROL SULFATE 90 UG/1
AEROSOL, METERED RESPIRATORY (INHALATION)
COMMUNITY
Start: 2023-05-04

## 2023-06-05 RX ORDER — AMOXICILLIN 500 MG/1
TABLET, FILM COATED ORAL
COMMUNITY
Start: 2023-05-24 | End: 2023-06-05

## 2023-06-05 RX ORDER — LATANOPROST 50 UG/ML
SOLUTION/ DROPS OPHTHALMIC
COMMUNITY
Start: 2023-05-24

## 2023-06-05 RX ORDER — METOPROLOL SUCCINATE 50 MG/1
50 TABLET, EXTENDED RELEASE ORAL DAILY
Qty: 90 TABLET | Refills: 1 | Status: SHIPPED | OUTPATIENT
Start: 2023-06-05

## 2023-06-05 RX ORDER — AZELASTINE 1 MG/ML
SPRAY, METERED NASAL
COMMUNITY
Start: 2023-05-04

## 2023-06-05 NOTE — PROGRESS NOTES
CC--Atrial fibrillation, hypertension      SUB--78-year-old pleasant patient well-known to me comes in for follow-up.  She complains of bilateral leg edema in the last several months.  She has history of atrial fibrillation and underwent AF ablation in 2018 with early recurrence needing Tikosyn.  Recently Tikosyn was stopped with recurrence of atrial arrhythmia and reinitiation of Tikosyn with resolution.  Prior cardiac catheterization without significant stenosis with normal EF.        My previous history is attached below for reference only which has been reviewed       patient is 77 years old white female whose past medical history significant for hypertension, Persistent atrial fibrillation,  obesity  underwent AF ablation in 2018 with early recurrence with initiation of Tikosyn to sinus rhythm with resolution of symptoms and Tikosyn was stopped with the last office visit and started noticing recurrent atrial fibrillation in last 2 to 3 weeks with symptoms of palpitations and mild fatigue --patient has severe left atrial enlargement needing antiarrhythmic treatment to optimize into sinus rhythm-- during past hospitalization CT surgery consult was also requested for possible future convergence procedure because of severe left atrial enlargement.In 2009, patient was diagnosed with paroxysmal atrial fibrillation.  Patient was treated with beta-blockers initially.  Patient was started on flecainide later on.  In  2017, patient underwent cardiac catheterization at [facility] and coronary arteries were normal.  LV function was preserved.  Patient was started on Eliquis because of recurrence of atrial fibrillation.   chads vasc  score---3   patient was hypertensive and is on multiple medication  Post re initiation of tikosyn to sinus and feels well  No new sx  Patient remains in functional class I with no new symptoms and came for follow-up        Past Medical History:     Reviewed history from 01/08/2019  and no changes required:        HTN        Paroxysmal  A-fib - Dr. Lugo        Seasonal allergies - on allergy shots per ENT        Hyperlipidemia        Hypothyroidism        Chronic low back pain        Colonoscopy in 4/13/2017        Mammogram in 7/31/2018 - negative        Pneumonia shot in the past - both Pneumovax and Prevnar        Shingle shot in the past        Osteopenia - DEXA on 6/23/2017 - L/S: +0.5 and Hip: -0.8        GYN HM per GYN - Dr. Hopkins        Negative Hepatitis C screening in 10/2017               Physical Exam    General:      well developed, well nourished, in no acute distress.    Head:      normocephalic and atraumatic.    Eyes:      PERRL/EOM intact, conjunctivae and sclerae clear without nystagmus.    Neck:      no  thyromegaly, trachea central with normal respiratory effort  Lungs:      clear bilaterally to auscultation.    Heart:       regular rate and rhythm, S1, S2 without murmurs, rubs, or gallops  Skin:      intact without lesions or rashes.    Psych:      alert and cooperative; normal mood and affect; normal attention span and concentration.               assessment plan    Significant bilateral leg edema.  Stop amlodipine  Start Toprol  Prescription for Toprol given  Persistent AF in sinus rhythm on Tikosyn  Essential hypertension well-controlled  Hypothyroidism supplemented  Medications reviewed and follow-up appointments made  Most recent potassium of 4.2 with normal creatinine      ECG 12 Lead    Date/Time: 6/5/2023 4:50 PM  Performed by: Dre Case MD  Authorized by: Dre Case MD   Comparison: compared with previous ECG   Similar to previous ECG  Rhythm: sinus rhythm  Ectopy: atrial premature contractions  Rate: normal  Conduction: conduction normal  Other findings: non-specific ST-T wave changes         Electronically signed by Dre Case MD, 06/05/23, 4:50 PM EDT.

## 2023-06-05 NOTE — LETTER
June 5, 2023       No Recipients    Patient: Shreee Vance   YOB: 1945   Date of Visit: 6/5/2023       Dear Dr. Cuevas Recipients:    Thank you for referring Sheree Vance to me for evaluation. Below are the relevant portions of my assessment and plan of care.    If you have questions, please do not hesitate to call me. I look forward to following Sheree along with you.         Sincerely,        Dre Case MD        CC:   No Recipients    Dre Case MD  06/05/23 1650  Sign when Signing Visit  CC--Atrial fibrillation, hypertension      SUB--78-year-old pleasant patient well-known to me comes in for follow-up.  She complains of bilateral leg edema in the last several months.  She has history of atrial fibrillation and underwent AF ablation in 2018 with early recurrence needing Tikosyn.  Recently Tikosyn was stopped with recurrence of atrial arrhythmia and reinitiation of Tikosyn with resolution.  Prior cardiac catheterization without significant stenosis with normal EF.        My previous history is attached below for reference only which has been reviewed       patient is 77 years old white female whose past medical history significant for hypertension, Persistent atrial fibrillation,  obesity  underwent AF ablation in 2018 with early recurrence with initiation of Tikosyn to sinus rhythm with resolution of symptoms and Tikosyn was stopped with the last office visit and started noticing recurrent atrial fibrillation in last 2 to 3 weeks with symptoms of palpitations and mild fatigue --patient has severe left atrial enlargement needing antiarrhythmic treatment to optimize into sinus rhythm-- during past hospitalization CT surgery consult was also requested for possible future convergence procedure because of severe left atrial enlargement.In 2009, patient was diagnosed with paroxysmal atrial fibrillation.  Patient was treated with beta-blockers initially.  Patient was started on  flecainide later on.  In  2017, patient underwent cardiac catheterization at [facility] and coronary arteries were normal.  LV function was preserved.  Patient was started on Eliquis because of recurrence of atrial fibrillation.   chads vasc  score---3   patient was hypertensive and is on multiple medication  Post re initiation of tikosyn to sinus and feels well  No new sx  Patient remains in functional class I with no new symptoms and came for follow-up        Past Medical History:     Reviewed history from 01/08/2019 and no changes required:        HTN        Paroxysmal  A-fib - Dr. Lugo        Seasonal allergies - on allergy shots per ENT        Hyperlipidemia        Hypothyroidism        Chronic low back pain        Colonoscopy in 4/13/2017        Mammogram in 7/31/2018 - negative        Pneumonia shot in the past - both Pneumovax and Prevnar        Shingle shot in the past        Osteopenia - DEXA on 6/23/2017 - L/S: +0.5 and Hip: -0.8        GYN HM per GYN - Dr. Hopkins        Negative Hepatitis C screening in 10/2017               Physical Exam    General:      well developed, well nourished, in no acute distress.    Head:      normocephalic and atraumatic.    Eyes:      PERRL/EOM intact, conjunctivae and sclerae clear without nystagmus.    Neck:      no  thyromegaly, trachea central with normal respiratory effort  Lungs:      clear bilaterally to auscultation.    Heart:       regular rate and rhythm, S1, S2 without murmurs, rubs, or gallops  Skin:      intact without lesions or rashes.    Psych:      alert and cooperative; normal mood and affect; normal attention span and concentration.               assessment plan    Significant bilateral leg edema.  Stop amlodipine  Start Toprol  Prescription for Toprol given  Persistent AF in sinus rhythm on Tikosyn  Essential hypertension well-controlled  Hypothyroidism supplemented  Medications reviewed and follow-up appointments made  Most recent  potassium of 4.2 with normal creatinine      ECG 12 Lead    Date/Time: 6/5/2023 4:50 PM  Performed by: Dre Case MD  Authorized by: Dre Case MD   Comparison: compared with previous ECG   Similar to previous ECG  Rhythm: sinus rhythm  Ectopy: atrial premature contractions  Rate: normal  Conduction: conduction normal  Other findings: non-specific ST-T wave changes         Electronically signed by Dre Case MD, 06/05/23, 4:50 PM EDT.

## 2023-08-09 ENCOUNTER — TELEPHONE (OUTPATIENT)
Dept: FAMILY MEDICINE CLINIC | Facility: CLINIC | Age: 78
End: 2023-08-09
Payer: MEDICARE

## 2023-08-10 ENCOUNTER — OFFICE VISIT (OUTPATIENT)
Dept: FAMILY MEDICINE CLINIC | Facility: CLINIC | Age: 78
End: 2023-08-10
Payer: MEDICARE

## 2023-08-10 VITALS
HEIGHT: 65 IN | BODY MASS INDEX: 36.92 KG/M2 | HEART RATE: 55 BPM | RESPIRATION RATE: 16 BRPM | WEIGHT: 221.6 LBS | OXYGEN SATURATION: 96 % | SYSTOLIC BLOOD PRESSURE: 140 MMHG | DIASTOLIC BLOOD PRESSURE: 73 MMHG | TEMPERATURE: 97.5 F

## 2023-08-10 DIAGNOSIS — L84 CALLUS OF TOE: Primary | ICD-10-CM

## 2023-08-10 PROCEDURE — 3077F SYST BP >= 140 MM HG: CPT | Performed by: FAMILY MEDICINE

## 2023-08-10 PROCEDURE — 3078F DIAST BP <80 MM HG: CPT | Performed by: FAMILY MEDICINE

## 2023-08-10 PROCEDURE — 99212 OFFICE O/P EST SF 10 MIN: CPT | Performed by: FAMILY MEDICINE

## 2023-08-10 NOTE — PROGRESS NOTES
Subjective   Chief Complaint   Patient presents with    referral for podiatrist     Corns on bilateral feet     Sheree Vance is a 78 y.o. female.     Patient Care Team:  Latia Guerrier MD as PCP - General  Latia Guerrier MD as PCP - Family Medicine  Dre Case MD (Cardiology)  Jet Godwin MD as Consulting Physician (Ophthalmology)  Antonio Fine MD as Consulting Physician (Allergy)  Sandor Astorga MD as Consulting Physician (Orthopedic Surgery)    History of Present Illness  She is coming in today due to toe concerns.  She tells me that in the recent past she developed a callus on the inner side of the left fifth toe.  This is causing some discomfort.  She already made an appointment to see a podiatrist and this appointment is scheduled for 8/16/2023, but she was advised that she needs a referral from her primary care physician.  No other issues are being reported.     The following portions of the patient's history were reviewed and updated as appropriate: allergies, current medications, past family history, past medical history, past social history, past surgical history, and problem list.  Past Medical History:   Diagnosis Date    A-fib Since 2001 Dx in [place]    Abnormal ECG 2009    Allergic 2014    Seasonal    Allergic rhinitis     Hx Allergy Shots x 4 Yrs    Aortic valve calcification 12/04/2018    Noted on SARA    Arrhythmia     Arthritis     Lumbar Spine    Atrial flutter     Breast mass seen on mammogram 03/19/2014    L 4CM Mass--Benign & Followed    Bruxism, sleep-related     Cataract     Chronic anticoagulation     Eliquis     Chronic low back pain     Hx Back Sx in 2016    Colon polyp     Coronary artery disease 2009    AFIB    DJD (degenerative joint disease), lumbar     Dyslipidemia     w/Hypertriglyceridemia--Statin/Fish Oils    Family history of premature CAD     Glaucoma     Heart disease     History of bone density study 03/19/14-[facility]    T-Score of 1.4 Indicating  Osteopenia    History of EKG 04/2018 & 08/2018 & 12/2018    First Degree AV Block/Multiple Atrial Premature Blocks Noted on All    Hormone replacement therapy (HRT)     Premarin-0.625MG    Hyperlipidemia     Controlled w/Meds    Hypertension     Controlled w/Losartan    Hypothyroidism     Synthroid    Insomnia     Takes OTC Sleep Aid PRN    Left atrial enlargement 12/04/2018    Severe Noted on Cardioversion Report/SARA    Left breast mass 03/19/2014    4CM Noted on Mammogram--Benign     Mild tricuspid valve regurgitation 12/04/2018    Noted on SARA    Mitral valve annular calcification 12/04/2018    Moderate Noted on SARA    Mitral valve regurgitation 12/04/2018    Moderate Noted on SARA    Obesity 12/04/2018    BMI-43.3     SHANAE on CPAP 11/27/2018    AHI 10.5/h, supine 30/h    Osteoarthritis of right hip 07/09/2019    Osteopenia 03/19/2014    Noted on Dexa Report    Restless sleeper     Tosses and Turns All Night Per Pt    Sinus congestion     Tricuspid leaflet thickened 12/04/2018    Noted on SARA    Urinary tract infection      Past Surgical History:   Procedure Laterality Date    ABLATION OF DYSRHYTHMIC FOCUS  2018    APPENDECTOMY      BACK SURGERY  2016    Spinal Fusion    BLADDER REPAIR  2001 in [place]    CARDIAC CATHETERIZATION  2015    CARDIOVERSION  08/7/18 & 4/17/18-[facility]    Dr. Villegas--For Chronic A-Fib    CARDIOVERSION  12/4/28-[facility]    Dr. Case--See Report    CATARACT EXTRACTION  2007    COLONOSCOPY  04/13/2017    HYSTERECTOMY      Partial    KNEE SURGERY Right 2009    OTHER SURGICAL HISTORY  12/4/18-[facility]    Fluoroscopy/Trans-Septal Access to L Atrium/Selective Venography of L Superior Pulm Vein/Add Lienier Radiofrequency Ablation Along the Coronary Sinus/Synchronized Cardioversion--Dr. Case    REPLACEMENT TOTAL KNEE Right 08/30/2021    Dr. Astorga    SPINE SURGERY      SARA  12/4/18-[facility]    Mild Concentric L Ventricular Hypertrophy Noted; EF 55-60%; L Atrial Moderate-Severely Dilated, Moderat Mitral Annular  "Calcification with Moderate MVR Noted; Mild TVR; Normal LV Function    TONSILLECTOMY      TOTAL HIP ARTHROPLASTY Right 07/06/2020    Dr. Astorga     The patient has a family history of  Family History   Problem Relation Age of Onset    Heart failure Mother     Alcohol abuse Mother     Early death Mother     Miscarriages / Stillbirths Mother     No Known Problems Father      Social History     Socioeconomic History    Marital status:      Spouse name: Papi    Number of children: 1   Tobacco Use    Smoking status: Never    Smokeless tobacco: Never   Vaping Use    Vaping Use: Never used   Substance and Sexual Activity    Alcohol use: Yes     Alcohol/week: 4.0 standard drinks     Types: 2 Glasses of wine, 2 Drinks containing 0.5 oz of alcohol per week     Comment: Infrequently    Drug use: No    Sexual activity: Yes     Partners: Male     Birth control/protection: Surgical     Comment: Hyst       Review of Systems   Skin:  Positive for skin lesions. Negative for color change, dry skin, rash and wound.   Visit Vitals  /73 (BP Location: Left arm, Patient Position: Sitting, Cuff Size: Adult)   Pulse 55   Temp 97.5 øF (36.4 øC) (Infrared)   Resp 16   Ht 165.1 cm (65\")   Wt 101 kg (221 lb 9.6 oz)   SpO2 96%   BMI 36.88 kg/mý              Current Outpatient Medications:     albuterol sulfate  (90 Base) MCG/ACT inhaler, , Disp: , Rfl:     amoxicillin (AMOXIL) 500 MG tablet, Take 1 tablet by mouth. Before dental procedure, Disp: , Rfl:     atorvastatin (LIPITOR) 10 MG tablet, TAKE 1 TABLET AT BEDTIME, Disp: 90 tablet, Rfl: 0    azelastine (ASTELIN) 0.1 % nasal spray, , Disp: , Rfl:     Cholecalciferol 2000 units capsule, Take  by mouth., Disp: , Rfl:     Chromium 200 MCG capsule, Take 100,000 mcg by mouth Daily., Disp: , Rfl:     coenzyme Q10 100 MG capsule, Take 1 capsule by mouth Daily., Disp: , Rfl:     dofetilide (TIKOSYN) 500 MCG capsule, TAKE 1 CAPSULE TWICE DAILY, Disp: 180 capsule, Rfl: 2    Eliquis 5 " MG tablet tablet, TAKE 1 TABLET EVERY 12 HOURS, Disp: 180 tablet, Rfl: 3    EPINEPHrine (EPIPEN) 0.3 MG/0.3ML solution auto-injector injection, USE AS DIRECTED FOR ACUTE ALLERGIC REACTION, Disp: , Rfl: 3    latanoprost (XALATAN) 0.005 % ophthalmic solution, instill 1 drop in both eyes at bedtime, Disp: , Rfl:     levothyroxine (SYNTHROID, LEVOTHROID) 150 MCG tablet, TAKE 1 TABLET EVERY DAY, Disp: 90 tablet, Rfl: 0    Magnesium 200 MG tablet, Take  by mouth Daily., Disp: , Rfl:     metoprolol succinate XL (TOPROL-XL) 50 MG 24 hr tablet, Take 1 tablet by mouth Daily., Disp: 90 tablet, Rfl: 1    montelukast (SINGULAIR) 10 MG tablet, Take 1 tablet by mouth Every Night. Prn per patient, Disp: , Rfl: 3    Multiple Vitamins-Minerals (MULTIVITAMIN ADULT) tablet, MULTIVITAMIN ADULT TABS, Disp: , Rfl:     NIACIN CR PO, Take 100 mg by mouth., Disp: , Rfl:     Omega-3 1000 MG capsule, Take  by mouth Daily., Disp: , Rfl:     Premarin 0.625 MG/GM vaginal cream, USE 1/2 GRAM PER VAGINA 2 TO 3 TIMES A WEEK, Disp: 30 g, Rfl: 1    spironolactone (ALDACTONE) 50 MG tablet, TAKE 1 TABLET EVERY DAY, Disp: 90 tablet, Rfl: 3    telmisartan (MICARDIS) 80 MG tablet, TAKE 1 TABLET EVERY DAY, Disp: 90 tablet, Rfl: 0    vitamin B-12 (CYANOCOBALAMIN) 1000 MCG tablet, Take 1 tablet by mouth Daily., Disp: , Rfl:     Objective   Physical Exam  Constitutional:       General: She is not in acute distress.     Appearance: Normal appearance. She is well-developed. She is not ill-appearing or diaphoretic.      Comments: Patient is in no distress, patient has normal voice and speech.  Normal respiratory effort.   HENT:      Head: Normocephalic and atraumatic.   Pulmonary:      Effort: Pulmonary effort is normal.   Musculoskeletal:      Cervical back: Normal range of motion and neck supple.      Comments: Left foot was examined, there is callus noted on the medial side of the left fifth toe.  No signs of infection.   Neurological:      General: No focal  deficit present.      Mental Status: She is alert and oriented to person, place, and time. Mental status is at baseline.   Psychiatric:         Mood and Affect: Mood normal.         Assessment & Plan   Diagnoses and all orders for this visit:    1. Callus of toe (Primary)  -     Ambulatory Referral to Podiatry      Foot care was discussed.  Referral to see a podiatrist was given.      Return in about 5 months (around 1/10/2024) for Next scheduled follow up.    Requested Prescriptions      No prescriptions requested or ordered in this encounter

## 2023-09-05 ENCOUNTER — TELEPHONE (OUTPATIENT)
Dept: CARDIOLOGY | Facility: CLINIC | Age: 78
End: 2023-09-05
Payer: MEDICARE

## 2023-09-05 NOTE — TELEPHONE ENCOUNTER
----- Message from Dre BURNHAM MD sent at 9/1/2023  5:02 PM EDT -----  Yes she can hold Eliquis for 3 days before procedure  Patient cleared for procedure    Dr PAYTON  ----- Message -----  From: Orquidea Hutchins MA  Sent: 9/1/2023   2:45 PM EDT  To: Dre BURNHAM MD    Pt is needing clearance for a foot procedure. Is she ok to proceed, and if so can she hold Eliquis? If so for how long?

## 2023-09-18 ENCOUNTER — TRANSCRIBE ORDERS (OUTPATIENT)
Dept: ADMINISTRATIVE | Facility: HOSPITAL | Age: 78
End: 2023-09-18
Payer: MEDICARE

## 2023-09-18 ENCOUNTER — HOSPITAL ENCOUNTER (OUTPATIENT)
Dept: GENERAL RADIOLOGY | Facility: HOSPITAL | Age: 78
Discharge: HOME | End: 2023-09-18
Payer: MEDICARE

## 2023-09-18 ENCOUNTER — HOSPITAL ENCOUNTER (OUTPATIENT)
Dept: CARDIOLOGY | Facility: HOSPITAL | Age: 78
Discharge: HOME | End: 2023-09-18
Payer: MEDICARE

## 2023-09-18 ENCOUNTER — LAB (OUTPATIENT)
Dept: LAB | Facility: HOSPITAL | Age: 78
End: 2023-09-18
Payer: MEDICARE

## 2023-09-18 DIAGNOSIS — Z01.818 PRE-OP TESTING: ICD-10-CM

## 2023-09-18 DIAGNOSIS — Z01.818 PRE-OP TESTING: Primary | ICD-10-CM

## 2023-09-18 LAB
ALBUMIN SERPL-MCNC: 4.3 G/DL (ref 3.5–5.2)
ALBUMIN/GLOB SERPL: 1.6 G/DL
ALP SERPL-CCNC: 189 U/L (ref 39–117)
ALT SERPL W P-5'-P-CCNC: 17 U/L (ref 1–33)
ANION GAP SERPL CALCULATED.3IONS-SCNC: 13.4 MMOL/L (ref 5–15)
AST SERPL-CCNC: 25 U/L (ref 1–32)
BASOPHILS # BLD AUTO: 0.03 10*3/MM3 (ref 0–0.2)
BASOPHILS NFR BLD AUTO: 0.5 % (ref 0–1.5)
BILIRUB SERPL-MCNC: 0.7 MG/DL (ref 0–1.2)
BUN SERPL-MCNC: 17 MG/DL (ref 8–23)
BUN/CREAT SERPL: 20 (ref 7–25)
CALCIUM SPEC-SCNC: 9.8 MG/DL (ref 8.6–10.5)
CHLORIDE SERPL-SCNC: 102 MMOL/L (ref 98–107)
CO2 SERPL-SCNC: 25.6 MMOL/L (ref 22–29)
CREAT SERPL-MCNC: 0.85 MG/DL (ref 0.57–1)
DEPRECATED RDW RBC AUTO: 40.8 FL (ref 37–54)
EGFRCR SERPLBLD CKD-EPI 2021: 71.1 ML/MIN/1.73
EOSINOPHIL # BLD AUTO: 0.09 10*3/MM3 (ref 0–0.4)
EOSINOPHIL NFR BLD AUTO: 1.5 % (ref 0.3–6.2)
ERYTHROCYTE [DISTWIDTH] IN BLOOD BY AUTOMATED COUNT: 12.4 % (ref 12.3–15.4)
GLOBULIN UR ELPH-MCNC: 2.7 GM/DL
GLUCOSE SERPL-MCNC: 103 MG/DL (ref 65–99)
HCT VFR BLD AUTO: 39.8 % (ref 34–46.6)
HGB BLD-MCNC: 13.1 G/DL (ref 12–15.9)
IMM GRANULOCYTES # BLD AUTO: 0.02 10*3/MM3 (ref 0–0.05)
IMM GRANULOCYTES NFR BLD AUTO: 0.3 % (ref 0–0.5)
LYMPHOCYTES # BLD AUTO: 2.32 10*3/MM3 (ref 0.7–3.1)
LYMPHOCYTES NFR BLD AUTO: 39 % (ref 19.6–45.3)
MCH RBC QN AUTO: 29.4 PG (ref 26.6–33)
MCHC RBC AUTO-ENTMCNC: 32.9 G/DL (ref 31.5–35.7)
MCV RBC AUTO: 89.4 FL (ref 79–97)
MONOCYTES # BLD AUTO: 0.58 10*3/MM3 (ref 0.1–0.9)
MONOCYTES NFR BLD AUTO: 9.7 % (ref 5–12)
NEUTROPHILS NFR BLD AUTO: 2.91 10*3/MM3 (ref 1.7–7)
NEUTROPHILS NFR BLD AUTO: 49 % (ref 42.7–76)
NRBC BLD AUTO-RTO: 0 /100 WBC (ref 0–0.2)
PLATELET # BLD AUTO: 268 10*3/MM3 (ref 140–450)
PMV BLD AUTO: 11 FL (ref 6–12)
POTASSIUM SERPL-SCNC: 4.4 MMOL/L (ref 3.5–5.2)
PROT SERPL-MCNC: 7 G/DL (ref 6–8.5)
RBC # BLD AUTO: 4.45 10*6/MM3 (ref 3.77–5.28)
SODIUM SERPL-SCNC: 141 MMOL/L (ref 136–145)
WBC NRBC COR # BLD: 5.95 10*3/MM3 (ref 3.4–10.8)

## 2023-09-18 PROCEDURE — 93005 ELECTROCARDIOGRAM TRACING: CPT | Performed by: PODIATRIST

## 2023-09-18 PROCEDURE — 71046 X-RAY EXAM CHEST 2 VIEWS: CPT

## 2023-09-18 PROCEDURE — 85025 COMPLETE CBC W/AUTO DIFF WBC: CPT

## 2023-09-18 PROCEDURE — 80053 COMPREHEN METABOLIC PANEL: CPT

## 2023-09-18 PROCEDURE — 36415 COLL VENOUS BLD VENIPUNCTURE: CPT

## 2023-09-19 LAB
QT INTERVAL: 466 MS
QTC INTERVAL: 473 MS

## 2023-09-21 RX ORDER — METOPROLOL SUCCINATE 50 MG/1
TABLET, EXTENDED RELEASE ORAL
Qty: 90 TABLET | Refills: 1 | Status: SHIPPED | OUTPATIENT
Start: 2023-09-21

## 2023-09-21 RX ORDER — APIXABAN 5 MG/1
TABLET, FILM COATED ORAL
Qty: 180 TABLET | Refills: 3 | Status: SHIPPED | OUTPATIENT
Start: 2023-09-21

## 2023-10-31 ENCOUNTER — TELEPHONE (OUTPATIENT)
Dept: CARDIOLOGY | Facility: CLINIC | Age: 78
End: 2023-10-31

## 2023-10-31 NOTE — TELEPHONE ENCOUNTER
"Caller: Sheree Vance \"Sheree Vance\"    Relationship to patient: Self    Best call back number: [phone]    Type of visit: 6 MONTH FOLLOW UP    Requested date: AFTER JANUARY 1ST 2024    If rescheduling, when is the original appointment: 12/7/23    Additional notes:PATIENT WANTING TO POSTPONE APPT BECAUSE OF [employer] INSURANCE. NO APPTS IN APPT DESK FOR JANUARY. PLEASE LET THE PATIENT KNOW WHEN WE COULD RESCHEDULE HER.        "

## 2024-01-17 ENCOUNTER — OFFICE VISIT (OUTPATIENT)
Dept: FAMILY MEDICINE CLINIC | Facility: CLINIC | Age: 79
End: 2024-01-17
Payer: MEDICARE

## 2024-01-17 VITALS
OXYGEN SATURATION: 96 % | TEMPERATURE: 97.5 F | SYSTOLIC BLOOD PRESSURE: 107 MMHG | BODY MASS INDEX: 36.99 KG/M2 | DIASTOLIC BLOOD PRESSURE: 70 MMHG | HEIGHT: 65 IN | WEIGHT: 222 LBS | RESPIRATION RATE: 16 BRPM | HEART RATE: 53 BPM

## 2024-01-17 DIAGNOSIS — N95.2 ATROPHIC VAGINITIS: ICD-10-CM

## 2024-01-17 DIAGNOSIS — E03.9 ACQUIRED HYPOTHYROIDISM: ICD-10-CM

## 2024-01-17 DIAGNOSIS — Z23 NEED FOR VACCINATION: ICD-10-CM

## 2024-01-17 DIAGNOSIS — Z00.00 MEDICARE ANNUAL WELLNESS VISIT, SUBSEQUENT: Primary | ICD-10-CM

## 2024-01-17 DIAGNOSIS — I10 ESSENTIAL HYPERTENSION: ICD-10-CM

## 2024-01-17 DIAGNOSIS — Z12.31 VISIT FOR SCREENING MAMMOGRAM: ICD-10-CM

## 2024-01-17 RX ORDER — LATANOPROST 50 UG/ML
1 SOLUTION/ DROPS OPHTHALMIC DAILY
COMMUNITY

## 2024-01-17 RX ORDER — LEVOTHYROXINE SODIUM 0.15 MG/1
150 TABLET ORAL DAILY
Qty: 90 TABLET | Refills: 1 | Status: SHIPPED | OUTPATIENT
Start: 2024-01-17

## 2024-01-17 RX ORDER — CONJUGATED ESTROGENS 0.62 MG/G
CREAM VAGINAL
Qty: 30 G | Refills: 1 | Status: SHIPPED | OUTPATIENT
Start: 2024-01-17

## 2024-01-17 RX ORDER — TELMISARTAN 80 MG/1
80 TABLET ORAL DAILY
Qty: 90 TABLET | Refills: 1 | Status: SHIPPED | OUTPATIENT
Start: 2024-01-17

## 2024-01-17 NOTE — PROGRESS NOTES
Subsequent Medicare Wellness Visit   The ABC's of the Annual Wellness Visit    Chief Complaint   Patient presents with    Medicare Wellness-subsequent       HPI:  Sheree Vance, -1945, is a 78 y.o. female who presents for a Subsequent Medicare Wellness Visit.  She lives with her , she is fully independent with her activities of daily living.  She reports that in 2023 she somehow twisted her hip and has been dealing with some pain since then, she is already under the care of orthopedist and was evaluated for it and she is currently doing physical therapy which is helping.  She is followed by cardiologist and eye doctor on regular basis.  She would like to get updated flu shot today.  No issues with depression or memory problems are being reported. Patient does not report any chest pain, shortness of breath, dizziness, nausea, vomiting, or diarrhea, visual issues, headaches, numbness or tingling. No urinary issues reported like urgency, frequency, or discomfort upon urination.  No significant weight changes reported.  No swelling reported.  No rashes or any other skin issues reported. No emotional issues or insomnia.      Recent Hospitalizations:  No hospitalization(s) within the last year..    Current Medical Providers:  Patient Care Team:  Latia Guerrier MD as PCP - General  Latia Guerrier MD as PCP - Family Medicine  Dre Case MD (Cardiology)  Jet Godwin MD as Consulting Physician (Ophthalmology)  Antonio Fine MD as Consulting Physician (Allergy)  Sandor Astorga MD as Consulting Physician (Orthopedic Surgery)    Health Habits and Functional and Cognitive Screening and Depression Screenin/17/2024     2:00 PM   Functional & Cognitive Status   Do you have difficulty preparing food and eating? No   Do you have difficulty bathing yourself, getting dressed or grooming yourself? No   Do you have difficulty using the toilet? No   Do you have difficulty moving  around from place to place? No   Do you have trouble with steps or getting out of a bed or a chair? No   Current Diet Well Balanced Diet   Dental Exam Up to date   Eye Exam Up to date   Exercise (times per week) 0 times per week   Current Exercises Include No Regular Exercise   Do you need help using the phone?  No   Are you deaf or do you have serious difficulty hearing?  No   Do you need help to go to places out of walking distance? No   Do you need help shopping? No   Do you need help preparing meals?  No   Do you need help with housework?  No   Do you need help with laundry? No   Do you need help taking your medications? No   Do you need help managing money? No   Do you ever drive or ride in a car without wearing a seat belt? No   Have you felt unusual stress, anger or loneliness in the last month? No   Who do you live with? Spouse   If you need help, do you have trouble finding someone available to you? No   Do you have difficulty concentrating, remembering or making decisions? No       Compared to one year ago, the patient feels her physical health is the same and her mental health is the same.    Depression Screen:      1/17/2024     2:35 PM   PHQ-2/PHQ-9 Depression Screening   Little Interest or Pleasure in Doing Things 0-->not at all   Feeling Down, Depressed or Hopeless 0-->not at all   PHQ-9: Brief Depression Severity Measure Score 0         Past Medical/Family/Social History:  The following portions of the patient's history were reviewed and updated as appropriate: allergies, current medications, past family history, past medical history, past social history, past surgical history, and problem list.    Allergies   Allergen Reactions    Amlodipine Swelling         Current Outpatient Medications:     albuterol sulfate  (90 Base) MCG/ACT inhaler, , Disp: , Rfl:     amoxicillin (AMOXIL) 500 MG tablet, Take 1 tablet by mouth. Before dental procedure, Disp: , Rfl:     atorvastatin (LIPITOR) 10 MG  tablet, TAKE 1 TABLET AT BEDTIME, Disp: 90 tablet, Rfl: 0    azelastine (ASTELIN) 0.1 % nasal spray, , Disp: , Rfl:     Cholecalciferol 2000 units capsule, Take  by mouth., Disp: , Rfl:     Chromium 200 MCG capsule, Take 100,000 mcg by mouth Daily., Disp: , Rfl:     coenzyme Q10 100 MG capsule, Take 1 capsule by mouth Daily., Disp: , Rfl:     dofetilide (TIKOSYN) 500 MCG capsule, TAKE 1 CAPSULE TWICE DAILY, Disp: 180 capsule, Rfl: 2    Eliquis 5 MG tablet tablet, TAKE 1 TABLET EVERY 12 HOURS, Disp: 180 tablet, Rfl: 3    EPINEPHrine (EPIPEN) 0.3 MG/0.3ML solution auto-injector injection, USE AS DIRECTED FOR ACUTE ALLERGIC REACTION, Disp: , Rfl: 3    Estrogens Conjugated (Premarin) 0.625 MG/GM vaginal cream, USE 1/2 GRAM PER VAGINA 2 TO 3 TIMES A WEEK, Disp: 30 g, Rfl: 1    levothyroxine (SYNTHROID, LEVOTHROID) 150 MCG tablet, Take 1 tablet by mouth Daily., Disp: 90 tablet, Rfl: 1    Magnesium 200 MG tablet, Take  by mouth Daily., Disp: , Rfl:     metoprolol succinate XL (TOPROL-XL) 50 MG 24 hr tablet, TAKE 1 TABLET EVERY DAY, Disp: 90 tablet, Rfl: 1    Multiple Vitamins-Minerals (MULTIVITAMIN ADULT) tablet, MULTIVITAMIN ADULT TABS, Disp: , Rfl:     NIACIN CR PO, Take 100 mg by mouth., Disp: , Rfl:     Omega-3 1000 MG capsule, Take  by mouth Daily., Disp: , Rfl:     spironolactone (ALDACTONE) 50 MG tablet, TAKE 1 TABLET EVERY DAY, Disp: 90 tablet, Rfl: 3    telmisartan (MICARDIS) 80 MG tablet, Take 1 tablet by mouth Daily., Disp: 90 tablet, Rfl: 1    vitamin B-12 (CYANOCOBALAMIN) 1000 MCG tablet, Take 1 tablet by mouth Daily., Disp: , Rfl:     latanoprost (XALATAN) 0.005 % ophthalmic solution, Inject 1 drop into the eye Daily., Disp: , Rfl:     Aspirin use counseling: Does not need ASA (and currently is not on it)    Current medication list contains no high risk medications.  No harmful drug interactions have been identified.     Family History   Problem Relation Age of Onset    Heart failure Mother     Alcohol abuse  Mother     Early death Mother     Miscarriages / Stillbirths Mother     No Known Problems Father        Social History     Tobacco Use    Smoking status: Never    Smokeless tobacco: Never   Substance Use Topics    Alcohol use: Yes     Alcohol/week: 4.0 standard drinks of alcohol     Types: 2 Glasses of wine, 2 Drinks containing 0.5 oz of alcohol per week     Comment: Infrequently       Past Surgical History:   Procedure Laterality Date    ABLATION OF DYSRHYTHMIC FOCUS  2018    APPENDECTOMY      BACK SURGERY  2016    Spinal Fusion    BLADDER REPAIR  2001 in [place]    CARDIAC CATHETERIZATION  2015    CARDIOVERSION  08/7/18 & 4/17/18-[facility]    Dr. Villegas--For Chronic A-Fib    [facility]ERSION  12/4/28[facility]    Dr. Case--See Report    CATARACT EXTRACTION  2007    COLONOSCOPY  04/13/2017    HYSTERECTOMY      Partial    KNEE SURGERY Right 2009    OTHER SURGICAL HISTORY  12/4/18-[facility]    Fluoroscopy/Trans-Septal Access to L Atrium/Selective Venography of L Superior Pulm Vein/Add Lienier Radiofrequency Ablation Along the Coronary Sinus/Synchronized Cardioversion--Dr. Case    REPLACEMENT TOTAL KNEE Right 08/30/2021    Dr. Astorga    SPINE SURGERY      SARA  12/4/18-[facility]    Mild Concentric L Ventricular Hypertrophy Noted; EF 55-60%; L Atrial Moderate-Severely Dilated, Moderat Mitral Annular Calcification with Moderate MVR Noted; Mild TVR; Normal LV Function    TONSILLECTOMY      TOTAL HIP ARTHROPLASTY Right 07/06/2020    Dr. Astorga       Patient Active Problem List   Diagnosis    Paroxysmal atrial fibrillation    Essential hypertension    Hyperlipidemia    Left ventricular diastolic dysfunction    Degenerative joint disease of right knee    Gastroesophageal reflux disease    Hyperglycemia    Hypothyroidism    Knee pain, right    Lumbosacral spondylosis without myelopathy    Osteopenia    Lumbar radiculopathy    Spinal stenosis of lumbar region    Primary osteoarthritis of right hip    Arthritis of right sacroiliac joint    Allergic  "rhinitis    Visit for screening mammogram    Postmenopausal    Vitamin D deficiency    Atrophic vaginitis    Dysuria    Medicare annual wellness visit, subsequent    SHANAE (obstructive sleep apnea)    Osteoarthritis, generalized    Pre-op exam    Needs flu shot    Class 2 obesity    Rash    Tick bite of left lower leg    Visit for monitoring Tikosyn therapy    Palpitations    Callus of toe    Need for vaccination       Review of Systems   Constitutional:  Negative for activity change, fatigue and fever.   Respiratory:  Negative for shortness of breath and wheezing.    Cardiovascular:  Negative for chest pain, palpitations and leg swelling.   Musculoskeletal:  Negative for arthralgias and back pain.   Skin:  Negative for rash.   Neurological:  Negative for tremors and headache.       Objective     Vitals:    01/17/24 1431   BP: 107/70   BP Location: Left arm   Patient Position: Sitting   Cuff Size: Adult   Pulse: 53   Resp: 16   Temp: 97.5 °F (36.4 °C)   TempSrc: Infrared   SpO2: 96%   Weight: 101 kg (222 lb)   Height: 165.1 cm (65\")   PainSc: 0-No pain              No results found.    The patient has no evidence of cognitve impairment.     Physical Exam  Vitals and nursing note reviewed.   Constitutional:       General: She is not in acute distress.     Appearance: Normal appearance. She is well-developed. She is not ill-appearing.   HENT:      Head: Normocephalic and atraumatic.   Cardiovascular:      Rate and Rhythm: Normal rate and regular rhythm.      Heart sounds: Normal heart sounds. No murmur heard.     No gallop.   Pulmonary:      Effort: Pulmonary effort is normal. No respiratory distress.      Breath sounds: Normal breath sounds. No wheezing, rhonchi or rales.   Chest:      Chest wall: No tenderness.   Musculoskeletal:      Cervical back: Normal range of motion and neck supple.   Neurological:      General: No focal deficit present.      Mental Status: She is alert and oriented to person, place, and time. " Mental status is at baseline.   Psychiatric:         Mood and Affect: Mood normal.         Recent Lab Results:  Lab Results   Component Value Date     (H) 07/13/2021     Lab Results   Component Value Date    CHOL 178 07/14/2023    TRIG 91 07/14/2023    HDL 61 (H) 07/14/2023    VLDL 17 07/14/2023    LDLHDL 1.62 07/14/2023       Assessment & Plan   Age-appropriate Screening Schedule:  Refer to the list below for future screening recommendations based on patient's age, sex and/or medical conditions.      Health Maintenance   Topic Date Due    DXA SCAN  11/12/2023    LIPID PANEL  07/14/2024    BMI FOLLOWUP  07/14/2024    ANNUAL WELLNESS VISIT  01/17/2025    TDAP/TD VACCINES (2 - Td or Tdap) 04/14/2025    HEPATITIS C SCREENING  Completed    COVID-19 Vaccine  Completed    INFLUENZA VACCINE  Completed    Pneumococcal Vaccine 65+  Completed    ZOSTER VACCINE  Completed    COLORECTAL CANCER SCREENING  Discontinued       Medicare Risks and Personalized Health Plan:  Advance Directive Discussion  Breast Cancer/Mammogram Screening  Cardiovascular risk  Colon Cancer Screening  Dementia/Memory   Depression/Dysphoria  Diabetic Lab Screening   Fall Risk  Immunizations Discussed/Encouraged (specific immunizations; COVID19 and RSV (Respiratory Syncytial Virus) )  Obesity/Overweight   Osteoporosis Risk      CMS-Preventive Services Quick Reference  Medicare Preventive Services Addressed:  Annual Wellness Visit (AWV)  Bone Density Measurements  Cardiovascular Disease Screening Tests (may do this order every 5 years in beneficiaries without signs or symptoms of cardiovascular disease)  Colorectal Cancer Screening, Colonoscopy  Screening Mammography     Advance Care Planning:  ACP discussion was held with the patient during this visit. Patient has an advance directive in EMR which is still valid.     Diagnoses and all orders for this visit:    1. Medicare annual wellness visit, subsequent (Primary)    2. Visit for screening  mammogram  -     Mammo Screening Digital Tomosynthesis Bilateral With CAD; Future    3. Acquired hypothyroidism  -     levothyroxine (SYNTHROID, LEVOTHROID) 150 MCG tablet; Take 1 tablet by mouth Daily.  Dispense: 90 tablet; Refill: 1    4. Essential hypertension  -     telmisartan (MICARDIS) 80 MG tablet; Take 1 tablet by mouth Daily.  Dispense: 90 tablet; Refill: 1    5. Atrophic vaginitis  -     Estrogens Conjugated (Premarin) 0.625 MG/GM vaginal cream; USE 1/2 GRAM PER VAGINA 2 TO 3 TIMES A WEEK  Dispense: 30 g; Refill: 1    6. Need for vaccination  -     Fluzone High-Dose 65+yrs ([id number])      Medicare wellness exam was done today.  I reviewed her previous labs.  I also reviewed her health maintenance.  Her last mammogram was in 12/2022 and the new order was given and she will be scheduling the test.  Her last DEXA scan was in 11/2021 and showed mild bone loss in osteopenia range.  Patient is to continue calcium and vitamin D supplementation.  Her last colonoscopy was in 4/2017 and no recall colonoscopy was recommended.  Immunization was also reviewed and updated and she was given a flu shot today and she was advised to schedule RSV shot through the pharmacy.  Healthy lifestyle was reinforced.      An After Visit Summary and PPPS with all of these plans were given to the patient.      Follow Up:  Return in about 6 months (around 7/17/2024) for Next scheduled follow up.        Requested Prescriptions     Signed Prescriptions Disp Refills    levothyroxine (SYNTHROID, LEVOTHROID) 150 MCG tablet 90 tablet 1     Sig: Take 1 tablet by mouth Daily.    telmisartan (MICARDIS) 80 MG tablet 90 tablet 1     Sig: Take 1 tablet by mouth Daily.    Estrogens Conjugated (Premarin) 0.625 MG/GM vaginal cream 30 g 1     Sig: USE 1/2 GRAM PER VAGINA 2 TO 3 TIMES A WEEK

## 2024-02-22 ENCOUNTER — OFFICE VISIT (OUTPATIENT)
Dept: CARDIOLOGY | Facility: CLINIC | Age: 79
End: 2024-02-22
Payer: MEDICARE

## 2024-02-22 VITALS
WEIGHT: 225 LBS | DIASTOLIC BLOOD PRESSURE: 72 MMHG | HEART RATE: 59 BPM | BODY MASS INDEX: 37.49 KG/M2 | OXYGEN SATURATION: 96 % | SYSTOLIC BLOOD PRESSURE: 124 MMHG | HEIGHT: 65 IN

## 2024-02-22 DIAGNOSIS — G47.33 OBSTRUCTIVE SLEEP APNEA SYNDROME: ICD-10-CM

## 2024-02-22 DIAGNOSIS — I10 ESSENTIAL HYPERTENSION: ICD-10-CM

## 2024-02-22 DIAGNOSIS — I48.0 PAROXYSMAL ATRIAL FIBRILLATION: Primary | ICD-10-CM

## 2024-02-22 DIAGNOSIS — E78.2 MIXED HYPERLIPIDEMIA: ICD-10-CM

## 2024-02-22 PROCEDURE — 1159F MED LIST DOCD IN RCRD: CPT | Performed by: INTERNAL MEDICINE

## 2024-02-22 PROCEDURE — 93000 ELECTROCARDIOGRAM COMPLETE: CPT | Performed by: INTERNAL MEDICINE

## 2024-02-22 PROCEDURE — 99214 OFFICE O/P EST MOD 30 MIN: CPT | Performed by: INTERNAL MEDICINE

## 2024-02-22 PROCEDURE — 3078F DIAST BP <80 MM HG: CPT | Performed by: INTERNAL MEDICINE

## 2024-02-22 PROCEDURE — 3074F SYST BP LT 130 MM HG: CPT | Performed by: INTERNAL MEDICINE

## 2024-02-22 PROCEDURE — 1160F RVW MEDS BY RX/DR IN RCRD: CPT | Performed by: INTERNAL MEDICINE

## 2024-02-22 RX ORDER — DOFETILIDE 0.5 MG/1
500 CAPSULE ORAL 2 TIMES DAILY
Qty: 180 CAPSULE | Refills: 1 | Status: SHIPPED | OUTPATIENT
Start: 2024-02-22

## 2024-02-22 RX ORDER — SPIRONOLACTONE 50 MG/1
50 TABLET, FILM COATED ORAL DAILY
Qty: 90 TABLET | Refills: 1 | Status: SHIPPED | OUTPATIENT
Start: 2024-02-22

## 2024-02-22 RX ORDER — METOPROLOL SUCCINATE 50 MG/1
50 TABLET, EXTENDED RELEASE ORAL DAILY
Qty: 90 TABLET | Refills: 1 | Status: SHIPPED | OUTPATIENT
Start: 2024-02-22

## 2024-02-22 RX ORDER — ATORVASTATIN CALCIUM 10 MG/1
10 TABLET, FILM COATED ORAL
Qty: 90 TABLET | Refills: 1 | Status: SHIPPED | OUTPATIENT
Start: 2024-02-22

## 2024-02-22 NOTE — PROGRESS NOTES
CC--Atrial fibrillation, hypertension      SUB--78 old pleasant patient well-known to me comes in for follow-up.  Patient had bilateral leg edema and amlodipine has been stopped at last visit and started on beta-blockers and came for evaluation.  Patient had AF needing ablation 2018 with recurrence currently on Tikosyn.  Prior cardiac catheterization without significant stenosis with normal EF.          My previous history is attached below for reference only which has been reviewed       patient is 77 years old white female whose past medical history significant for hypertension, Persistent atrial fibrillation,  obesity  underwent AF ablation in 2018 with early recurrence with initiation of Tikosyn to sinus rhythm with resolution of symptoms and Tikosyn was stopped with the last office visit and started noticing recurrent atrial fibrillation in last 2 to 3 weeks with symptoms of palpitations and mild fatigue --patient has severe left atrial enlargement needing antiarrhythmic treatment to optimize into sinus rhythm-- during past hospitalization CT surgery consult was also requested for possible future convergence procedure because of severe left atrial enlargement.In 2009, patient was diagnosed with paroxysmal atrial fibrillation.  Patient was treated with beta-blockers initially.  Patient was started on flecainide later on.  In  2017, patient underwent cardiac catheterization at [facility] and coronary arteries were normal.  LV function was preserved.  Patient was started on Eliquis because of recurrence of atrial fibrillation.   chads vasc  score---3   patient was hypertensive and is on multiple medication  Post re initiation of tikosyn to sinus and feels well  No new sx  Patient remains in functional class I with no new symptoms and came for follow-up        Past Medical History:     Reviewed history from 01/08/2019 and no changes required:        HTN        Paroxysmal  A-fib - Dr. Lugo         Seasonal allergies - on allergy shots per ENT        Hyperlipidemia        Hypothyroidism        Chronic low back pain        Colonoscopy in 4/13/2017        Mammogram in 7/31/2018 - negative        Pneumonia shot in the past - both Pneumovax and Prevnar        Shingle shot in the past        Osteopenia - DEXA on 6/23/2017 - L/S: +0.5 and Hip: -0.8        GYN HM per GYN - Dr. Hopkins        Negative Hepatitis C screening in 10/2017                 Physical Exam    General:      well developed, well nourished, in no acute distress.    Head:      normocephalic and atraumatic.    Eyes:      PERRL/EOM intact, conjunctivae and sclerae clear without nystagmus.    Neck:      no  thyromegaly, trachea central with normal respiratory effort  Lungs:      clear bilaterally to auscultation.    Heart:       regular rate and rhythm, S1, S2 without murmurs, rubs, or gallops  Skin:      intact without lesions or rashes.    Psych:      alert and cooperative; normal mood and affect; normal attention span and concentration.               assessment plan    Persistent AF with prior ablation with recurrence on Tikosyn in sinus rhythm  Hypothyroidism on levothyroxine  Hypertension on spironolactone and metoprolol and  blood pressure well-controlled   Patient is also on Micardis  Hyperlipidemia on atorvastatin  Medications reviewed and follow-up appointments made  Leg edema resolved after stopping Norvasc  Medications refilled  Follow-up in 6 months      ECG 12 Lead    Date/Time: 2/22/2024 3:26 PM  Performed by: Dre Case MD    Authorized by: Dre Case MD  Comparison: compared with previous ECG   Similar to previous ECG  Rhythm: sinus rhythm  Rate: normal  Conduction: incomplete right bundle branch block  Other findings: non-specific ST-T wave changes and low voltage        Electronically signed by Dre Case MD, 02/22/24, 3:26 PM EST.

## 2024-02-22 NOTE — LETTER
February 22, 2024       No Recipients    Patient: Sheree Vance   YOB: 1945   Date of Visit: 2/22/2024     Dear Latia Guerrier MD:       Thank you for referring Sheree Vance to me for evaluation. Below are the relevant portions of my assessment and plan of care.    If you have questions, please do not hesitate to call me. I look forward to following Sheree along with you.         Sincerely,        Dre Case MD        CC:   No Recipients    Dre Case MD  02/22/24 1526  Sign when Signing Visit  CC--Atrial fibrillation, hypertension      SUB--78 old pleasant patient well-known to me comes in for follow-up.  Patient had bilateral leg edema and amlodipine has been stopped at last visit and started on beta-blockers and came for evaluation.  Patient had AF needing ablation 2018 with recurrence currently on Tikosyn.  Prior cardiac catheterization without significant stenosis with normal EF.          My previous history is attached below for reference only which has been reviewed       patient is 77 years old white female whose past medical history significant for hypertension, Persistent atrial fibrillation,  obesity  underwent AF ablation in 2018 with early recurrence with initiation of Tikosyn to sinus rhythm with resolution of symptoms and Tikosyn was stopped with the last office visit and started noticing recurrent atrial fibrillation in last 2 to 3 weeks with symptoms of palpitations and mild fatigue --patient has severe left atrial enlargement needing antiarrhythmic treatment to optimize into sinus rhythm-- during past hospitalization CT surgery consult was also requested for possible future convergence procedure because of severe left atrial enlargement.In 2009, patient was diagnosed with paroxysmal atrial fibrillation.  Patient was treated with beta-blockers initially.  Patient was started on flecainide later on.  In  2017, patient underwent cardiac catheterization at  [facility] and coronary arteries were normal.  LV function was preserved.  Patient was started on Eliquis because of recurrence of atrial fibrillation.   chads vasc  score---3   patient was hypertensive and is on multiple medication  Post re initiation of tikosyn to sinus and feels well  No new sx  Patient remains in functional class I with no new symptoms and came for follow-up        Past Medical History:     Reviewed history from 01/08/2019 and no changes required:        HTN        Paroxysmal  A-fib - Dr. Lugo        Seasonal allergies - on allergy shots per ENT        Hyperlipidemia        Hypothyroidism        Chronic low back pain        Colonoscopy in 4/13/2017        Mammogram in 7/31/2018 - negative        Pneumonia shot in the past - both Pneumovax and Prevnar        Shingle shot in the past        Osteopenia - DEXA on 6/23/2017 - L/S: +0.5 and Hip: -0.8        GYN HM per GYN - Dr. Hopkins        Negative Hepatitis C screening in 10/2017                 Physical Exam    General:      well developed, well nourished, in no acute distress.    Head:      normocephalic and atraumatic.    Eyes:      PERRL/EOM intact, conjunctivae and sclerae clear without nystagmus.    Neck:      no  thyromegaly, trachea central with normal respiratory effort  Lungs:      clear bilaterally to auscultation.    Heart:       regular rate and rhythm, S1, S2 without murmurs, rubs, or gallops  Skin:      intact without lesions or rashes.    Psych:      alert and cooperative; normal mood and affect; normal attention span and concentration.               assessment plan    Persistent AF with prior ablation with recurrence on Tikosyn in sinus rhythm  Hypothyroidism on levothyroxine  Hypertension on spironolactone and metoprolol and  blood pressure well-controlled   Patient is also on Micardis  Hyperlipidemia on atorvastatin  Medications reviewed and follow-up appointments made  Leg edema resolved after stopping  Norvasc  Medications refilled  Follow-up in 6 months      ECG 12 Lead    Date/Time: 2/22/2024 3:26 PM  Performed by: Dre Case MD    Authorized by: Dre Case MD  Comparison: compared with previous ECG   Similar to previous ECG  Rhythm: sinus rhythm  Rate: normal  Conduction: incomplete right bundle branch block  Other findings: non-specific ST-T wave changes and low voltage        Electronically signed by Dre Case MD, 02/22/24, 3:26 PM EST.

## 2024-05-22 RX ORDER — METOPROLOL SUCCINATE 50 MG/1
50 TABLET, EXTENDED RELEASE ORAL DAILY
Qty: 90 TABLET | Refills: 1 | Status: SHIPPED | OUTPATIENT
Start: 2024-05-22

## 2024-05-22 NOTE — TELEPHONE ENCOUNTER
Caller: CATERINA    Relationship: SELF    Best call back number: [phone]    Requested Prescriptions:   Requested Prescriptions     Pending Prescriptions Disp Refills    metoprolol succinate XL (TOPROL-XL) 50 MG 24 hr tablet 90 tablet 1     Sig: Take 1 tablet by mouth Daily.        Pharmacy where request should be sent: [facility] - [place], [place] - [address]  [phone]  - [phone] FX     Last office visit with prescribing clinician: 2/22/2024   Last telemedicine visit with prescribing clinician: Visit date not found   Next office visit with prescribing clinician: 8/22/2024     Additional details provided by patient: 1 WEEK LEFT    Does the patient have less than a 3 day supply:  [] Yes  [x] No    Would you like a call back once the refill request has been completed: [] Yes [] No    If the office needs to give you a call back, can they leave a voicemail: [] Yes [] No    Juli Toro Rep   05/22/24 10:41 EDT

## 2024-05-30 ENCOUNTER — TELEPHONE (OUTPATIENT)
Dept: FAMILY MEDICINE CLINIC | Facility: CLINIC | Age: 79
End: 2024-05-30

## 2024-05-30 NOTE — TELEPHONE ENCOUNTER
I called the patient and she denies any of these symptoms. She will monitor and call us back for an appointment if any of these symptoms appear

## 2024-05-30 NOTE — TELEPHONE ENCOUNTER
Not every take carries a disease, but certainly the situations happen.  It usually takes for a tick to be attached for 24 to 48 hours to be able to even spread the disease.  Does she have any fever, chills, headaches, or any GI issues?  She needs to monitor for rashes.  If any of the symptoms are to develop patient needs to be checked.  Please let me know if she has any other questions.  Thank you.

## 2024-05-30 NOTE — TELEPHONE ENCOUNTER
"  Caller: Sheree Vance \"Sheree Vance\"    Relationship: Self    Best call back number:     Sheree Vance \"Sheree Vance\" (Self) [phone] (Mobile)       What was the call regarding:  PATIENT HAD A TICK BITE- PULLED OFF YESTERDAY, AND PUT NEOSPORIN ON IT     IT DID HAVE A RED [employer] [place] AROUND IT, BUT GONE TODAY - NO FEVER     PLEASE CALL AND LET HER KNOW WHAT SHE NEEDS TO DO     Is it okay if the provider responds through MyChart: CALL PLEASE          [facility] - [place] - [address] - [phone]  - [phone]  [phone]   "

## 2024-05-31 ENCOUNTER — OFFICE VISIT (OUTPATIENT)
Dept: FAMILY MEDICINE CLINIC | Facility: CLINIC | Age: 79
End: 2024-05-31
Payer: MEDICARE

## 2024-05-31 VITALS
SYSTOLIC BLOOD PRESSURE: 130 MMHG | BODY MASS INDEX: 37.32 KG/M2 | DIASTOLIC BLOOD PRESSURE: 86 MMHG | WEIGHT: 224 LBS | TEMPERATURE: 97.7 F | HEIGHT: 65 IN | HEART RATE: 102 BPM | OXYGEN SATURATION: 96 %

## 2024-05-31 DIAGNOSIS — S40.262A TICK BITE OF LEFT SHOULDER, INITIAL ENCOUNTER: Primary | ICD-10-CM
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
## 2024-05-31 PROCEDURE — 3075F SYST BP GE 130 - 139MM HG: CPT | Performed by: FAMILY MEDICINE

## 2024-05-31 PROCEDURE — 99213 OFFICE O/P EST LOW 20 MIN: CPT | Performed by: FAMILY MEDICINE

## 2024-05-31 PROCEDURE — 3079F DIAST BP 80-89 MM HG: CPT | Performed by: FAMILY MEDICINE

## 2024-05-31 PROCEDURE — 1126F AMNT PAIN NOTED NONE PRSNT: CPT | Performed by: FAMILY MEDICINE

## 2024-05-31 RX ORDER — DOXYCYCLINE HYCLATE 100 MG/1
100 CAPSULE ORAL 2 TIMES DAILY
Qty: 28 CAPSULE | Refills: 0 | Status: SHIPPED | OUTPATIENT
Start: 2024-05-31 | End: 2024-06-14

## 2024-05-31 NOTE — PROGRESS NOTES
Subjective   Sheree Vance is a 79 y.o. female.     History of Present Illness  79-year-old female patient present with complaint of tick bite of left shoulder. Tick was pulled off by her . Symptoms noted 2 days ago.  She is complaining of rash but denies fever, headache, neck pain, nausea vomiting or joint pain.         The following portions of the patient's history were reviewed and updated as appropriate: past medical history, past social history, past surgical history and problem list.    Review of Systems   Constitutional:  Negative for fever.   Musculoskeletal:  Negative for neck pain and neck stiffness.   Skin:  Positive for rash.        Tick bite Left shoulder   Neurological:  Negative for headache.       Objective   Physical Exam  Vitals reviewed.   Musculoskeletal:      Cervical back: Normal range of motion and neck supple.   Skin:     Findings: Erythema and rash present.      Comments: rash noted at left shoulder   Neurological:      Mental Status: She is alert and oriented to person, place, and time.         Vitals:    05/31/24 1343   BP: 130/86   Pulse: 102   Temp: 97.7 °F (36.5 °C)   SpO2: 96%     Current Outpatient Medications on File Prior to Visit   Medication Sig Dispense Refill    albuterol sulfate  (90 Base) MCG/ACT inhaler       apixaban (Eliquis) 5 MG tablet tablet Take 1 tablet by mouth Every 12 (Twelve) Hours. 180 tablet 1    atorvastatin (LIPITOR) 10 MG tablet Take 1 tablet by mouth every night at bedtime. 90 tablet 1    azelastine (ASTELIN) 0.1 % nasal spray       Cholecalciferol 2000 units capsule Take  by mouth.      Chromium 200 MCG capsule Take 100,000 mcg by mouth Daily.      coenzyme Q10 100 MG capsule Take 1 capsule by mouth Daily.      dofetilide (TIKOSYN) 500 MCG capsule Take 1 capsule by mouth 2 (Two) Times a Day. 180 capsule 1    EPINEPHrine (EPIPEN) 0.3 MG/0.3ML solution auto-injector injection USE AS DIRECTED FOR ACUTE ALLERGIC REACTION  3    Estrogens  Conjugated (Premarin) 0.625 MG/GM vaginal cream USE 1/2 GRAM PER VAGINA 2 TO 3 TIMES A WEEK 30 g 1    latanoprost (XALATAN) 0.005 % ophthalmic solution Inject 1 drop into the eye Daily.      levothyroxine (SYNTHROID, LEVOTHROID) 150 MCG tablet Take 1 tablet by mouth Daily. 90 tablet 1    Magnesium 200 MG tablet Take  by mouth Daily.      metoprolol succinate XL (TOPROL-XL) 50 MG 24 hr tablet Take 1 tablet by mouth Daily. 90 tablet 1    Multiple Vitamins-Minerals (MULTIVITAMIN ADULT) tablet MULTIVITAMIN ADULT TABS      NIACIN CR PO Take 100 mg by mouth.      Omega-3 1000 MG capsule Take  by mouth Daily.      spironolactone (ALDACTONE) 50 MG tablet Take 1 tablet by mouth Daily. 90 tablet 1    telmisartan (MICARDIS) 80 MG tablet Take 1 tablet by mouth Daily. 90 tablet 1    vitamin B-12 (CYANOCOBALAMIN) 1000 MCG tablet Take 1 tablet by mouth Daily.       No current facility-administered medications on file prior to visit.         Assessment & Plan   Problems Addressed this Visit       Tick bite of left shoulder - Primary    Relevant Medications    doxycycline (VIBRAMYCIN) 100 MG capsule     Diagnoses         Codes Comments    Tick bite of left shoulder, initial encounter    -  Primary ICD-10-CM: S40.262A, W57.XXXA  ICD-9-CM: 912.4, E906.4

## 2024-07-24 ENCOUNTER — LAB (OUTPATIENT)
Dept: FAMILY MEDICINE CLINIC | Facility: CLINIC | Age: 79
End: 2024-07-24
Payer: MEDICARE

## 2024-07-24 ENCOUNTER — OFFICE VISIT (OUTPATIENT)
Dept: FAMILY MEDICINE CLINIC | Facility: CLINIC | Age: 79
End: 2024-07-24
Payer: MEDICARE

## 2024-07-24 VITALS
WEIGHT: 221 LBS | TEMPERATURE: 97.5 F | SYSTOLIC BLOOD PRESSURE: 119 MMHG | BODY MASS INDEX: 36.82 KG/M2 | DIASTOLIC BLOOD PRESSURE: 76 MMHG | HEIGHT: 65 IN | OXYGEN SATURATION: 95 % | RESPIRATION RATE: 16 BRPM | HEART RATE: 48 BPM

## 2024-07-24 DIAGNOSIS — E78.2 MIXED HYPERLIPIDEMIA: ICD-10-CM

## 2024-07-24 DIAGNOSIS — I10 ESSENTIAL HYPERTENSION: Primary | ICD-10-CM

## 2024-07-24 DIAGNOSIS — E03.9 ACQUIRED HYPOTHYROIDISM: ICD-10-CM

## 2024-07-24 DIAGNOSIS — R73.9 HYPERGLYCEMIA: ICD-10-CM

## 2024-07-24 LAB
ALBUMIN SERPL-MCNC: 4.3 G/DL (ref 3.5–5.2)
ALBUMIN/GLOB SERPL: 1.9 G/DL
ALP SERPL-CCNC: 214 U/L (ref 39–117)
ALT SERPL W P-5'-P-CCNC: 14 U/L (ref 1–33)
ANION GAP SERPL CALCULATED.3IONS-SCNC: 12 MMOL/L (ref 5–15)
AST SERPL-CCNC: 25 U/L (ref 1–32)
BASOPHILS # BLD AUTO: 0.03 10*3/MM3 (ref 0–0.2)
BASOPHILS NFR BLD AUTO: 0.6 % (ref 0–1.5)
BILIRUB SERPL-MCNC: 0.6 MG/DL (ref 0–1.2)
BUN SERPL-MCNC: 19 MG/DL (ref 8–23)
BUN/CREAT SERPL: 19.8 (ref 7–25)
CALCIUM SPEC-SCNC: 10 MG/DL (ref 8.6–10.5)
CHLORIDE SERPL-SCNC: 101 MMOL/L (ref 98–107)
CHOLEST SERPL-MCNC: 169 MG/DL (ref 0–200)
CO2 SERPL-SCNC: 25 MMOL/L (ref 22–29)
CREAT SERPL-MCNC: 0.96 MG/DL (ref 0.57–1)
DEPRECATED RDW RBC AUTO: 41.1 FL (ref 37–54)
EGFRCR SERPLBLD CKD-EPI 2021: 61.1 ML/MIN/1.73
EOSINOPHIL # BLD AUTO: 0.09 10*3/MM3 (ref 0–0.4)
EOSINOPHIL NFR BLD AUTO: 1.7 % (ref 0.3–6.2)
ERYTHROCYTE [DISTWIDTH] IN BLOOD BY AUTOMATED COUNT: 12.3 % (ref 12.3–15.4)
GLOBULIN UR ELPH-MCNC: 2.3 GM/DL
GLUCOSE SERPL-MCNC: 96 MG/DL (ref 65–99)
HBA1C MFR BLD: 5.7 % (ref 4.8–5.6)
HCT VFR BLD AUTO: 41.9 % (ref 34–46.6)
HDLC SERPL-MCNC: 72 MG/DL (ref 40–60)
HGB BLD-MCNC: 13.5 G/DL (ref 12–15.9)
IMM GRANULOCYTES # BLD AUTO: 0.01 10*3/MM3 (ref 0–0.05)
IMM GRANULOCYTES NFR BLD AUTO: 0.2 % (ref 0–0.5)
LDLC SERPL CALC-MCNC: 86 MG/DL (ref 0–100)
LDLC/HDLC SERPL: 1.19 {RATIO}
LYMPHOCYTES # BLD AUTO: 2.44 10*3/MM3 (ref 0.7–3.1)
LYMPHOCYTES NFR BLD AUTO: 45.8 % (ref 19.6–45.3)
MCH RBC QN AUTO: 29.7 PG (ref 26.6–33)
MCHC RBC AUTO-ENTMCNC: 32.2 G/DL (ref 31.5–35.7)
MCV RBC AUTO: 92.3 FL (ref 79–97)
MONOCYTES # BLD AUTO: 0.61 10*3/MM3 (ref 0.1–0.9)
MONOCYTES NFR BLD AUTO: 11.4 % (ref 5–12)
NEUTROPHILS NFR BLD AUTO: 2.15 10*3/MM3 (ref 1.7–7)
NEUTROPHILS NFR BLD AUTO: 40.3 % (ref 42.7–76)
NRBC BLD AUTO-RTO: 0 /100 WBC (ref 0–0.2)
PLATELET # BLD AUTO: 278 10*3/MM3 (ref 140–450)
PMV BLD AUTO: 10.8 FL (ref 6–12)
POTASSIUM SERPL-SCNC: 4.6 MMOL/L (ref 3.5–5.2)
PROT SERPL-MCNC: 6.6 G/DL (ref 6–8.5)
RBC # BLD AUTO: 4.54 10*6/MM3 (ref 3.77–5.28)
SODIUM SERPL-SCNC: 138 MMOL/L (ref 136–145)
TRIGL SERPL-MCNC: 57 MG/DL (ref 0–150)
TSH SERPL DL<=0.05 MIU/L-ACNC: 3.27 UIU/ML (ref 0.27–4.2)
VLDLC SERPL-MCNC: 11 MG/DL (ref 5–40)
WBC NRBC COR # BLD AUTO: 5.33 10*3/MM3 (ref 3.4–10.8)

## 2024-07-24 PROCEDURE — 1126F AMNT PAIN NOTED NONE PRSNT: CPT | Performed by: FAMILY MEDICINE

## 2024-07-24 PROCEDURE — 83036 HEMOGLOBIN GLYCOSYLATED A1C: CPT | Performed by: FAMILY MEDICINE

## 2024-07-24 PROCEDURE — 84443 ASSAY THYROID STIM HORMONE: CPT | Performed by: FAMILY MEDICINE

## 2024-07-24 PROCEDURE — 36415 COLL VENOUS BLD VENIPUNCTURE: CPT | Performed by: FAMILY MEDICINE

## 2024-07-24 PROCEDURE — 85025 COMPLETE CBC W/AUTO DIFF WBC: CPT | Performed by: FAMILY MEDICINE

## 2024-07-24 PROCEDURE — 99214 OFFICE O/P EST MOD 30 MIN: CPT | Performed by: FAMILY MEDICINE

## 2024-07-24 PROCEDURE — 80053 COMPREHEN METABOLIC PANEL: CPT | Performed by: FAMILY MEDICINE

## 2024-07-24 PROCEDURE — 80061 LIPID PANEL: CPT | Performed by: FAMILY MEDICINE

## 2024-07-24 PROCEDURE — 3074F SYST BP LT 130 MM HG: CPT | Performed by: FAMILY MEDICINE

## 2024-07-24 PROCEDURE — G2211 COMPLEX E/M VISIT ADD ON: HCPCS | Performed by: FAMILY MEDICINE

## 2024-07-24 PROCEDURE — 3078F DIAST BP <80 MM HG: CPT | Performed by: FAMILY MEDICINE

## 2024-07-24 RX ORDER — LEVOTHYROXINE SODIUM 0.15 MG/1
150 TABLET ORAL DAILY
Qty: 90 TABLET | Refills: 1 | Status: SHIPPED | OUTPATIENT
Start: 2024-07-24

## 2024-07-24 RX ORDER — TELMISARTAN 80 MG/1
80 TABLET ORAL DAILY
Qty: 90 TABLET | Refills: 1 | Status: SHIPPED | OUTPATIENT
Start: 2024-07-24

## 2024-07-24 RX ORDER — AMOXICILLIN 500 MG/1
CAPSULE ORAL
COMMUNITY
Start: 2024-07-15

## 2024-07-24 NOTE — PROGRESS NOTES
Subjective   Chief Complaint   Patient presents with    Follow-up    Med Refill    Hypertension    Hyperlipidemia    Coronary Artery Disease    Atrial Fibrillation    Hypothyroidism     Sheree Vance is a 79 y.o. female.     Patient Care Team:  Latia Guerrier MD as PCP - General  Latia Guerrier MD as PCP - Family Medicine  Dre Case MD (Cardiology)  Jet Godwin MD as Consulting Physician (Ophthalmology)  Antonio Fine MD as Consulting Physician (Allergy)  Sandor Astorga MD as Consulting Physician (Orthopedic Surgery)    History of Present Illness  She is coming in today to follow-up on her chronic medical problems and her medications including hypertension, hyperlipidemia, hypothyroidism, atrial fibrillation, and heart issues.  Patient is also due for fasting blood work today.  She reports taking all of her medications as directed and denies any issues or side effects.  Patient is followed by cardiology, she has been on Tikosyn for few years now.  She tells me that over the last year or so her heart rate has been running lower and her cardiologist is aware of it.  She denies any dizziness, lightheadedness, or syncope.  She is scheduled to follow-up with her cardiologist on 8/22/2024.  She is also followed by allergy specialist due to seasonal allergies and she has been on allergy shots once a week.       The following portions of the patient's history were reviewed and updated as appropriate: allergies, current medications, past family history, past medical history, past social history, past surgical history, and problem list.  Past Medical History:   Diagnosis Date    A-fib Since 2001 Dx in [place]    Abnormal ECG 2009    Allergic 2014    Seasonal    Allergic rhinitis     Hx Allergy Shots x 4 Yrs    Aortic valve calcification 12/04/2018    Noted on [facility]    Arrhythmia     Arthritis     Lumbar Spine    Atrial flutter     Breast mass seen on mammogram 03/19/2014    L 4CM Mass--Benign &  Followed    Bruxism, sleep-related     Cataract     Chronic anticoagulation     Eliquis     Chronic low back pain     Hx Back Sx in 2016    Colon polyp     Coronary artery disease 2009    AFIB    DJD (degenerative joint disease), lumbar     Dyslipidemia     w/Hypertriglyceridemia--Statin/Fish Oils    Family history of premature CAD     Glaucoma     Heart disease     History of bone density study 03/19/14-[facility]    T-Score of 1.4 Indicating Osteopenia    History of EKG 04/2018 & 08/2018 & 12/2018    First Degree AV Block/Multiple Atrial Premature Blocks Noted on All    Hormone replacement therapy (HRT)     Premarin-0.625MG    Hyperlipidemia     Controlled w/Meds    Hypertension     Controlled w/Losartan    Hypothyroidism     Synthroid    Insomnia     Takes OTC Sleep Aid PRN    Left atrial enlargement 12/04/2018    Severe Noted on Cardioversion Report/[facility]    Left breast mass 03/19/2014    4CM Noted on Mammogram--Benign     Mild tricuspid valve regurgitation 12/04/2018    Noted on SARA    Mitral valve annular calcification 12/04/2018    Moderate Noted on SARA    Mitral valve regurgitation 12/04/2018    Moderate Noted on SARA    Obesity 12/04/2018    BMI-43.3     SHANAE on CPAP 11/27/2018    AHI 10.5/h, supine 30/h    Osteoarthritis of right hip 07/09/2019    Osteopenia 03/19/2014    Noted on Dexa Report    Restless sleeper     Tosses and Turns All Night Per Pt    Sinus congestion     Tricuspid leaflet thickened 12/04/2018    Noted on SARA    Urinary tract infection      Past Surgical History:   Procedure Laterality Date    ABLATION OF DYSRHYTHMIC FOCUS  2018    APPENDECTOMY      BACK SURGERY  2016    Spinal Fusion    BLADDER REPAIR  2001 in [place]    CARDIAC CATHETERIZATION  2015    CARDIOVERSION  08/7/18 & 4/17/18-[facility]    Dr. Villegas--For Chronic A-Fib    CARDIOVERSION  12/4/28-[facility]    Dr. Case--See Report    CATARACT EXTRACTION  2007    COLONOSCOPY  04/13/2017    HYSTERECTOMY      Partial    KNEE SURGERY Right 2009    OTHER  "SURGICAL HISTORY  12/4/18-F    Fluoroscopy/Trans-Septal Access to L Atrium/Selective Venography of L Superior Pulm Vein/Add Lienier Radiofrequency Ablation Along the Coronary Sinus/Synchronized Cardioversion--Dr. Case    REPLACEMENT TOTAL KNEE Right 08/30/2021    Dr. Astorga    SPINE SURGERY      SARA  12/4/18-F    Mild Concentric L Ventricular Hypertrophy Noted; EF 55-60%; L Atrial Moderate-Severely Dilated, Moderat Mitral Annular Calcification with Moderate MVR Noted; Mild TVR; Normal LV Function    TONSILLECTOMY      TOTAL HIP ARTHROPLASTY Right 07/06/2020    Dr. Astorga     The patient has a family history of  Family History   Problem Relation Age of Onset    Heart failure Mother     Alcohol abuse Mother     Early death Mother     Miscarriages / Stillbirths Mother     No Known Problems Father      Social History     Socioeconomic History    Marital status:      Spouse name: Papi    Number of children: 1   Tobacco Use    Smoking status: Never     Passive exposure: Never    Smokeless tobacco: Never   Vaping Use    Vaping status: Never Used   Substance and Sexual Activity    Alcohol use: Yes     Alcohol/week: 4.0 standard drinks of alcohol     Types: 2 Glasses of wine, 2 Drinks containing 0.5 oz of alcohol per week     Comment: Infrequently    Drug use: No    Sexual activity: Yes     Partners: Male     Birth control/protection: Surgical     Comment: Hyst       Review of Systems   Constitutional:  Negative for activity change, fatigue and fever.   Respiratory:  Negative for shortness of breath and wheezing.    Cardiovascular:  Negative for chest pain, palpitations and leg swelling.   Musculoskeletal:  Negative for arthralgias and back pain.   Skin:  Negative for rash.   Neurological:  Negative for tremors and headache.     Visit Vitals  /76 (BP Location: Left arm, Patient Position: Sitting, Cuff Size: Adult)   Pulse (!) 48   Temp 97.5 °F (36.4 °C) (Infrared)   Resp 16   Ht 165.1 cm (65\")   Wt 100 " kg (221 lb)   SpO2 95%   BMI 36.78 kg/m²       Class 2 Severe Obesity (BMI >=35 and <=39.9). Obesity-related health conditions include the following: hypertension and dyslipidemias. Obesity is unchanged. BMI is is above average; BMI management plan is completed. We discussed portion control and increasing exercise.      Current Outpatient Medications:     albuterol sulfate  (90 Base) MCG/ACT inhaler, , Disp: , Rfl:     amoxicillin (AMOXIL) 500 MG capsule, Predental, Disp: , Rfl:     apixaban (Eliquis) 5 MG tablet tablet, Take 1 tablet by mouth Every 12 (Twelve) Hours., Disp: 180 tablet, Rfl: 1    atorvastatin (LIPITOR) 10 MG tablet, Take 1 tablet by mouth every night at bedtime., Disp: 90 tablet, Rfl: 1    azelastine (ASTELIN) 0.1 % nasal spray, , Disp: , Rfl:     Cholecalciferol 2000 units capsule, Take  by mouth., Disp: , Rfl:     Chromium 200 MCG capsule, Take 100,000 mcg by mouth Daily., Disp: , Rfl:     coenzyme Q10 100 MG capsule, Take 1 capsule by mouth Daily., Disp: , Rfl:     dofetilide (TIKOSYN) 500 MCG capsule, Take 1 capsule by mouth 2 (Two) Times a Day., Disp: 180 capsule, Rfl: 1    EPINEPHrine (EPIPEN) 0.3 MG/0.3ML solution auto-injector injection, USE AS DIRECTED FOR ACUTE ALLERGIC REACTION, Disp: , Rfl: 3    Estrogens Conjugated (Premarin) 0.625 MG/GM vaginal cream, USE 1/2 GRAM PER VAGINA 2 TO 3 TIMES A WEEK, Disp: 30 g, Rfl: 1    latanoprost (XALATAN) 0.005 % ophthalmic solution, Inject 1 drop into the eye Daily., Disp: , Rfl:     levothyroxine (SYNTHROID, LEVOTHROID) 150 MCG tablet, Take 1 tablet by mouth Daily., Disp: 90 tablet, Rfl: 1    Magnesium 200 MG tablet, Take  by mouth Daily., Disp: , Rfl:     metoprolol succinate XL (TOPROL-XL) 50 MG 24 hr tablet, Take 1 tablet by mouth Daily., Disp: 90 tablet, Rfl: 1    Multiple Vitamins-Minerals (MULTIVITAMIN ADULT) tablet, MULTIVITAMIN ADULT TABS, Disp: , Rfl:     Omega-3 1000 MG capsule, Take  by mouth Daily., Disp: , Rfl:     spironolactone  (ALDACTONE) 50 MG tablet, Take 1 tablet by mouth Daily., Disp: 90 tablet, Rfl: 1    telmisartan (MICARDIS) 80 MG tablet, Take 1 tablet by mouth Daily., Disp: 90 tablet, Rfl: 1    vitamin B-12 (CYANOCOBALAMIN) 1000 MCG tablet, Take 1 tablet by mouth Daily., Disp: , Rfl:     Objective   Physical Exam  Vitals and nursing note reviewed.   Constitutional:       General: She is not in acute distress.     Appearance: Normal appearance. She is well-developed. She is not ill-appearing.   HENT:      Head: Normocephalic and atraumatic.   Cardiovascular:      Rate and Rhythm: Normal rate and regular rhythm.      Heart sounds: Normal heart sounds. No murmur heard.     No gallop.   Pulmonary:      Effort: Pulmonary effort is normal. No respiratory distress.      Breath sounds: Normal breath sounds. No wheezing, rhonchi or rales.   Chest:      Chest wall: No tenderness.   Musculoskeletal:      Cervical back: Normal range of motion and neck supple.   Neurological:      General: No focal deficit present.      Mental Status: She is alert and oriented to person, place, and time. Mental status is at baseline.   Psychiatric:         Mood and Affect: Mood normal.         FOLLOWING LABS WERE REVIEWED TODAY:  CMP          9/18/2023    13:27   CMP   Glucose 103    BUN 17    Creatinine 0.85    EGFR 71.1    Sodium 141    Potassium 4.4    Chloride 102    Calcium 9.8    Total Protein 7.0    Albumin 4.3    Globulin 2.7    Total Bilirubin 0.7    Alkaline Phosphatase 189    AST (SGOT) 25    ALT (SGPT) 17    Albumin/Globulin Ratio 1.6    BUN/Creatinine Ratio 20.0    Anion Gap 13.4      CBC          9/18/2023    13:27 11/15/2023    03:42   CBC   WBC 5.95  9.29       RBC 4.45  3.51       Hemoglobin 13.1  10.4       Hematocrit 39.8  31.8       MCV 89.4  90.6       MCH 29.4  29.6       MCHC 32.9  32.7       RDW 12.4  13.2       Platelets 268  278          Details          This result is from an external source.                 Assessment & Plan    Diagnoses and all orders for this visit:    1. Essential hypertension (Primary)  -     CBC Auto Differential  -     Comprehensive Metabolic Panel  -     Lipid Panel  -     telmisartan (MICARDIS) 80 MG tablet; Take 1 tablet by mouth Daily.  Dispense: 90 tablet; Refill: 1    2. Mixed hyperlipidemia  -     CBC Auto Differential  -     Comprehensive Metabolic Panel  -     Lipid Panel    3. Acquired hypothyroidism  -     TSH  -     levothyroxine (SYNTHROID, LEVOTHROID) 150 MCG tablet; Take 1 tablet by mouth Daily.  Dispense: 90 tablet; Refill: 1    4. Hyperglycemia  -     Hemoglobin A1c      I reviewed her medical problems and her medications as well as her previous labs.  Patient is to continue her current medicines and refills were given.  I will be rechecking fasting labs today.  She is scheduled to follow-up with her cardiologist on 8/2022.  Patient reports having some low heart rate readings at home on and off for over a year now.  She will further address it with her cardiologist at her upcoming follow-up appointment on 8/22/2022.  I also briefly reviewed her health maintenance and patient is up to speed with her preventative tests at this time.      Return in about 6 months (around 1/24/2025) for Medicare Wellness.    Requested Prescriptions     Signed Prescriptions Disp Refills    telmisartan (MICARDIS) 80 MG tablet 90 tablet 1     Sig: Take 1 tablet by mouth Daily.    levothyroxine (SYNTHROID, LEVOTHROID) 150 MCG tablet 90 tablet 1     Sig: Take 1 tablet by mouth Daily.       Latia Guerrier MD  07/24/2024

## 2024-07-30 RX ORDER — SPIRONOLACTONE 50 MG/1
50 TABLET, FILM COATED ORAL DAILY
Qty: 90 TABLET | Refills: 1 | Status: SHIPPED | OUTPATIENT
Start: 2024-07-30

## 2024-08-22 ENCOUNTER — OFFICE VISIT (OUTPATIENT)
Dept: CARDIOLOGY | Facility: CLINIC | Age: 79
End: 2024-08-22
Payer: MEDICARE

## 2024-08-22 VITALS
HEIGHT: 65 IN | OXYGEN SATURATION: 97 % | DIASTOLIC BLOOD PRESSURE: 76 MMHG | WEIGHT: 228 LBS | SYSTOLIC BLOOD PRESSURE: 136 MMHG | BODY MASS INDEX: 37.99 KG/M2 | HEART RATE: 52 BPM

## 2024-08-22 DIAGNOSIS — E78.2 MIXED HYPERLIPIDEMIA: ICD-10-CM

## 2024-08-22 DIAGNOSIS — I48.0 PAROXYSMAL ATRIAL FIBRILLATION: Primary | ICD-10-CM

## 2024-08-22 DIAGNOSIS — G47.33 OBSTRUCTIVE SLEEP APNEA SYNDROME: ICD-10-CM

## 2024-08-22 DIAGNOSIS — I10 ESSENTIAL HYPERTENSION: ICD-10-CM

## 2024-08-22 PROCEDURE — 99214 OFFICE O/P EST MOD 30 MIN: CPT | Performed by: INTERNAL MEDICINE

## 2024-08-22 PROCEDURE — 3078F DIAST BP <80 MM HG: CPT | Performed by: INTERNAL MEDICINE

## 2024-08-22 PROCEDURE — 3075F SYST BP GE 130 - 139MM HG: CPT | Performed by: INTERNAL MEDICINE

## 2024-08-22 PROCEDURE — 1160F RVW MEDS BY RX/DR IN RCRD: CPT | Performed by: INTERNAL MEDICINE

## 2024-08-22 PROCEDURE — 1159F MED LIST DOCD IN RCRD: CPT | Performed by: INTERNAL MEDICINE

## 2024-08-22 PROCEDURE — 93000 ELECTROCARDIOGRAM COMPLETE: CPT | Performed by: INTERNAL MEDICINE

## 2024-08-22 RX ORDER — METOPROLOL SUCCINATE 25 MG/1
25 TABLET, EXTENDED RELEASE ORAL DAILY
Qty: 90 TABLET | Refills: 1 | Status: SHIPPED | OUTPATIENT
Start: 2024-08-22

## 2024-08-22 NOTE — PROGRESS NOTES
CC--Atrial fibrillation, hypertension      SUB--79-year-old pleasant patient comes in for follow-up  Patient has history of atrial fibrillation with prior ablation in 2018 with recurrence currently on Tikosyn and previous cardiac catheterization without significant stenosis with normal EF.  Patient had prior bilateral leg edema and amlodipine has been stopped in the past  Patient complains of mild fatigue with current intake of Toprol with mild relative bradycardia        My previous history is attached below for reference only which has been reviewed       patient is 77 years old white female whose past medical history significant for hypertension, Persistent atrial fibrillation,  obesity  underwent AF ablation in 2018 with early recurrence with initiation of Tikosyn to sinus rhythm with resolution of symptoms and Tikosyn was stopped with the last office visit and started noticing recurrent atrial fibrillation in last 2 to 3 weeks with symptoms of palpitations and mild fatigue --patient has severe left atrial enlargement needing antiarrhythmic treatment to optimize into sinus rhythm-- during past hospitalization CT surgery consult was also requested for possible future convergence procedure because of severe left atrial enlargement.In 2009, patient was diagnosed with paroxysmal atrial fibrillation.  Patient was treated with beta-blockers initially.  Patient was started on flecainide later on.  In  2017, patient underwent cardiac catheterization at [facility] and coronary arteries were normal.  LV function was preserved.  Patient was started on Eliquis because of recurrence of atrial fibrillation.   chads vasc  score---3   patient was hypertensive and is on multiple medication  Post re initiation of tikosyn to sinus and feels well  No new sx  Patient remains in functional class I with no new symptoms and came for follow-up        Past Medical History:     Reviewed history from 01/08/2019 and no changes  required:        HTN        Paroxysmal  A-fib - Dr. Lugo        Seasonal allergies - on allergy shots per ENT        Hyperlipidemia        Hypothyroidism        Chronic low back pain        Colonoscopy in 4/13/2017        Mammogram in 7/31/2018 - negative        Pneumonia shot in the past - both Pneumovax and Prevnar        Shingle shot in the past        Osteopenia - DEXA on 6/23/2017 - L/S: +0.5 and Hip: -0.8        GYN HM per GYN - Dr. Hopkins        Negative Hepatitis C screening in 10/2017                 Physical Exam    General:      well developed, well nourished, in no acute distress.    Head:      normocephalic and atraumatic.    Eyes:      PERRL/EOM intact, conjunctivae and sclerae clear without nystagmus.    Neck:      no  thyromegaly, trachea central with normal respiratory effort  Lungs:      clear bilaterally to auscultation.    Heart:       regular rate and rhythm, S1, S2 without murmurs, rubs, or gallops  Skin:      intact without lesions or rashes.    Psych:      alert and cooperative; normal mood and affect; normal attention span and concentration.           assessment plan    Recent potassium 4.6.  Hemoglobin and platelets are normal.  Creatinine is normal and TSH is normal  Persistent AF with prior ablation with clinical recurrence on Tikosyn and QTc interval of 420 ms today.  Essential hypertension well-controlled with current intake of metoprolol/spironolactone and telmisartan  Hyperlipidemia on atorvastatin  Anticoagulation with apixaban  Hypothyroidism on levothyroxine  Medications reviewed and follow-up appointments made  Reduce the dose of metoprolol for mild bradycardia to 25 mg a day and a new prescription given      ECG 12 Lead    Date/Time: 8/22/2024 11:08 AM  Performed by: Dre Case MD    Authorized by: Dre Case MD  Comparison: compared with previous ECG   Similar to previous ECG  Rhythm: sinus rhythm  Rate: normal  Conduction: conduction normal  Other findings:  non-specific ST-T wave changes      Electronically signed by Dre Case MD, 08/22/24, 11:08 AM EDT.

## 2024-08-22 NOTE — LETTER
August 22, 2024       No Recipients    Patient: Sheree Vance   YOB: 1945   Date of Visit: 8/22/2024     Dear Latia Guerrier MD:       Thank you for referring Sheree Vance to me for evaluation. Below are the relevant portions of my assessment and plan of care.    If you have questions, please do not hesitate to call me. I look forward to following Sheree along with you.         Sincerely,        Dre Case MD        CC:   No Recipients    Dre Case MD  08/22/24 1108  Sign when Signing Visit  CC--Atrial fibrillation, hypertension      SUB--79-year-old pleasant patient comes in for follow-up  Patient has history of atrial fibrillation with prior ablation in 2018 with recurrence currently on Tikosyn and previous cardiac catheterization without significant stenosis with normal EF.  Patient had prior bilateral leg edema and amlodipine has been stopped in the past  Patient complains of mild fatigue with current intake of Toprol with mild relative bradycardia        My previous history is attached below for reference only which has been reviewed       patient is 77 years old white female whose past medical history significant for hypertension, Persistent atrial fibrillation,  obesity  underwent AF ablation in 2018 with early recurrence with initiation of Tikosyn to sinus rhythm with resolution of symptoms and Tikosyn was stopped with the last office visit and started noticing recurrent atrial fibrillation in last 2 to 3 weeks with symptoms of palpitations and mild fatigue --patient has severe left atrial enlargement needing antiarrhythmic treatment to optimize into sinus rhythm-- during past hospitalization CT surgery consult was also requested for possible future convergence procedure because of severe left atrial enlargement.In 2009, patient was diagnosed with paroxysmal atrial fibrillation.  Patient was treated with beta-blockers initially.  Patient was started on flecainide  later on.  In  2017, patient underwent cardiac catheterization at [facility] and coronary arteries were normal.  LV function was preserved.  Patient was started on Eliquis because of recurrence of atrial fibrillation.   chads vasc  score---3   patient was hypertensive and is on multiple medication  Post re initiation of tikosyn to sinus and feels well  No new sx  Patient remains in functional class I with no new symptoms and came for follow-up        Past Medical History:     Reviewed history from 01/08/2019 and no changes required:        HTN        Paroxysmal  A-fib - Dr. Lugo        Seasonal allergies - on allergy shots per ENT        Hyperlipidemia        Hypothyroidism        Chronic low back pain        Colonoscopy in 4/13/2017        Mammogram in 7/31/2018 - negative        Pneumonia shot in the past - both Pneumovax and Prevnar        Shingle shot in the past        Osteopenia - DEXA on 6/23/2017 - L/S: +0.5 and Hip: -0.8        GYN HM per GYN - Dr. Hopkins        Negative Hepatitis C screening in 10/2017                 Physical Exam    General:      well developed, well nourished, in no acute distress.    Head:      normocephalic and atraumatic.    Eyes:      PERRL/EOM intact, conjunctivae and sclerae clear without nystagmus.    Neck:      no  thyromegaly, trachea central with normal respiratory effort  Lungs:      clear bilaterally to auscultation.    Heart:       regular rate and rhythm, S1, S2 without murmurs, rubs, or gallops  Skin:      intact without lesions or rashes.    Psych:      alert and cooperative; normal mood and affect; normal attention span and concentration.           assessment plan    Recent potassium 4.6.  Hemoglobin and platelets are normal.  Creatinine is normal and TSH is normal  Persistent AF with prior ablation with clinical recurrence on Tikosyn and QTc interval of 420 ms today.  Essential hypertension well-controlled with current intake of  metoprolol/spironolactone and telmisartan  Hyperlipidemia on atorvastatin  Anticoagulation with apixaban  Hypothyroidism on levothyroxine  Medications reviewed and follow-up appointments made  Reduce the dose of metoprolol for mild bradycardia to 25 mg a day and a new prescription given      ECG 12 Lead    Date/Time: 8/22/2024 11:08 AM  Performed by: Dre Case MD    Authorized by: Dre Case MD  Comparison: compared with previous ECG   Similar to previous ECG  Rhythm: sinus rhythm  Rate: normal  Conduction: conduction normal  Other findings: non-specific ST-T wave changes      Electronically signed by Dre Case MD, 08/22/24, 11:08 AM EDT.

## 2024-09-04 ENCOUNTER — TELEPHONE (OUTPATIENT)
Dept: CARDIOLOGY | Facility: CLINIC | Age: 79
End: 2024-09-04
Payer: MEDICARE

## 2024-09-04 NOTE — TELEPHONE ENCOUNTER
"     Caller: Sheree Vance \"Sheree Vance\"    Relationship to patient: Self    Best call back number:  [phone]    Patient is needing: PATIENTS HR WAS 40 WHEN ON A FULL DOSAGE OF METOROLOL AND THEN WHEN SHE WAS INSTRUCTED TO START TAKING HALF DOSAGE. HER HR HAS STAYED OVER A 100 SINCE THE CHANGE. BLOOD PRESSURE IS DOING GREAT.      "

## 2024-09-06 ENCOUNTER — TELEPHONE (OUTPATIENT)
Dept: CARDIOLOGY | Facility: CLINIC | Age: 79
End: 2024-09-06
Payer: MEDICARE

## 2024-09-06 NOTE — TELEPHONE ENCOUNTER
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 She would like to know what you advise she do.

## 2024-09-09 ENCOUNTER — OFFICE VISIT (OUTPATIENT)
Dept: CARDIOLOGY | Facility: CLINIC | Age: 79
End: 2024-09-09
Payer: MEDICARE

## 2024-09-09 VITALS
SYSTOLIC BLOOD PRESSURE: 140 MMHG | WEIGHT: 225 LBS | BODY MASS INDEX: 37.49 KG/M2 | HEART RATE: 53 BPM | HEIGHT: 65 IN | DIASTOLIC BLOOD PRESSURE: 82 MMHG | OXYGEN SATURATION: 99 %

## 2024-09-09 DIAGNOSIS — I10 ESSENTIAL HYPERTENSION: ICD-10-CM

## 2024-09-09 DIAGNOSIS — I48.0 PAROXYSMAL ATRIAL FIBRILLATION: ICD-10-CM

## 2024-09-09 DIAGNOSIS — R00.1 BRADYCARDIA, SINUS: Primary | ICD-10-CM

## 2024-09-09 PROCEDURE — 1159F MED LIST DOCD IN RCRD: CPT | Performed by: NURSE PRACTITIONER

## 2024-09-09 PROCEDURE — 3077F SYST BP >= 140 MM HG: CPT | Performed by: NURSE PRACTITIONER

## 2024-09-09 PROCEDURE — 1160F RVW MEDS BY RX/DR IN RCRD: CPT | Performed by: NURSE PRACTITIONER

## 2024-09-09 PROCEDURE — 3079F DIAST BP 80-89 MM HG: CPT | Performed by: NURSE PRACTITIONER

## 2024-09-09 PROCEDURE — 99214 OFFICE O/P EST MOD 30 MIN: CPT | Performed by: NURSE PRACTITIONER

## 2024-09-09 PROCEDURE — 93000 ELECTROCARDIOGRAM COMPLETE: CPT | Performed by: NURSE PRACTITIONER

## 2024-09-09 RX ORDER — METOPROLOL SUCCINATE 25 MG/1
25 TABLET, EXTENDED RELEASE ORAL DAILY
Qty: 45 TABLET | Refills: 2 | Status: SHIPPED | OUTPATIENT
Start: 2024-09-09

## 2024-09-09 NOTE — PROGRESS NOTES
[facility] CARDIOLOGY      REASON FOR FOLLOW-UP:  Medication question          Chief Complaint   Patient presents with    Heart Problem         Dear Latia Guerrier MD        Heart Problem       It was my pleasure to see Sheree Vance in the office today for acute visit.  She is a 79-year-old female well-known to Dr. Case with past medical history of atrial fibrillation and prior ablation 2018.  She had recurrence currently on Tikosyn suppression with no further episodes of A-fib and resolution of symptoms.  Heart catheterization in the past without significant stenosis.  She has additional history of hyperlipidemia, hypothyroidism, hypertension.  The patient presents today with questions about her medication.    At her last cardiology office visit on 8/22/2024 she expressed concerns about low heart rates.  She denies any symptoms including dizziness, lightheadedness, near syncopal or syncopal episodes.  Metoprolol succinate was decreased to 25 mg daily.  The patient stated since that time her heart rate has been running into the 100s.  Again, she denies any symptoms.  She decided to increase her metoprolol to 37.5 mg daily and her heart rate has improved, however she is now concerned again about low heart rates.  She states that her heart rate has been running in the 50s and 60s.  Her blood pressure is actually controlled at 140/82.  She denies any lower extremity edema, orthopnea or PND.      ASSESSMENT:  Sinus bradycardia  Persistent atrial fibrillation on Tikosyn therapy with no recurrence  Primary hypertension  Dyslipidemia  Elevated chads vascular score  Consumptive coagulopathy on Eliquis  Hypothyroidism    PLAN:  Patient was advised to continue the 37.5 mg of Toprol XL.  Continue to monitor heart rate.  If she becomes symptomatic, she should decrease Toprol XL back to 25 mg daily.  Follow-up with primary cardiologist as scheduled        CHF Guideline Directed Medical  Therapy  Beta Blocker:   ARNI/ACE/ARB:   SGLT 2 inhibitors:   Diuretics:   Aldosterone Antagonist:   Vasodilators & Nitrates:       Diagnoses and all orders for this visit:    1. Bradycardia, sinus (Primary)    2. Essential hypertension    3. Paroxysmal atrial fibrillation    Other orders  -     metoprolol succinate XL (TOPROL-XL) 25 MG 24 hr tablet; Take 1 tablet by mouth Daily. Pt taking 1.5 tabs  Dispense: 45 tablet; Refill: 2          The following portions of the patient's history were reviewed and updated as appropriate: allergies, current medications, past family history, past medical history, past social history, past surgical history, and problem list.    REVIEW OF SYSTEMS:    Review of Systems   All other systems reviewed and are negative.      Vitals:    09/09/24 1439   BP: 140/82   Pulse: 53   SpO2: 99%         PHYSICAL EXAM:    General: Alert, cooperative, no distress, appears stated age  Head:  Normocephalic, atraumatic, mucous membranes moist  Eyes:  Conjunctiva/corneas clear, EOM's intact     Neck:  Supple,  no JVD or bruit     Lungs: Clear to auscultation bilaterally, no wheezes rhonchi rales are noted  Chest wall: No tenderness  Musculoskeletal:   Ambulates freely without assistance  Heart::  Regular rate and rhythm, S1 and S2 normal, no murmur, rub or gallop  Abdomen: Soft, non-tender, nondistended, bowel sounds active, no abdominal bruit  Extremities: No cyanosis, clubbing, or edema   Pulses: 2+ and symmetric all extremities  Skin:  No rashes or lesions  Neuro/psych: A&O x3. CN II through XII are grossly intact with appropriate affect        Past Medical History:   Diagnosis Date    A-fib Since 2001 Dx in [place]    Abnormal ECG 2009    Allergic 2014    Seasonal    Allergic rhinitis     Hx Allergy Shots x 4 Yrs    Aortic valve calcification 12/04/2018    Noted on SARA    Arrhythmia     Arthritis     Lumbar Spine    Atrial flutter     Breast mass seen on mammogram 03/19/2014    L 4CM Mass--Benign &  Followed    Bruxism, sleep-related     Cataract     Chronic anticoagulation     Eliquis     Chronic low back pain     Hx Back Sx in 2016    Colon polyp     Coronary artery disease 2009    AFIB    DJD (degenerative joint disease), lumbar     Dyslipidemia     w/Hypertriglyceridemia--Statin/Fish Oils    Family history of premature CAD     Glaucoma     Heart disease     History of bone density study 03/19/14-[facility]    T-Score of 1.4 Indicating Osteopenia    History of EKG 04/2018 & 08/2018 & 12/2018    First Degree AV Block/Multiple Atrial Premature Blocks Noted on All    Hormone replacement therapy (HRT)     Premarin-0.625MG    Hyperlipidemia     Controlled w/Meds    Hypertension     Controlled w/Losartan    Hypothyroidism     Synthroid    Insomnia     Takes OTC Sleep Aid PRN    Left atrial enlargement 12/04/2018    Severe Noted on Cardioversion Report/SARA    Left breast mass 03/19/2014    4CM Noted on Mammogram--Benign     Mild tricuspid valve regurgitation 12/04/2018    Noted on SARA    Mitral valve annular calcification 12/04/2018    Moderate Noted on SARA    Mitral valve regurgitation 12/04/2018    Moderate Noted on SARA    Obesity 12/04/2018    BMI-43.3     SHANAE on CPAP 11/27/2018    AHI 10.5/h, supine 30/h    Osteoarthritis of right hip 07/09/2019    Osteopenia 03/19/2014    Noted on Dexa Report    Restless sleeper     Tosses and Turns All Night Per Pt    Sinus congestion     Tricuspid leaflet thickened 12/04/2018    Noted on SARA    Urinary tract infection        Past Surgical History:   Procedure Laterality Date    ABLATION OF DYSRHYTHMIC FOCUS  2018    APPENDECTOMY      BACK SURGERY  2016    Spinal Fusion    BLADDER REPAIR  2001 in [place]    CARDIAC CATHETERIZATION  2015    CARDIOVERSION  08/7/18 & 4/17/18-[facility]    Dr. Villegas--For Chronic A-Fib    CARDIOVERSION  12/4/28-[facility]    Dr. Case--See Report    CATARACT EXTRACTION  2007    COLONOSCOPY  04/13/2017    HYSTERECTOMY      Partial    KNEE SURGERY Right 2009    OTHER  SURGICAL HISTORY  12/4/18-[facility]    Fluoroscopy/Trans-Septal Access to L Atrium/Selective Venography of L Superior Pulm Vein/Add Lienier Radiofrequency Ablation Along the Coronary Sinus/Synchronized Cardioversion--Dr. Case    REPLACEMENT TOTAL KNEE Right 08/30/2021    Dr. Astorga    SPINE SURGERY      SARA  12/4/18-[facility]    Mild Concentric L Ventricular Hypertrophy Noted; EF 55-60%; L Atrial Moderate-Severely Dilated, Moderat Mitral Annular Calcification with Moderate MVR Noted; Mild TVR; Normal LV Function    TONSILLECTOMY      TOTAL HIP ARTHROPLASTY Right 07/06/2020    Dr. Astorga         Current Outpatient Medications:     albuterol sulfate  (90 Base) MCG/ACT inhaler, , Disp: , Rfl:     apixaban (Eliquis) 5 MG tablet tablet, Take 1 tablet by mouth Every 12 (Twelve) Hours., Disp: 180 tablet, Rfl: 1    atorvastatin (LIPITOR) 10 MG tablet, Take 1 tablet by mouth every night at bedtime., Disp: 90 tablet, Rfl: 1    azelastine (ASTELIN) 0.1 % nasal spray, , Disp: , Rfl:     Cholecalciferol 2000 units capsule, Take  by mouth., Disp: , Rfl:     Chromium 200 MCG capsule, Take 100,000 mcg by mouth Daily., Disp: , Rfl:     coenzyme Q10 100 MG capsule, Take 1 capsule by mouth Daily., Disp: , Rfl:     dofetilide (TIKOSYN) 500 MCG capsule, Take 1 capsule by mouth 2 (Two) Times a Day., Disp: 180 capsule, Rfl: 1    EPINEPHrine (EPIPEN) 0.3 MG/0.3ML solution auto-injector injection, USE AS DIRECTED FOR ACUTE ALLERGIC REACTION, Disp: , Rfl: 3    Estrogens Conjugated (Premarin) 0.625 MG/GM vaginal cream, USE 1/2 GRAM PER VAGINA 2 TO 3 TIMES A WEEK, Disp: 30 g, Rfl: 1    latanoprost (XALATAN) 0.005 % ophthalmic solution, Inject 1 drop into the eye Daily., Disp: , Rfl:     levothyroxine (SYNTHROID, LEVOTHROID) 150 MCG tablet, Take 1 tablet by mouth Daily., Disp: 90 tablet, Rfl: 1    Magnesium 200 MG tablet, Take  by mouth Daily., Disp: , Rfl:     metoprolol succinate XL (TOPROL-XL) 25 MG 24 hr tablet, Take 1 tablet by mouth Daily.  "Pt taking 1.5 tabs, Disp: 45 tablet, Rfl: 2    Multiple Vitamins-Minerals (MULTIVITAMIN ADULT) tablet, MULTIVITAMIN ADULT TABS, Disp: , Rfl:     Omega-3 1000 MG capsule, Take  by mouth Daily., Disp: , Rfl:     spironolactone (ALDACTONE) 50 MG tablet, Take 1 tablet by mouth Daily., Disp: 90 tablet, Rfl: 1    telmisartan (MICARDIS) 80 MG tablet, Take 1 tablet by mouth Daily., Disp: 90 tablet, Rfl: 1    vitamin B-12 (CYANOCOBALAMIN) 1000 MCG tablet, Take 1 tablet by mouth Daily., Disp: , Rfl:     Allergies   Allergen Reactions    Amlodipine Swelling       Family History   Problem Relation Age of Onset    Heart failure Mother     Alcohol abuse Mother     Early death Mother     Miscarriages / Stillbirths Mother     No Known Problems Father        Social History     Tobacco Use    Smoking status: Never     Passive exposure: Never    Smokeless tobacco: Never   Substance Use Topics    Alcohol use: Yes     Alcohol/week: 4.0 standard drinks of alcohol     Types: 2 Glasses of wine, 2 Drinks containing 0.5 oz of alcohol per week     Comment: Infrequently           Current Electrocardiogram:    ECG 12 Lead    Date/Time: 9/9/2024 3:37 PM  Performed by: Carolyn Hooks APRN    Authorized by: Carolyn Hooks APRN  Comparison: compared with previous ECG from 8/22/2024  Similar to previous ECG  Rhythm: sinus bradycardia  BPM: 53  Conduction: 1st degree AV block              EMR Dragon/Transcription:   \"Dictated utilizing Dragon dictation\".     Copied text in this note has been reviewed by me and is accurate as of 09/09/24.      "

## 2024-10-02 ENCOUNTER — TELEPHONE (OUTPATIENT)
Dept: FAMILY MEDICINE CLINIC | Facility: CLINIC | Age: 79
End: 2024-10-02

## 2024-10-02 DIAGNOSIS — E03.9 ACQUIRED HYPOTHYROIDISM: ICD-10-CM

## 2024-10-02 RX ORDER — LEVOTHYROXINE SODIUM 150 UG/1
150 TABLET ORAL DAILY
Qty: 90 TABLET | Refills: 1 | Status: SHIPPED | OUTPATIENT
Start: 2024-10-02

## 2024-10-02 NOTE — TELEPHONE ENCOUNTER
"    Caller:       Sheree Vance \"Sheree Vance\" (Self) [phone] (Mobile)       What medication are you requesting: LEVOTHYROXINE (GENERIC ONLY)    CARELON  ONLY FILLS BRAND NAME AND [facility] IS CHEAPER         Have you had these symptoms before:    [x] Yes  [] No    Have you been treated for these symptoms before:   [x] Yes  [] No    If a prescription is needed, what is your preferred pharmacy and phone number: [facility] - [place], [place] - [address] - [phone] [facility] [phone] FX     Additional notes:        "

## 2024-10-02 NOTE — TELEPHONE ENCOUNTER
Okay, please advise the patient that I sent a prescription for levothyroxine generic form to her [facility] pharmacy as requested.  Thank you.

## 2024-10-10 DIAGNOSIS — E78.2 MIXED HYPERLIPIDEMIA: ICD-10-CM

## 2024-10-10 RX ORDER — ATORVASTATIN CALCIUM 10 MG/1
10 TABLET, FILM COATED ORAL
Qty: 90 TABLET | Refills: 1 | Status: SHIPPED | OUTPATIENT
Start: 2024-10-10

## 2024-10-17 DIAGNOSIS — E78.2 MIXED HYPERLIPIDEMIA: ICD-10-CM

## 2024-10-17 RX ORDER — ATORVASTATIN CALCIUM 10 MG/1
10 TABLET, FILM COATED ORAL
Qty: 90 TABLET | Refills: 1 | Status: SHIPPED | OUTPATIENT
Start: 2024-10-17

## 2024-10-17 RX ORDER — DOFETILIDE 0.5 MG/1
500 CAPSULE ORAL 2 TIMES DAILY
Qty: 180 CAPSULE | Refills: 1 | Status: SHIPPED | OUTPATIENT
Start: 2024-10-17

## 2024-10-22 DIAGNOSIS — E78.2 MIXED HYPERLIPIDEMIA: ICD-10-CM

## 2024-10-22 RX ORDER — DOFETILIDE 0.5 MG/1
500 CAPSULE ORAL 2 TIMES DAILY
Qty: 180 CAPSULE | Refills: 1 | Status: SHIPPED | OUTPATIENT
Start: 2024-10-22

## 2024-10-24 ENCOUNTER — TELEPHONE (OUTPATIENT)
Dept: CARDIOLOGY | Facility: CLINIC | Age: 79
End: 2024-10-24
Payer: MEDICARE

## 2024-10-24 NOTE — TELEPHONE ENCOUNTER
"    Caller: Sheree Vance \"Sheree Vance\"    Relationship to patient: Self    Best call back number: [phone]    Patient is needing: LAST COUPLE WEEKS SHE IS FEELING LIGHTHEADED. WANTS TO DISCUSS MEDICATIONS.           "

## 2024-10-28 ENCOUNTER — OFFICE VISIT (OUTPATIENT)
Dept: CARDIOLOGY | Facility: CLINIC | Age: 79
End: 2024-10-28
Payer: MEDICARE

## 2024-10-28 VITALS
OXYGEN SATURATION: 98 % | SYSTOLIC BLOOD PRESSURE: 178 MMHG | DIASTOLIC BLOOD PRESSURE: 92 MMHG | HEART RATE: 55 BPM | HEIGHT: 65 IN | BODY MASS INDEX: 37.44 KG/M2

## 2024-10-28 DIAGNOSIS — I10 ESSENTIAL HYPERTENSION: ICD-10-CM

## 2024-10-28 DIAGNOSIS — I48.0 PAF (PAROXYSMAL ATRIAL FIBRILLATION): Primary | ICD-10-CM

## 2024-10-28 PROCEDURE — 3077F SYST BP >= 140 MM HG: CPT | Performed by: STUDENT IN AN ORGANIZED HEALTH CARE EDUCATION/TRAINING PROGRAM

## 2024-10-28 PROCEDURE — 93000 ELECTROCARDIOGRAM COMPLETE: CPT | Performed by: STUDENT IN AN ORGANIZED HEALTH CARE EDUCATION/TRAINING PROGRAM

## 2024-10-28 PROCEDURE — 3080F DIAST BP >= 90 MM HG: CPT | Performed by: STUDENT IN AN ORGANIZED HEALTH CARE EDUCATION/TRAINING PROGRAM

## 2024-10-28 PROCEDURE — 99214 OFFICE O/P EST MOD 30 MIN: CPT | Performed by: STUDENT IN AN ORGANIZED HEALTH CARE EDUCATION/TRAINING PROGRAM

## 2024-10-28 RX ORDER — AMLODIPINE BESYLATE 10 MG/1
10 TABLET ORAL DAILY
Qty: 30 TABLET | Refills: 11 | Status: SHIPPED | OUTPATIENT
Start: 2024-10-28

## 2024-10-28 NOTE — PROGRESS NOTES
Cardiology Office Visit      Encounter Date:  10/28/2024    PATIENT IDENTIFICATION    Name: Sheree Vance  Age: 79 y.o. Sex: female : 1945  MRN: [id number]    Reason For Followup:  Hypertension, essential   paroxysmal A-fib      Brief Clinical History:  Patient 79-year-old female with medical history of essential hypertension, hyperlipidemia, paroxysmal atrial fibrillation status post ablation in 2018, currently sinus rhythm on Tikosyn.      Interval History:  Since this visit, patient reports that her blood pressure has been elevated for the past 3 weeks, despite being compliant to her medications including metoprolol succinate 37.5 mg daily, spironolactone 50 mg daily, telmisartan 80 mg daily.  Patient reports that in the past her blood pressure was always well-controlled on amlodipine 10 mg daily, which was discontinued due to lower extremity edema but she believes her recent edema was actually related to hip surgery and not necessarily medication side effect.  Patient is very distressed about taking metoprolol, reported significant side effects such as  fatigue and difficult to control bradycardia.    EKG performed in office today interpreted independently by me shows normal sinus rhythm, heart rate 57, nonspecific ST-T wave changes,  Low voltage in precordial and limb leads      Assessment & Plan    Impressions:  Essential hypertension, poorly controlled  Hyperlipidemia  Paroxysmal A-fib status post ablation 2018, currently sinus rhythm on Tikosyn   Sinus bradycardia, related to beta-blocker      Recommendations:  Plan to try amlodipine 10 mg daily as this has worked in the past for better blood pressure control, while monitoring for possible side effect such as LE edema. As she has not tolerating well metoprolol, plan to stop for now, while continuing dofetilide 500 mcg BID.  Repeat TTE due to worsening blood pressure control and palpitations - assess for hypertensive cardiomyopathy  Educated  "patient about importance of low-sodium diet she verbalized understanding.  Follow-up in 3 months.      Diagnoses and all orders for this visit:    1. Essential hypertension (Primary)  -     amLODIPine (NORVASC) 10 MG tablet; Take 1 tablet by mouth Daily.  Dispense: 30 tablet; Refill: 11    2. PAF (paroxysmal atrial fibrillation)  -     Adult Transthoracic Echo Complete W/ Cont if Necessary Per Protocol; Future          Objective:    Vitals:  Vitals:    10/28/24 1429   BP: 178/92   Pulse: 55   SpO2: 98%   Height: 165.1 cm (65\")     Body mass index is 37.44 kg/m².      Physical Exam:  General: Alert, cooperative, no distress, appears stated age  Lungs:  Clear to auscultation bilaterally, no wheezes, rhonchi or rales are noted  Chest wall: No tenderness  Heart::  Regular rate and rhythm, S1 and S2 normal, no murmur.  No rub or gallop  Abdomen: Soft, nontender, nondistended, bowel sounds active  Extremities: No cyanosis, clubbing, or edema  Pulses: 2+ and symmetric all extremities  Neuro/psych: No gross focal deficits        Allergies:  Allergies   Allergen Reactions    Amlodipine Swelling       Medication Review:     Current Outpatient Medications:     albuterol sulfate  (90 Base) MCG/ACT inhaler, , Disp: , Rfl:     apixaban (Eliquis) 5 MG tablet tablet, Take 1 tablet by mouth Every 12 (Twelve) Hours., Disp: 180 tablet, Rfl: 1    atorvastatin (LIPITOR) 10 MG tablet, Take 1 tablet by mouth every night at bedtime., Disp: 90 tablet, Rfl: 1    azelastine (ASTELIN) 0.1 % nasal spray, , Disp: , Rfl:     Cholecalciferol 2000 units capsule, Take  by mouth., Disp: , Rfl:     Chromium 200 MCG capsule, Take 100,000 mcg by mouth Daily., Disp: , Rfl:     coenzyme Q10 100 MG capsule, Take 1 capsule by mouth Daily., Disp: , Rfl:     dofetilide (TIKOSYN) 500 MCG capsule, Take 1 capsule by mouth 2 (Two) Times a Day., Disp: 180 capsule, Rfl: 1    Estrogens Conjugated (Premarin) 0.625 MG/GM vaginal cream, USE 1/2 GRAM PER VAGINA 2 " TO 3 TIMES A WEEK, Disp: 30 g, Rfl: 1    latanoprost (XALATAN) 0.005 % ophthalmic solution, Inject 1 drop into the eye Daily., Disp: , Rfl:     levothyroxine (SYNTHROID, LEVOTHROID) 150 MCG tablet, Take 1 tablet by mouth Daily., Disp: 90 tablet, Rfl: 1    Magnesium 200 MG tablet, Take  by mouth Daily., Disp: , Rfl:     Multiple Vitamins-Minerals (MULTIVITAMIN ADULT) tablet, MULTIVITAMIN ADULT TABS, Disp: , Rfl:     Omega-3 1000 MG capsule, Take  by mouth Daily., Disp: , Rfl:     spironolactone (ALDACTONE) 50 MG tablet, Take 1 tablet by mouth Daily., Disp: 90 tablet, Rfl: 1    telmisartan (MICARDIS) 80 MG tablet, Take 1 tablet by mouth Daily., Disp: 90 tablet, Rfl: 1    vitamin B-12 (CYANOCOBALAMIN) 1000 MCG tablet, Take 1 tablet by mouth Daily., Disp: , Rfl:     amLODIPine (NORVASC) 10 MG tablet, Take 1 tablet by mouth Daily., Disp: 30 tablet, Rfl: 11    EPINEPHrine (EPIPEN) 0.3 MG/0.3ML solution auto-injector injection, USE AS DIRECTED FOR ACUTE ALLERGIC REACTION (Patient not taking: Reported on 10/28/2024), Disp: , Rfl: 3    Family History:  Family History   Problem Relation Age of Onset    Heart failure Mother     Alcohol abuse Mother     Early death Mother     Miscarriages / Stillbirths Mother     No Known Problems Father        Past Medical History:  Past Medical History:   Diagnosis Date    A-fib Since 2001 Dx in [place]    Abnormal ECG 2009    Allergic 2014    Seasonal    Allergic rhinitis     Hx Allergy Shots x 4 Yrs    Aortic valve calcification 12/04/2018    Noted on SARA    Arrhythmia     Arthritis     Lumbar Spine    Atrial flutter     Breast mass seen on mammogram 03/19/2014    L 4CM Mass--Benign & Followed    Bruxism, sleep-related     Cataract     Chronic anticoagulation     Eliquis     Chronic low back pain     Hx Back Sx in 2016    Colon polyp     Coronary artery disease 2009    AFIB    DJD (degenerative joint disease), lumbar     Dyslipidemia     w/Hypertriglyceridemia--Statin/Fish Oils    Family history  of premature CAD     Glaucoma     Heart disease     History of bone density study 03/19/14-[facility]    T-Score of 1.4 Indicating Osteopenia    History of EKG 04/2018 & 08/2018 & 12/2018    First Degree AV Block/Multiple Atrial Premature Blocks Noted on All    Hormone replacement therapy (HRT)     Premarin-0.625MG    Hyperlipidemia     Controlled w/Meds    Hypertension     Controlled w/Losartan    Hypothyroidism     Synthroid    Insomnia     Takes OTC Sleep Aid PRN    Left atrial enlargement 12/04/2018    Severe Noted on Cardioversion Report/SARA    Left breast mass 03/19/2014    4CM Noted on Mammogram--Benign     Mild tricuspid valve regurgitation 12/04/2018    Noted on SARA    Mitral valve annular calcification 12/04/2018    Moderate Noted on SARA    Mitral valve regurgitation 12/04/2018    Moderate Noted on SARA    Obesity 12/04/2018    BMI-43.3     SHANAE on CPAP 11/27/2018    AHI 10.5/h, supine 30/h    Osteoarthritis of right hip 07/09/2019    Osteopenia 03/19/2014    Noted on Dexa Report    Restless sleeper     Tosses and Turns All Night Per Pt    Sinus congestion     Tricuspid leaflet thickened 12/04/2018    Noted on SARA    Urinary tract infection        Past Surgical History:  Past Surgical History:   Procedure Laterality Date    ABLATION OF DYSRHYTHMIC FOCUS  2018    APPENDECTOMY      BACK SURGERY  2016    Spinal Fusion    BLADDER REPAIR  2001 in [place]    CARDIAC CATHETERIZATION  2015    CARDIOVERSION  08/7/18 & 4/17/18-[facility]    Dr. Villegas--For Chronic A-Fib    CARDIOVERSION  12/4/28-[facility]    Dr. Case--See Report    CATARACT EXTRACTION  2007    COLONOSCOPY  04/13/2017    HYSTERECTOMY      Partial    KNEE SURGERY Right 2009    OTHER SURGICAL HISTORY  12/4/18-[facility]    Fluoroscopy/Trans-Septal Access to L Atrium/Selective Venography of L Superior Pulm Vein/Add Lienier Radiofrequency Ablation Along the Coronary Sinus/Synchronized Cardioversion--Dr. Case    REPLACEMENT TOTAL KNEE Right 08/30/2021    Dr. Astorga    SPINE SURGERY       SARA  12/4/18-[facility]    Mild Concentric L Ventricular Hypertrophy Noted; EF 55-60%; L Atrial Moderate-Severely Dilated, Moderat Mitral Annular Calcification with Moderate MVR Noted; Mild TVR; Normal LV Function    TONSILLECTOMY      TOTAL HIP ARTHROPLASTY Right 07/06/2020    Dr. Astorga       Social History:  Social History     Socioeconomic History    Marital status:      Spouse name: Papi    Number of children: 1   Tobacco Use    Smoking status: Never     Passive exposure: Never    Smokeless tobacco: Never   Vaping Use    Vaping status: Never Used   Substance and Sexual Activity    Alcohol use: Yes     Alcohol/week: 4.0 standard drinks of alcohol     Types: 2 Glasses of wine, 2 Drinks containing 0.5 oz of alcohol per week     Comment: Infrequently    Drug use: Never    Sexual activity: Yes     Partners: Male     Birth control/protection: Hysterectomy, Surgical     Comment: Hyst       Review of Systems:  The following systems were reviewed as they relate to the cardiovascular system: Constitutional, Eyes, ENT, Cardiovascular, Respiratory, Gastrointestinal, Integumentary, Neurological, Psychiatric, Hematologic, Endocrine, Musculoskeletal, and Genitourinary. The pertinent cardiovascular findings are reported above with all other cardiovascular points within those systems being negative.    Diagnostic Study Review:     Current Electrocardiogram:    ECG 12 Lead    Date/Time: 10/28/2024 3:02 PM  Performed by: Sam Faulkner MD    Authorized by: Sam Faulkner MD  Rhythm: sinus rhythm  Rate: normal  QRS axis: normal  Other findings: non-specific ST-T wave changes    Clinical impression: non-specific ECG      Laboratory Data:  Lab Results   Component Value Date    GLUCOSE 96 07/24/2024    BUN 19 07/24/2024    CREATININE 0.96 07/24/2024    EGFRIFNONA 55 (L) 10/11/2021    EGFRIFAFRI >60 07/13/2021    BCR 19.8 07/24/2024    K 4.6 07/24/2024    CO2 25.0 07/24/2024     CALCIUM 10.0 07/24/2024    ALBUMIN 4.3 07/24/2024    LABIL2 1.5 12/03/2018    AST 25 07/24/2024    ALT 14 07/24/2024     Lab Results   Component Value Date    GLUCOSE 96 07/24/2024    CALCIUM 10.0 07/24/2024     07/24/2024    K 4.6 07/24/2024    CO2 25.0 07/24/2024     07/24/2024    BUN 19 07/24/2024    CREATININE 0.96 07/24/2024    EGFRIFAFRI >60 07/13/2021    EGFRIFNONA 55 (L) 10/11/2021    BCR 19.8 07/24/2024    ANIONGAP 12.0 07/24/2024     Lab Results   Component Value Date    WBC 5.33 07/24/2024    HGB 13.5 07/24/2024    HCT 41.9 07/24/2024    MCV 92.3 07/24/2024     07/24/2024     Lab Results   Component Value Date    CHOL 169 07/24/2024    TRIG 57 07/24/2024    HDL 72 (H) 07/24/2024    LDL 86 07/24/2024     Lab Results   Component Value Date    HGBA1C 5.70 (H) 07/24/2024     Lab Results   Component Value Date    INR 1.3 11/15/2023    INR 1.1 12/03/2018    INR 1.0 08/07/2018    PROTIME 15.3 (H) 11/15/2023    PROTIME 11.0 12/03/2018    PROTIME 11.0 08/07/2018       Most Recent Echo:  Results for orders placed in visit on 12/04/18    SCANNED - ECHOCARDIOGRAM       Most Recent Stress Test:       Most Recent Cardiac Catheterization:   No results found for this or any previous visit.       NOTE: The following portions of the patient's note were reviewed, confirmed and/or updated this visit as appropriate: History of present illness/Interval history, physical examination, assessment & plan, allergies, current medications, past family history, past medical history, past social history, past surgical history and problem list.    Labs pertinent to today's visit on 10/28/2024 (including but not limited to CBC, CMP, and lipid profiles) were requested from the patient's primary care provider/hospital/clinical laboratory.  If the labs were available for the visit, they were reviewed with the patient.  If they were not available, when received, special interest will be made to the labs pertinent to this  "visit.  The patient's most recent \"in-house\" labs are noted below and have been reviewed.  Outside labs pertinent to this visit are scanned into the record and have been reviewed.    Discussions held today, 10/28/2024,regarding procedures included risk, benefits, and options including but not limited to: Death, MI, stroke, pain, bleeding, infection, and possible need for vascular/thoracic/cardiothoracic surgery.    Copied information within this note was reviewed and is current as of 10/28/2024.    Assessment and plan noted herein represents the current plan of care as of 10/28/2024.    Significant resources from our office and staff are inherent in engaging this patient in a continuous and active collaborative plan of care related to their chronic cardiovascular conditions outlined herein.  The management of these conditions requires the direction of our service with specialized clinical knowledge, skills, and experience.  This collaborative care includes but is not limited to patient education, expectations and responsibilities, shared decision making around therapeutic goals, and shared commitments to achieve those goals.  "

## 2024-10-28 NOTE — PATIENT INSTRUCTIONS
Thank you for following up with cardiology today.  We encourage you to continue taking her medications and engaging healthy lifestyle habits including physical activity and diet with low sodium levels.  We will obtain an echocardiogram to better evaluate your heart.  If you have any questions please let us know.

## 2024-11-11 ENCOUNTER — HOSPITAL ENCOUNTER (OUTPATIENT)
Dept: CARDIOLOGY | Facility: HOSPITAL | Age: 79
Discharge: HOME | End: 2024-11-11
Admitting: STUDENT IN AN ORGANIZED HEALTH CARE EDUCATION/TRAINING PROGRAM
Payer: MEDICARE

## 2024-11-11 VITALS
BODY MASS INDEX: 37.49 KG/M2 | HEIGHT: 65 IN | WEIGHT: 225 LBS | SYSTOLIC BLOOD PRESSURE: 147 MMHG | DIASTOLIC BLOOD PRESSURE: 81 MMHG | HEART RATE: 77 BPM

## 2024-11-11 DIAGNOSIS — I48.0 PAF (PAROXYSMAL ATRIAL FIBRILLATION): ICD-10-CM

## 2024-11-11 PROCEDURE — 93306 TTE W/DOPPLER COMPLETE: CPT | Performed by: STUDENT IN AN ORGANIZED HEALTH CARE EDUCATION/TRAINING PROGRAM

## 2024-11-11 PROCEDURE — 93306 TTE W/DOPPLER COMPLETE: CPT

## 2024-11-12 LAB
BH CV ECHO MEAS - AO MAX PG: 8 MMHG
BH CV ECHO MEAS - AO MEAN PG: 4.6 MMHG
BH CV ECHO MEAS - AO ROOT DIAM: 3.2 CM
BH CV ECHO MEAS - AO V2 MAX: 141.3 CM/SEC
BH CV ECHO MEAS - AO V2 VTI: 29.2 CM
BH CV ECHO MEAS - AVA(I,D): 3.9 CM2
BH CV ECHO MEAS - EDV(CUBED): 73.5 ML
BH CV ECHO MEAS - EDV(MOD-SP4): 50 ML
BH CV ECHO MEAS - EF(MOD-BP): 54 %
BH CV ECHO MEAS - EF(MOD-SP4): 54.3 %
BH CV ECHO MEAS - ESV(CUBED): 22.1 ML
BH CV ECHO MEAS - ESV(MOD-SP4): 22.8 ML
BH CV ECHO MEAS - FS: 33 %
BH CV ECHO MEAS - IVS/LVPW: 0.82 CM
BH CV ECHO MEAS - IVSD: 0.91 CM
BH CV ECHO MEAS - LA DIMENSION: 4.1 CM
BH CV ECHO MEAS - LV MASS(C)D: 139.9 GRAMS
BH CV ECHO MEAS - LV MAX PG: 6.1 MMHG
BH CV ECHO MEAS - LV MEAN PG: 3.4 MMHG
BH CV ECHO MEAS - LV V1 MAX: 123.4 CM/SEC
BH CV ECHO MEAS - LV V1 VTI: 30.1 CM
BH CV ECHO MEAS - LVIDD: 4.2 CM
BH CV ECHO MEAS - LVIDS: 2.8 CM
BH CV ECHO MEAS - LVOT AREA: 3.7 CM2
BH CV ECHO MEAS - LVOT DIAM: 2.18 CM
BH CV ECHO MEAS - LVPWD: 1.12 CM
BH CV ECHO MEAS - MV A MAX VEL: 58.1 CM/SEC
BH CV ECHO MEAS - MV DEC SLOPE: 269.7 CM/SEC2
BH CV ECHO MEAS - MV DEC TIME: 0.23 SEC
BH CV ECHO MEAS - MV E MAX VEL: 62.2 CM/SEC
BH CV ECHO MEAS - MV E/A: 1.07
BH CV ECHO MEAS - MV MAX PG: 2.25 MMHG
BH CV ECHO MEAS - MV MEAN PG: 1.01 MMHG
BH CV ECHO MEAS - MV V2 VTI: 22.7 CM
BH CV ECHO MEAS - MVA(VTI): 5 CM2
BH CV ECHO MEAS - PA V2 MAX: 91.7 CM/SEC
BH CV ECHO MEAS - PI END-D VEL: 99.7 CM/SEC
BH CV ECHO MEAS - RV MAX PG: 1.76 MMHG
BH CV ECHO MEAS - RV V1 MAX: 66.3 CM/SEC
BH CV ECHO MEAS - RV V1 VTI: 13 CM
BH CV ECHO MEAS - SV(LVOT): 112.3 ML
BH CV ECHO MEAS - SV(MOD-SP4): 27.2 ML
BH CV ECHO MEAS - TR MAX PG: 20.3 MMHG
BH CV ECHO MEAS - TR MAX VEL: 225.5 CM/SEC

## 2025-01-15 ENCOUNTER — TELEPHONE (OUTPATIENT)
Dept: CARDIOLOGY | Facility: CLINIC | Age: 80
End: 2025-01-15
Payer: MEDICARE

## 2025-01-15 NOTE — TELEPHONE ENCOUNTER
"Caller: Sheree Vance \"Sheree Vance\"    Relationship to patient: Self    Best call back number: [phone]     Patient is needing: PT STATED UNDER [place] WILL COST A FORTUNE AND SHE WAS RECOMMENDED A [facility]. THE PHARMACY SERVICE IS NEEDING DR GANT'S APPROVAL FOR THIS.     [facility]'S PHONE NUMBER [phone], AND THERE FAX # [phone]    "

## 2025-01-17 NOTE — TELEPHONE ENCOUNTER
"Caller: Sheree Vance \"Sheree Vance\"     Relationship to patient: Self     Best call back number: [phone]     PT CALLING TO CHECK ON REQUEST  "

## 2025-01-24 ENCOUNTER — OFFICE VISIT (OUTPATIENT)
Dept: FAMILY MEDICINE CLINIC | Facility: CLINIC | Age: 80
End: 2025-01-24
Payer: MEDICARE

## 2025-01-24 ENCOUNTER — LAB (OUTPATIENT)
Dept: FAMILY MEDICINE CLINIC | Facility: CLINIC | Age: 80
End: 2025-01-24
Payer: MEDICARE

## 2025-01-24 VITALS
HEART RATE: 73 BPM | OXYGEN SATURATION: 96 % | HEIGHT: 65 IN | DIASTOLIC BLOOD PRESSURE: 77 MMHG | TEMPERATURE: 98.4 F | WEIGHT: 227.5 LBS | RESPIRATION RATE: 16 BRPM | BODY MASS INDEX: 37.9 KG/M2 | SYSTOLIC BLOOD PRESSURE: 132 MMHG

## 2025-01-24 DIAGNOSIS — Z00.00 MEDICARE ANNUAL WELLNESS VISIT, SUBSEQUENT: Primary | ICD-10-CM

## 2025-01-24 DIAGNOSIS — R73.03 PREDIABETES: ICD-10-CM

## 2025-01-24 DIAGNOSIS — I10 ESSENTIAL HYPERTENSION: ICD-10-CM

## 2025-01-24 PROBLEM — Z01.818 PRE-OP EXAM: Status: RESOLVED | Noted: 2021-07-02 | Resolved: 2025-01-24

## 2025-01-24 PROBLEM — R30.0 DYSURIA: Status: RESOLVED | Noted: 2019-12-30 | Resolved: 2025-01-24

## 2025-01-24 PROBLEM — R00.2 PALPITATIONS: Status: RESOLVED | Noted: 2023-01-10 | Resolved: 2025-01-24

## 2025-01-24 PROBLEM — R73.9 HYPERGLYCEMIA: Status: RESOLVED | Noted: 2017-10-24 | Resolved: 2025-01-24

## 2025-01-24 LAB — HBA1C MFR BLD: 5 % (ref 4.8–5.6)

## 2025-01-24 PROCEDURE — 3078F DIAST BP <80 MM HG: CPT | Performed by: FAMILY MEDICINE

## 2025-01-24 PROCEDURE — 84443 ASSAY THYROID STIM HORMONE: CPT | Performed by: FAMILY MEDICINE

## 2025-01-24 PROCEDURE — 3075F SYST BP GE 130 - 139MM HG: CPT | Performed by: FAMILY MEDICINE

## 2025-01-24 PROCEDURE — 36415 COLL VENOUS BLD VENIPUNCTURE: CPT | Performed by: FAMILY MEDICINE

## 2025-01-24 PROCEDURE — G0439 PPPS, SUBSEQ VISIT: HCPCS | Performed by: FAMILY MEDICINE

## 2025-01-24 PROCEDURE — 80061 LIPID PANEL: CPT | Performed by: FAMILY MEDICINE

## 2025-01-24 PROCEDURE — 1170F FXNL STATUS ASSESSED: CPT | Performed by: FAMILY MEDICINE

## 2025-01-24 PROCEDURE — 1160F RVW MEDS BY RX/DR IN RCRD: CPT | Performed by: FAMILY MEDICINE

## 2025-01-24 PROCEDURE — 1159F MED LIST DOCD IN RCRD: CPT | Performed by: FAMILY MEDICINE

## 2025-01-24 PROCEDURE — 1126F AMNT PAIN NOTED NONE PRSNT: CPT | Performed by: FAMILY MEDICINE

## 2025-01-24 PROCEDURE — 83036 HEMOGLOBIN GLYCOSYLATED A1C: CPT | Performed by: FAMILY MEDICINE

## 2025-01-24 PROCEDURE — 80053 COMPREHEN METABOLIC PANEL: CPT | Performed by: FAMILY MEDICINE

## 2025-01-24 RX ORDER — TELMISARTAN 80 MG/1
80 TABLET ORAL DAILY
Qty: 90 TABLET | Refills: 1 | Status: SHIPPED | OUTPATIENT
Start: 2025-01-24

## 2025-01-24 RX ORDER — AMOXICILLIN 500 MG/1
500 CAPSULE ORAL 2 TIMES DAILY
COMMUNITY
Start: 2025-01-14

## 2025-01-24 NOTE — ASSESSMENT & PLAN NOTE
Orders:    telmisartan (MICARDIS) 80 MG tablet; Take 1 tablet by mouth Daily.    Comprehensive Metabolic Panel    Lipid Panel    TSH

## 2025-01-24 NOTE — PROGRESS NOTES
Subjective   The ABCs of the Annual Wellness Visit  Medicare Wellness Visit    Chief Complaint   Patient presents with    Medicare Wellness-subsequent       Sheree Vance is a 79 y.o. patient who presents for a Medicare Wellness Visit.  She lives with her , she is fully independent with her activities of daily living.  No issues with memory or depressive symptoms are being reported.  No frequent falls.  Patient is followed by several different specialist including cardiology and she has upcoming appointment. Patient does not report any chest pain, shortness of breath, dizziness, nausea, vomiting, or diarrhea, visual issues, headaches, numbness or tingling. No urinary issues reported like urgency, frequency, or discomfort upon urination.  No significant weight changes reported.  No swelling reported.  No rashes or any other skin issues reported. No emotional issues or insomnia.      The following portions of the patient's history were reviewed and   updated as appropriate: allergies, current medications, past family history, past medical history, past social history, past surgical history, and problem list.    Compared to one year ago, the patient's physical   health is the same.  Compared to one year ago, the patient's mental   health is the same.    Recent Hospitalizations:  She was not admitted to the hospital during the last year.     Current Medical Providers:  Patient Care Team:  Latia Guerrier MD as PCP - General  Latia Guerrier MD as PCP - Family Medicine  Dre Case MD (Cardiology)  Jet Godwin MD as Consulting Physician (Ophthalmology)  Antonio Fine MD as Consulting Physician (Allergy)  Sandor Astorga MD as Consulting Physician (Orthopedic Surgery)  Sam Faulkner MD as Consulting Physician (Cardiology)    Outpatient Medications Prior to Visit   Medication Sig Dispense Refill    albuterol sulfate  (90 Base) MCG/ACT inhaler        amLODIPine (NORVASC) 10 MG tablet Take 1 tablet by mouth Daily. 30 tablet 11    amoxicillin (AMOXIL) 500 MG capsule Take 1 capsule by mouth 2 (Two) Times a Day.      apixaban (Eliquis) 5 MG tablet tablet Take 1 tablet by mouth Every 12 (Twelve) Hours. 180 tablet 1    atorvastatin (LIPITOR) 10 MG tablet Take 1 tablet by mouth every night at bedtime. 90 tablet 1    azelastine (ASTELIN) 0.1 % nasal spray       Cholecalciferol 2000 units capsule Take  by mouth.      Chromium 200 MCG capsule Take 100,000 mcg by mouth Daily.      coenzyme Q10 100 MG capsule Take 1 capsule by mouth Daily.      dofetilide (TIKOSYN) 500 MCG capsule Take 1 capsule by mouth 2 (Two) Times a Day. 180 capsule 1    EPINEPHrine (EPIPEN) 0.3 MG/0.3ML solution auto-injector injection   3    Estrogens Conjugated (Premarin) 0.625 MG/GM vaginal cream USE 1/2 GRAM PER VAGINA 2 TO 3 TIMES A WEEK 30 g 1    latanoprost (XALATAN) 0.005 % ophthalmic solution Inject 1 drop into the eye Daily.      levothyroxine (SYNTHROID, LEVOTHROID) 150 MCG tablet Take 1 tablet by mouth Daily. 90 tablet 1    Magnesium 200 MG tablet Take  by mouth Daily.      Multiple Vitamins-Minerals (MULTIVITAMIN ADULT) tablet MULTIVITAMIN ADULT TABS      Omega-3 1000 MG capsule Take  by mouth Daily.      spironolactone (ALDACTONE) 50 MG tablet Take 1 tablet by mouth Daily. 90 tablet 1    vitamin B-12 (CYANOCOBALAMIN) 1000 MCG tablet Take 1 tablet by mouth Daily.      telmisartan (MICARDIS) 80 MG tablet Take 1 tablet by mouth Daily. 90 tablet 1     No facility-administered medications prior to visit.     No opioid medication identified on active medication list. I have reviewed chart for other potential  high risk medication/s and harmful drug interactions in the elderly.      Aspirin is not on active medication list.  Aspirin use is not indicated based on review of current medical condition/s. Risk of harm outweighs potential benefits.  .    Patient Active Problem List   Diagnosis     "Paroxysmal atrial fibrillation    Essential hypertension    Hyperlipidemia    Left ventricular diastolic dysfunction    Degenerative joint disease of right knee    Gastroesophageal reflux disease    Hypothyroidism    Knee pain, right    Lumbosacral spondylosis without myelopathy    Osteopenia    Lumbar radiculopathy    Spinal stenosis of lumbar region    Primary osteoarthritis of right hip    Arthritis of right sacroiliac joint    Allergic rhinitis    Visit for screening mammogram    Postmenopausal    Vitamin D deficiency    Atrophic vaginitis    Medicare annual wellness visit, subsequent    SHANAE (obstructive sleep apnea)    Osteoarthritis, generalized    Needs flu shot    Class 2 obesity    Rash    Tick bite of left lower leg    Visit for monitoring Tikosyn therapy    Callus of toe    Need for vaccination    Tick bite of left shoulder    Prediabetes     Advance Care Planning Advance Directive is not on file.  ACP discussion was held with the patient during this visit. Patient has an advance directive (not in EMR), copy requested.      Review of Systems   Constitutional:  Negative for activity change, fatigue and fever.   Respiratory:  Negative for cough, shortness of breath and wheezing.    Cardiovascular:  Negative for chest pain, palpitations and leg swelling.   Gastrointestinal:  Negative for constipation, diarrhea and indigestion.   Skin:  Negative for color change, dry skin and rash.   Neurological:  Negative for tremors and headache.           Objective   Vitals:    01/24/25 1252   BP: 132/77   BP Location: Left arm   Patient Position: Sitting   Cuff Size: Adult   Pulse: 73   Resp: 16   Temp: 98.4 °F (36.9 °C)   TempSrc: Infrared   SpO2: 96%   Weight: 103 kg (227 lb 8 oz)   Height: 165.1 cm (65\")   PainSc: 0-No pain       Estimated body mass index is 37.86 kg/m² as calculated from the following:    Height as of this encounter: 165.1 cm (65\").    Weight as of this encounter: 103 kg (227 lb 8 oz).       Physical " Exam  Vitals and nursing note reviewed.   Constitutional:       General: She is not in acute distress.     Appearance: Normal appearance. She is well-developed. She is not ill-appearing.   HENT:      Head: Normocephalic and atraumatic.   Cardiovascular:      Rate and Rhythm: Normal rate and regular rhythm.      Heart sounds: Normal heart sounds. No murmur heard.     No gallop.   Pulmonary:      Effort: Pulmonary effort is normal. No respiratory distress.      Breath sounds: Normal breath sounds. No wheezing, rhonchi or rales.   Chest:      Chest wall: No tenderness.   Musculoskeletal:      Cervical back: Normal range of motion and neck supple.   Neurological:      General: No focal deficit present.      Mental Status: She is alert and oriented to person, place, and time. Mental status is at baseline.   Psychiatric:         Mood and Affect: Mood normal.           Does the patient have evidence of cognitive impairment? No  Lab Results   Component Value Date    TRIG 46 2025    HDL 73 (H) 2025    LDL 93 2025    VLDL 9 2025    HGBA1C 5.00 2025                                                                                               Health  Risk Assessment    Smoking Status:  Social History     Tobacco Use   Smoking Status Never    Passive exposure: Never   Smokeless Tobacco Never     Alcohol Consumption:  Social History     Substance and Sexual Activity   Alcohol Use Yes    Alcohol/week: 4.0 standard drinks of alcohol    Types: 2 Glasses of wine, 2 Drinks containing 0.5 oz of alcohol per week    Comment: Infrequently       Fall Risk Screen  STEADI Fall Risk Assessment was completed, and patient is at LOW risk for falls.Assessment completed on:2025    Depression Screening   Little interest or pleasure in doing things? Not at all   Feeling down, depressed, or hopeless? Not at all   PHQ-2 Total Score 0      Health Habits and Functional and Cognitive Screenin/24/2025     1:00  PM   Functional & Cognitive Status   Do you have difficulty preparing food and eating? No   Do you have difficulty bathing yourself, getting dressed or grooming yourself? No   Do you have difficulty using the toilet? No   Do you have difficulty moving around from place to place? No   Do you have trouble with steps or getting out of a bed or a chair? No   Current Diet Well Balanced Diet   Dental Exam Up to date   Eye Exam Up to date   Exercise (times per week) 3 times per week   Current Exercises Include Stair Step Machine   Do you need help using the phone?  No   Are you deaf or do you have serious difficulty hearing?  No   Do you need help to go to places out of walking distance? No   Do you need help shopping? No   Do you need help preparing meals?  No   Do you need help with housework?  No   Do you need help with laundry? No   Do you need help taking your medications? No   Do you need help managing money? No   Do you ever drive or ride in a car without wearing a seat belt? No   Have you felt unusual stress, anger or loneliness in the last month? No   Who do you live with? Spouse   If you need help, do you have trouble finding someone available to you? No   Do you have difficulty concentrating, remembering or making decisions? No           Age-appropriate Screening Schedule:  Refer to the list below for future screening recommendations based on patient's age, sex and/or medical conditions. Orders for these recommended tests are listed in the plan section. The patient has been provided with a written plan.    Health Maintenance List  Health Maintenance   Topic Date Due    RSV Vaccine - Adults (1 - 1-dose 75+ series) Never done    DXA SCAN  11/12/2023    TDAP/TD VACCINES (2 - Td or Tdap) 04/14/2025    BMI FOLLOWUP  07/24/2025    ANNUAL WELLNESS VISIT  01/24/2026    LIPID PANEL  01/24/2026    HEPATITIS C SCREENING  Completed    COVID-19 Vaccine  Completed    INFLUENZA VACCINE  Completed    Pneumococcal Vaccine 65+   Completed    ZOSTER VACCINE  Completed    MAMMOGRAM  Discontinued    COLORECTAL CANCER SCREENING  Discontinued                                                                                                                                                CMS Preventative Services Quick Reference  Risk Factors Identified During Encounter  Fall Risk-High or Moderate: Discussed Fall Prevention in the home  Immunizations Discussed/Encouraged: COVID19 and RSV (Respiratory Syncytial Virus)  Dental Screening Recommended  Vision Screening Recommended    The above risks/problems have been discussed with the patient.  Pertinent information has been shared with the patient in the After Visit Summary.  An After Visit Summary and PPPS were made available to the patient.    Follow Up:     Next Medicare Wellness visit to be scheduled in 1 year.     Assessment & Plan  Medicare annual wellness visit, subsequent    Essential hypertension      Orders:    telmisartan (MICARDIS) 80 MG tablet; Take 1 tablet by mouth Daily.    Comprehensive Metabolic Panel    Lipid Panel    TSH    Prediabetes    Orders:    Hemoglobin A1c       Medicare wellness exam was done today.  I reviewed her health maintenance.  I will be getting fasting blood work.  I also reviewed her health maintenance.  Her last mammogram was in 3/2024 and the new order for mammogram was given so patient can schedule it down the road.  Her last DEXA scan was in 11/2023 and showed some mild bone loss in osteopenia range.  Her last colonoscopy was in 4/2017 and no recall for colonoscopy was recommended.  Immunization was also reviewed and recommended including RSV vaccine and patient was advised to schedule this through the pharmacy.  Healthy lifestyle was reinforced.    Follow Up:     Return in about 6 months (around 7/24/2025) for Next scheduled follow up.    Requested Prescriptions     Signed Prescriptions Disp Refills    telmisartan (MICARDIS) 80 MG tablet 90 tablet 1     Sig:  Take 1 tablet by mouth Daily.       Latia Guerrier MD  01/24/2025  12:56 EST

## 2025-01-25 PROBLEM — R73.03 PREDIABETES: Status: ACTIVE | Noted: 2025-01-25

## 2025-01-25 LAB
ALBUMIN SERPL-MCNC: 4.1 G/DL (ref 3.5–5.2)
ALBUMIN/GLOB SERPL: 1.7 G/DL
ALP SERPL-CCNC: 202 U/L (ref 39–117)
ALT SERPL W P-5'-P-CCNC: 19 U/L (ref 1–33)
ANION GAP SERPL CALCULATED.3IONS-SCNC: 12.7 MMOL/L (ref 5–15)
AST SERPL-CCNC: 27 U/L (ref 1–32)
BILIRUB SERPL-MCNC: 0.5 MG/DL (ref 0–1.2)
BUN SERPL-MCNC: 20 MG/DL (ref 8–23)
BUN/CREAT SERPL: 22.5 (ref 7–25)
CALCIUM SPEC-SCNC: 9.8 MG/DL (ref 8.6–10.5)
CHLORIDE SERPL-SCNC: 101 MMOL/L (ref 98–107)
CHOLEST SERPL-MCNC: 175 MG/DL (ref 0–200)
CO2 SERPL-SCNC: 25.3 MMOL/L (ref 22–29)
CREAT SERPL-MCNC: 0.89 MG/DL (ref 0.57–1)
EGFRCR SERPLBLD CKD-EPI 2021: 66.9 ML/MIN/1.73
GLOBULIN UR ELPH-MCNC: 2.4 GM/DL
GLUCOSE SERPL-MCNC: 89 MG/DL (ref 65–99)
HDLC SERPL-MCNC: 73 MG/DL (ref 40–60)
LDLC SERPL CALC-MCNC: 93 MG/DL (ref 0–100)
LDLC/HDLC SERPL: 1.27 {RATIO}
POTASSIUM SERPL-SCNC: 4.5 MMOL/L (ref 3.5–5.2)
PROT SERPL-MCNC: 6.5 G/DL (ref 6–8.5)
SODIUM SERPL-SCNC: 139 MMOL/L (ref 136–145)
TRIGL SERPL-MCNC: 46 MG/DL (ref 0–150)
TSH SERPL DL<=0.05 MIU/L-ACNC: 3.8 UIU/ML (ref 0.27–4.2)
VLDLC SERPL-MCNC: 9 MG/DL (ref 5–40)

## 2025-01-27 ENCOUNTER — OFFICE VISIT (OUTPATIENT)
Dept: CARDIOLOGY | Facility: CLINIC | Age: 80
End: 2025-01-27
Payer: MEDICARE

## 2025-01-27 VITALS
SYSTOLIC BLOOD PRESSURE: 130 MMHG | BODY MASS INDEX: 38.32 KG/M2 | HEIGHT: 65 IN | OXYGEN SATURATION: 98 % | DIASTOLIC BLOOD PRESSURE: 80 MMHG | WEIGHT: 230 LBS | HEART RATE: 72 BPM

## 2025-01-27 DIAGNOSIS — E78.2 MIXED HYPERLIPIDEMIA: ICD-10-CM

## 2025-01-27 DIAGNOSIS — I51.9 LEFT VENTRICULAR DIASTOLIC DYSFUNCTION: ICD-10-CM

## 2025-01-27 DIAGNOSIS — R74.8 ELEVATED LIVER ENZYMES: Primary | ICD-10-CM

## 2025-01-27 DIAGNOSIS — I48.0 PAROXYSMAL ATRIAL FIBRILLATION: ICD-10-CM

## 2025-01-27 DIAGNOSIS — I10 ESSENTIAL HYPERTENSION: Primary | ICD-10-CM

## 2025-01-27 DIAGNOSIS — E66.812 CLASS 2 OBESITY: ICD-10-CM

## 2025-01-27 DIAGNOSIS — R73.03 PREDIABETES: ICD-10-CM

## 2025-01-27 RX ORDER — ATORVASTATIN CALCIUM 10 MG/1
10 TABLET, FILM COATED ORAL
Qty: 90 TABLET | Refills: 1 | Status: SHIPPED | OUTPATIENT
Start: 2025-01-27

## 2025-01-27 RX ORDER — SPIRONOLACTONE 50 MG/1
50 TABLET, FILM COATED ORAL DAILY
Qty: 90 TABLET | Refills: 1 | Status: SHIPPED | OUTPATIENT
Start: 2025-01-27

## 2025-01-27 NOTE — PROGRESS NOTES
Patient  has seen results on mychart and has no questions. Patient wants to get the ultrasound done    abnormal lab result Never smoker

## 2025-01-27 NOTE — PROGRESS NOTES
Cardiology Office Visit      Encounter Date:  2025    PATIENT IDENTIFICATION    Name: Sheree Vance  Age: 79 y.o. Sex: female : 1945  MRN: [id number]    Reason For Followup:  Hypertension    Brief Clinical History:  Patient is a 79-year-old female with obesity BMI 38, hypertension, hyperlipidemia, paroxysmal atrial fibrillation status post PVI in  currently sinus rhythm on Tikosyn,  and symptomatic sinus bradycardia related to beta-blocker.      During our last visit in 2024 she complained of not feeling well while on metoprolol, apparently with symptomatic bradycardia.  Her blood pressure was significant elevated at 180/90 mmHg. At that time we stopped metoprolol and started amlodipine 10 mg daily.  In today's visit, she reports significant improvement of her symptoms.  Blood pressure well-controlled at 130/80.  She denies chest pain, shortness of breath, palpitations, dizziness, lightheadedness, presyncope or syncope.    Her current medications amlodipine 10 mg daily, dofetilide 500 mg twice daily, Eliquis 5 twice daily, atorvastatin 10, spironolactone 50, telmisartan 80.    Most recent cardiac workup:  TTE 2024    Left ventricular systolic function is normal. Calculated left ventricular EF = 54%    Left ventricular diastolic function was normal.    Normal RV systolic function.    No significant valvular disease.    Assessment & Plan    Impressions:  Essential hypertension  Hyperlipidemia  Prediabetes  Paroxysmal A-fib status post ablation , currently sinus rhythm on Tikosyn   Sinus bradycardia, related to beta-blocker  Obesity, BMI 38    Recommendations:  Continue dofetilide 500 mg twice daily, patient currently sinus rhythm  Continue coagulation given elevated [id number] score  Continue current antihypertensive regimen with amlodipine, spironolactone telmisartan  Continue atorvastatin for hyperlipidemia  Encourage patient to engage in lifestyle changes including  "healthier diet and weight loss    Diagnoses and all orders for this visit:    1. Essential hypertension (Primary)  -     spironolactone (ALDACTONE) 50 MG tablet; Take 1 tablet by mouth Daily.  Dispense: 90 tablet; Refill: 1    2. Mixed hyperlipidemia  -     atorvastatin (LIPITOR) 10 MG tablet; Take 1 tablet by mouth every night at bedtime.  Dispense: 90 tablet; Refill: 1          Objective:    Vitals:  Vitals:    01/27/25 1355   BP: 130/80   Pulse: 72   SpO2: 98%   Weight: 104 kg (230 lb)   Height: 165.1 cm (65\")     Body mass index is 38.27 kg/m².      Physical Exam:  General: Alert, cooperative, no distress, appears stated age  Lungs:  Clear to auscultation bilaterally, no wheezes, rhonchi or rales are noted  Chest wall: No tenderness  Heart::  Regular rate and rhythm, S1 and S2 normal, no murmur.  No rub or gallop  Abdomen: Soft, nontender, nondistended, bowel sounds active  Extremities: No cyanosis, clubbing, or edema  Pulses: 2+ and symmetric all extremities  Neuro/psych: No gross focal deficits    Advance Care Planning   ACP discussion was held with the patient during this visit. Patient has an advance directive (not in EMR), copy requested.         Allergies:  Allergies   Allergen Reactions    Amlodipine Swelling       Medication Review:     Current Outpatient Medications:     albuterol sulfate  (90 Base) MCG/ACT inhaler, , Disp: , Rfl:     amLODIPine (NORVASC) 10 MG tablet, Take 1 tablet by mouth Daily., Disp: 30 tablet, Rfl: 11    amoxicillin (AMOXIL) 500 MG capsule, Take 1 capsule by mouth 2 (Two) Times a Day., Disp: , Rfl:     apixaban (Eliquis) 5 MG tablet tablet, Take 1 tablet by mouth Every 12 (Twelve) Hours., Disp: 180 tablet, Rfl: 1    atorvastatin (LIPITOR) 10 MG tablet, Take 1 tablet by mouth every night at bedtime., Disp: 90 tablet, Rfl: 1    azelastine (ASTELIN) 0.1 % nasal spray, , Disp: , Rfl:     Cholecalciferol 2000 units capsule, Take  by mouth., Disp: , Rfl:     Chromium 200 MCG " capsule, Take 100,000 mcg by mouth Daily., Disp: , Rfl:     coenzyme Q10 100 MG capsule, Take 1 capsule by mouth Daily., Disp: , Rfl:     dofetilide (TIKOSYN) 500 MCG capsule, Take 1 capsule by mouth 2 (Two) Times a Day., Disp: 180 capsule, Rfl: 1    EPINEPHrine (EPIPEN) 0.3 MG/0.3ML solution auto-injector injection, , Disp: , Rfl: 3    Estrogens Conjugated (Premarin) 0.625 MG/GM vaginal cream, USE 1/2 GRAM PER VAGINA 2 TO 3 TIMES A WEEK, Disp: 30 g, Rfl: 1    latanoprost (XALATAN) 0.005 % ophthalmic solution, Inject 1 drop into the eye Daily., Disp: , Rfl:     levothyroxine (SYNTHROID, LEVOTHROID) 150 MCG tablet, Take 1 tablet by mouth Daily., Disp: 90 tablet, Rfl: 1    Magnesium 200 MG tablet, Take  by mouth Daily., Disp: , Rfl:     Multiple Vitamins-Minerals (MULTIVITAMIN ADULT) tablet, MULTIVITAMIN ADULT TABS, Disp: , Rfl:     Omega-3 1000 MG capsule, Take  by mouth Daily., Disp: , Rfl:     spironolactone (ALDACTONE) 50 MG tablet, Take 1 tablet by mouth Daily., Disp: 90 tablet, Rfl: 1    telmisartan (MICARDIS) 80 MG tablet, Take 1 tablet by mouth Daily., Disp: 90 tablet, Rfl: 1    vitamin B-12 (CYANOCOBALAMIN) 1000 MCG tablet, Take 1 tablet by mouth Daily., Disp: , Rfl:     Family History:  Family History   Problem Relation Age of Onset    Heart failure Mother     Alcohol abuse Mother     Early death Mother     Miscarriages / Stillbirths Mother     No Known Problems Father        Past Medical History:  Past Medical History:   Diagnosis Date    A-fib Since 2001 Dx in [place]    Abnormal ECG 2009    Allergic 2014    Seasonal    Allergic rhinitis     Hx Allergy Shots x 4 Yrs    Aortic valve calcification 12/04/2018    Noted on SARA    Arrhythmia     Arthritis     Lumbar Spine    Atrial flutter     Breast mass seen on mammogram 03/19/2014    L 4CM Mass--Benign & Followed    Bruxism, sleep-related     Cataract     Chronic anticoagulation     Eliquis     Chronic low back pain     Hx Back Sx in 2016    Colon polyp      Coronary artery disease 2009    AFIB    DJD (degenerative joint disease), lumbar     Dyslipidemia     w/Hypertriglyceridemia--Statin/Fish Oils    Family history of premature CAD     Glaucoma     Heart disease     History of bone density study 03/19/14-[facility]    T-Score of 1.4 Indicating Osteopenia    History of EKG 04/2018 & 08/2018 & 12/2018    First Degree AV Block/Multiple Atrial Premature Blocks Noted on All    Hormone replacement therapy (HRT)     Premarin-0.625MG    Hyperlipidemia     Controlled w/Meds    Hypertension     Controlled w/Losartan    Hypothyroidism     Synthroid    Insomnia     Takes OTC Sleep Aid PRN    Left atrial enlargement 12/04/2018    Severe Noted on Cardioversion Report/SARA    Left breast mass 03/19/2014    4CM Noted on Mammogram--Benign     Mild tricuspid valve regurgitation 12/04/2018    Noted on SARA    Mitral valve annular calcification 12/04/2018    Moderate Noted on SARA    Mitral valve regurgitation 12/04/2018    Moderate Noted on SARA    Obesity 12/04/2018    BMI-43.3     SHANAE on CPAP 11/27/2018    AHI 10.5/h, supine 30/h    Osteoarthritis of right hip 07/09/2019    Osteopenia 03/19/2014    Noted on Dexa Report    Restless sleeper     Tosses and Turns All Night Per Pt    Sinus congestion     Tricuspid leaflet thickened 12/04/2018    Noted on SARA    Urinary tract infection        Past Surgical History:  Past Surgical History:   Procedure Laterality Date    ABLATION OF DYSRHYTHMIC FOCUS  2018    APPENDECTOMY      BACK SURGERY  2016    Spinal Fusion    BLADDER REPAIR  2001 in [place]    CARDIAC CATHETERIZATION  2015    CARDIOVERSION  08/7/18 & 4/17/18-[facility]    Dr. Villegas--For Chronic A-Fib    CARDIOVERSION  12/4/28-[facility]    Dr. Case--See Report    CATARACT EXTRACTION  2007    COLONOSCOPY  04/13/2017    HYSTERECTOMY      Partial    JOINT REPLACEMENT  2020 2021    KNEE SURGERY Right 2009    OTHER SURGICAL HISTORY  12/4/18-[facility]    Fluoroscopy/Trans-Septal Access to L Atrium/Selective Venography of L  Superior Pulm Vein/Add Lienier Radiofrequency Ablation Along the Coronary Sinus/Synchronized Cardioversion--Dr. Case    REPLACEMENT TOTAL KNEE Right 08/30/2021    Dr. Astorga    SPINE SURGERY      SARA  12/4/18-[facility]    Mild Concentric L Ventricular Hypertrophy Noted; EF 55-60%; L Atrial Moderate-Severely Dilated, Moderat Mitral Annular Calcification with Moderate MVR Noted; Mild TVR; Normal LV Function    TONSILLECTOMY      TOTAL HIP ARTHROPLASTY Right 07/06/2020    Dr. Astorga       Social History:  Social History     Socioeconomic History    Marital status:      Spouse name: Papi    Number of children: 1   Tobacco Use    Smoking status: Never     Passive exposure: Never    Smokeless tobacco: Never   Vaping Use    Vaping status: Never Used   Substance and Sexual Activity    Alcohol use: Yes     Alcohol/week: 4.0 standard drinks of alcohol     Types: 2 Glasses of wine, 2 Drinks containing 0.5 oz of alcohol per week     Comment: Infrequently    Drug use: Never    Sexual activity: Yes     Partners: Male     Birth control/protection: Hysterectomy, Surgical     Comment: Hyst       Review of Systems:  The following systems were reviewed as they relate to the cardiovascular system: Constitutional, Eyes, ENT, Cardiovascular, Respiratory, Gastrointestinal, Integumentary, Neurological, Psychiatric, Hematologic, Endocrine, Musculoskeletal, and Genitourinary. The pertinent cardiovascular findings are reported above with all other cardiovascular points within those systems being negative.    Diagnostic Study Review:         Laboratory Data:  Lab Results   Component Value Date    GLUCOSE 89 01/24/2025    BUN 20 01/24/2025    CREATININE 0.89 01/24/2025    EGFRIFNONA 55 (L) 10/11/2021    EGFRIFAFRI >60 07/13/2021    BCR 22.5 01/24/2025    K 4.5 01/24/2025    CO2 25.3 01/24/2025    CALCIUM 9.8 01/24/2025    ALBUMIN 4.1 01/24/2025    LABIL2 1.5 12/03/2018    AST 27 01/24/2025    ALT 19 01/24/2025     Lab Results   Component  Value Date    GLUCOSE 89 01/24/2025    CALCIUM 9.8 01/24/2025     01/24/2025    K 4.5 01/24/2025    CO2 25.3 01/24/2025     01/24/2025    BUN 20 01/24/2025    CREATININE 0.89 01/24/2025    EGFRIFAFRI >60 07/13/2021    EGFRIFNONA 55 (L) 10/11/2021    BCR 22.5 01/24/2025    ANIONGAP 12.7 01/24/2025     Lab Results   Component Value Date    WBC 5.33 07/24/2024    HGB 13.5 07/24/2024    HCT 41.9 07/24/2024    MCV 92.3 07/24/2024     07/24/2024     Lab Results   Component Value Date    CHOL 175 01/24/2025    TRIG 46 01/24/2025    HDL 73 (H) 01/24/2025    LDL 93 01/24/2025     Lab Results   Component Value Date    HGBA1C 5.00 01/24/2025     Lab Results   Component Value Date    INR 1.3 11/15/2023    INR 1.1 12/03/2018    INR 1.0 08/07/2018    PROTIME 15.3 (H) 11/15/2023    PROTIME 11.0 12/03/2018    PROTIME 11.0 08/07/2018       Most Recent Echo:  Results for orders placed during the hospital encounter of 11/11/24    Adult Transthoracic Echo Complete W/ Cont if Necessary Per Protocol    Interpretation Summary    Left ventricular systolic function is normal. Calculated left ventricular EF = 54%    Left ventricular diastolic function was normal.    Normal RV systolic function.    No significant valvular disease.    Electronically signed by Sam Faulkner MD, 11/12/24, 8:46 AM EST.       Most Recent Stress Test:       Most Recent Cardiac Catheterization:   No results found for this or any previous visit.       NOTE: The following portions of the patient's note were reviewed, confirmed and/or updated this visit as appropriate: History of present illness/Interval history, physical examination, assessment & plan, allergies, current medications, past family history, past medical history, past social history, past surgical history and problem list.    Labs pertinent to today's visit on 01/27/2025 (including but not limited to CBC, CMP, and lipid profiles) were requested from the  "patient's primary care provider/hospital/clinical laboratory.  If the labs were available for the visit, they were reviewed with the patient.  If they were not available, when received, special interest will be made to the labs pertinent to this visit.  The patient's most recent \"in-house\" labs are noted below and have been reviewed.  Outside labs pertinent to this visit are scanned into the record and have been reviewed.    Discussions held today, 01/27/2025,regarding procedures included risk, benefits, and options including but not limited to: Death, MI, stroke, pain, bleeding, infection, and possible need for vascular/thoracic/cardiothoracic surgery.    Copied information within this note was reviewed and is current as of 01/27/2025.    Assessment and plan noted herein represents the current plan of care as of 01/27/2025.    Significant resources from our office and staff are inherent in engaging this patient in a continuous and active collaborative plan of care related to their chronic cardiovascular conditions outlined herein.  The management of these conditions requires the direction of our service with specialized clinical knowledge, skills, and experience.  This collaborative care includes but is not limited to patient education, expectations and responsibilities, shared decision making around therapeutic goals, and shared commitments to achieve those goals.  "

## 2025-02-12 ENCOUNTER — HOSPITAL ENCOUNTER (OUTPATIENT)
Dept: ULTRASOUND IMAGING | Facility: HOSPITAL | Age: 80
Discharge: HOME | End: 2025-02-12
Admitting: FAMILY MEDICINE
Payer: MEDICARE

## 2025-02-12 DIAGNOSIS — R74.8 ELEVATED LIVER ENZYMES: ICD-10-CM

## 2025-02-12 PROCEDURE — 76705 ECHO EXAM OF ABDOMEN: CPT

## 2025-02-13 ENCOUNTER — PREP FOR SURGERY (OUTPATIENT)
Dept: OTHER | Facility: HOSPITAL | Age: 80
End: 2025-02-13
Payer: MEDICARE

## 2025-02-13 ENCOUNTER — OFFICE VISIT (OUTPATIENT)
Dept: CARDIOLOGY | Facility: CLINIC | Age: 80
End: 2025-02-13
Payer: MEDICARE

## 2025-02-13 VITALS
HEIGHT: 65 IN | WEIGHT: 226 LBS | BODY MASS INDEX: 37.65 KG/M2 | OXYGEN SATURATION: 97 % | HEART RATE: 102 BPM | DIASTOLIC BLOOD PRESSURE: 85 MMHG | SYSTOLIC BLOOD PRESSURE: 138 MMHG

## 2025-02-13 DIAGNOSIS — I51.9 LEFT VENTRICULAR DIASTOLIC DYSFUNCTION: ICD-10-CM

## 2025-02-13 DIAGNOSIS — I10 ESSENTIAL HYPERTENSION: ICD-10-CM

## 2025-02-13 DIAGNOSIS — R00.2 PALPITATIONS: ICD-10-CM

## 2025-02-13 DIAGNOSIS — I48.0 PAROXYSMAL ATRIAL FIBRILLATION: Primary | ICD-10-CM

## 2025-02-13 DIAGNOSIS — G47.33 OBSTRUCTIVE SLEEP APNEA SYNDROME: ICD-10-CM

## 2025-02-13 DIAGNOSIS — E66.812 CLASS 2 OBESITY: ICD-10-CM

## 2025-02-13 DIAGNOSIS — E78.2 MIXED HYPERLIPIDEMIA: ICD-10-CM

## 2025-02-13 RX ORDER — SODIUM CHLORIDE 0.9 % (FLUSH) 0.9 %
3 SYRINGE (ML) INJECTION EVERY 12 HOURS SCHEDULED
OUTPATIENT
Start: 2025-02-13

## 2025-02-13 RX ORDER — SODIUM CHLORIDE 0.9 % (FLUSH) 0.9 %
3-10 SYRINGE (ML) INJECTION AS NEEDED
OUTPATIENT
Start: 2025-02-13

## 2025-02-13 RX ORDER — SODIUM CHLORIDE 9 MG/ML
40 INJECTION, SOLUTION INTRAVENOUS AS NEEDED
OUTPATIENT
Start: 2025-02-13

## 2025-02-13 NOTE — H&P (VIEW-ONLY)
CC--Atrial fibrillation, hypertension      SUB--79-year-old pleasant patient well-known to me with persistent AF in the past underwent ablation 2018 and had early recurrence needing Tikosyn initiation and she was stable for several years until recently she has noticed increasing episodes of atrial fibrillation despite Tikosyn intake and came for evaluation.  Patient had previous cardiac catheterization without significant coronary artery stenosis and recent echocardiography shows normal EF  Patient feels poorly with intermittent atrial fibrillation  Not a candidate for drug therapy escalation because of prior history of drug-induced bradycardia and beta-blockers had to be stopped in the past        My previous history is attached below for reference only which has been reviewed       patient is 77 years old white female whose past medical history significant for hypertension, Persistent atrial fibrillation,  obesity  underwent AF ablation in 2018 with early recurrence with initiation of Tikosyn to sinus rhythm with resolution of symptoms and Tikosyn was stopped with the last office visit and started noticing recurrent atrial fibrillation in last 2 to 3 weeks with symptoms of palpitations and mild fatigue --patient has severe left atrial enlargement needing antiarrhythmic treatment to optimize into sinus rhythm-- during past hospitalization CT surgery consult was also requested for possible future convergence procedure because of severe left atrial enlargement.In 2009, patient was diagnosed with paroxysmal atrial fibrillation.  Patient was treated with beta-blockers initially.  Patient was started on flecainide later on.  In  2017, patient underwent cardiac catheterization at [facility] and coronary arteries were normal.  LV function was preserved.  Patient was started on Eliquis because of recurrence of atrial fibrillation.   chads vasc  score---3   patient was hypertensive and is on multiple  medication  Post re initiation of tikosyn to sinus and feels well  No new sx  Patient remains in functional class I with no new symptoms and came for follow-up        Past Medical History:     Reviewed history from 01/08/2019 and no changes required:        HTN        Paroxysmal  A-fib - Dr. Lugo        Seasonal allergies - on allergy shots per ENT        Hyperlipidemia        Hypothyroidism        Chronic low back pain        Colonoscopy in 4/13/2017        Mammogram in 7/31/2018 - negative        Pneumonia shot in the past - both Pneumovax and Prevnar        Shingle shot in the past        Osteopenia - DEXA on 6/23/2017 - L/S: +0.5 and Hip: -0.8        GYN HM per GYN - Dr. Hopkins        Negative Hepatitis C screening in 10/2017                 Physical Exam    General:      well developed, well nourished, in no acute distress.    Head:      normocephalic and atraumatic.    Eyes:      PERRL/EOM intact, conjunctivae and sclerae clear without nystagmus.    Neck:      no  thyromegaly, trachea central with normal respiratory effort  Lungs:      clear bilaterally to auscultation.    Heart:       irregular rate and rhythm, S1, S2 without murmurs, rubs, or gallops  Skin:      intact without lesions or rashes.    Psych:      alert and cooperative; normal mood and affect; normal attention span and concentration.                   assessment plan    Recent TSH normal, potassium 4.5 and creatinine normal  Recurrent atrial fibrillation with symptoms with remote history of ablation and recurrence of arrhythmia despite Tikosyn and after discussing options for symptomatic arrhythmia, patient scheduled for redo AF ablation, risks and benefits and outcomes educated and orders placed  Hyperlipidemia on atorvastatin  Anticoagulation with apixaban  Hypothyroidism on levothyroxine  Hypertension controlled with Aldactone and telmisartan  Medications reviewed and follow-up appointments made        ECG 12 Lead    Date/Time: 2/13/2025  12:27 PM  Performed by: Dre Case MD    Authorized by: Dre Case MD  Comparison: compared with previous ECG   Comparison to previous ECG: Atypical atrial flutter with low voltage complexes replaced sinus rhythm compared to previous EKG      Electronically signed by Dre Case MD, 02/13/25, 12:27 PM EST.

## 2025-02-13 NOTE — LETTER
February 13, 2025     Latia Guerrier MD  [address]  [address]  [place] [address]    Patient: Sheree Vance   YOB: 1945   Date of Visit: 2/13/2025     Dear Latia Guerrier MD:       Thank you for referring Sheree Vance to me for evaluation. Below are the relevant portions of my assessment and plan of care.    If you have questions, please do not hesitate to call me. I look forward to following Sheree along with you.         Sincerely,        Dre Case MD        CC: No Recipients    Dre Case MD  02/13/25 1228  Sign when Signing Visit  CC--Atrial fibrillation, hypertension      SUB--79-year-old pleasant patient well-known to me with persistent AF in the past underwent ablation 2018 and had early recurrence needing Tikosyn initiation and she was stable for several years until recently she has noticed increasing episodes of atrial fibrillation despite Tikosyn intake and came for evaluation.  Patient had previous cardiac catheterization without significant coronary artery stenosis and recent echocardiography shows normal EF  Patient feels poorly with intermittent atrial fibrillation  Not a candidate for drug therapy escalation because of prior history of drug-induced bradycardia and beta-blockers had to be stopped in the past        My previous history is attached below for reference only which has been reviewed       patient is 77 years old white female whose past medical history significant for hypertension, Persistent atrial fibrillation,  obesity  underwent AF ablation in 2018 with early recurrence with initiation of Tikosyn to sinus rhythm with resolution of symptoms and Tikosyn was stopped with the last office visit and started noticing recurrent atrial fibrillation in last 2 to 3 weeks with symptoms of palpitations and mild fatigue --patient has severe left atrial enlargement needing antiarrhythmic treatment to optimize into sinus rhythm-- during past  hospitalization CT surgery consult was also requested for possible future convergence procedure because of severe left atrial enlargement.In 2009, patient was diagnosed with paroxysmal atrial fibrillation.  Patient was treated with beta-blockers initially.  Patient was started on flecainide later on.  In  2017, patient underwent cardiac catheterization at [facility] and coronary arteries were normal.  LV function was preserved.  Patient was started on Eliquis because of recurrence of atrial fibrillation.   chads vasc  score---3   patient was hypertensive and is on multiple medication  Post re initiation of tikosyn to sinus and feels well  No new sx  Patient remains in functional class I with no new symptoms and came for follow-up        Past Medical History:     Reviewed history from 01/08/2019 and no changes required:        HTN        Paroxysmal  A-fib - Dr. Lugo        Seasonal allergies - on allergy shots per ENT        Hyperlipidemia        Hypothyroidism        Chronic low back pain        Colonoscopy in 4/13/2017        Mammogram in 7/31/2018 - negative        Pneumonia shot in the past - both Pneumovax and Prevnar        Shingle shot in the past        Osteopenia - DEXA on 6/23/2017 - L/S: +0.5 and Hip: -0.8        GYN HM per GYN - Dr. Hopkins        Negative Hepatitis C screening in 10/2017                 Physical Exam    General:      well developed, well nourished, in no acute distress.    Head:      normocephalic and atraumatic.    Eyes:      PERRL/EOM intact, conjunctivae and sclerae clear without nystagmus.    Neck:      no  thyromegaly, trachea central with normal respiratory effort  Lungs:      clear bilaterally to auscultation.    Heart:       irregular rate and rhythm, S1, S2 without murmurs, rubs, or gallops  Skin:      intact without lesions or rashes.    Psych:      alert and cooperative; normal mood and affect; normal attention span and concentration.                    assessment plan    Recent TSH normal, potassium 4.5 and creatinine normal  Recurrent atrial fibrillation with symptoms with remote history of ablation and recurrence of arrhythmia despite Tikosyn and after discussing options for symptomatic arrhythmia, patient scheduled for redo AF ablation, risks and benefits and outcomes educated and orders placed  Hyperlipidemia on atorvastatin  Anticoagulation with apixaban  Hypothyroidism on levothyroxine  Hypertension controlled with Aldactone and telmisartan  Medications reviewed and follow-up appointments made        ECG 12 Lead    Date/Time: 2/13/2025 12:27 PM  Performed by: rDe Case MD    Authorized by: Dre Case MD  Comparison: compared with previous ECG   Comparison to previous ECG: Atypical atrial flutter with low voltage complexes replaced sinus rhythm compared to previous EKG      Electronically signed by Dre Case MD, 02/13/25, 12:27 PM EST.

## 2025-02-13 NOTE — PROGRESS NOTES
CC--Atrial fibrillation, hypertension      SUB--79-year-old pleasant patient well-known to me with persistent AF in the past underwent ablation 2018 and had early recurrence needing Tikosyn initiation and she was stable for several years until recently she has noticed increasing episodes of atrial fibrillation despite Tikosyn intake and came for evaluation.  Patient had previous cardiac catheterization without significant coronary artery stenosis and recent echocardiography shows normal EF  Patient feels poorly with intermittent atrial fibrillation  Not a candidate for drug therapy escalation because of prior history of drug-induced bradycardia and beta-blockers had to be stopped in the past        My previous history is attached below for reference only which has been reviewed       patient is 77 years old white female whose past medical history significant for hypertension, Persistent atrial fibrillation,  obesity  underwent AF ablation in 2018 with early recurrence with initiation of Tikosyn to sinus rhythm with resolution of symptoms and Tikosyn was stopped with the last office visit and started noticing recurrent atrial fibrillation in last 2 to 3 weeks with symptoms of palpitations and mild fatigue --patient has severe left atrial enlargement needing antiarrhythmic treatment to optimize into sinus rhythm-- during past hospitalization CT surgery consult was also requested for possible future convergence procedure because of severe left atrial enlargement.In 2009, patient was diagnosed with paroxysmal atrial fibrillation.  Patient was treated with beta-blockers initially.  Patient was started on flecainide later on.  In  2017, patient underwent cardiac catheterization at [facility] and coronary arteries were normal.  LV function was preserved.  Patient was started on Eliquis because of recurrence of atrial fibrillation.   chads vasc  score---3   patient was hypertensive and is on multiple  medication  Post re initiation of tikosyn to sinus and feels well  No new sx  Patient remains in functional class I with no new symptoms and came for follow-up        Past Medical History:     Reviewed history from 01/08/2019 and no changes required:        HTN        Paroxysmal  A-fib - Dr. Lugo        Seasonal allergies - on allergy shots per ENT        Hyperlipidemia        Hypothyroidism        Chronic low back pain        Colonoscopy in 4/13/2017        Mammogram in 7/31/2018 - negative        Pneumonia shot in the past - both Pneumovax and Prevnar        Shingle shot in the past        Osteopenia - DEXA on 6/23/2017 - L/S: +0.5 and Hip: -0.8        GYN HM per GYN - Dr. Hopkins        Negative Hepatitis C screening in 10/2017                 Physical Exam    General:      well developed, well nourished, in no acute distress.    Head:      normocephalic and atraumatic.    Eyes:      PERRL/EOM intact, conjunctivae and sclerae clear without nystagmus.    Neck:      no  thyromegaly, trachea central with normal respiratory effort  Lungs:      clear bilaterally to auscultation.    Heart:       irregular rate and rhythm, S1, S2 without murmurs, rubs, or gallops  Skin:      intact without lesions or rashes.    Psych:      alert and cooperative; normal mood and affect; normal attention span and concentration.                   assessment plan    Recent TSH normal, potassium 4.5 and creatinine normal  Recurrent atrial fibrillation with symptoms with remote history of ablation and recurrence of arrhythmia despite Tikosyn and after discussing options for symptomatic arrhythmia, patient scheduled for redo AF ablation, risks and benefits and outcomes educated and orders placed  Hyperlipidemia on atorvastatin  Anticoagulation with apixaban  Hypothyroidism on levothyroxine  Hypertension controlled with Aldactone and telmisartan  Medications reviewed and follow-up appointments made        ECG 12 Lead    Date/Time: 2/13/2025  12:27 PM  Performed by: Dre Case MD    Authorized by: Dre Case MD  Comparison: compared with previous ECG   Comparison to previous ECG: Atypical atrial flutter with low voltage complexes replaced sinus rhythm compared to previous EKG      Electronically signed by Dre Case MD, 02/13/25, 12:27 PM EST.

## 2025-03-05 ENCOUNTER — TELEPHONE (OUTPATIENT)
Dept: CARDIOLOGY | Facility: CLINIC | Age: 80
End: 2025-03-05
Payer: MEDICARE

## 2025-03-05 DIAGNOSIS — E03.9 ACQUIRED HYPOTHYROIDISM: ICD-10-CM

## 2025-03-05 DIAGNOSIS — I10 ESSENTIAL HYPERTENSION: ICD-10-CM

## 2025-03-05 RX ORDER — LEVOTHYROXINE SODIUM 150 UG/1
150 TABLET ORAL DAILY
Qty: 90 TABLET | Refills: 0 | Status: SHIPPED | OUTPATIENT
Start: 2025-03-05

## 2025-03-05 RX ORDER — AMLODIPINE BESYLATE 10 MG/1
10 TABLET ORAL DAILY
Qty: 90 TABLET | Refills: 1 | Status: SHIPPED | OUTPATIENT
Start: 2025-03-05

## 2025-03-05 NOTE — TELEPHONE ENCOUNTER
"Caller: Sheree Vance \"Sheree Vance\"    Relationship: Self    Best call back number: [phone]    Requested Prescriptions:   Requested Prescriptions     Pending Prescriptions Disp Refills    amLODIPine (NORVASC) 10 MG tablet 30 tablet 11     Sig: Take 1 tablet by mouth Daily.        Pharmacy where request should be sent: [facility] [place], [place] - [address]  [phone] [facility] [phone]      Last office visit with prescribing clinician: 2/13/2025   Last telemedicine visit with prescribing clinician: Visit date not found   Next office visit with prescribing clinician: 5/19/2025     Additional details provided by patient: PT NEEDS A 90 DAY PRESCRIPTION    Does the patient have less than a 3 day supply:  [] Yes  [x] No    Would you like a call back once the refill request has been completed: [] Yes [x] No    If the office needs to give you a call back, can they leave a voicemail: [] Yes [x] No    Juli Hyde Rep   03/05/25 09:34 EST       "

## 2025-03-10 ENCOUNTER — LAB (OUTPATIENT)
Dept: LAB | Facility: HOSPITAL | Age: 80
End: 2025-03-10
Payer: MEDICARE

## 2025-03-10 DIAGNOSIS — R00.2 PALPITATIONS: ICD-10-CM

## 2025-03-10 DIAGNOSIS — I48.0 PAROXYSMAL ATRIAL FIBRILLATION: ICD-10-CM

## 2025-03-10 DIAGNOSIS — G47.33 OBSTRUCTIVE SLEEP APNEA SYNDROME: ICD-10-CM

## 2025-03-10 DIAGNOSIS — I51.9 LEFT VENTRICULAR DIASTOLIC DYSFUNCTION: ICD-10-CM

## 2025-03-10 LAB
ALBUMIN SERPL-MCNC: 4.2 G/DL (ref 3.5–5.2)
ALBUMIN/GLOB SERPL: 1.5 G/DL
ALP SERPL-CCNC: 216 U/L (ref 39–117)
ALT SERPL W P-5'-P-CCNC: 16 U/L (ref 1–33)
ANION GAP SERPL CALCULATED.3IONS-SCNC: 13.1 MMOL/L (ref 5–15)
AST SERPL-CCNC: 28 U/L (ref 1–32)
BASOPHILS # BLD AUTO: 0.04 10*3/MM3 (ref 0–0.2)
BASOPHILS NFR BLD AUTO: 0.6 % (ref 0–1.5)
BILIRUB SERPL-MCNC: 0.5 MG/DL (ref 0–1.2)
BUN SERPL-MCNC: 18 MG/DL (ref 8–23)
BUN/CREAT SERPL: 18.6 (ref 7–25)
CALCIUM SPEC-SCNC: 9.8 MG/DL (ref 8.6–10.5)
CHLORIDE SERPL-SCNC: 103 MMOL/L (ref 98–107)
CO2 SERPL-SCNC: 25.9 MMOL/L (ref 22–29)
CREAT SERPL-MCNC: 0.97 MG/DL (ref 0.57–1)
DEPRECATED RDW RBC AUTO: 39.3 FL (ref 37–54)
EGFRCR SERPLBLD CKD-EPI 2021: 60.3 ML/MIN/1.73
EOSINOPHIL # BLD AUTO: 0.09 10*3/MM3 (ref 0–0.4)
EOSINOPHIL NFR BLD AUTO: 1.4 % (ref 0.3–6.2)
ERYTHROCYTE [DISTWIDTH] IN BLOOD BY AUTOMATED COUNT: 12.1 % (ref 12.3–15.4)
GLOBULIN UR ELPH-MCNC: 2.8 GM/DL
GLUCOSE SERPL-MCNC: 103 MG/DL (ref 65–99)
HCT VFR BLD AUTO: 44.6 % (ref 34–46.6)
HGB BLD-MCNC: 14.8 G/DL (ref 12–15.9)
IMM GRANULOCYTES # BLD AUTO: 0.01 10*3/MM3 (ref 0–0.05)
IMM GRANULOCYTES NFR BLD AUTO: 0.2 % (ref 0–0.5)
LYMPHOCYTES # BLD AUTO: 2.76 10*3/MM3 (ref 0.7–3.1)
LYMPHOCYTES NFR BLD AUTO: 42.6 % (ref 19.6–45.3)
MAGNESIUM SERPL-MCNC: 2.2 MG/DL (ref 1.6–2.4)
MCH RBC QN AUTO: 29.8 PG (ref 26.6–33)
MCHC RBC AUTO-ENTMCNC: 33.2 G/DL (ref 31.5–35.7)
MCV RBC AUTO: 89.9 FL (ref 79–97)
MONOCYTES # BLD AUTO: 0.73 10*3/MM3 (ref 0.1–0.9)
MONOCYTES NFR BLD AUTO: 11.3 % (ref 5–12)
NEUTROPHILS NFR BLD AUTO: 2.85 10*3/MM3 (ref 1.7–7)
NEUTROPHILS NFR BLD AUTO: 43.9 % (ref 42.7–76)
NRBC BLD AUTO-RTO: 0 /100 WBC (ref 0–0.2)
PLATELET # BLD AUTO: 338 10*3/MM3 (ref 140–450)
PMV BLD AUTO: 10.6 FL (ref 6–12)
POTASSIUM SERPL-SCNC: 4.7 MMOL/L (ref 3.5–5.2)
PROT SERPL-MCNC: 7 G/DL (ref 6–8.5)
RBC # BLD AUTO: 4.96 10*6/MM3 (ref 3.77–5.28)
SODIUM SERPL-SCNC: 142 MMOL/L (ref 136–145)
WBC NRBC COR # BLD AUTO: 6.48 10*3/MM3 (ref 3.4–10.8)

## 2025-03-10 PROCEDURE — 80053 COMPREHEN METABOLIC PANEL: CPT

## 2025-03-10 PROCEDURE — 83735 ASSAY OF MAGNESIUM: CPT

## 2025-03-10 PROCEDURE — 85025 COMPLETE CBC W/AUTO DIFF WBC: CPT

## 2025-03-10 PROCEDURE — 36415 COLL VENOUS BLD VENIPUNCTURE: CPT

## 2025-03-12 ENCOUNTER — ANESTHESIA EVENT (OUTPATIENT)
Dept: CARDIOLOGY | Facility: HOSPITAL | Age: 80
End: 2025-03-12
Payer: MEDICARE

## 2025-03-12 ENCOUNTER — HOSPITAL ENCOUNTER (OUTPATIENT)
Dept: CARDIOLOGY | Facility: HOSPITAL | Age: 80
Discharge: HOME | End: 2025-03-12
Payer: MEDICARE

## 2025-03-12 ENCOUNTER — ANESTHESIA (OUTPATIENT)
Dept: CARDIOLOGY | Facility: HOSPITAL | Age: 80
End: 2025-03-12
Payer: MEDICARE

## 2025-03-12 ENCOUNTER — HOSPITAL ENCOUNTER (OUTPATIENT)
Facility: HOSPITAL | Age: 80
Discharge: HOME | End: 2025-03-14
Attending: INTERNAL MEDICINE | Admitting: INTERNAL MEDICINE
Payer: MEDICARE

## 2025-03-12 VITALS — SYSTOLIC BLOOD PRESSURE: 109 MMHG | OXYGEN SATURATION: 96 % | HEART RATE: 95 BPM | DIASTOLIC BLOOD PRESSURE: 62 MMHG

## 2025-03-12 DIAGNOSIS — G47.33 OBSTRUCTIVE SLEEP APNEA SYNDROME: ICD-10-CM

## 2025-03-12 DIAGNOSIS — I48.0 PAROXYSMAL ATRIAL FIBRILLATION: ICD-10-CM

## 2025-03-12 DIAGNOSIS — I51.9 LEFT VENTRICULAR DIASTOLIC DYSFUNCTION: ICD-10-CM

## 2025-03-12 DIAGNOSIS — R00.2 PALPITATIONS: ICD-10-CM

## 2025-03-12 LAB
ACT BLD: 129 SECONDS (ref 89–137)
ACT BLD: 279 SECONDS (ref 89–137)
ACT BLD: 285 SECONDS (ref 89–137)
ACT BLD: 308 SECONDS (ref 89–137)
ACT BLD: 331 SECONDS (ref 89–137)
ALBUMIN SERPL-MCNC: 3.9 G/DL (ref 3.5–5.2)
ALBUMIN/GLOB SERPL: 1.9 G/DL
ALP SERPL-CCNC: 191 U/L (ref 39–117)
ALT SERPL W P-5'-P-CCNC: 14 U/L (ref 1–33)
ANION GAP SERPL CALCULATED.3IONS-SCNC: 10.6 MMOL/L (ref 5–15)
AST SERPL-CCNC: 60 U/L (ref 1–32)
BH CV ECHO SHUNT ASSESSMENT PERFORMED (HIDDEN SCRIPTING): 1
BILIRUB SERPL-MCNC: 1.1 MG/DL (ref 0–1.2)
BUN SERPL-MCNC: 19 MG/DL (ref 8–23)
BUN/CREAT SERPL: 21.3 (ref 7–25)
CALCIUM SPEC-SCNC: 8.3 MG/DL (ref 8.6–10.5)
CHLORIDE SERPL-SCNC: 108 MMOL/L (ref 98–107)
CO2 SERPL-SCNC: 22.4 MMOL/L (ref 22–29)
CREAT SERPL-MCNC: 0.89 MG/DL (ref 0.57–1)
DEPRECATED RDW RBC AUTO: 46.9 FL (ref 37–54)
EGFRCR SERPLBLD CKD-EPI 2021: 66.9 ML/MIN/1.73
ERYTHROCYTE [DISTWIDTH] IN BLOOD BY AUTOMATED COUNT: 13.7 % (ref 12.3–15.4)
GLOBULIN UR ELPH-MCNC: 2.1 GM/DL
GLUCOSE SERPL-MCNC: 185 MG/DL (ref 65–99)
HCT VFR BLD AUTO: 40.7 % (ref 34–46.6)
HGB BLD-MCNC: 12.7 G/DL (ref 12–15.9)
LV EF 2D ECHO EST: 60 %
MCH RBC QN AUTO: 29.3 PG (ref 26.6–33)
MCHC RBC AUTO-ENTMCNC: 31.2 G/DL (ref 31.5–35.7)
MCV RBC AUTO: 93.8 FL (ref 79–97)
PLATELET # BLD AUTO: 255 10*3/MM3 (ref 140–450)
PMV BLD AUTO: 10.4 FL (ref 6–12)
POTASSIUM SERPL-SCNC: 4.6 MMOL/L (ref 3.5–5.2)
PROT SERPL-MCNC: 6 G/DL (ref 6–8.5)
RBC # BLD AUTO: 4.34 10*6/MM3 (ref 3.77–5.28)
SODIUM SERPL-SCNC: 141 MMOL/L (ref 136–145)
WBC NRBC COR # BLD AUTO: 9.66 10*3/MM3 (ref 3.4–10.8)

## 2025-03-12 PROCEDURE — 25010000002 SUGAMMADEX 200 MG/2ML SOLUTION: Performed by: NURSE ANESTHETIST, CERTIFIED REGISTERED

## 2025-03-12 PROCEDURE — 25810000003 SODIUM CHLORIDE 0.9 % SOLUTION: Performed by: INTERNAL MEDICINE

## 2025-03-12 PROCEDURE — C1769 GUIDE WIRE: HCPCS | Performed by: INTERNAL MEDICINE

## 2025-03-12 PROCEDURE — 63710000001 LATANOPROST 0.005 % SOLUTION 2.5 ML BOTTLE: Performed by: INTERNAL MEDICINE

## 2025-03-12 PROCEDURE — C1893 INTRO/SHEATH, FIXED,NON-PEEL: HCPCS | Performed by: INTERNAL MEDICINE

## 2025-03-12 PROCEDURE — 93321 DOPPLER ECHO F-UP/LMTD STD: CPT

## 2025-03-12 PROCEDURE — 80053 COMPREHEN METABOLIC PANEL: CPT | Performed by: INTERNAL MEDICINE

## 2025-03-12 PROCEDURE — 25010000002 PHENYLEPHRINE 10 MG/ML SOLUTION 5 ML VIAL

## 2025-03-12 PROCEDURE — 63710000001 HYDROCODONE-ACETAMINOPHEN 5-325 MG TABLET: Performed by: INTERNAL MEDICINE

## 2025-03-12 PROCEDURE — 93325 DOPPLER ECHO COLOR FLOW MAPG: CPT

## 2025-03-12 PROCEDURE — 25810000003 SODIUM CHLORIDE 0.9 % SOLUTION

## 2025-03-12 PROCEDURE — 93655 ICAR CATH ABLTJ DSCRT ARRHYT: CPT | Performed by: INTERNAL MEDICINE

## 2025-03-12 PROCEDURE — C1730 CATH, EP, 19 OR FEW ELECT: HCPCS | Performed by: INTERNAL MEDICINE

## 2025-03-12 PROCEDURE — 25010000002 LIDOCAINE PF 2% 2 % SOLUTION

## 2025-03-12 PROCEDURE — C1759 CATH, INTRA ECHOCARDIOGRAPHY: HCPCS | Performed by: INTERNAL MEDICINE

## 2025-03-12 PROCEDURE — 93312 ECHO TRANSESOPHAGEAL: CPT

## 2025-03-12 PROCEDURE — C1732 CATH, EP, DIAG/ABL, 3D/VECT: HCPCS | Performed by: INTERNAL MEDICINE

## 2025-03-12 PROCEDURE — 85347 COAGULATION TIME ACTIVATED: CPT

## 2025-03-12 PROCEDURE — 25010000002 ONDANSETRON PER 1 MG: Performed by: NURSE ANESTHETIST, CERTIFIED REGISTERED

## 2025-03-12 PROCEDURE — G0378 HOSPITAL OBSERVATION PER HR: HCPCS

## 2025-03-12 PROCEDURE — 25010000002 PROTAMINE SULFATE PER 10 MG: Performed by: NURSE ANESTHETIST, CERTIFIED REGISTERED

## 2025-03-12 PROCEDURE — 93656 COMPRE EP EVAL ABLTJ ATR FIB: CPT | Performed by: INTERNAL MEDICINE

## 2025-03-12 PROCEDURE — 63710000001 APIXABAN 5 MG TABLET: Performed by: INTERNAL MEDICINE

## 2025-03-12 PROCEDURE — 25010000002 PHENYLEPHRINE 10 MG/ML SOLUTION

## 2025-03-12 PROCEDURE — A9270 NON-COVERED ITEM OR SERVICE: HCPCS | Performed by: INTERNAL MEDICINE

## 2025-03-12 PROCEDURE — 25010000002 FENTANYL CITRATE (PF) 100 MCG/2ML SOLUTION

## 2025-03-12 PROCEDURE — 25010000002 GLYCOPYRROLATE 1 MG/5ML SOLUTION

## 2025-03-12 PROCEDURE — 25010000002 PROPOFOL 200 MG/20ML EMULSION: Performed by: NURSE ANESTHETIST, CERTIFIED REGISTERED

## 2025-03-12 PROCEDURE — C1894 INTRO/SHEATH, NON-LASER: HCPCS | Performed by: INTERNAL MEDICINE

## 2025-03-12 PROCEDURE — 25810000003 SODIUM CHLORIDE 0.9 % SOLUTION 250 ML FLEX CONT

## 2025-03-12 PROCEDURE — 63710000001 ATORVASTATIN 10 MG TABLET: Performed by: INTERNAL MEDICINE

## 2025-03-12 PROCEDURE — 25810000003 SODIUM CHLORIDE 0.9 % SOLUTION: Performed by: NURSE ANESTHETIST, CERTIFIED REGISTERED

## 2025-03-12 PROCEDURE — 25010000002 HEPARIN (PORCINE) PER 1000 UNITS

## 2025-03-12 PROCEDURE — C1766 INTRO/SHEATH,STRBLE,NON-PEEL: HCPCS | Performed by: INTERNAL MEDICINE

## 2025-03-12 PROCEDURE — C1733 CATH, EP, OTHR THAN COOL-TIP: HCPCS | Performed by: INTERNAL MEDICINE

## 2025-03-12 PROCEDURE — 85027 COMPLETE CBC AUTOMATED: CPT | Performed by: INTERNAL MEDICINE

## 2025-03-12 RX ORDER — LEVOTHYROXINE SODIUM 150 UG/1
150 TABLET ORAL
Status: DISCONTINUED | OUTPATIENT
Start: 2025-03-13 | End: 2025-03-14 | Stop reason: HOSPADM

## 2025-03-12 RX ORDER — EPHEDRINE SULFATE 5 MG/ML
5 INJECTION INTRAVENOUS ONCE AS NEEDED
OUTPATIENT
Start: 2025-03-12

## 2025-03-12 RX ORDER — FLUMAZENIL 0.1 MG/ML
0.2 INJECTION INTRAVENOUS AS NEEDED
OUTPATIENT
Start: 2025-03-12 | End: 2025-03-13

## 2025-03-12 RX ORDER — FAMOTIDINE 10 MG/ML
INJECTION, SOLUTION INTRAVENOUS AS NEEDED
Status: DISCONTINUED | OUTPATIENT
Start: 2025-03-12 | End: 2025-03-12 | Stop reason: SURG

## 2025-03-12 RX ORDER — TELMISARTAN 80 MG/1
80 TABLET ORAL NIGHTLY
COMMUNITY
End: 2025-03-14 | Stop reason: HOSPADM

## 2025-03-12 RX ORDER — PROPOFOL 10 MG/ML
INJECTION, EMULSION INTRAVENOUS AS NEEDED
Status: DISCONTINUED | OUTPATIENT
Start: 2025-03-12 | End: 2025-03-12 | Stop reason: SURG

## 2025-03-12 RX ORDER — HEPARIN SODIUM 1000 [USP'U]/ML
INJECTION, SOLUTION INTRAVENOUS; SUBCUTANEOUS AS NEEDED
Status: DISCONTINUED | OUTPATIENT
Start: 2025-03-12 | End: 2025-03-12 | Stop reason: SURG

## 2025-03-12 RX ORDER — GLYCOPYRROLATE 0.2 MG/ML
INJECTION INTRAMUSCULAR; INTRAVENOUS AS NEEDED
Status: DISCONTINUED | OUTPATIENT
Start: 2025-03-12 | End: 2025-03-12 | Stop reason: SURG

## 2025-03-12 RX ORDER — LIDOCAINE HYDROCHLORIDE 20 MG/ML
INJECTION, SOLUTION EPIDURAL; INFILTRATION; INTRACAUDAL; PERINEURAL AS NEEDED
Status: DISCONTINUED | OUTPATIENT
Start: 2025-03-12 | End: 2025-03-12 | Stop reason: SURG

## 2025-03-12 RX ORDER — OXYCODONE HYDROCHLORIDE 5 MG/1
5 TABLET ORAL ONCE AS NEEDED
Refills: 0 | OUTPATIENT
Start: 2025-03-12

## 2025-03-12 RX ORDER — LATANOPROST 50 UG/ML
1 SOLUTION/ DROPS OPHTHALMIC NIGHTLY
Status: DISCONTINUED | OUTPATIENT
Start: 2025-03-12 | End: 2025-03-14 | Stop reason: HOSPADM

## 2025-03-12 RX ORDER — SODIUM CHLORIDE 9 MG/ML
30 INJECTION, SOLUTION INTRAVENOUS CONTINUOUS
Status: DISPENSED | OUTPATIENT
Start: 2025-03-12 | End: 2025-03-13

## 2025-03-12 RX ORDER — OXYCODONE HYDROCHLORIDE 5 MG/1
10 TABLET ORAL EVERY 4 HOURS PRN
Refills: 0 | OUTPATIENT
Start: 2025-03-12

## 2025-03-12 RX ORDER — SODIUM CHLORIDE 9 MG/ML
INJECTION, SOLUTION INTRAVENOUS CONTINUOUS PRN
Status: DISCONTINUED | OUTPATIENT
Start: 2025-03-12 | End: 2025-03-12 | Stop reason: SURG

## 2025-03-12 RX ORDER — ROCURONIUM BROMIDE 10 MG/ML
INJECTION, SOLUTION INTRAVENOUS AS NEEDED
Status: DISCONTINUED | OUTPATIENT
Start: 2025-03-12 | End: 2025-03-12 | Stop reason: SURG

## 2025-03-12 RX ORDER — SODIUM CHLORIDE 0.9 % (FLUSH) 0.9 %
3-10 SYRINGE (ML) INJECTION AS NEEDED
Status: DISCONTINUED | OUTPATIENT
Start: 2025-03-12 | End: 2025-03-12 | Stop reason: HOSPADM

## 2025-03-12 RX ORDER — FENTANYL CITRATE 50 UG/ML
INJECTION, SOLUTION INTRAMUSCULAR; INTRAVENOUS AS NEEDED
Status: DISCONTINUED | OUTPATIENT
Start: 2025-03-12 | End: 2025-03-12 | Stop reason: SURG

## 2025-03-12 RX ORDER — DIPHENHYDRAMINE HYDROCHLORIDE 50 MG/ML
12.5 INJECTION INTRAMUSCULAR; INTRAVENOUS
OUTPATIENT
Start: 2025-03-12

## 2025-03-12 RX ORDER — HYDRALAZINE HYDROCHLORIDE 20 MG/ML
5 INJECTION INTRAMUSCULAR; INTRAVENOUS
OUTPATIENT
Start: 2025-03-12

## 2025-03-12 RX ORDER — DIPHENHYDRAMINE HYDROCHLORIDE 50 MG/ML
12.5 INJECTION INTRAMUSCULAR; INTRAVENOUS ONCE AS NEEDED
OUTPATIENT
Start: 2025-03-12

## 2025-03-12 RX ORDER — ONDANSETRON 2 MG/ML
4 INJECTION INTRAMUSCULAR; INTRAVENOUS ONCE AS NEEDED
OUTPATIENT
Start: 2025-03-12

## 2025-03-12 RX ORDER — FENTANYL CITRATE 50 UG/ML
50 INJECTION, SOLUTION INTRAMUSCULAR; INTRAVENOUS
OUTPATIENT
Start: 2025-03-12

## 2025-03-12 RX ORDER — PROTAMINE SULFATE 10 MG/ML
INJECTION, SOLUTION INTRAVENOUS AS NEEDED
Status: DISCONTINUED | OUTPATIENT
Start: 2025-03-12 | End: 2025-03-12 | Stop reason: SURG

## 2025-03-12 RX ORDER — NALOXONE HCL 0.4 MG/ML
0.4 VIAL (ML) INJECTION
Status: DISCONTINUED | OUTPATIENT
Start: 2025-03-12 | End: 2025-03-13

## 2025-03-12 RX ORDER — AMLODIPINE BESYLATE 5 MG/1
10 TABLET ORAL DAILY
Status: DISCONTINUED | OUTPATIENT
Start: 2025-03-13 | End: 2025-03-13

## 2025-03-12 RX ORDER — IPRATROPIUM BROMIDE AND ALBUTEROL SULFATE 2.5; .5 MG/3ML; MG/3ML
3 SOLUTION RESPIRATORY (INHALATION) ONCE AS NEEDED
OUTPATIENT
Start: 2025-03-12

## 2025-03-12 RX ORDER — MORPHINE SULFATE 2 MG/ML
1 INJECTION, SOLUTION INTRAMUSCULAR; INTRAVENOUS EVERY 4 HOURS PRN
Status: DISCONTINUED | OUTPATIENT
Start: 2025-03-12 | End: 2025-03-13

## 2025-03-12 RX ORDER — SODIUM CHLORIDE 0.9 % (FLUSH) 0.9 %
3 SYRINGE (ML) INJECTION EVERY 12 HOURS SCHEDULED
Status: DISCONTINUED | OUTPATIENT
Start: 2025-03-12 | End: 2025-03-12

## 2025-03-12 RX ORDER — ONDANSETRON 2 MG/ML
4 INJECTION INTRAMUSCULAR; INTRAVENOUS EVERY 6 HOURS PRN
Status: DISCONTINUED | OUTPATIENT
Start: 2025-03-12 | End: 2025-03-13

## 2025-03-12 RX ORDER — SPIRONOLACTONE 25 MG/1
50 TABLET ORAL DAILY
Status: DISCONTINUED | OUTPATIENT
Start: 2025-03-13 | End: 2025-03-14 | Stop reason: HOSPADM

## 2025-03-12 RX ORDER — PHENYLEPHRINE HYDROCHLORIDE 10 MG/ML
INJECTION INTRAVENOUS AS NEEDED
Status: DISCONTINUED | OUTPATIENT
Start: 2025-03-12 | End: 2025-03-12 | Stop reason: SURG

## 2025-03-12 RX ORDER — ATORVASTATIN CALCIUM 10 MG/1
10 TABLET, FILM COATED ORAL NIGHTLY
Status: DISCONTINUED | OUTPATIENT
Start: 2025-03-12 | End: 2025-03-14 | Stop reason: HOSPADM

## 2025-03-12 RX ORDER — SODIUM CHLORIDE 9 MG/ML
40 INJECTION, SOLUTION INTRAVENOUS AS NEEDED
Status: DISCONTINUED | OUTPATIENT
Start: 2025-03-12 | End: 2025-03-12

## 2025-03-12 RX ORDER — LABETALOL HYDROCHLORIDE 5 MG/ML
5 INJECTION, SOLUTION INTRAVENOUS
OUTPATIENT
Start: 2025-03-12

## 2025-03-12 RX ORDER — HYDROCODONE BITARTRATE AND ACETAMINOPHEN 5; 325 MG/1; MG/1
1 TABLET ORAL EVERY 4 HOURS PRN
Status: DISCONTINUED | OUTPATIENT
Start: 2025-03-12 | End: 2025-03-13

## 2025-03-12 RX ORDER — ONDANSETRON 2 MG/ML
INJECTION INTRAMUSCULAR; INTRAVENOUS AS NEEDED
Status: DISCONTINUED | OUTPATIENT
Start: 2025-03-12 | End: 2025-03-12 | Stop reason: SURG

## 2025-03-12 RX ORDER — ACETAMINOPHEN 325 MG/1
650 TABLET ORAL EVERY 4 HOURS PRN
Status: DISCONTINUED | OUTPATIENT
Start: 2025-03-12 | End: 2025-03-14 | Stop reason: HOSPADM

## 2025-03-12 RX ORDER — ALBUTEROL SULFATE 0.83 MG/ML
2.5 SOLUTION RESPIRATORY (INHALATION) EVERY 6 HOURS PRN
Status: DISCONTINUED | OUTPATIENT
Start: 2025-03-12 | End: 2025-03-14 | Stop reason: HOSPADM

## 2025-03-12 RX ORDER — ACETAMINOPHEN 650 MG/1
650 SUPPOSITORY RECTAL EVERY 4 HOURS PRN
Status: DISCONTINUED | OUTPATIENT
Start: 2025-03-12 | End: 2025-03-14 | Stop reason: HOSPADM

## 2025-03-12 RX ORDER — NALOXONE HCL 0.4 MG/ML
0.4 VIAL (ML) INJECTION AS NEEDED
OUTPATIENT
Start: 2025-03-12 | End: 2025-03-13

## 2025-03-12 RX ADMIN — ONDANSETRON 4 MG: 2 INJECTION INTRAMUSCULAR; INTRAVENOUS at 18:00

## 2025-03-12 RX ADMIN — PROTAMINE SULFATE 100 MG: 10 INJECTION, SOLUTION INTRAVENOUS at 18:32

## 2025-03-12 RX ADMIN — HYDROCODONE BITARTRATE AND ACETAMINOPHEN 1 TABLET: 5; 325 TABLET ORAL at 19:55

## 2025-03-12 RX ADMIN — SODIUM CHLORIDE 30 ML/HR: 9 INJECTION, SOLUTION INTRAVENOUS at 13:47

## 2025-03-12 RX ADMIN — SODIUM CHLORIDE: 9 INJECTION, SOLUTION INTRAVENOUS at 14:27

## 2025-03-12 RX ADMIN — ATORVASTATIN CALCIUM 10 MG: 10 TABLET ORAL at 22:54

## 2025-03-12 RX ADMIN — PHENYLEPHRINE HYDROCHLORIDE 0.5 MCG/KG/MIN: 10 INJECTION INTRAVENOUS at 16:23

## 2025-03-12 RX ADMIN — SODIUM CHLORIDE: 9 INJECTION, SOLUTION INTRAVENOUS at 18:17

## 2025-03-12 RX ADMIN — ROCURONIUM BROMIDE 10 MG: 10 INJECTION, SOLUTION INTRAVENOUS at 17:15

## 2025-03-12 RX ADMIN — HEPARIN SODIUM 14000 UNITS: 1000 INJECTION INTRAVENOUS; SUBCUTANEOUS at 16:43

## 2025-03-12 RX ADMIN — ROCURONIUM BROMIDE 50 MG: 10 INJECTION, SOLUTION INTRAVENOUS at 16:16

## 2025-03-12 RX ADMIN — GLYCOPYRROLATE 0.2 MG: 0.2 INJECTION INTRAMUSCULAR; INTRAVENOUS at 16:55

## 2025-03-12 RX ADMIN — HEPARIN SODIUM 3000 UNITS: 1000 INJECTION INTRAVENOUS; SUBCUTANEOUS at 16:55

## 2025-03-12 RX ADMIN — APIXABAN 5 MG: 5 TABLET, FILM COATED ORAL at 22:54

## 2025-03-12 RX ADMIN — FAMOTIDINE 20 MG: 10 INJECTION INTRAVENOUS at 16:22

## 2025-03-12 RX ADMIN — LATANOPROST 1 DROP: 50 SOLUTION OPHTHALMIC at 22:50

## 2025-03-12 RX ADMIN — SODIUM CHLORIDE: 9 INJECTION, SOLUTION INTRAVENOUS at 16:11

## 2025-03-12 RX ADMIN — FENTANYL CITRATE 100 MCG: 50 INJECTION, SOLUTION INTRAMUSCULAR; INTRAVENOUS at 16:15

## 2025-03-12 RX ADMIN — SUGAMMADEX 200 MG: 100 INJECTION, SOLUTION INTRAVENOUS at 18:35

## 2025-03-12 RX ADMIN — HEPARIN SODIUM 4000 UNITS: 1000 INJECTION INTRAVENOUS; SUBCUTANEOUS at 17:09

## 2025-03-12 RX ADMIN — LIDOCAINE HYDROCHLORIDE 50 MG: 20 INJECTION, SOLUTION EPIDURAL; INFILTRATION; INTRACAUDAL; PERINEURAL at 16:16

## 2025-03-12 RX ADMIN — PHENYLEPHRINE HYDROCHLORIDE 200 MCG: 10 INJECTION INTRAVENOUS at 16:28

## 2025-03-12 RX ADMIN — ROCURONIUM BROMIDE 10 MG: 10 INJECTION, SOLUTION INTRAVENOUS at 16:45

## 2025-03-12 RX ADMIN — HEPARIN SODIUM 2000 UNITS: 1000 INJECTION INTRAVENOUS; SUBCUTANEOUS at 18:26

## 2025-03-12 RX ADMIN — PROPOFOL 150 MG: 10 INJECTION, EMULSION INTRAVENOUS at 14:28

## 2025-03-12 NOTE — ANESTHESIA POSTPROCEDURE EVALUATION
Patient: Sheree Vance    Procedure Summary       Date: 03/12/25 Room / Location: [place] CATH LAB 3 / [facility] CATH INVASIVE LOCATION    Anesthesia Start: 1611 Anesthesia Stop: 1846    Procedure: Ablation atrial fibrillation (Right: Groin) Diagnosis:       Paroxysmal atrial fibrillation      Palpitations      Left ventricular diastolic dysfunction      Obstructive sleep apnea syndrome      (Recurrent symptomatic paroxysmal atrial fibrillation)    Providers: Dre Case MD Provider: Harpreet Mireles MD    Anesthesia Type: general ASA Status: 3            Anesthesia Type: general    Vitals  Vitals Value Taken Time   /61 03/12/25 18:56   Temp 97.6 °F (36.4 °C) 03/12/25 18:50   Pulse 65 03/12/25 19:00   Resp 18 03/12/25 18:50   SpO2 96 % 03/12/25 19:00   Vitals shown include unfiled device data.        Post Anesthesia Care and Evaluation    Patient location during evaluation: PACU  Patient participation: complete - patient participated  Level of consciousness: awake  Pain scale: See nurse's notes for pain score.  Pain management: adequate    Airway patency: patent  Anesthetic complications: No anesthetic complications  PONV Status: none  Cardiovascular status: acceptable  Respiratory status: acceptable and spontaneous ventilation  Hydration status: acceptable    Comments: Patient seen and examined postoperatively; vital signs stable; SpO2 greater than or equal to 90%; cardiopulmonary status stable; nausea/vomiting adequately controlled; pain adequately controlled; no apparent anesthesia complications; patient discharged from anesthesia care when discharge criteria were met

## 2025-03-12 NOTE — ANESTHESIA POSTPROCEDURE EVALUATION
Patient: Sheree Vance    Procedure Summary       Date: 03/12/25 Room / Location: [facility]    Anesthesia Start: 1427 Anesthesia Stop: 1440    Procedure: ADULT TRANSESOPHAGEAL ECHO (SARA) W/ CONT IF NECESSARY PER PROTOCOL Diagnosis:       Paroxysmal atrial fibrillation      Palpitations      (Arrhythmia)    Scheduled Providers:  Provider: Gisela Small MD    Anesthesia Type: Not recorded ASA Status: Not recorded            Anesthesia Type: No value filed.    Vitals  Vitals Value Taken Time   /52 03/12/25 15:01   Temp     Pulse 96 03/12/25 15:02   Resp     SpO2 97 % 03/12/25 15:02   Vitals shown include unfiled device data.        Post Anesthesia Care and Evaluation    Patient location during evaluation: PACU  Patient participation: complete - patient participated  Level of consciousness: awake and alert  Pain management: satisfactory to patient    Airway patency: patent  Anesthetic complications: No anesthetic complications  PONV Status: none  Cardiovascular status: acceptable  Respiratory status: acceptable  Hydration status: acceptable

## 2025-03-12 NOTE — ANESTHESIA PROCEDURE NOTES
Airway  Date/Time: 3/12/2025 4:18 PM  Airway not difficult    General Information and Staff    Patient location during procedure: OR  CRNA/CAA: Chino Zafar CRNA    Indications and Patient Condition  Indications for airway management: airway protection    Preoxygenated: yes    Mask difficulty assessment: 1 - vent by mask    Final Airway Details    Final airway type: endotracheal airway      Successful airway: ETT   Successful intubation technique: video laryngoscopy  Adjuncts used in placement: intubating stylet  Endotracheal tube insertion site: oral  Blade: [place]ah  Blade size: 3  ETT size (mm): 7.0  Cormack-Lehane Classification: grade I - full view of glottis  Placement verified by: capnometry   Measured from: lips  ETT/EBT  to lips (cm): 21  Number of attempts at approach: 1  Assessment: lips, teeth, and gum same as pre-op and atraumatic intubation

## 2025-03-12 NOTE — ANESTHESIA PREPROCEDURE EVALUATION
Anesthesia Evaluation     Patient summary reviewed and Nursing notes reviewed   NPO Solid Status: > 8 hours  NPO Liquid Status: > 8 hours           Airway   Mallampati: II  TM distance: >3 FB  Neck ROM: full  No difficulty expected  Dental - normal exam     Pulmonary - negative pulmonary ROS and normal exam    breath sounds clear to auscultation  (+) ,sleep apnea  Cardiovascular - negative cardio ROS and normal exam  Exercise tolerance: unable to assess    ECG reviewed  Rhythm: regular  Rate: normal    (+) hypertension, CAD, dysrhythmias Paroxysmal Atrial Fib, hyperlipidemia    ROS comment: NL ECHO    Neuro/Psych- negative ROS  (+) numbness  GI/Hepatic/Renal/Endo - negative ROS   (+) obesity, GERD, thyroid problem hypothyroidism    Musculoskeletal (-) negative ROS    Abdominal  - normal exam   Substance History - negative use     OB/GYN negative ob/gyn ROS         Other - negative ROS  arthritis,                       Anesthesia Plan    ASA 3     general     (GETA, A Fib Ablation)  intravenous induction     Anesthetic plan, risks, benefits, and alternatives have been provided, discussed and informed consent has been obtained with: patient.        CODE STATUS:

## 2025-03-12 NOTE — ANESTHESIA PREPROCEDURE EVALUATION
Anesthesia Evaluation     Patient summary reviewed and Nursing notes reviewed   NPO Solid Status: > 8 hours  NPO Liquid Status: > 8 hours           Airway   Mallampati: II  TM distance: >3 FB  Neck ROM: full  No difficulty expected  Dental - normal exam     Pulmonary - normal exam    breath sounds clear to auscultation  (+) ,sleep apnea  Cardiovascular - normal exam  Exercise tolerance: unable to assess    ECG reviewed  Rhythm: regular  Rate: normal    (+) hypertension, CAD, dysrhythmias Paroxysmal Atrial Fib, hyperlipidemia    ROS comment: NL ECHO    Neuro/Psych  (+) numbness  GI/Hepatic/Renal/Endo    (+) obesity, GERD, thyroid problem hypothyroidism    Musculoskeletal (-) negative ROS    Abdominal  - normal exam   Substance History - negative use     OB/GYN negative ob/gyn ROS         Other                          Anesthesia Plan    ASA 3     general     (GETA, A Fib Ablation)  intravenous induction     Anesthetic plan, risks, benefits, and alternatives have been provided, discussed and informed consent has been obtained with: patient.        CODE STATUS:

## 2025-03-13 LAB
ANION GAP SERPL CALCULATED.3IONS-SCNC: 8.7 MMOL/L (ref 5–15)
BUN SERPL-MCNC: 18 MG/DL (ref 8–23)
BUN/CREAT SERPL: 19.6 (ref 7–25)
CALCIUM SPEC-SCNC: 8.8 MG/DL (ref 8.6–10.5)
CHLORIDE SERPL-SCNC: 107 MMOL/L (ref 98–107)
CO2 SERPL-SCNC: 23.3 MMOL/L (ref 22–29)
CREAT SERPL-MCNC: 0.92 MG/DL (ref 0.57–1)
DEPRECATED RDW RBC AUTO: 46.9 FL (ref 37–54)
EGFRCR SERPLBLD CKD-EPI 2021: 64.3 ML/MIN/1.73
ERYTHROCYTE [DISTWIDTH] IN BLOOD BY AUTOMATED COUNT: 13.7 % (ref 12.3–15.4)
GLUCOSE SERPL-MCNC: 156 MG/DL (ref 65–99)
HCT VFR BLD AUTO: 37.1 % (ref 34–46.6)
HGB BLD-MCNC: 11.7 G/DL (ref 12–15.9)
MAGNESIUM SERPL-MCNC: 1.9 MG/DL (ref 1.6–2.4)
MCH RBC QN AUTO: 29.1 PG (ref 26.6–33)
MCHC RBC AUTO-ENTMCNC: 31.5 G/DL (ref 31.5–35.7)
MCV RBC AUTO: 92.3 FL (ref 79–97)
PLATELET # BLD AUTO: 261 10*3/MM3 (ref 140–450)
PMV BLD AUTO: 10.6 FL (ref 6–12)
POTASSIUM SERPL-SCNC: 4.9 MMOL/L (ref 3.5–5.2)
RBC # BLD AUTO: 4.02 10*6/MM3 (ref 3.77–5.28)
SODIUM SERPL-SCNC: 139 MMOL/L (ref 136–145)
WBC NRBC COR # BLD AUTO: 4.91 10*3/MM3 (ref 3.4–10.8)

## 2025-03-13 PROCEDURE — 63710000001 DOFETILIDE 500 MCG CAPSULE: Performed by: INTERNAL MEDICINE

## 2025-03-13 PROCEDURE — 63710000001 HYDROCODONE-ACETAMINOPHEN 5-325 MG TABLET: Performed by: INTERNAL MEDICINE

## 2025-03-13 PROCEDURE — 63710000001 DILTIAZEM 60 MG TABLET: Performed by: INTERNAL MEDICINE

## 2025-03-13 PROCEDURE — A9270 NON-COVERED ITEM OR SERVICE: HCPCS | Performed by: INTERNAL MEDICINE

## 2025-03-13 PROCEDURE — 80048 BASIC METABOLIC PNL TOTAL CA: CPT | Performed by: INTERNAL MEDICINE

## 2025-03-13 PROCEDURE — 63710000001 SPIRONOLACTONE 25 MG TABLET: Performed by: INTERNAL MEDICINE

## 2025-03-13 PROCEDURE — G0378 HOSPITAL OBSERVATION PER HR: HCPCS

## 2025-03-13 PROCEDURE — 63710000001 LEVOTHYROXINE 150 MCG TABLET: Performed by: INTERNAL MEDICINE

## 2025-03-13 PROCEDURE — 63710000001 ATORVASTATIN 10 MG TABLET: Performed by: INTERNAL MEDICINE

## 2025-03-13 PROCEDURE — 93005 ELECTROCARDIOGRAM TRACING: CPT | Performed by: INTERNAL MEDICINE

## 2025-03-13 PROCEDURE — 63710000001 MUPIROCIN 2 % OINTMENT: Performed by: INTERNAL MEDICINE

## 2025-03-13 PROCEDURE — 63710000001 APIXABAN 5 MG TABLET: Performed by: INTERNAL MEDICINE

## 2025-03-13 PROCEDURE — 85027 COMPLETE CBC AUTOMATED: CPT | Performed by: INTERNAL MEDICINE

## 2025-03-13 PROCEDURE — 99232 SBSQ HOSP IP/OBS MODERATE 35: CPT | Performed by: INTERNAL MEDICINE

## 2025-03-13 PROCEDURE — 83735 ASSAY OF MAGNESIUM: CPT | Performed by: INTERNAL MEDICINE

## 2025-03-13 PROCEDURE — 93010 ELECTROCARDIOGRAM REPORT: CPT | Performed by: INTERNAL MEDICINE

## 2025-03-13 RX ORDER — DILTIAZEM HCL 60 MG
60 TABLET ORAL 3 TIMES DAILY
Status: DISCONTINUED | OUTPATIENT
Start: 2025-03-13 | End: 2025-03-14 | Stop reason: HOSPADM

## 2025-03-13 RX ORDER — DOFETILIDE 0.5 MG/1
500 CAPSULE ORAL EVERY 12 HOURS
Status: DISCONTINUED | OUTPATIENT
Start: 2025-03-13 | End: 2025-03-14 | Stop reason: HOSPADM

## 2025-03-13 RX ADMIN — DILTIAZEM HYDROCHLORIDE 60 MG: 60 TABLET, FILM COATED ORAL at 20:47

## 2025-03-13 RX ADMIN — DOFETILIDE 500 MCG: 0.5 CAPSULE ORAL at 20:47

## 2025-03-13 RX ADMIN — SPIRONOLACTONE 50 MG: 25 TABLET ORAL at 08:42

## 2025-03-13 RX ADMIN — HYDROCODONE BITARTRATE AND ACETAMINOPHEN 1 TABLET: 5; 325 TABLET ORAL at 00:26

## 2025-03-13 RX ADMIN — LEVOTHYROXINE SODIUM 150 MCG: 150 TABLET ORAL at 05:54

## 2025-03-13 RX ADMIN — ATORVASTATIN CALCIUM 10 MG: 10 TABLET ORAL at 20:47

## 2025-03-13 RX ADMIN — MUPIROCIN 1 APPLICATION: 20 OINTMENT TOPICAL at 08:42

## 2025-03-13 RX ADMIN — MUPIROCIN 1 APPLICATION: 20 OINTMENT TOPICAL at 20:47

## 2025-03-13 RX ADMIN — APIXABAN 5 MG: 5 TABLET, FILM COATED ORAL at 20:47

## 2025-03-13 RX ADMIN — DILTIAZEM HYDROCHLORIDE 60 MG: 60 TABLET, FILM COATED ORAL at 14:30

## 2025-03-13 RX ADMIN — LATANOPROST 1 DROP: 50 SOLUTION OPHTHALMIC at 20:48

## 2025-03-13 RX ADMIN — APIXABAN 5 MG: 5 TABLET, FILM COATED ORAL at 08:42

## 2025-03-13 RX ADMIN — DOFETILIDE 500 MCG: 0.5 CAPSULE ORAL at 09:09

## 2025-03-13 NOTE — CONSULTS
Patient Care Team:  Latia Guerrier MD as PCP - General  Latia Guerrier MD as PCP - Family Medicine  Dre Case MD (Cardiology)  Jet Godwin MD as Consulting Physician (Ophthalmology)  Antonio Fine MD as Consulting Physician (Allergy)  Sandor Astorga MD as Consulting Physician (Orthopedic Surgery)  Sam Carlos MD as Consulting Physician (Cardiology)  Referring Provider:  Dr. Case  Reason for consultation:  AFib    Chief complaint:  palpitations    Subjective     History of Present Illness:  Sheree Vance is a 79 yr old female with pmhx HTN, HTN, persistent AF on chronic Eliquis s/p multiple cardioversion's and an ablation in 2018 on Tikosyn, CAD, SHANAE on CPAP, OA, Hypothyroidism, DJD, chronic back pain s/p spinal fusion who presented to [facility] for re-do ablation with Dr. Case 03/12/25. Patient did well with the procedure however started to have recurrent arrhythmia the next monring. She further complains of palpitations, leg edema, but no chest pain.  Patient was started back on Tikosyn and started on low dose cardizem. [facility] was consulted for consideration of possible convergence procedure in the future.     Review of Systems   Constitutional:  Negative for diaphoresis.   Respiratory:  Negative for chest tightness.    Cardiovascular:  Positive for palpitations and leg swelling. Negative for chest pain.        Past Medical History:   Diagnosis Date    A-fib Since 2001 Dx in [place]    Abnormal ECG 2009    Allergic 2014    Seasonal    Allergic rhinitis     Hx Allergy Shots x 4 Yrs    Aortic valve calcification 12/04/2018    Noted on SARA    Arrhythmia     Arthritis     Lumbar Spine    Atrial flutter     Breast mass seen on mammogram 03/19/2014    L 4CM Mass--Benign & Followed    Bruxism, sleep-related     Cataract     Chronic anticoagulation     Eliquis     Chronic low back pain     Hx Back Sx in 2016    Colon polyp     Coronary artery disease 2009    AFIB     DJD (degenerative joint disease), lumbar     Dyslipidemia     w/Hypertriglyceridemia--Statin/Fish Oils    Family history of premature CAD     Glaucoma     Heart disease     History of bone density study 03/19/14-[facility]    T-Score of 1.4 Indicating Osteopenia    History of EKG 04/2018 & 08/2018 & 12/2018    First Degree AV Block/Multiple Atrial Premature Blocks Noted on All    Hormone replacement therapy (HRT)     Premarin-0.625MG    Hyperlipidemia     Controlled w/Meds    Hypertension     Controlled w/Losartan    Hypothyroidism     Synthroid    Insomnia     Takes OTC Sleep Aid PRN    Left atrial enlargement 12/04/2018    Severe Noted on Cardioversion Report/SARA    Left breast mass 03/19/2014    4CM Noted on Mammogram--Benign     Mild tricuspid valve regurgitation 12/04/2018    Noted on SARA    Mitral valve annular calcification 12/04/2018    Moderate Noted on SARA    Mitral valve regurgitation 12/04/2018    Moderate Noted on SARA    Obesity 12/04/2018    BMI-43.3     SHANAE on CPAP 11/27/2018    AHI 10.5/h, supine 30/h    Osteoarthritis of right hip 07/09/2019    Osteopenia 03/19/2014    Noted on Dexa Report    Restless sleeper     Tosses and Turns All Night Per Pt    Sinus congestion     Tricuspid leaflet thickened 12/04/2018    Noted on SARA    Urinary tract infection      Past Surgical History:   Procedure Laterality Date    ABLATION OF DYSRHYTHMIC FOCUS  2018    APPENDECTOMY      BACK SURGERY  2016    Spinal Fusion    BLADDER REPAIR  2001 in [place]    CARDIAC CATHETERIZATION  2015    CARDIAC ELECTROPHYSIOLOGY PROCEDURE Right 3/12/2025    Procedure: Ablation atrial fibrillation;  Surgeon: Dre Case MD;  Location: [facility] INVASIVE LOCATION;  Service: Cardiovascular;  Laterality: Right;    CARDIOVERSION  08/7/18 & 4/17/18-[facility]    Dr. Villegas--For Chronic A-Fib    CARDIOVERSION  12/4/28-[facility]    Dr. Case--See Report    CATARACT EXTRACTION  2007    COLONOSCOPY  04/13/2017    HYSTERECTOMY      Partial    JOINT  "REPLACEMENT  2020 2021    KNEE SURGERY Right 2009    OTHER SURGICAL HISTORY  12/4/18-[facility]    Fluoroscopy/Trans-Septal Access to L Atrium/Selective Venography of L Superior Pulm Vein/Add Lienier Radiofrequency Ablation Along the Coronary Sinus/Synchronized Cardioversion--Dr. Case    REPLACEMENT TOTAL KNEE Right 08/30/2021    Dr. Astorga    SPINE SURGERY      SARA  12/4/18-[facility]    Mild Concentric L Ventricular Hypertrophy Noted; EF 55-60%; L Atrial Moderate-Severely Dilated, Moderat Mitral Annular Calcification with Moderate MVR Noted; Mild TVR; Normal LV Function    TONSILLECTOMY      TOTAL HIP ARTHROPLASTY Right 07/06/2020    Dr. Astorga     Family History   Problem Relation Age of Onset    Heart failure Mother     Alcohol abuse Mother     Early death Mother     Miscarriages / Stillbirths Mother     No Known Problems Father      Social History     Tobacco Use    Smoking status: Never     Passive exposure: Never    Smokeless tobacco: Never   Vaping Use    Vaping status: Never Used   Substance Use Topics    Alcohol use: Yes     Alcohol/week: 4.0 standard drinks of alcohol     Types: 2 Glasses of wine, 2 Drinks containing 0.5 oz of alcohol per week     Comment: \"a couple a week\"    Drug use: Never     Medications Prior to Admission   Medication Sig Dispense Refill Last Dose/Taking    albuterol sulfate  (90 Base) MCG/ACT inhaler Inhale 2 puffs Every 4 (Four) Hours As Needed for Wheezing or Shortness of Air.   Taking As Needed    amLODIPine (NORVASC) 10 MG tablet Take 1 tablet by mouth Daily. 90 tablet 1 3/11/2025 Morning    amoxicillin (AMOXIL) 500 MG capsule Take 1 capsule by mouth 2 (Two) Times a Day As Needed (teeth cleanings). For teeth cleaning   Taking As Needed    apixaban (Eliquis) 5 MG tablet tablet Take 1 tablet by mouth Every 12 (Twelve) Hours. 180 tablet 1 3/11/2025 at  9:00 PM    atorvastatin (LIPITOR) 10 MG tablet Take 1 tablet by mouth every night at bedtime. 90 tablet 1 3/11/2025 Bedtime    " azelastine (ASTELIN) 0.1 % nasal spray Administer 1 spray into the nostril(s) as directed by provider As Needed for Allergies.   Past Week    Cholecalciferol 2000 units capsule Take 1 capsule by mouth Daily.   3/11/2025 Morning    Chromium 200 MCG capsule Take 1,000 mcg by mouth Daily.   3/11/2025 Bedtime    coenzyme Q10 100 MG capsule Take 1 capsule by mouth Every Night.   3/11/2025 Bedtime    dofetilide (TIKOSYN) 500 MCG capsule Take 1 capsule by mouth 2 (Two) Times a Day. 180 capsule 1 Past Week    EPINEPHrine (EPIPEN) 0.3 MG/0.3ML solution auto-injector injection   3 Taking    Estrogens Conjugated (Premarin) 0.625 MG/GM vaginal cream USE 1/2 GRAM PER VAGINA 2 TO 3 TIMES A WEEK 30 g 1 Past Week    latanoprost (XALATAN) 0.005 % ophthalmic solution Inject 1 drop into the eye Every Night.   3/11/2025 Bedtime    levothyroxine (SYNTHROID, LEVOTHROID) 150 MCG tablet TAKE ONE TABLET BY MOUTH DAILY 90 tablet 0 3/12/2025 at  8:00 AM    Magnesium 400 MG tablet Take 1 tablet by mouth Every Night.   3/11/2025 Bedtime    Multiple Vitamins-Minerals (MULTIVITAMIN ADULT) tablet Take 1 tablet by mouth Daily.   3/11/2025 Morning    Omega-3 1000 MG capsule Take 1 capsule by mouth Daily.   3/11/2025 Morning    spironolactone (ALDACTONE) 50 MG tablet Take 1 tablet by mouth Daily. 90 tablet 1 3/11/2025 Morning    telmisartan (MICARDIS) 80 MG tablet Take 1 tablet by mouth Every Night.   3/11/2025 Bedtime    vitamin B-12 (CYANOCOBALAMIN) 1000 MCG tablet Take 1 tablet by mouth Daily.   3/11/2025 Morning     apixaban, 5 mg, Oral, Q12H  atorvastatin, 10 mg, Oral, Nightly  dilTIAZem, 60 mg, Oral, TID  dofetilide, 500 mcg, Oral, Q12H  latanoprost, 1 drop, Both Eyes, Nightly  levothyroxine, 150 mcg, Oral, Q AM  mupirocin, 1 Application, Each Nare, BID  spironolactone, 50 mg, Oral, Daily      Allergies:  Patient has no known allergies.    Objective      Vital Signs  Temp:  [98 °F (36.7 °C)-98.3 °F (36.8 °C)] 98 °F (36.7 °C)  Heart Rate:   "[] 80  Resp:  [15-17] 15  BP: ()/(47-74) 116/64    Flowsheet Rows      Flowsheet Row First Filed Value   Admission Height 162.6 cm (64\") Documented at 03/12/2025 1338   Admission Weight 101 kg (223 lb 12.3 oz) Documented at 03/12/2025 1338          162.6 cm (64\")    Physical Exam  Constitutional:       Appearance: Normal appearance.   HENT:      Head: Normocephalic and atraumatic.      Mouth/Throat:      Mouth: Mucous membranes are moist.      Pharynx: Oropharynx is clear.   Eyes:      Pupils: Pupils are equal, round, and reactive to light.   Cardiovascular:      Rate and Rhythm: Normal rate. Rhythm irregular.      Pulses: Normal pulses.   Pulmonary:      Effort: Pulmonary effort is normal. No respiratory distress.   Abdominal:      Tenderness: There is no abdominal tenderness.   Skin:     General: Skin is warm and dry.   Neurological:      General: No focal deficit present.      Mental Status: She is alert and oriented to person, place, and time. Mental status is at baseline.   Psychiatric:         Mood and Affect: Mood normal.         Behavior: Behavior normal.         Thought Content: Thought content normal.         Judgment: Judgment normal.         Results Review:   Lab Results (last 24 hours)       Procedure Component Value Units Date/Time    Magnesium [[id number]]  (Normal) Collected: 03/14/25 0421    Specimen: Blood Updated: 03/14/25 0505     Magnesium 2.1 mg/dL             EP Study 03/12/2025  Findings     Patient had   moderate to severe left atrial enlargement   Prior cryoablation was done on this patient and mapping today revealed complete antral isolation  Small area of focal activation was noted inside the left superior pulmonary vein and activation was eccentric highly suspicious for epicardial to endocardial bridge which was targeted with PFA ablation  Patient presented with atrial fibrillation and substrate ablation was done with pulsed field ablation with posterior wall and posterior " roof isolation and post ablation cardioversion, patient went to sinus rhythm without induction of atrial fibrillation  Different types of atrial tachycardias and atrial flutters could be induced post completion of PFA ablation  Concentric atrial flutter was noted with possible conduction to cavotricuspid isthmus with no success with isthmus ablation and repeat mapping revealed broad area of septal activation early both on the left side septum and right side septum with presence of interatrial septal aneurysm  Patient also has a PFO  Septal access however was done in the posterior septum for ablation purposes  After extensive mapping of the flutter with proximal to distal conduction, biatrial septal flutter was suspected with possible endocardial epicardial.  And cardioversion was done to sinus rhythm  Repeat pacing did not induce a focal atrial tachycardia coming from lateral mitral annulus which was targeted with ablation with no further induction with a cycle length of 460 ms  After completion of this ablation repeat pacing was done down to 300 ms from mid and proximal coronary sinus without induction of atrial fibrillation or any defined atrial arrhythmia    Plan  Overnight observation  If the patient has recurrent arrhythmias, it is reasonable to refer this patient to convergence procedure    Echo     Clinical Indication    Arrhythmia   Dx: Paroxysmal atrial fibrillation [I48.0 (ICD-10-CM)]; Palpitations [R00.2 (ICD-10-CM)]     Interpretation Summary         Left ventricular systolic function is normal. Estimated left ventricular EF = 60% Left ventricular ejection fraction appears to be 56 - 60%.    Left ventricular wall thickness is consistent with mild concentric hypertrophy.    Left ventricular diastolic function is consistent with (grade I) impaired relaxation.    The left atrial cavity is moderate to severely dilated.    Abnormal mitral valve structure consistent with dilated annulus. There is mild,  bileaflet mitral valve thickening present.    Estimated right ventricular systolic pressure from tricuspid regurgitation is normal (<35 mmHg).    SARA  Findings     Normal LV systolic function with moderate to severe left atrial enlargement with mild to moderate central MR and mild TR without pulm hypertension and no effusion and interatrial septal aneurysm noted without PFO       Assessment & Plan       PAF (paroxysmal atrial fibrillation)    Paroxysmal atrial fibrillation    Left ventricular diastolic dysfunction    Palpitations    Obstructive sleep apnea syndrome      Assessment & Plan    Persistent Afib s/p multiple cardioversion's, ablation (2018), on Eliquis, on tikosyn s/p redo ablation 03/12/25 Dr. Case -- on Tikosyn, Cardizem, Eliquis  CAD  Hypertension  Hyperlipidemia  SHANAE on CPAP  OA  Hypothyroidism  DJD  Chronic back pain s/p spinal fusion.    PLAN  Further planning pending surgeons review of patient's films. Discussed with Dr. Choi and she's to be seen in clinic in one month 04/14/25 at 11:15 AM      Thank you for allowing us to participate in the care of this patient.      PETE Thompson  03/14/25  10:28 EDT    **all problems new to this examiner  **EKG and CXR independently reviewed and interpreted

## 2025-03-13 NOTE — PROGRESS NOTES
CC--- recurrent atrial arrhythmia    Sub  Patient underwent substrate ablation and flutter ablation yesterday and did well overnight and started have recurrent arrhythmia this morning  Complains of palpitations and mild leg edema and no chest pain      Past Medical History:   Diagnosis Date    A-fib Since 2001 Dx in [place]    Abnormal ECG 2009    Allergic 2014    Seasonal    Allergic rhinitis     Hx Allergy Shots x 4 Yrs    Aortic valve calcification 12/04/2018    Noted on SARA    Arrhythmia     Arthritis     Lumbar Spine    Atrial flutter     Breast mass seen on mammogram 03/19/2014    L 4CM Mass--Benign & Followed    Bruxism, sleep-related     Cataract     Chronic anticoagulation     Eliquis     Chronic low back pain     Hx Back Sx in 2016    Colon polyp     Coronary artery disease 2009    AFIB    DJD (degenerative joint disease), lumbar     Dyslipidemia     w/Hypertriglyceridemia--Statin/Fish Oils    Family history of premature CAD     Glaucoma     Heart disease     History of bone density study 03/19/14-[facility]    T-Score of 1.4 Indicating Osteopenia    History of EKG 04/2018 & 08/2018 & 12/2018    First Degree AV Block/Multiple Atrial Premature Blocks Noted on All    Hormone replacement therapy (HRT)     Premarin-0.625MG    Hyperlipidemia     Controlled w/Meds    Hypertension     Controlled w/Losartan    Hypothyroidism     Synthroid    Insomnia     Takes OTC Sleep Aid PRN    Left atrial enlargement 12/04/2018    Severe Noted on Cardioversion Report/SARA    Left breast mass 03/19/2014    4CM Noted on Mammogram--Benign     Mild tricuspid valve regurgitation 12/04/2018    Noted on SARA    Mitral valve annular calcification 12/04/2018    Moderate Noted on SARA    Mitral valve regurgitation 12/04/2018    Moderate Noted on SARA    Obesity 12/04/2018    BMI-43.3     SHANAE on CPAP 11/27/2018    AHI 10.5/h, supine 30/h    Osteoarthritis of right hip 07/09/2019    Osteopenia 03/19/2014    Noted on Dexa Report    Restless sleeper      Tosses and Turns All Night Per Pt    Sinus congestion     Tricuspid leaflet thickened 12/04/2018    Noted on SARA    Urinary tract infection      Past Surgical History:   Procedure Laterality Date    ABLATION OF DYSRHYTHMIC FOCUS  2018    APPENDECTOMY      BACK SURGERY  2016    Spinal Fusion    BLADDER REPAIR  2001 in [place]    CARDIAC CATHETERIZATION  2015    CARDIAC ELECTROPHYSIOLOGY PROCEDURE Right 3/12/2025    Procedure: Ablation atrial fibrillation;  Surgeon: Dre Case MD;  Location: [facility] CATH INVASIVE LOCATION;  Service: Cardiovascular;  Laterality: Right;    CARDIOVERSION  08/7/18 & 4/17/18-[facility]    Dr. Villegas--For Chronic A-Fib    CARDIOVERSION  12/4/28-[facility]    Dr. Case--See Report    CATARACT EXTRACTION  2007    COLONOSCOPY  04/13/2017    HYSTERECTOMY      Partial    JOINT REPLACEMENT  2020 2021    KNEE SURGERY Right 2009    OTHER SURGICAL HISTORY  12/4/18-[facility]    Fluoroscopy/Trans-Septal Access to L Atrium/Selective Venography of L Superior Pulm Vein/Add Lienier Radiofrequency Ablation Along the Coronary Sinus/Synchronized Cardioversion--Dr. Case    REPLACEMENT TOTAL KNEE Right 08/30/2021    Dr. Astorga    SPINE SURGERY      SARA  12/4/18-[facility]    Mild Concentric L Ventricular Hypertrophy Noted; EF 55-60%; L Atrial Moderate-Severely Dilated, Moderat Mitral Annular Calcification with Moderate MVR Noted; Mild TVR; Normal LV Function    TONSILLECTOMY      TOTAL HIP ARTHROPLASTY Right 07/06/2020    Dr. Astorga     Family History   Problem Relation Age of Onset    Heart failure Mother     Alcohol abuse Mother     Early death Mother     Miscarriages / Stillbirths Mother     No Known Problems Father      Social History     Tobacco Use    Smoking status: Never     Passive exposure: Never    Smokeless tobacco: Never   Vaping Use    Vaping status: Never Used   Substance Use Topics    Alcohol use: Yes     Alcohol/week: 4.0 standard drinks of alcohol     Types: 2 Glasses of wine, 2 Drinks containing 0.5 oz of  "alcohol per week     Comment: \"a couple a week\"    Drug use: Never     Medications Prior to Admission   Medication Sig Dispense Refill Last Dose/Taking    albuterol sulfate  (90 Base) MCG/ACT inhaler Inhale 2 puffs Every 4 (Four) Hours As Needed for Wheezing or Shortness of Air.   Taking As Needed    amLODIPine (NORVASC) 10 MG tablet Take 1 tablet by mouth Daily. 90 tablet 1 3/11/2025 Morning    amoxicillin (AMOXIL) 500 MG capsule Take 1 capsule by mouth 2 (Two) Times a Day As Needed (teeth cleanings). For teeth cleaning   Taking As Needed    apixaban (Eliquis) 5 MG tablet tablet Take 1 tablet by mouth Every 12 (Twelve) Hours. 180 tablet 1 3/11/2025 at  9:00 PM    atorvastatin (LIPITOR) 10 MG tablet Take 1 tablet by mouth every night at bedtime. 90 tablet 1 3/11/2025 Bedtime    azelastine (ASTELIN) 0.1 % nasal spray Administer 1 spray into the nostril(s) as directed by provider As Needed for Allergies.   Past Week    Cholecalciferol 2000 units capsule Take 1 capsule by mouth Daily.   3/11/2025 Morning    Chromium 200 MCG capsule Take 1,000 mcg by mouth Daily.   3/11/2025 Bedtime    coenzyme Q10 100 MG capsule Take 1 capsule by mouth Every Night.   3/11/2025 Bedtime    dofetilide (TIKOSYN) 500 MCG capsule Take 1 capsule by mouth 2 (Two) Times a Day. 180 capsule 1 Past Week    EPINEPHrine (EPIPEN) 0.3 MG/0.3ML solution auto-injector injection   3 Taking    Estrogens Conjugated (Premarin) 0.625 MG/GM vaginal cream USE 1/2 GRAM PER VAGINA 2 TO 3 TIMES A WEEK 30 g 1 Past Week    latanoprost (XALATAN) 0.005 % ophthalmic solution Inject 1 drop into the eye Every Night.   3/11/2025 Bedtime    levothyroxine (SYNTHROID, LEVOTHROID) 150 MCG tablet TAKE ONE TABLET BY MOUTH DAILY 90 tablet 0 3/12/2025 at  8:00 AM    Magnesium 400 MG tablet Take 1 tablet by mouth Every Night.   3/11/2025 Bedtime    Multiple Vitamins-Minerals (MULTIVITAMIN ADULT) tablet Take 1 tablet by mouth Daily.   3/11/2025 Morning    Omega-3 1000 MG " capsule Take 1 capsule by mouth Daily.   3/11/2025 Morning    spironolactone (ALDACTONE) 50 MG tablet Take 1 tablet by mouth Daily. 90 tablet 1 3/11/2025 Morning    telmisartan (MICARDIS) 80 MG tablet Take 1 tablet by mouth Every Night.   3/11/2025 Bedtime    vitamin B-12 (CYANOCOBALAMIN) 1000 MCG tablet Take 1 tablet by mouth Daily.   3/11/2025 Morning     Allergies:  Patient has no known allergies.    Review of Systems   General:  positive for fatigue and tiredness  Eyes: No redness  Cardiovascular: No chest pain    Physical Exam    General:      well developed, well nourished, in no acute distress.    Head:      normocephalic and atraumatic.    Eyes:      PERRL/EOM intact, conjunctivae and sclerae clear without nystagmus.    Neck:      no  thyromegaly, trachea central with normal respiratory effort  Lungs:      clear bilaterally to auscultation.    Heart:       irregular rate and rhythm, S1, S2 without murmurs, rubs, or gallops  Skin:      intact without lesions or rashes.    Psych:      alert and cooperative; normal mood and affect; normal attention span and concentration.              CBC    Results from last 7 days   Lab Units 03/13/25  0427 03/12/25  2139 03/10/25  1129   WBC 10*3/mm3 4.91 9.66 6.48   HEMOGLOBIN g/dL 11.7* 12.7 14.8   PLATELETS 10*3/mm3 261 255 338     BMP   Results from last 7 days   Lab Units 03/13/25  0427 03/12/25  2139 03/10/25  1129   SODIUM mmol/L 139 141 142   POTASSIUM mmol/L 4.9 4.6 4.7   CHLORIDE mmol/L 107 108* 103   CO2 mmol/L 23.3 22.4 25.9   BUN mg/dL 18 19 18   CREATININE mg/dL 0.92 0.89 0.97   GLUCOSE mg/dL 156* 185* 103*   MAGNESIUM mg/dL 1.9  --  2.2       Assessment plan    Post AF ablation which was redo ablation with substrate ablation and attempted flutter ablation and suspected epicardial to endocardial conduction with early recurrence of arrhythmia  Started back on Tikosyn which she is taking at home  Also had low-dose of Cardizem for rate control  Amlodipine to be  stopped  Continue Aldactone  Hypertension controlled  CT surgery consultation for possible future convergence procedure which was discussed with the patient  Diastolic dysfunction continue Aldactone    Potential discharge tomorrow    Electronically signed by Dre Case MD, 03/13/25, 2:30 PM EDT.     ICU

## 2025-03-14 VITALS
WEIGHT: 227.07 LBS | SYSTOLIC BLOOD PRESSURE: 111 MMHG | TEMPERATURE: 97.7 F | HEART RATE: 66 BPM | HEIGHT: 64 IN | OXYGEN SATURATION: 95 % | DIASTOLIC BLOOD PRESSURE: 71 MMHG | BODY MASS INDEX: 38.77 KG/M2 | RESPIRATION RATE: 16 BRPM

## 2025-03-14 DIAGNOSIS — I48.19 OTHER PERSISTENT ATRIAL FIBRILLATION: ICD-10-CM

## 2025-03-14 DIAGNOSIS — I25.10 ATHEROSCLEROSIS OF NATIVE CORONARY ARTERY OF NATIVE HEART WITHOUT ANGINA PECTORIS: ICD-10-CM

## 2025-03-14 DIAGNOSIS — Z01.818 PREOPERATIVE CLEARANCE: Primary | ICD-10-CM

## 2025-03-14 LAB
MAGNESIUM SERPL-MCNC: 2.1 MG/DL (ref 1.6–2.4)
QT INTERVAL: 431 MS
QTC INTERVAL: 468 MS

## 2025-03-14 PROCEDURE — 83735 ASSAY OF MAGNESIUM: CPT | Performed by: INTERNAL MEDICINE

## 2025-03-14 PROCEDURE — A9270 NON-COVERED ITEM OR SERVICE: HCPCS | Performed by: INTERNAL MEDICINE

## 2025-03-14 PROCEDURE — 63710000001 LEVOTHYROXINE 150 MCG TABLET: Performed by: INTERNAL MEDICINE

## 2025-03-14 PROCEDURE — 63710000001 DILTIAZEM 60 MG TABLET: Performed by: INTERNAL MEDICINE

## 2025-03-14 PROCEDURE — 99239 HOSP IP/OBS DSCHRG MGMT >30: CPT | Performed by: NURSE PRACTITIONER

## 2025-03-14 PROCEDURE — 63710000001 APIXABAN 5 MG TABLET: Performed by: INTERNAL MEDICINE

## 2025-03-14 PROCEDURE — 93005 ELECTROCARDIOGRAM TRACING: CPT | Performed by: INTERNAL MEDICINE

## 2025-03-14 PROCEDURE — 63710000001 MUPIROCIN 2 % OINTMENT: Performed by: INTERNAL MEDICINE

## 2025-03-14 PROCEDURE — 63710000001 SPIRONOLACTONE 25 MG TABLET: Performed by: INTERNAL MEDICINE

## 2025-03-14 PROCEDURE — 63710000001 DOFETILIDE 500 MCG CAPSULE: Performed by: INTERNAL MEDICINE

## 2025-03-14 RX ORDER — DILTIAZEM HCL 60 MG
60 TABLET ORAL 3 TIMES DAILY
Qty: 90 TABLET | Refills: 5 | Status: SHIPPED | OUTPATIENT
Start: 2025-03-14

## 2025-03-14 RX ADMIN — DILTIAZEM HYDROCHLORIDE 60 MG: 60 TABLET, FILM COATED ORAL at 09:16

## 2025-03-14 RX ADMIN — SPIRONOLACTONE 50 MG: 25 TABLET ORAL at 09:19

## 2025-03-14 RX ADMIN — LEVOTHYROXINE SODIUM 150 MCG: 150 TABLET ORAL at 05:16

## 2025-03-14 RX ADMIN — MUPIROCIN 1 APPLICATION: 20 OINTMENT TOPICAL at 09:19

## 2025-03-14 RX ADMIN — DOFETILIDE 500 MCG: 0.5 CAPSULE ORAL at 09:38

## 2025-03-14 RX ADMIN — APIXABAN 5 MG: 5 TABLET, FILM COATED ORAL at 09:15

## 2025-03-14 NOTE — CASE MANAGEMENT/SOCIAL WORK
Case Management Discharge Note           Transportation Services  Private: Car (with family/friend)    Final Discharge Disposition Code: 01 - home or self-care

## 2025-03-14 NOTE — DISCHARGE SUMMARY
[facility] (formerly [facility]) CARDIOLOGY  [facility]    DISCHARGE SUMMARY      Patient Name: Sheree Vance  :1945  79 y.o.    Date of Admit: 3/12/2025  Date of Discharge:  3/14/2025    Discharge Diagnosis:  Problems Addressed this Visit          Cardiac and Vasculature    Paroxysmal atrial fibrillation    Relevant Medications    dilTIAZem (CARDIZEM) tablet 60 mg    dilTIAZem (CARDIZEM) 60 MG tablet    Other Relevant Orders    EP/CRM Study (Completed)    Left ventricular diastolic dysfunction    Relevant Medications    dilTIAZem (CARDIZEM) tablet 60 mg    dilTIAZem (CARDIZEM) 60 MG tablet    Other Relevant Orders    EP/CRM Study (Completed)    Palpitations    Relevant Orders    EP/CRM Study (Completed)       Sleep    Obstructive sleep apnea syndrome    Relevant Orders    EP/CRM Study (Completed)     Diagnoses         Codes Comments      Paroxysmal atrial fibrillation     ICD-10-CM: I48.0  ICD-9-CM: 427.31       Palpitations     ICD-10-CM: R00.2  ICD-9-CM: 785.1       Left ventricular diastolic dysfunction     ICD-10-CM: I51.9  ICD-9-CM: 429.9       Obstructive sleep apnea syndrome     ICD-10-CM: G47.33  ICD-9-CM: 327.23           1) PAF s/p redo ablation 3/12  - SARA 3/12 showed EF 56-60% with grade 1 diastolic dysfunction, mild to moderate MR, mild TR  - early reoccurrence AF --> restarted on Tikosyn  - rate controlled on diltiazem; norvasc stopped  - on Eliquis for anticoagulation  - CV surgery was consulted for possible Convergence procedure    2) CAD    3) Hx CVA    4) HTN    5) HLD    6) hypothyroidism    7)  SHANEA    8) chronic back pain s/p spinal fusion        Hospital Course:   SUB--79-year-old pleasant patient well-known to me with persistent AF in the past underwent ablation  and had early recurrence needing Tikosyn initiation and she was stable for several years until recently she has noticed increasing episodes of atrial fibrillation despite Tikosyn intake and came for  evaluation.  Patient had previous cardiac catheterization without significant coronary artery stenosis and recent echocardiography shows normal EF  Patient feels poorly with intermittent atrial fibrillation  Not a candidate for drug therapy escalation because of prior history of drug-induced bradycardia and beta-blockers had to be stopped in the past           My previous history is attached below for reference only which has been reviewed         patient is 77 years old white female whose past medical history significant for hypertension, Persistent atrial fibrillation,  obesity  underwent AF ablation in 2018 with early recurrence with initiation of Tikosyn to sinus rhythm with resolution of symptoms and Tikosyn was stopped with the last office visit and started noticing recurrent atrial fibrillation in last 2 to 3 weeks with symptoms of palpitations and mild fatigue --patient has severe left atrial enlargement needing antiarrhythmic treatment to optimize into sinus rhythm-- during past hospitalization CT surgery consult was also requested for possible future convergence procedure because of severe left atrial enlargement.In 2009, patient was diagnosed with paroxysmal atrial fibrillation.  Patient was treated with beta-blockers initially.  Patient was started on flecainide later on.  In  2017, patient underwent cardiac catheterization at [facility] and coronary arteries were normal.  LV function was preserved.  Patient was started on Eliquis because of recurrence of atrial fibrillation.   chads vasc  score---3   patient was hypertensive and is on multiple medication  Post re initiation of tikosyn to sinus and feels well  No new sx  Patient remains in functional class I with no new symptoms and came for follow-up    Recent TSH normal, potassium 4.5 and creatinine normal  Recurrent atrial fibrillation with symptoms with remote history of ablation and recurrence of arrhythmia despite Tikosyn and after  discussing options for symptomatic arrhythmia, patient scheduled for redo AF ablation, risks and benefits and outcomes educated and orders placed  Hyperlipidemia on atorvastatin  Anticoagulation with apixaban  Hypothyroidism on levothyroxine  Hypertension controlled with Aldactone and telmisartan  Medications reviewed and follow-up appointments made    Patient underwent redo ablation on 3/22 that had early recurrent arrhythmia the next morning.  CV surgery was consulted for consideration for possible convergence procedure.  This would be done as an outpatient and she is planned for preop cath preop in the next 3-4 weeks.  In the meantime patient was restarted on Tikosyn.  QT is appropriate today and she will have repeat EKG in office in one week.  She was also started on diltiazem for rate control and amlodipine discontinued.  Today she is in persistent rate controlled atrial fibrillation.  She denies shortness of breath or chest pain.  She has no postop hematoma and has ambulated without difficulty.  Continue on anticoagulation with Eliquis.  As discussed with Dr. Case, she will be discharged home today.       Procedures Performed  Procedure(s):  Ablation atrial fibrillation    EP/CRM Study    Accession Number: [id number]   Date of Study: 3/12/25   Ordering Provider: Dre Case MD    Clinical Indications: Paroxysmal atrial fibrillation [I48.0 (ICD-10-CM)], Palpitations [R00.2 (ICD-10-CM)], Left ventricular diastolic dysfunction [I51.9 (ICD-10-CM)], Obstructive sleep apnea syndrome [G47.33 (ICD-10-CM)]        Performing Physician  Performing Staff   Primary: Dre Case MD     Documenter: Macey Lucio    Scrub Person: Michael Murray    Invasive Nurse: Lenora Bear RN    Invasive Nurse: Eduar Hoang RN    Invasive Nurse: Clyde Burris RN             Anesthesiology Staff    Anesthesiologist: Harpreet Mireles MD    CRNA: Chino Zafar CRNA    CRNA: Johny Kirby CRNA          Procedures    Ablation atrial fibrillation      Admission Information    Admission Date/Time Discharge Date/Time Room/Bed   03/12/25  1211  3112/1     Patient Hx Of Height, Weight, and Vitals    Height Weight BSA (Calculated - sq m) BMI (Calculated) Retired BMI (kg/m2) Pulse BP              Physicians    Panel Physicians Referring Physician Case Authorizing Physician   Dre Case MD (Primary)  Dre Case MD     Indications    Paroxysmal atrial fibrillation [I48.0 (ICD-10-CM)]   Palpitations [R00.2 (ICD-10-CM)]   Left ventricular diastolic dysfunction [I51.9 (ICD-10-CM)]   Obstructive sleep apnea syndrome [G47.33 (ICD-10-CM)]     Pre Procedure Diagnosis    Recurrent symptomatic paroxysmal atrial fibrillation       Conclusion    Procedures done  1. Comprehensive EP study with pacing from multiple sites including coronary sinus ,RV and superior vena cava with induction of arrhythmias   2.  Pulsed field ablation of left superior pulmonary vein and additional pulsed field ablation of left atrial posterior wall and posterior roof for substrate ablation for ongoing atrial fibrillation  3. Intracardiac echocardiography  4.  Fluoroscopy  5. Trans septal  access of left atrium  6.  Postablation EP study  7. THREE-DIMENSIONAL MAPPING WITH CARTO SYSTEM  8.  Additional Linear radiofrequency ablation along the isthmus between tricuspid valve and inferior vena cava for right atrial flutter  9.  Additional focal radiofrequency ablation along the lateral mitral annulus for focal atrial tachycardia  10.  Additional focal radiofrequency ablation on right atrial septum for suspected septal atrial tachycardia        Procedure indication---recurrent symptomatic paroxysmal atrial fibrillation and atrial flutter and failed medical therapy  Patient had prior AF ablation with cryoablation many years ago and started on breakthrough arrhythmia and came for redo EP study and ablation        Timeout before procedure, patient  placed under anesthesia by anesthesia team.  Pepcid before procedure  Glycopyrrolate before procedure  Complications none  Estimated blood loss less than 20 cc                    Procedure dictation     After obtaining a valid consent , patient was draped in sterile fashion and placed under  general anesthesia.   Micropuncture kit access was used for accessing the right common femoral vein.  A 18 Divehi and 9 Divehi venous sheath were placed in the right common femoral vein an ice catheter in the right atrium.  A preface  sheath along with Claiborne needle was used and transseptal access obtained under the guidance of ice catheter.  Heparin was given after transseptal access to keep the ACT above 300.  Multipolar catheter called Octaray was used to map the pulmonary vein antrum  Complete pulmonary vein antrum was isolated  Small area of active conduction was noted inside the left superior pulmonary vein with eccentric activation consistent with epicardial to endocardial bridge  This area of activation was surrounded by scar tissue from prior ablation  This small area of active conduction was targeted with PFA in the olive mode with loss of signals post ablation  The posterior roof and posterior wall were isolated with PFA and post ablation complete posterior wall and posterior roof were isolated by voltage mapping  Repeat pacing did not induce concentric atrial arrhythmia and mapping on the left side revealed earliest activation on the septal aspect without getting any cycle length  Mapping on the right side did not reveal possible isthmus conduction and cycling could be obtained  Radiofrequency ablation of the isthmus was done with Q dot ablation catheter without any success  Additional focal ablation was also targeted on the right side septum where early activation was noted without any success  Repeat mapping was then performed and patient was noted to have likely septal atrial arrhythmia with a broad area of  activation and patient has interatrial septal aneurysm  Synchronized cardioversion was done to sinus rhythm  Repeat pacing easily induced focal atrial tachycardia coming from lateral mitral annulus  While mapping early activation was noted by the tachycardia terminated and focal radiofrequency ablation of this area was done without further induction of arrhythmia  Catheters removed and sheath removed and figure-of-eight suture was placed for hemostasis and heparin was reversed with protamine and patient transferred to recovery           Findings     Patient had   moderate to severe left atrial enlargement   Prior cryoablation was done on this patient and mapping today revealed complete antral isolation  Small area of focal activation was noted inside the left superior pulmonary vein and activation was eccentric highly suspicious for epicardial to endocardial bridge which was targeted with PFA ablation  Patient presented with atrial fibrillation and substrate ablation was done with pulsed field ablation with posterior wall and posterior roof isolation and post ablation cardioversion, patient went to sinus rhythm without induction of atrial fibrillation  Different types of atrial tachycardias and atrial flutters could be induced post completion of PFA ablation  Concentric atrial flutter was noted with possible conduction to cavotricuspid isthmus with no success with isthmus ablation and repeat mapping revealed broad area of septal activation early both on the left side septum and right side septum with presence of interatrial septal aneurysm  Patient also has a PFO  Septal access however was done in the posterior septum for ablation purposes  After extensive mapping of the flutter with proximal to distal conduction, biatrial septal flutter was suspected with possible endocardial epicardial.  And cardioversion was done to sinus rhythm  Repeat pacing did not induce a focal atrial tachycardia coming from lateral mitral  annulus which was targeted with ablation with no further induction with a cycle length of 460 ms  After completion of this ablation repeat pacing was done down to 300 ms from mid and proximal coronary sinus without induction of atrial fibrillation or any defined atrial arrhythmia           Plan     Overnight observation     If the patient has recurrent arrhythmias, it is reasonable to refer this patient to convergence procedure        Electronically signed by Dre Case MD, 03/12/25, 6:56 PM EDT.          Consults       Date and Time Order Name Status Description    3/13/2025  2:27 PM Inpatient Cardiothoracic Surgery Consult Completed             Pertinent Test Results:   Results from last 7 days   Lab Units 03/13/25  0427 03/12/25  2139   SODIUM mmol/L 139 141   POTASSIUM mmol/L 4.9 4.6   CHLORIDE mmol/L 107 108*   CO2 mmol/L 23.3 22.4   BUN mg/dL 18 19   CREATININE mg/dL 0.92 0.89   CALCIUM mg/dL 8.8 8.3*   BILIRUBIN mg/dL  --  1.1   ALK PHOS U/L  --  191*   ALT (SGPT) U/L  --  14   AST (SGOT) U/L  --  60*   GLUCOSE mg/dL 156* 185*         @LABRCNT(bnp)@  Results from last 7 days   Lab Units 03/13/25  0427   WBC 10*3/mm3 4.91   HEMOGLOBIN g/dL 11.7*   HEMATOCRIT % 37.1   PLATELETS 10*3/mm3 261         Results from last 7 days   Lab Units 03/14/25  0421   MAGNESIUM mg/dL 2.1           ECHOCARDIOGRAM:    Results for orders placed during the hospital encounter of 03/12/25    Adult Transesophageal Echo (SARA) W/ Cont if Necessary Per Protocol    Interpretation Summary    Left ventricular systolic function is normal. Estimated left ventricular EF = 60% Left ventricular ejection fraction appears to be 56 - 60%.    Left ventricular wall thickness is consistent with mild concentric hypertrophy.    Left ventricular diastolic function is consistent with (grade I) impaired relaxation.    The left atrial cavity is moderate to severely dilated.    Abnormal mitral valve structure consistent with dilated annulus. There  is mild, bileaflet mitral valve thickening present.    Estimated right ventricular systolic pressure from tricuspid regurgitation is normal (<35 mmHg).    Procedure indication  Recurrent atrial fibrillation/atrial flutter    conscious sedation administered by anesthesia  Timeout before procedure    consent obtained before procedure      Procedure note  after obtaining a valid consent patient was sedated by Anesthesia and a SARA probe was easily placed into esophagus with multiplane imaging with 2D, color and Doppler followed by bubble study with agitated saline without any complications    SARA  Findings    Normal LV systolic function with moderate to severe left atrial enlargement with mild to moderate central MR and mild TR without pulm hypertension and no effusion and interatrial septal aneurysm noted without PFO    Plan  Proceed with ablation    Procedure done  Transesophageal echocardiography      Electronically signed by Dre Case MD, 03/12/25, 3:59 PM EDT.      Physical Exam  Neuro: AAOx3, no gross deficits  CV: S1S2 RRR, no murmur  Resp: nonlabored, CTA  GI: BS+, abd soft  Ext: pedal pulses palp, no edema  Tele: SR    Condition on Discharge: stable    Discharge Medications     Discharge Medications        New Medications        Instructions Start Date   dilTIAZem 60 MG tablet  Commonly known as: CARDIZEM   60 mg, Oral, 3 times daily             Continue These Medications        Instructions Start Date   albuterol sulfate  (90 Base) MCG/ACT inhaler  Commonly known as: PROVENTIL HFA;VENTOLIN HFA;PROAIR HFA   Inhale 2 puffs Every 4 (Four) Hours As Needed for Wheezing or Shortness of Air.      amoxicillin 500 MG capsule  Commonly known as: AMOXIL   500 mg, 2 Times Daily PRN      apixaban 5 MG tablet tablet  Commonly known as: Eliquis   5 mg, Oral, Every 12 Hours      atorvastatin 10 MG tablet  Commonly known as: LIPITOR   10 mg, Oral, Every Night at Bedtime      azelastine 0.1 % nasal  spray  Commonly known as: ASTELIN   Administer 1 spray into the nostril(s) as directed by provider As Needed for Allergies.      Cholecalciferol 50 MCG (2000 UT) capsule   1 capsule, Oral, Daily      Chromium 200 MCG capsule   1 mg, Daily      coenzyme Q10 100 MG capsule   100 mg, Nightly      dofetilide 500 MCG capsule  Commonly known as: TIKOSYN   500 mcg, Oral, 2 Times Daily      EPINEPHrine 0.3 MG/0.3ML solution auto-injector injection  Commonly known as: EPIPEN       latanoprost 0.005 % ophthalmic solution  Commonly known as: XALATAN   1 drop, Nightly      levothyroxine 150 MCG tablet  Commonly known as: SYNTHROID, LEVOTHROID   150 mcg, Oral, Daily      Magnesium 400 MG tablet   1 tablet, Oral, Nightly      Multivitamin Adult tablet tablet  Generic drug: multivitamin with minerals   Take 1 tablet by mouth Daily.      Omega-3 1000 MG capsule   1 capsule, Oral, Daily      Premarin 0.625 MG/GM vaginal cream  Generic drug: Estrogens Conjugated   USE 1/2 GRAM PER VAGINA 2 TO 3 TIMES A WEEK      spironolactone 50 MG tablet  Commonly known as: ALDACTONE   50 mg, Oral, Daily      vitamin B-12 1000 MCG tablet  Commonly known as: CYANOCOBALAMIN   1,000 mcg, Daily             Stop These Medications      amLODIPine 10 MG tablet  Commonly known as: NORVASC     telmisartan 80 MG tablet  Commonly known as: MICARDIS              Discharge Diet:     Activity at Discharge:   Activity Instructions       Bathing Restrictions      For one week    Type of Restriction: Bathing    Bathing Restrictions: No Tub Bath    Driving Restrictions      Type of Restriction: Driving    Driving Restrictions: No Driving (Time Limited)    Length: Other    Indicate Length of Restriction: 3 days post heart catheterization    Lifting Restrictions      Type of Restriction: Lifting    Lifting Restrictions: Lifting Restriction (Indicate Limit)    Weight Limit (Pounds): 4    Length of Lifting Restriction: 3 days post heart catherization             Discharge disposition: home    Follow-up Appointments  Future Appointments   Date Time Provider Department Center   3/27/2025  2:00 PM Carolyn Hooks APRN [facility]   5/19/2025  2:00 PM Dre Case MD [facility]   7/25/2025  2:00 PM Latia Guerrier MD [facility]         Test Results Pending at Discharge  Pending Results       None             Electronically signed by PETE Ruiz, 03/14/25, 9:48 AM EDT.     Time: > 30 minutes spent on discharge

## 2025-03-17 LAB
QT INTERVAL: 272 MS
QT INTERVAL: 337 MS
QTC INTERVAL: 279 MS
QTC INTERVAL: 398 MS
QTC INTERVAL: NORMAL MS

## 2025-03-24 ENCOUNTER — OFFICE VISIT (OUTPATIENT)
Dept: CARDIOLOGY | Facility: CLINIC | Age: 80
End: 2025-03-24
Payer: MEDICARE

## 2025-03-24 VITALS
OXYGEN SATURATION: 96 % | BODY MASS INDEX: 37.73 KG/M2 | DIASTOLIC BLOOD PRESSURE: 70 MMHG | HEIGHT: 64 IN | WEIGHT: 221 LBS | SYSTOLIC BLOOD PRESSURE: 127 MMHG | HEART RATE: 60 BPM

## 2025-03-24 DIAGNOSIS — I10 ESSENTIAL HYPERTENSION: Primary | ICD-10-CM

## 2025-03-24 DIAGNOSIS — G47.33 OBSTRUCTIVE SLEEP APNEA SYNDROME: ICD-10-CM

## 2025-03-24 DIAGNOSIS — I25.10 ATHEROSCLEROSIS OF NATIVE CORONARY ARTERY OF NATIVE HEART WITHOUT ANGINA PECTORIS: ICD-10-CM

## 2025-03-24 DIAGNOSIS — I48.0 PAROXYSMAL ATRIAL FIBRILLATION: ICD-10-CM

## 2025-03-24 DIAGNOSIS — I51.9 LEFT VENTRICULAR DIASTOLIC DYSFUNCTION: ICD-10-CM

## 2025-03-24 DIAGNOSIS — I48.0 PAF (PAROXYSMAL ATRIAL FIBRILLATION): ICD-10-CM

## 2025-03-24 DIAGNOSIS — E78.2 MIXED HYPERLIPIDEMIA: ICD-10-CM

## 2025-03-24 PROCEDURE — 93000 ELECTROCARDIOGRAM COMPLETE: CPT | Performed by: INTERNAL MEDICINE

## 2025-03-24 PROCEDURE — 99214 OFFICE O/P EST MOD 30 MIN: CPT | Performed by: INTERNAL MEDICINE

## 2025-03-24 PROCEDURE — 1160F RVW MEDS BY RX/DR IN RCRD: CPT | Performed by: INTERNAL MEDICINE

## 2025-03-24 PROCEDURE — 3074F SYST BP LT 130 MM HG: CPT | Performed by: INTERNAL MEDICINE

## 2025-03-24 PROCEDURE — 3078F DIAST BP <80 MM HG: CPT | Performed by: INTERNAL MEDICINE

## 2025-03-24 PROCEDURE — 1159F MED LIST DOCD IN RCRD: CPT | Performed by: INTERNAL MEDICINE

## 2025-03-24 RX ORDER — TELMISARTAN 80 MG/1
80 TABLET ORAL DAILY
COMMUNITY
End: 2025-03-26

## 2025-03-24 NOTE — LETTER
March 24, 2025     Latia Guerrier MD  [address]  [address]  [place] [address]    Patient: Sheree Vance   YOB: 1945   Date of Visit: 3/24/2025     Dear Latia Guerrier MD:       Thank you for referring Sheree Vance to me for evaluation. Below are the relevant portions of my assessment and plan of care.    If you have questions, please do not hesitate to call me. I look forward to following Sheree along with you.         Sincerely,        Dre Case MD        CC: No Recipients    Dre Case MD  03/24/25 1050  Sign when Signing Visit  CC--Atrial fibrillation, hypertension      SUB--79-year-old patient well-known to me underwent complex atrial arrhythmia ablation with pulsed field ablation and flutter ablation and started having early recurrence and restarted back on Tikosyn for follow-up.  Consultation for convergence procedure was requested during hospitalization.  Ablation was performed March 2025.  Continues to have intermittent atrial fibrillation despite Tikosyn and redo ablation and patient awaiting to see surgery for convergence procedure    EP findings below for reference      Findings     Patient had   moderate to severe left atrial enlargement   Prior cryoablation was done on this patient and mapping today revealed complete antral isolation  Small area of focal activation was noted inside the left superior pulmonary vein and activation was eccentric highly suspicious for epicardial to endocardial bridge which was targeted with PFA ablation  Patient presented with atrial fibrillation and substrate ablation was done with pulsed field ablation with posterior wall and posterior roof isolation and post ablation cardioversion, patient went to sinus rhythm without induction of atrial fibrillation  Different types of atrial tachycardias and atrial flutters could be induced post completion of PFA ablation  Concentric atrial flutter was noted with possible  conduction to cavotricuspid isthmus with no success with isthmus ablation and repeat mapping revealed broad area of septal activation early both on the left side septum and right side septum with presence of interatrial septal aneurysm  Patient also has a PFO  Septal access however was done in the posterior septum for ablation purposes  After extensive mapping of the flutter with proximal to distal conduction, biatrial septal flutter was suspected with possible endocardial epicardial.  And cardioversion was done to sinus rhythm  Repeat pacing did not induce a focal atrial tachycardia coming from lateral mitral annulus which was targeted with ablation with no further induction with a cycle length of 460 ms  After completion of this ablation repeat pacing was done down to 300 ms from mid and proximal coronary sinus without induction of atrial fibrillation or any defined atrial arrhythmia       Previous note for reference below  79-year-old pleasant patient well-known to me with persistent AF in the past underwent ablation 2018 and had early recurrence needing Tikosyn initiation and she was stable for several years until recently she has noticed increasing episodes of atrial fibrillation despite Tikosyn intake and came for evaluation.  Patient had previous cardiac catheterization without significant coronary artery stenosis and recent echocardiography shows normal EF  Patient feels poorly with intermittent atrial fibrillation  Not a candidate for drug therapy escalation because of prior history of drug-induced bradycardia and beta-blockers had to be stopped in the past        My previous history is attached below for reference only which has been reviewed       patient is 77 years old white female whose past medical history significant for hypertension, Persistent atrial fibrillation,  obesity  underwent AF ablation in 2018 with early recurrence with initiation of Tikosyn to sinus rhythm with resolution of symptoms and  Tikosyn was stopped with the last office visit and started noticing recurrent atrial fibrillation in last 2 to 3 weeks with symptoms of palpitations and mild fatigue --patient has severe left atrial enlargement needing antiarrhythmic treatment to optimize into sinus rhythm-- during past hospitalization CT surgery consult was also requested for possible future convergence procedure because of severe left atrial enlargement.In 2009, patient was diagnosed with paroxysmal atrial fibrillation.  Patient was treated with beta-blockers initially.  Patient was started on flecainide later on.  In  2017, patient underwent cardiac catheterization at [facility] and coronary arteries were normal.  LV function was preserved.  Patient was started on Eliquis because of recurrence of atrial fibrillation.   chads vasc  score---3   patient was hypertensive and is on multiple medication  Post re initiation of tikosyn to sinus and feels well  No new sx  Patient remains in functional class I with no new symptoms and came for follow-up        Past Medical History:     Reviewed history from 01/08/2019 and no changes required:        HTN        Paroxysmal  A-fib - Dr. Lugo        Seasonal allergies - on allergy shots per ENT        Hyperlipidemia        Hypothyroidism        Chronic low back pain        Colonoscopy in 4/13/2017        Mammogram in 7/31/2018 - negative        Pneumonia shot in the past - both Pneumovax and Prevnar        Shingle shot in the past        Osteopenia - DEXA on 6/23/2017 - L/S: +0.5 and Hip: -0.8        GYN HM per GYN - Dr. Hopkins        Negative Hepatitis C screening in 10/2017                 Physical Exam    General:      well developed, well nourished, in no acute distress.    Head:      normocephalic and atraumatic.    Eyes:      PERRL/EOM intact, conjunctivae and sclerae clear without nystagmus.    Neck:      no  thyromegaly, trachea central with normal respiratory effort  Lungs:      clear  bilaterally to auscultation.    Heart:       regular rate and rhythm, S1, S2 without murmurs, rubs, or gallops  Skin:      intact without lesions or rashes.    Psych:      alert and cooperative; normal mood and affect; normal attention span and concentration.             assessment plan    Redo EP study and ablation with clinical recurrence on Tikosyn with QTc interval of 470 ms in sinus rhythm  Consultation for convergence procedure for any recurrent arrhythmia was also done during hospitalization  Patient currently anticoagulated with apixaban  Hypothyroidism on levothyroxine  Hypertension controlled with diltiazem/Aldactone  Hyperlipidemia on atorvastatin  Medications reviewed and follow-up appointments made  Restart telmisartan  to optimize hypertension as she was taking before  Check BMP in 2 weeks  Patient to be scheduled for left heart catheterization and also renal angiography for evaluation of any coronary artery disease prior to convergence procedure and I will discuss with interventional cardiology        ECG 12 Lead    Date/Time: 3/24/2025 10:50 AM  Performed by: Dre Case MD    Authorized by: Dre Case MD  Comparison: compared with previous ECG   Similar to previous ECG  Rhythm: sinus rhythm  Rate: normal  Conduction: 1st degree AV block      Electronically signed by Dre Case MD, 03/24/25, 10:50 AM EDT.

## 2025-03-24 NOTE — H&P (VIEW-ONLY)
CC--Atrial fibrillation, hypertension      SUB--79-year-old patient well-known to me underwent complex atrial arrhythmia ablation with pulsed field ablation and flutter ablation and started having early recurrence and restarted back on Tikosyn for follow-up.  Consultation for convergence procedure was requested during hospitalization.  Ablation was performed March 2025.  Continues to have intermittent atrial fibrillation despite Tikosyn and redo ablation and patient awaiting to see surgery for convergence procedure    EP findings below for reference      Findings     Patient had   moderate to severe left atrial enlargement   Prior cryoablation was done on this patient and mapping today revealed complete antral isolation  Small area of focal activation was noted inside the left superior pulmonary vein and activation was eccentric highly suspicious for epicardial to endocardial bridge which was targeted with PFA ablation  Patient presented with atrial fibrillation and substrate ablation was done with pulsed field ablation with posterior wall and posterior roof isolation and post ablation cardioversion, patient went to sinus rhythm without induction of atrial fibrillation  Different types of atrial tachycardias and atrial flutters could be induced post completion of PFA ablation  Concentric atrial flutter was noted with possible conduction to cavotricuspid isthmus with no success with isthmus ablation and repeat mapping revealed broad area of septal activation early both on the left side septum and right side septum with presence of interatrial septal aneurysm  Patient also has a PFO  Septal access however was done in the posterior septum for ablation purposes  After extensive mapping of the flutter with proximal to distal conduction, biatrial septal flutter was suspected with possible endocardial epicardial.  And cardioversion was done to sinus rhythm  Repeat pacing did not induce a focal atrial tachycardia coming  from lateral mitral annulus which was targeted with ablation with no further induction with a cycle length of 460 ms  After completion of this ablation repeat pacing was done down to 300 ms from mid and proximal coronary sinus without induction of atrial fibrillation or any defined atrial arrhythmia       Previous note for reference below  79-year-old pleasant patient well-known to me with persistent AF in the past underwent ablation 2018 and had early recurrence needing Tikosyn initiation and she was stable for several years until recently she has noticed increasing episodes of atrial fibrillation despite Tikosyn intake and came for evaluation.  Patient had previous cardiac catheterization without significant coronary artery stenosis and recent echocardiography shows normal EF  Patient feels poorly with intermittent atrial fibrillation  Not a candidate for drug therapy escalation because of prior history of drug-induced bradycardia and beta-blockers had to be stopped in the past        My previous history is attached below for reference only which has been reviewed       patient is 77 years old white female whose past medical history significant for hypertension, Persistent atrial fibrillation,  obesity  underwent AF ablation in 2018 with early recurrence with initiation of Tikosyn to sinus rhythm with resolution of symptoms and Tikosyn was stopped with the last office visit and started noticing recurrent atrial fibrillation in last 2 to 3 weeks with symptoms of palpitations and mild fatigue --patient has severe left atrial enlargement needing antiarrhythmic treatment to optimize into sinus rhythm-- during past hospitalization CT surgery consult was also requested for possible future convergence procedure because of severe left atrial enlargement.In 2009, patient was diagnosed with paroxysmal atrial fibrillation.  Patient was treated with beta-blockers initially.  Patient was started on flecainide later on.  In   2017, patient underwent cardiac catheterization at [facility] and coronary arteries were normal.  LV function was preserved.  Patient was started on Eliquis because of recurrence of atrial fibrillation.   chads vasc  score---3   patient was hypertensive and is on multiple medication  Post re initiation of tikosyn to sinus and feels well  No new sx  Patient remains in functional class I with no new symptoms and came for follow-up        Past Medical History:     Reviewed history from 01/08/2019 and no changes required:        HTN        Paroxysmal  A-fib - Dr. Lugo        Seasonal allergies - on allergy shots per ENT        Hyperlipidemia        Hypothyroidism        Chronic low back pain        Colonoscopy in 4/13/2017        Mammogram in 7/31/2018 - negative        Pneumonia shot in the past - both Pneumovax and Prevnar        Shingle shot in the past        Osteopenia - DEXA on 6/23/2017 - L/S: +0.5 and Hip: -0.8        GYN HM per GYN - Dr. Hopkins        Negative Hepatitis C screening in 10/2017                 Physical Exam    General:      well developed, well nourished, in no acute distress.    Head:      normocephalic and atraumatic.    Eyes:      PERRL/EOM intact, conjunctivae and sclerae clear without nystagmus.    Neck:      no  thyromegaly, trachea central with normal respiratory effort  Lungs:      clear bilaterally to auscultation.    Heart:       regular rate and rhythm, S1, S2 without murmurs, rubs, or gallops  Skin:      intact without lesions or rashes.    Psych:      alert and cooperative; normal mood and affect; normal attention span and concentration.             assessment plan    Redo EP study and ablation with clinical recurrence on Tikosyn with QTc interval of 470 ms in sinus rhythm  Consultation for convergence procedure for any recurrent arrhythmia was also done during hospitalization  Patient currently anticoagulated with apixaban  Hypothyroidism on  levothyroxine  Hypertension controlled with diltiazem/Aldactone  Hyperlipidemia on atorvastatin  Medications reviewed and follow-up appointments made  Restart telmisartan  to optimize hypertension as she was taking before  Check BMP in 2 weeks  Patient to be scheduled for left heart catheterization and also renal angiography for evaluation of any coronary artery disease prior to convergence procedure and I will discuss with interventional cardiology        ECG 12 Lead    Date/Time: 3/24/2025 10:50 AM  Performed by: Dre Case MD    Authorized by: Dre Case MD  Comparison: compared with previous ECG   Similar to previous ECG  Rhythm: sinus rhythm  Rate: normal  Conduction: 1st degree AV block      Electronically signed by Dre Case MD, 03/24/25, 10:50 AM EDT.

## 2025-03-26 RX ORDER — TELMISARTAN 80 MG/1
80 TABLET ORAL DAILY
Qty: 90 TABLET | Refills: 1 | Status: SHIPPED | OUTPATIENT
Start: 2025-03-26

## 2025-03-26 NOTE — TELEPHONE ENCOUNTER
Rx Refill Note  Requested Prescriptions     Pending Prescriptions Disp Refills    telmisartan (MICARDIS) 80 MG tablet [Pharmacy Med Name: TELMISARTAN 80 MG TABLET] 90 tablet 0     Sig: TAKE 1 TABLET BY MOUTH DAILY.      Last office visit with prescribing clinician: 1/24/2025   Last telemedicine visit with prescribing clinician: Visit date not found   Next office visit with prescribing clinician: 7/25/2025                         Would you like a call back once the refill request has been completed: [] Yes [] No    If the office needs to give you a call back, can they leave a voicemail: [] Yes [] No    Adrianna Langley MA  03/26/25, 13:36 EDT

## 2025-04-08 ENCOUNTER — LAB (OUTPATIENT)
Dept: LAB | Facility: HOSPITAL | Age: 80
End: 2025-04-08
Payer: MEDICARE

## 2025-04-08 DIAGNOSIS — I25.10 ATHEROSCLEROSIS OF NATIVE CORONARY ARTERY OF NATIVE HEART WITHOUT ANGINA PECTORIS: ICD-10-CM

## 2025-04-08 DIAGNOSIS — I48.0 PAROXYSMAL ATRIAL FIBRILLATION: ICD-10-CM

## 2025-04-08 DIAGNOSIS — I48.0 PAF (PAROXYSMAL ATRIAL FIBRILLATION): ICD-10-CM

## 2025-04-08 DIAGNOSIS — I10 ESSENTIAL HYPERTENSION: ICD-10-CM

## 2025-04-08 LAB
ANION GAP SERPL CALCULATED.3IONS-SCNC: 9.4 MMOL/L (ref 5–15)
BASOPHILS # BLD AUTO: 0.03 10*3/MM3 (ref 0–0.2)
BASOPHILS NFR BLD AUTO: 0.5 % (ref 0–1.5)
BUN SERPL-MCNC: 15 MG/DL (ref 8–23)
BUN/CREAT SERPL: 14.6 (ref 7–25)
CALCIUM SPEC-SCNC: 9.6 MG/DL (ref 8.6–10.5)
CHLORIDE SERPL-SCNC: 102 MMOL/L (ref 98–107)
CO2 SERPL-SCNC: 25.6 MMOL/L (ref 22–29)
CREAT SERPL-MCNC: 1.03 MG/DL (ref 0.57–1)
DEPRECATED RDW RBC AUTO: 46.5 FL (ref 37–54)
EGFRCR SERPLBLD CKD-EPI 2021: 56.1 ML/MIN/1.73
EOSINOPHIL # BLD AUTO: 0.1 10*3/MM3 (ref 0–0.4)
EOSINOPHIL NFR BLD AUTO: 1.8 % (ref 0.3–6.2)
ERYTHROCYTE [DISTWIDTH] IN BLOOD BY AUTOMATED COUNT: 13.7 % (ref 12.3–15.4)
GLUCOSE SERPL-MCNC: 100 MG/DL (ref 65–99)
HCT VFR BLD AUTO: 42.5 % (ref 34–46.6)
HGB BLD-MCNC: 13.4 G/DL (ref 12–15.9)
IMM GRANULOCYTES # BLD AUTO: 0.01 10*3/MM3 (ref 0–0.05)
IMM GRANULOCYTES NFR BLD AUTO: 0.2 % (ref 0–0.5)
INR PPP: 1.27 (ref 0.9–1.1)
LYMPHOCYTES # BLD AUTO: 2.33 10*3/MM3 (ref 0.7–3.1)
LYMPHOCYTES NFR BLD AUTO: 40.9 % (ref 19.6–45.3)
MCH RBC QN AUTO: 29.2 PG (ref 26.6–33)
MCHC RBC AUTO-ENTMCNC: 31.5 G/DL (ref 31.5–35.7)
MCV RBC AUTO: 92.6 FL (ref 79–97)
MONOCYTES # BLD AUTO: 0.65 10*3/MM3 (ref 0.1–0.9)
MONOCYTES NFR BLD AUTO: 11.4 % (ref 5–12)
NEUTROPHILS NFR BLD AUTO: 2.57 10*3/MM3 (ref 1.7–7)
NEUTROPHILS NFR BLD AUTO: 45.2 % (ref 42.7–76)
NRBC BLD AUTO-RTO: 0 /100 WBC (ref 0–0.2)
PLATELET # BLD AUTO: 277 10*3/MM3 (ref 140–450)
PMV BLD AUTO: 10.2 FL (ref 6–12)
POTASSIUM SERPL-SCNC: 4.7 MMOL/L (ref 3.5–5.2)
PROTHROMBIN TIME: 15.8 SECONDS (ref 11.7–14.2)
RBC # BLD AUTO: 4.59 10*6/MM3 (ref 3.77–5.28)
SODIUM SERPL-SCNC: 137 MMOL/L (ref 136–145)
WBC NRBC COR # BLD AUTO: 5.69 10*3/MM3 (ref 3.4–10.8)

## 2025-04-08 PROCEDURE — 36415 COLL VENOUS BLD VENIPUNCTURE: CPT

## 2025-04-08 PROCEDURE — 85025 COMPLETE CBC W/AUTO DIFF WBC: CPT

## 2025-04-08 PROCEDURE — 85610 PROTHROMBIN TIME: CPT

## 2025-04-08 PROCEDURE — 80048 BASIC METABOLIC PNL TOTAL CA: CPT

## 2025-04-10 ENCOUNTER — HOSPITAL ENCOUNTER (OUTPATIENT)
Facility: HOSPITAL | Age: 80
Setting detail: HOSPITAL OUTPATIENT SURGERY
Discharge: HOME | End: 2025-04-10
Attending: INTERNAL MEDICINE | Admitting: INTERNAL MEDICINE
Payer: MEDICARE

## 2025-04-10 VITALS
OXYGEN SATURATION: 97 % | DIASTOLIC BLOOD PRESSURE: 58 MMHG | SYSTOLIC BLOOD PRESSURE: 117 MMHG | HEART RATE: 71 BPM | RESPIRATION RATE: 14 BRPM

## 2025-04-10 DIAGNOSIS — Z01.818 PREOPERATIVE CLEARANCE: ICD-10-CM

## 2025-04-10 DIAGNOSIS — I48.0 PAF (PAROXYSMAL ATRIAL FIBRILLATION): ICD-10-CM

## 2025-04-10 DIAGNOSIS — E78.2 MIXED HYPERLIPIDEMIA: Primary | ICD-10-CM

## 2025-04-10 DIAGNOSIS — I10 ESSENTIAL HYPERTENSION: ICD-10-CM

## 2025-04-10 DIAGNOSIS — I25.10 ATHEROSCLEROSIS OF NATIVE CORONARY ARTERY OF NATIVE HEART WITHOUT ANGINA PECTORIS: ICD-10-CM

## 2025-04-10 DIAGNOSIS — I48.0 PAROXYSMAL ATRIAL FIBRILLATION: ICD-10-CM

## 2025-04-10 PROCEDURE — 25010000002 FENTANYL CITRATE (PF) 100 MCG/2ML SOLUTION: Performed by: INTERNAL MEDICINE

## 2025-04-10 PROCEDURE — 25010000002 NICARDIPINE 2.5 MG/ML SOLUTION: Performed by: INTERNAL MEDICINE

## 2025-04-10 PROCEDURE — 93458 L HRT ARTERY/VENTRICLE ANGIO: CPT | Performed by: INTERNAL MEDICINE

## 2025-04-10 PROCEDURE — 25510000001 IOPAMIDOL PER 1 ML: Performed by: INTERNAL MEDICINE

## 2025-04-10 PROCEDURE — 25010000002 LIDOCAINE 2% SOLUTION: Performed by: INTERNAL MEDICINE

## 2025-04-10 PROCEDURE — 25810000003 SODIUM CHLORIDE 0.9 % SOLUTION: Performed by: INTERNAL MEDICINE

## 2025-04-10 PROCEDURE — C1769 GUIDE WIRE: HCPCS | Performed by: INTERNAL MEDICINE

## 2025-04-10 PROCEDURE — 25010000002 MIDAZOLAM PER 1 MG: Performed by: INTERNAL MEDICINE

## 2025-04-10 PROCEDURE — 25010000002 HEPARIN (PORCINE) PER 1000 UNITS: Performed by: INTERNAL MEDICINE

## 2025-04-10 PROCEDURE — 25010000002 NITROGLYCERIN 5 MG/ML SOLUTION: Performed by: INTERNAL MEDICINE

## 2025-04-10 PROCEDURE — 99152 MOD SED SAME PHYS/QHP 5/>YRS: CPT | Performed by: INTERNAL MEDICINE

## 2025-04-10 PROCEDURE — C1894 INTRO/SHEATH, NON-LASER: HCPCS | Performed by: INTERNAL MEDICINE

## 2025-04-10 RX ORDER — NALOXONE HCL 0.4 MG/ML
0.4 VIAL (ML) INJECTION AS NEEDED
Status: DISCONTINUED | OUTPATIENT
Start: 2025-04-10 | End: 2025-04-10 | Stop reason: HOSPADM

## 2025-04-10 RX ORDER — DIPHENHYDRAMINE HYDROCHLORIDE 50 MG/ML
12.5 INJECTION, SOLUTION INTRAMUSCULAR; INTRAVENOUS
Status: DISCONTINUED | OUTPATIENT
Start: 2025-04-10 | End: 2025-04-10 | Stop reason: HOSPADM

## 2025-04-10 RX ORDER — ONDANSETRON 2 MG/ML
4 INJECTION INTRAMUSCULAR; INTRAVENOUS EVERY 6 HOURS PRN
Status: DISCONTINUED | OUTPATIENT
Start: 2025-04-10 | End: 2025-04-10 | Stop reason: HOSPADM

## 2025-04-10 RX ORDER — ONDANSETRON 4 MG/1
4 TABLET, ORALLY DISINTEGRATING ORAL EVERY 6 HOURS PRN
Status: DISCONTINUED | OUTPATIENT
Start: 2025-04-10 | End: 2025-04-10 | Stop reason: HOSPADM

## 2025-04-10 RX ORDER — SODIUM CHLORIDE 9 MG/ML
100 INJECTION, SOLUTION INTRAVENOUS CONTINUOUS
Status: DISCONTINUED | OUTPATIENT
Start: 2025-04-10 | End: 2025-04-10 | Stop reason: HOSPADM

## 2025-04-10 RX ORDER — DIPHENHYDRAMINE HYDROCHLORIDE 50 MG/ML
12.5 INJECTION, SOLUTION INTRAMUSCULAR; INTRAVENOUS ONCE AS NEEDED
Status: DISCONTINUED | OUTPATIENT
Start: 2025-04-10 | End: 2025-04-10 | Stop reason: HOSPADM

## 2025-04-10 RX ORDER — FENTANYL CITRATE 50 UG/ML
50 INJECTION, SOLUTION INTRAMUSCULAR; INTRAVENOUS
Status: DISCONTINUED | OUTPATIENT
Start: 2025-04-10 | End: 2025-04-10 | Stop reason: HOSPADM

## 2025-04-10 RX ORDER — FLUMAZENIL 0.1 MG/ML
0.2 INJECTION INTRAVENOUS AS NEEDED
Status: DISCONTINUED | OUTPATIENT
Start: 2025-04-10 | End: 2025-04-10 | Stop reason: HOSPADM

## 2025-04-10 RX ORDER — NITROGLYCERIN 0.4 MG/1
0.4 TABLET SUBLINGUAL
Status: DISCONTINUED | OUTPATIENT
Start: 2025-04-10 | End: 2025-04-10 | Stop reason: HOSPADM

## 2025-04-10 RX ORDER — HEPARIN SODIUM 1000 [USP'U]/ML
INJECTION, SOLUTION INTRAVENOUS; SUBCUTANEOUS
Status: DISCONTINUED | OUTPATIENT
Start: 2025-04-10 | End: 2025-04-10 | Stop reason: HOSPADM

## 2025-04-10 RX ORDER — EPHEDRINE SULFATE 5 MG/ML
5 INJECTION INTRAVENOUS ONCE AS NEEDED
Status: DISCONTINUED | OUTPATIENT
Start: 2025-04-10 | End: 2025-04-10 | Stop reason: HOSPADM

## 2025-04-10 RX ORDER — ACETAMINOPHEN 325 MG/1
650 TABLET ORAL EVERY 4 HOURS PRN
Status: DISCONTINUED | OUTPATIENT
Start: 2025-04-10 | End: 2025-04-10 | Stop reason: HOSPADM

## 2025-04-10 RX ORDER — LABETALOL HYDROCHLORIDE 5 MG/ML
5 INJECTION, SOLUTION INTRAVENOUS
Status: DISCONTINUED | OUTPATIENT
Start: 2025-04-10 | End: 2025-04-10 | Stop reason: HOSPADM

## 2025-04-10 RX ORDER — HYDRALAZINE HYDROCHLORIDE 20 MG/ML
5 INJECTION INTRAMUSCULAR; INTRAVENOUS
Status: DISCONTINUED | OUTPATIENT
Start: 2025-04-10 | End: 2025-04-10 | Stop reason: HOSPADM

## 2025-04-10 RX ORDER — FENTANYL CITRATE 50 UG/ML
INJECTION, SOLUTION INTRAMUSCULAR; INTRAVENOUS
Status: DISCONTINUED | OUTPATIENT
Start: 2025-04-10 | End: 2025-04-10 | Stop reason: HOSPADM

## 2025-04-10 RX ORDER — ONDANSETRON 2 MG/ML
4 INJECTION INTRAMUSCULAR; INTRAVENOUS ONCE AS NEEDED
Status: DISCONTINUED | OUTPATIENT
Start: 2025-04-10 | End: 2025-04-10 | Stop reason: HOSPADM

## 2025-04-10 RX ORDER — IOPAMIDOL 755 MG/ML
INJECTION, SOLUTION INTRAVASCULAR
Status: DISCONTINUED | OUTPATIENT
Start: 2025-04-10 | End: 2025-04-10 | Stop reason: HOSPADM

## 2025-04-10 RX ORDER — MIDAZOLAM HYDROCHLORIDE 1 MG/ML
INJECTION, SOLUTION INTRAMUSCULAR; INTRAVENOUS
Status: DISCONTINUED | OUTPATIENT
Start: 2025-04-10 | End: 2025-04-10 | Stop reason: HOSPADM

## 2025-04-10 RX ORDER — NITROGLYCERIN 5 MG/ML
INJECTION, SOLUTION INTRAVENOUS
Status: DISCONTINUED | OUTPATIENT
Start: 2025-04-10 | End: 2025-04-10 | Stop reason: HOSPADM

## 2025-04-10 RX ORDER — LIDOCAINE HYDROCHLORIDE 20 MG/ML
INJECTION, SOLUTION INFILTRATION; PERINEURAL
Status: DISCONTINUED | OUTPATIENT
Start: 2025-04-10 | End: 2025-04-10 | Stop reason: HOSPADM

## 2025-04-10 RX ORDER — IPRATROPIUM BROMIDE AND ALBUTEROL SULFATE 2.5; .5 MG/3ML; MG/3ML
3 SOLUTION RESPIRATORY (INHALATION) ONCE AS NEEDED
Status: DISCONTINUED | OUTPATIENT
Start: 2025-04-10 | End: 2025-04-10 | Stop reason: HOSPADM

## 2025-04-10 RX ORDER — OXYCODONE HYDROCHLORIDE 5 MG/1
5 TABLET ORAL ONCE AS NEEDED
Refills: 0 | Status: DISCONTINUED | OUTPATIENT
Start: 2025-04-10 | End: 2025-04-10 | Stop reason: HOSPADM

## 2025-04-10 RX ORDER — OXYCODONE HYDROCHLORIDE 5 MG/1
10 TABLET ORAL EVERY 4 HOURS PRN
Refills: 0 | Status: DISCONTINUED | OUTPATIENT
Start: 2025-04-10 | End: 2025-04-10 | Stop reason: HOSPADM

## 2025-04-10 RX ORDER — NICARDIPINE HYDROCHLORIDE 2.5 MG/ML
INJECTION INTRAVENOUS
Status: DISCONTINUED | OUTPATIENT
Start: 2025-04-10 | End: 2025-04-10 | Stop reason: HOSPADM

## 2025-04-10 RX ADMIN — SODIUM CHLORIDE 100 ML/HR: 9 INJECTION, SOLUTION INTRAVENOUS at 12:30

## 2025-04-10 NOTE — DISCHARGE INSTRUCTIONS
Post Cath Instructions  Drink plenty of water for the next 24 hours. This helps to eliminate the dye used in your procedure through urination.  You may resume a normal diet; however, try to avoid foods that would cause gas or constipation.    What to do for pain: acetaminophen (Tylenol), not ibuprofen (Advil) can be used for puncture site tenderness. If your pain is unmanageable with this method, call the Cardiologist's office.     Sedative medication (Anesthesia) given to you during your catheterization may decrease your judgement and reaction time for up to 24-48 hours.  Therefore:  DO NOT drive, operate hazardous machinery, or consume alcoholic beverages for 24 hours.   DO NOT make any important/legal decisions for 24 hours.   Have someone stay with you for at least 24 hours.    For the next 48 hours (to allow proper healing and prevent bleeding):  Avoid excessive bending at wound site  Avoid straining (anything that would tense up muscles around the affected puncture site)  Avoid lifting, pushing, or pulling objects greater than 5 pounds, for 5 days  For Arm Cases:  No flexing at the puncture site, such as hammering, golfing, bowling, or swinging any objects    Keep the puncture site clean and dry.  After 24 hours, remove the dressing and replace it with a Band-Aid for at least one additional day.  Gently clean the site with mild soap and water.  No scrubbing/rubbing, and lightly pat the area dry.  Showers are acceptable; however, avoid submerging in water (tub baths, hot tubs, pools, dishwater, etc…) for at least one week.  The site should be completely healed before resuming these activities to reduce the risk of infection. Watch for signs and symptoms of infection and notify the physician of any of the following:  Bleeding or an increase in swelling, redness, or warmth at the puncture site  Fever  Increased soreness around puncture site  Foul odor or significant drainage from the puncture site  **A bruise or  small “pea sized” lump under the skin at the puncture site is not unusual.  This should disappear within 3-4 weeks.**    CONTACT YOUR PHYSICIAN OR CALL 911 IF YOU EXPERIENCE ANY OF THE FOLLOWING:  Increased angina (chest pain) or frequent sensations of pressure, burning, pain, or other discomfort in the chest, arm, jaws, or stomach  Lightheadedness, dizziness, faint feeling, sweating, or difficulty breathing  Odd changes in sedation (like numbness, tingling, coldness, or pain) or color (pale/bluish) in the arm or leg in which the catheter was inserted.    IMPORTANT:  Although this occurs very rarely, if you should develop bright red or excessive bleeding, feel a “pop” inside at the insertion site, or notice a sudden increase in swelling larger than a walnut, you should call 911.  Hold continuous firm pressure to the access site until emergency personnel arrive.  It is best if someone else can do this for you.

## 2025-04-14 ENCOUNTER — OFFICE VISIT (OUTPATIENT)
Dept: CARDIAC SURGERY | Facility: CLINIC | Age: 80
End: 2025-04-14
Payer: MEDICARE

## 2025-04-14 VITALS
WEIGHT: 219 LBS | DIASTOLIC BLOOD PRESSURE: 82 MMHG | BODY MASS INDEX: 36.49 KG/M2 | RESPIRATION RATE: 18 BRPM | HEART RATE: 65 BPM | OXYGEN SATURATION: 98 % | HEIGHT: 65 IN | SYSTOLIC BLOOD PRESSURE: 136 MMHG | TEMPERATURE: 97.5 F

## 2025-04-14 DIAGNOSIS — E78.2 MIXED HYPERLIPIDEMIA: ICD-10-CM

## 2025-04-14 DIAGNOSIS — R00.2 PALPITATIONS: ICD-10-CM

## 2025-04-14 DIAGNOSIS — I48.0 PAROXYSMAL ATRIAL FIBRILLATION: Primary | ICD-10-CM

## 2025-04-14 DIAGNOSIS — I25.10 ATHEROSCLEROSIS OF NATIVE CORONARY ARTERY OF NATIVE HEART WITHOUT ANGINA PECTORIS: ICD-10-CM

## 2025-04-14 DIAGNOSIS — I48.0 PAF (PAROXYSMAL ATRIAL FIBRILLATION): ICD-10-CM

## 2025-04-14 DIAGNOSIS — I51.9 LEFT VENTRICULAR DIASTOLIC DYSFUNCTION: ICD-10-CM

## 2025-04-14 DIAGNOSIS — I10 ESSENTIAL HYPERTENSION: ICD-10-CM

## 2025-04-14 PROCEDURE — 1159F MED LIST DOCD IN RCRD: CPT

## 2025-04-14 PROCEDURE — 3075F SYST BP GE 130 - 139MM HG: CPT

## 2025-04-14 PROCEDURE — 99215 OFFICE O/P EST HI 40 MIN: CPT

## 2025-04-14 PROCEDURE — 3079F DIAST BP 80-89 MM HG: CPT

## 2025-04-14 PROCEDURE — 1160F RVW MEDS BY RX/DR IN RCRD: CPT

## 2025-04-14 NOTE — PROGRESS NOTES
"Chief Complaint  Follow-up (Hospital F/U)    Subjective        Sheree Vance presents to [facility] CARDIAC SURGERY  History of Present Illness  79 years old female with hypertension, hyperlipidemia, paroxysmal A-fib on chronic Eliquis, CAD, Ulises on CPAP, ULISES, hypothyroidism, presents for evaluation about her A-fib.  She has a history of A-fib ablation in 2018, and after that she was on normal sinus rhythm for 6 years.  This year she started to have palpitations and recurrent A-fib symptoms.  Dr. Case did an redo A-fib ablation but the procedure was not quite successful and she is still having some burst of A-fib.  She has been on Tikosyn since 2018.  Echocardiogram on 11/12/2025 did not show any valvular disease.      Objective   Vital Signs:  /82 (BP Location: Left arm, Patient Position: Sitting, Cuff Size: Adult)   Pulse 65   Temp 97.5 °F (36.4 °C)   Resp 18   Ht 165.1 cm (65\")   Wt 99.3 kg (219 lb)   SpO2 98%   BMI 36.44 kg/m²   Estimated body mass index is 36.44 kg/m² as calculated from the following:    Height as of this encounter: 165.1 cm (65\").    Weight as of this encounter: 99.3 kg (219 lb).            Physical Exam  Constitutional:       Appearance: Normal appearance. She is normal weight.   Cardiovascular:      Rate and Rhythm: Normal rate and regular rhythm.      Heart sounds: Normal heart sounds.   Pulmonary:      Effort: Pulmonary effort is normal.      Breath sounds: Normal breath sounds.   Neurological:      General: No focal deficit present.      Mental Status: She is alert and oriented to person, place, and time. Mental status is at baseline.   Psychiatric:         Mood and Affect: Mood normal.         Behavior: Behavior normal.         Thought Content: Thought content normal.         Judgment: Judgment normal.        Result Review :                Assessment and Plan   Diagnoses and all orders for this visit:    1. Paroxysmal atrial fibrillation " (Primary)    2. Essential hypertension    3. Mixed hyperlipidemia    4. Left ventricular diastolic dysfunction    5. Palpitations    6. PAF (paroxysmal atrial fibrillation)    7. Atherosclerosis of native coronary artery of native heart without angina pectoris    79 years old female with paroxysmal atrial fibrillation status post A-fib ablation in 2 opportunities.  She is on Tikosyn and Eliquis.  She is very symptomatic when she has the A-fib episode.  She comes today to discuss about a convergent procedure.  I explained risk and benefits of the procedure to the patient and she agreed to proceed.  We will do a left atrial appendage ligation also.     I spent 40 minutes caring for Sheree on this date of service. This time includes time spent by me in the following activities:preparing for the visit, reviewing tests, obtaining and/or reviewing a separately obtained history, performing a medically appropriate examination and/or evaluation , counseling and educating the patient/family/caregiver, ordering medications, tests, or procedures, referring and communicating with other health care professionals , documenting information in the medical record, independently interpreting results and communicating that information with the patient/family/caregiver, and care coordination  Follow Up   No follow-ups on file.  Patient was given instructions and counseling regarding her condition or for health maintenance advice. Please see specific information pulled into the AVS if appropriate.

## 2025-04-29 ENCOUNTER — TELEPHONE (OUTPATIENT)
Dept: CARDIAC SURGERY | Facility: CLINIC | Age: 80
End: 2025-04-29
Payer: MEDICARE

## 2025-04-29 ENCOUNTER — PREP FOR SURGERY (OUTPATIENT)
Dept: OTHER | Facility: HOSPITAL | Age: 80
End: 2025-04-29
Payer: MEDICARE

## 2025-04-29 DIAGNOSIS — I48.0 PAROXYSMAL ATRIAL FIBRILLATION: Primary | ICD-10-CM

## 2025-04-29 RX ORDER — NICOTINE POLACRILEX 4 MG
15 LOZENGE BUCCAL
OUTPATIENT
Start: 2025-04-29

## 2025-04-29 RX ORDER — SODIUM CHLORIDE 9 MG/ML
30 INJECTION, SOLUTION INTRAVENOUS CONTINUOUS
OUTPATIENT
Start: 2025-05-22 | End: 2025-05-24

## 2025-04-29 RX ORDER — SODIUM CHLORIDE 0.9 % (FLUSH) 0.9 %
3 SYRINGE (ML) INJECTION EVERY 12 HOURS SCHEDULED
OUTPATIENT
Start: 2025-04-29

## 2025-04-29 RX ORDER — NITROGLYCERIN 0.4 MG/1
0.4 TABLET SUBLINGUAL
OUTPATIENT
Start: 2025-04-29

## 2025-04-29 RX ORDER — ALPRAZOLAM 0.25 MG
0.25 TABLET ORAL ONCE
OUTPATIENT
Start: 2025-04-29 | End: 2025-04-29

## 2025-04-29 RX ORDER — IBUPROFEN 600 MG/1
1 TABLET ORAL
OUTPATIENT
Start: 2025-04-29

## 2025-04-29 RX ORDER — DEXTROSE MONOHYDRATE 25 G/50ML
10-50 INJECTION, SOLUTION INTRAVENOUS
OUTPATIENT
Start: 2025-04-29

## 2025-04-29 RX ORDER — SODIUM CHLORIDE 0.9 % (FLUSH) 0.9 %
3-10 SYRINGE (ML) INJECTION AS NEEDED
OUTPATIENT
Start: 2025-04-29

## 2025-04-29 RX ORDER — CHLORHEXIDINE GLUCONATE ORAL RINSE 1.2 MG/ML
15 SOLUTION DENTAL ONCE
OUTPATIENT
Start: 2025-04-29 | End: 2025-04-29

## 2025-04-29 RX ORDER — SODIUM CHLORIDE 9 MG/ML
40 INJECTION, SOLUTION INTRAVENOUS AS NEEDED
OUTPATIENT
Start: 2025-04-29

## 2025-04-29 RX ORDER — ACETAMINOPHEN 325 MG/1
650 TABLET ORAL EVERY 4 HOURS PRN
OUTPATIENT
Start: 2025-04-29

## 2025-04-29 RX ORDER — CHLORHEXIDINE GLUCONATE 500 MG/1
CLOTH TOPICAL EVERY 12 HOURS PRN
OUTPATIENT
Start: 2025-04-29

## 2025-04-29 RX ORDER — SODIUM CHLORIDE 0.9 % (FLUSH) 0.9 %
30 SYRINGE (ML) INJECTION ONCE AS NEEDED
OUTPATIENT
Start: 2025-04-29

## 2025-04-29 RX ORDER — CHLORHEXIDINE GLUCONATE ORAL RINSE 1.2 MG/ML
15 SOLUTION DENTAL EVERY 12 HOURS
OUTPATIENT
Start: 2025-04-29 | End: 2025-04-30

## 2025-04-29 NOTE — TELEPHONE ENCOUNTER
ANSELMO orders placed for Convergent procedure with Dr. Choi   Anticoagulation d/w him  Last dose of Eliquis is 5/20/2025  No Lovenox bridge ordered    Addendum:  D/w Dr. Choi about need to stop Tikosyn prior to Convergent procedure--he said it does NOT need to be stopped.  ANSELMO notified

## 2025-04-29 NOTE — TELEPHONE ENCOUNTER
Spoke to patient with PAT and surgery instructions. Jj will reach out her to schedule her PAT times. Surgery is on 5- at 0730 with and 0500 arrival time. Last dose of Eliquis will be on 5-. She is no longer taking her FISH OIL. She expressed a verbal understanding of these directions. Was instructed to call back with any further questions.

## 2025-05-19 ENCOUNTER — ANESTHESIA EVENT (OUTPATIENT)
Dept: PERIOP | Facility: HOSPITAL | Age: 80
End: 2025-05-19
Payer: MEDICARE

## 2025-05-19 NOTE — ANESTHESIA PREPROCEDURE EVALUATION
Anesthesia Evaluation     Patient summary reviewed and Nursing notes reviewed   NPO Solid Status: > 8 hours  NPO Liquid Status: > 8 hours           Airway   Mallampati: II  TM distance: >3 FB  Neck ROM: full  No difficulty expected  Dental - normal exam     Pulmonary - normal exam   (+) ,sleep apnea  Cardiovascular - normal exam    ECG reviewed    (+) hypertension, valvular problems/murmurs, CAD, dysrhythmias Atrial Fib, hyperlipidemia      Neuro/Psych  (+) numbness  GI/Hepatic/Renal/Endo    (+) obesity, GERD, thyroid problem     Musculoskeletal     Abdominal  - normal exam    Bowel sounds: normal.   Substance History      OB/GYN          Other   arthritis,     ROS/Med Hx Other: Similar to previous ECG  Rhythm: sinus rhythm  Rate: normal  Conduction: 1st degree AV block      ·  Left ventricular systolic function is normal. Estimated left ventricular EF = 60% Left ventricular ejection fraction appears to be 56 - 60%.  ·  Left ventricular wall thickness is consistent with mild concentric hypertrophy.  ·  Left ventricular diastolic function is consistent with (grade I) impaired relaxation.  ·  The left atrial cavity is moderate to severely dilated.  ·  Abnormal mitral valve structure consistent with dilated annulus. There is mild, bileaflet mitral valve thickening present.  ·  Estimated right ventricular systolic pressure from tricuspid regurgitation is normal (<35 mmHg).         Impressions:  Mild obstructive coronary disease involving the ostium of the LAD and also proximal left circumflex artery  Normal LV systolic function normal wall motion estimate LV ejection fraction of 60%                        Anesthesia Plan    ASA 3     general, Ethel and CVL     intravenous induction     Anesthetic plan, risks, benefits, and alternatives have been provided, discussed and informed consent has been obtained with: patient.        CODE STATUS:

## 2025-05-20 ENCOUNTER — HOSPITAL ENCOUNTER (OUTPATIENT)
Dept: GENERAL RADIOLOGY | Facility: HOSPITAL | Age: 80
Discharge: HOME | End: 2025-05-20
Payer: MEDICARE

## 2025-05-20 ENCOUNTER — HOSPITAL ENCOUNTER (OUTPATIENT)
Dept: CARDIOLOGY | Facility: HOSPITAL | Age: 80
Discharge: HOME | End: 2025-05-20
Payer: MEDICARE

## 2025-05-20 ENCOUNTER — PRE-ADMISSION TESTING (OUTPATIENT)
Dept: PREADMISSION TESTING | Facility: HOSPITAL | Age: 80
DRG: 317 | End: 2025-05-20
Payer: MEDICARE

## 2025-05-20 ENCOUNTER — LAB (OUTPATIENT)
Dept: LAB | Facility: HOSPITAL | Age: 80
End: 2025-05-20
Payer: MEDICARE

## 2025-05-20 ENCOUNTER — APPOINTMENT (OUTPATIENT)
Dept: RESPIRATORY THERAPY | Facility: HOSPITAL | Age: 80
DRG: 317 | End: 2025-05-20
Payer: MEDICARE

## 2025-05-20 ENCOUNTER — HOSPITAL ENCOUNTER (OUTPATIENT)
Dept: CT IMAGING | Facility: HOSPITAL | Age: 80
Discharge: HOME | End: 2025-05-20
Payer: MEDICARE

## 2025-05-20 VITALS
TEMPERATURE: 97.7 F | HEIGHT: 65 IN | BODY MASS INDEX: 36.89 KG/M2 | RESPIRATION RATE: 16 BRPM | OXYGEN SATURATION: 96 % | HEART RATE: 81 BPM | DIASTOLIC BLOOD PRESSURE: 81 MMHG | SYSTOLIC BLOOD PRESSURE: 143 MMHG | WEIGHT: 221.4 LBS

## 2025-05-20 DIAGNOSIS — I48.0 PAROXYSMAL ATRIAL FIBRILLATION: ICD-10-CM

## 2025-05-20 LAB
ABO GROUP BLD: NORMAL
ALBUMIN SERPL-MCNC: 4.4 G/DL (ref 3.5–5.2)
ALBUMIN/GLOB SERPL: 1.8 G/DL
ALP SERPL-CCNC: 250 U/L (ref 39–117)
ALT SERPL W P-5'-P-CCNC: 13 U/L (ref 1–33)
ANION GAP SERPL CALCULATED.3IONS-SCNC: 11.6 MMOL/L (ref 5–15)
APTT PPP: 29.6 SECONDS (ref 22.7–35.4)
ARTERIAL PATENCY WRIST A: POSITIVE
AST SERPL-CCNC: 24 U/L (ref 1–32)
ATMOSPHERIC PRESS: NORMAL MM[HG]
BACTERIA UR QL AUTO: ABNORMAL /HPF
BACTERIA UR QL AUTO: NORMAL /HPF
BASE EXCESS BLDA CALC-SCNC: 0.2 MMOL/L (ref 0–3)
BASOPHILS # BLD AUTO: 0.03 10*3/MM3 (ref 0–0.2)
BASOPHILS NFR BLD AUTO: 0.5 % (ref 0–1.5)
BDY SITE: NORMAL
BILIRUB SERPL-MCNC: 0.5 MG/DL (ref 0–1.2)
BILIRUB UR QL STRIP: NEGATIVE
BILIRUB UR QL STRIP: NEGATIVE
BLD GP AB SCN SERPL QL: NEGATIVE
BUN SERPL-MCNC: 16 MG/DL (ref 8–23)
BUN/CREAT SERPL: 16.7 (ref 7–25)
CALCIUM SPEC-SCNC: 9.7 MG/DL (ref 8.6–10.5)
CHLORIDE SERPL-SCNC: 102 MMOL/L (ref 98–107)
CLARITY UR: CLEAR
CLARITY UR: CLEAR
CLOSE TME COLL+ADP + EPINEP PNL BLD: 96 % (ref 86–100)
CO2 BLDA-SCNC: 25.6 MMOL/L (ref 22–29)
CO2 SERPL-SCNC: 24.4 MMOL/L (ref 22–29)
COLOR UR: YELLOW
COLOR UR: YELLOW
CREAT SERPL-MCNC: 0.96 MG/DL (ref 0.57–1)
DEPRECATED RDW RBC AUTO: 46.6 FL (ref 37–54)
EGFRCR SERPLBLD CKD-EPI 2021: 60.7 ML/MIN/1.73
EOSINOPHIL # BLD AUTO: 0.12 10*3/MM3 (ref 0–0.4)
EOSINOPHIL NFR BLD AUTO: 2.2 % (ref 0.3–6.2)
ERYTHROCYTE [DISTWIDTH] IN BLOOD BY AUTOMATED COUNT: 13.6 % (ref 12.3–15.4)
GLOBULIN UR ELPH-MCNC: 2.4 GM/DL
GLUCOSE SERPL-MCNC: 107 MG/DL (ref 65–99)
GLUCOSE UR STRIP-MCNC: NEGATIVE MG/DL
GLUCOSE UR STRIP-MCNC: NEGATIVE MG/DL
HCO3 BLDA-SCNC: 24.5 MMOL/L (ref 21–28)
HCT VFR BLD AUTO: 41.7 % (ref 34–46.6)
HEMODILUTION: NO
HGB BLD-MCNC: 13.4 G/DL (ref 12–15.9)
HGB UR QL STRIP.AUTO: NEGATIVE
HGB UR QL STRIP.AUTO: NEGATIVE
HYALINE CASTS UR QL AUTO: ABNORMAL /LPF
HYALINE CASTS UR QL AUTO: NORMAL /LPF
IMM GRANULOCYTES # BLD AUTO: 0.01 10*3/MM3 (ref 0–0.05)
IMM GRANULOCYTES NFR BLD AUTO: 0.2 % (ref 0–0.5)
INHALED O2 CONCENTRATION: 21 %
INR PPP: 1.27 (ref 0.9–1.1)
KETONES UR QL STRIP: NEGATIVE
KETONES UR QL STRIP: NEGATIVE
LEUKOCYTE ESTERASE UR QL STRIP.AUTO: ABNORMAL
LEUKOCYTE ESTERASE UR QL STRIP.AUTO: ABNORMAL
LYMPHOCYTES # BLD AUTO: 2.7 10*3/MM3 (ref 0.7–3.1)
LYMPHOCYTES NFR BLD AUTO: 49.2 % (ref 19.6–45.3)
MCH RBC QN AUTO: 29.8 PG (ref 26.6–33)
MCHC RBC AUTO-ENTMCNC: 32.1 G/DL (ref 31.5–35.7)
MCV RBC AUTO: 92.7 FL (ref 79–97)
MODALITY: NORMAL
MONOCYTES # BLD AUTO: 0.56 10*3/MM3 (ref 0.1–0.9)
MONOCYTES NFR BLD AUTO: 10.2 % (ref 5–12)
MRSA DNA SPEC QL NAA+PROBE: NORMAL
NEUTROPHILS NFR BLD AUTO: 2.07 10*3/MM3 (ref 1.7–7)
NEUTROPHILS NFR BLD AUTO: 37.7 % (ref 42.7–76)
NITRITE UR QL STRIP: NEGATIVE
NITRITE UR QL STRIP: NEGATIVE
NRBC BLD AUTO-RTO: 0 /100 WBC (ref 0–0.2)
PCO2 BLDA: 37.5 MM HG (ref 35–48)
PH BLDA: 7.42 PH UNITS (ref 7.35–7.45)
PH UR STRIP.AUTO: 6 [PH] (ref 5–8)
PH UR STRIP.AUTO: 6 [PH] (ref 5–8)
PLATELET # BLD AUTO: 262 10*3/MM3 (ref 140–450)
PMV BLD AUTO: 10 FL (ref 6–12)
PO2 BLD: 412 MM[HG] (ref 0–500)
PO2 BLDA: 86.6 MM HG (ref 83–108)
POTASSIUM SERPL-SCNC: 4.2 MMOL/L (ref 3.5–5.2)
PROT SERPL-MCNC: 6.8 G/DL (ref 6–8.5)
PROT UR QL STRIP: NEGATIVE
PROT UR QL STRIP: NEGATIVE
PROTHROMBIN TIME: 15.9 SECONDS (ref 11.7–14.2)
QT INTERVAL: 402 MS
QTC INTERVAL: 454 MS
RBC # BLD AUTO: 4.5 10*6/MM3 (ref 3.77–5.28)
RBC # UR STRIP: ABNORMAL /HPF
RBC # UR STRIP: NORMAL /HPF
REF LAB TEST METHOD: ABNORMAL
REF LAB TEST METHOD: NORMAL
RH BLD: POSITIVE
SAO2 % BLDCOA: 96.8 % (ref 94–98)
SODIUM SERPL-SCNC: 138 MMOL/L (ref 136–145)
SP GR UR STRIP: 1.01 (ref 1–1.03)
SP GR UR STRIP: 1.01 (ref 1–1.03)
SQUAMOUS #/AREA URNS HPF: ABNORMAL /HPF
SQUAMOUS #/AREA URNS HPF: NORMAL /HPF
T&S EXPIRATION DATE: NORMAL
UROBILINOGEN UR QL STRIP: ABNORMAL
UROBILINOGEN UR QL STRIP: ABNORMAL
WBC # UR STRIP: ABNORMAL /HPF
WBC # UR STRIP: NORMAL /HPF
WBC NRBC COR # BLD AUTO: 5.49 10*3/MM3 (ref 3.4–10.8)

## 2025-05-20 PROCEDURE — 81001 URINALYSIS AUTO W/SCOPE: CPT

## 2025-05-20 PROCEDURE — 80053 COMPREHEN METABOLIC PANEL: CPT

## 2025-05-20 PROCEDURE — 86900 BLOOD TYPING SEROLOGIC ABO: CPT

## 2025-05-20 PROCEDURE — 86901 BLOOD TYPING SEROLOGIC RH(D): CPT

## 2025-05-20 PROCEDURE — 85025 COMPLETE CBC W/AUTO DIFF WBC: CPT

## 2025-05-20 PROCEDURE — 85610 PROTHROMBIN TIME: CPT

## 2025-05-20 PROCEDURE — 82803 BLOOD GASES ANY COMBINATION: CPT

## 2025-05-20 PROCEDURE — 93005 ELECTROCARDIOGRAM TRACING: CPT | Performed by: NURSE PRACTITIONER

## 2025-05-20 PROCEDURE — 36415 COLL VENOUS BLD VENIPUNCTURE: CPT

## 2025-05-20 PROCEDURE — 85576 BLOOD PLATELET AGGREGATION: CPT

## 2025-05-20 PROCEDURE — 86923 COMPATIBILITY TEST ELECTRIC: CPT

## 2025-05-20 PROCEDURE — 86850 RBC ANTIBODY SCREEN: CPT

## 2025-05-20 PROCEDURE — 87086 URINE CULTURE/COLONY COUNT: CPT

## 2025-05-20 PROCEDURE — 36600 WITHDRAWAL OF ARTERIAL BLOOD: CPT

## 2025-05-20 PROCEDURE — 87641 MR-STAPH DNA AMP PROBE: CPT

## 2025-05-20 PROCEDURE — 71250 CT THORAX DX C-: CPT

## 2025-05-20 PROCEDURE — 71046 X-RAY EXAM CHEST 2 VIEWS: CPT

## 2025-05-20 PROCEDURE — 85730 THROMBOPLASTIN TIME PARTIAL: CPT

## 2025-05-20 RX ORDER — CHLORHEXIDINE GLUCONATE ORAL RINSE 1.2 MG/ML
15 SOLUTION DENTAL EVERY 12 HOURS
Status: ACTIVE | OUTPATIENT
Start: 2025-05-20 | End: 2025-05-21

## 2025-05-20 RX ORDER — CHLORHEXIDINE GLUCONATE 500 MG/1
CLOTH TOPICAL EVERY 12 HOURS PRN
Status: ACTIVE | OUTPATIENT
Start: 2025-05-20

## 2025-05-20 NOTE — PAT
Emergency Contact: Papi Rajiv (spouse) [phone]    Password: Luis W.     5 meter walk:   6.2  6.4  5.1

## 2025-05-20 NOTE — H&P
Name: Sheree Vance ADMIT: (Not on file)   : 1945  PCP: Latia Guerrier MD    MRN: [id number] LOS: 0 days   AGE/SEX: 80 y.o. female  ROOM: Room/bed info not found     Chief Complaint: Atrial fibrillation    Subjective   History of Present Illness  Patient is a 80 y.o. female with a past medical history including atrial fibs/flutter on chronic AC (Eliquis), CAD, lumbar DJD, hypertension, hyperlipidemia, mild valvular heart disease, SHANAE with CPAP compliance, and hypothyroidism.  In  she underwent A-fib ablation.  Following that she remained in normal sinus rhythm for 6 years.  This year she began to have palpitations and S/S of A-fib.  In March of this year she underwent a second A-fib ablation with Dr. Case which was unsuccessful.  She was referred to Dr. Choi for evaluation regarding possible convergent procedure.  He recommended convergent procedure and today she presents PAT in preparation for upcoming surgery.  She does not smoke and rarely drinks alcohol.  Denies any recent changes to her health history or home medications.  She is in normal sinus rhythm today, but it is very noticeable to her when she is in AF.  She has palpitations and fatigue.    Past Medical History:   Diagnosis Date    A-fib Since  Dx in [place]    Abnormal ECG     Allergic     Seasonal    Allergic rhinitis     Hx Allergy Shots x 4 Yrs    Aortic valve calcification 2018    Noted on SARA    Arrhythmia     Arthritis     Lumbar Spine    Atrial flutter     Breast mass seen on mammogram 2014    L 4CM Mass--Benign & Followed    Bruxism, sleep-related     Cataract     Chronic anticoagulation     Eliquis     Chronic low back pain     Hx Back Sx in 2016    Colon polyp     Coronary artery disease 2009    AFIB    DJD (degenerative joint disease), lumbar     Dyslipidemia     w/Hypertriglyceridemia--Statin/Fish Oils    Family history of premature CAD     GERD (gastroesophageal reflux disease)      Glaucoma     Heart disease     History of bone density study 03/19/14-[facility]    T-Score of 1.4 Indicating Osteopenia    History of EKG 04/2018 & 08/2018 & 12/2018    First Degree AV Block/Multiple Atrial Premature Blocks Noted on All    Hormone replacement therapy (HRT)     Premarin-0.625MG    Hyperlipidemia     Controlled w/Meds    Hypertension     Controlled w/Losartan    Hypothyroidism     Synthroid    Insomnia     Takes OTC Sleep Aid PRN    Left atrial enlargement 12/04/2018    Severe Noted on Cardioversion Report/SARA    Left breast mass 03/19/2014    4CM Noted on Mammogram--Benign     Mild tricuspid valve regurgitation 12/04/2018    Noted on SARA    Mitral valve annular calcification 12/04/2018    Moderate Noted on SARA    Mitral valve regurgitation 12/04/2018    Moderate Noted on SARA    Obesity 12/04/2018    BMI-43.3     SHANAE on CPAP 11/27/2018    AHI 10.5/h, supine 30/h    Osteoarthritis of right hip 07/09/2019    Osteopenia 03/19/2014    Noted on Dexa Report    Restless sleeper     Tosses and Turns All Night Per Pt    Sinus congestion     Tricuspid leaflet thickened 12/04/2018    Noted on SARA    Urinary tract infection      Past Surgical History:   Procedure Laterality Date    ABLATION OF DYSRHYTHMIC FOCUS  2018    APPENDECTOMY      BACK SURGERY  2016    Spinal Fusion    BLADDER REPAIR  2001 in [place]    CARDIAC CATHETERIZATION  2015    CARDIAC CATHETERIZATION Right 4/10/2025    Procedure: Left Heart Cath;  Surgeon: Richard Burgos MD;  Location: [facility] CATH INVASIVE LOCATION;  Service: Cardiovascular;  Laterality: Right;    CARDIAC ELECTROPHYSIOLOGY PROCEDURE Right 03/12/2025    Procedure: Ablation atrial fibrillation;  Surgeon: Dre Case MD;  Location:  [facility] CATH INVASIVE LOCATION;  Service: Cardiovascular;  Laterality: Right;    CARDIOVERSION  08/7/18 & 4/17/18-[facility]    Dr. Villegas--For Chronic A-Fib    CARDIOVERSION  12/4/28-[facility]    Dr. Case--See Report    CATARACT EXTRACTION  2007    COLONOSCOPY  " 04/13/2017    HYSTERECTOMY      Partial    JOINT REPLACEMENT  2020 2021    KNEE ARTHROSCOPY  2009    KNEE SURGERY Right 2009    OTHER SURGICAL HISTORY  12/4/18-[facility]    Fluoroscopy/Trans-Septal Access to L Atrium/Selective Venography of L Superior Pulm Vein/Add Lienier Radiofrequency Ablation Along the Coronary Sinus/Synchronized Cardioversion--Dr. Case    REPLACEMENT TOTAL KNEE Right 08/30/2021    Dr. Astorga    SPINAL FUSION  2016    SPINE SURGERY  2016    SARA  12/4/18-[facility]    Mild Concentric L Ventricular Hypertrophy Noted; EF 55-60%; L Atrial Moderate-Severely Dilated, Moderat Mitral Annular Calcification with Moderate MVR Noted; Mild TVR; Normal LV Function    TONSILLECTOMY      TOTAL HIP ARTHROPLASTY Right 07/06/2020    Dr. Astorga     Family History   Problem Relation Age of Onset    Heart failure Mother     Alcohol abuse Mother     Early death Mother     Miscarriages / Stillbirths Mother     No Known Problems Father      Social History     Tobacco Use    Smoking status: Never     Passive exposure: Never    Smokeless tobacco: Never   Vaping Use    Vaping status: Never Used   Substance Use Topics    Alcohol use: Yes     Alcohol/week: 4.0 standard drinks of alcohol     Types: 2 Glasses of wine, 2 Drinks containing 0.5 oz of alcohol per week     Comment: \"a couple a week\"    Drug use: Never     No medications prior to admission.     Allergies:  Patient has no known allergies.    Review of Systems   Constitutional:  Positive for fatigue.   Cardiovascular:  Positive for palpitations.   All other systems reviewed and are negative.       Objective    Vital Signs        on   ;      There is no height or weight on file to calculate BMI.    Physical Exam  Vitals and nursing note reviewed.   Constitutional:       Appearance: She is obese.   HENT:      Head: Normocephalic and atraumatic.      Right Ear: External ear normal.      Left Ear: External ear normal.      Nose: Nose normal.      Mouth/Throat:      Mouth: Mucous " membranes are moist.      Pharynx: Oropharynx is clear.   Eyes:      Conjunctiva/sclera: Conjunctivae normal.      Pupils: Pupils are equal, round, and reactive to light.   Cardiovascular:      Rate and Rhythm: Normal rate and regular rhythm.      Pulses: Normal pulses.      Heart sounds: Normal heart sounds.   Pulmonary:      Effort: Pulmonary effort is normal.      Breath sounds: Normal breath sounds.   Abdominal:      General: Bowel sounds are normal.      Palpations: Abdomen is soft.   Musculoskeletal:         General: Normal range of motion.      Cervical back: Normal range of motion and neck supple.   Skin:     General: Skin is warm and dry.      Capillary Refill: Capillary refill takes less than 2 seconds.   Neurological:      Mental Status: She is alert and oriented to person, place, and time. Mental status is at baseline.   Psychiatric:         Mood and Affect: Mood normal.         Behavior: Behavior normal.         Results Review:   I reviewed the patient's new clinical results.    WBC WBC   Date Value Ref Range Status   05/20/2025 5.49 3.40 - 10.80 10*3/mm3 Final      HGB Hemoglobin   Date Value Ref Range Status   05/20/2025 13.4 12.0 - 15.9 g/dL Final      HCT Hematocrit   Date Value Ref Range Status   05/20/2025 41.7 34.0 - 46.6 % Final      Platelets Platelets   Date Value Ref Range Status   05/20/2025 262 140 - 450 10*3/mm3 Final        PT/INR:    Protime   Date Value Ref Range Status   05/20/2025 15.9 (H) 11.7 - 14.2 Seconds Final   /  INR   Date Value Ref Range Status   05/20/2025 1.27 (H) 0.90 - 1.10 Final       Sodium Sodium   Date Value Ref Range Status   05/20/2025 138 136 - 145 mmol/L Final      Potassium Potassium   Date Value Ref Range Status   05/20/2025 4.2 3.5 - 5.2 mmol/L Final      Chloride Chloride   Date Value Ref Range Status   05/20/2025 102 98 - 107 mmol/L Final      Bicarbonate CO2   Date Value Ref Range Status   05/20/2025 24.4 22.0 - 29.0 mmol/L Final      BUN BUN   Date Value  "Ref Range Status   05/20/2025 16 8 - 23 mg/dL Final      Creatinine Creatinine   Date Value Ref Range Status   05/20/2025 0.96 0.57 - 1.00 mg/dL Final      Calcium Calcium   Date Value Ref Range Status   05/20/2025 9.7 8.6 - 10.5 mg/dL Final      Magnesium No results found for: \"MG\"       Assessment & Plan       Paroxysmal atrial fibrillation      Assessment & Plan    Paroxysmal atrial fibrillation on chronic AC (last dose Eliquis 5/19)  Palpitations  Hypertension  Hypothyroidism  History of SHANAE with CPAP compliance    Plans for convergent procedure with Dr. Choi on Thursday 5/22.  Labs and diagnostics reviewed.  Patient instructed not to eat or drink anything after midnight the night before surgery or take any medications the morning of surgery.  Her last dose of Eliquis was the evening of 5/19.  She has not been taking any vitamins/supplements.  Discussed surgery and expected postoperative course.  All questions and concerns were addressed to patient satisfaction.    Elana Lynch, PETE  05/20/25  10:28 EDT      "

## 2025-05-21 LAB — BACTERIA SPEC AEROBE CULT: NORMAL

## 2025-05-22 ENCOUNTER — HOSPITAL ENCOUNTER (INPATIENT)
Facility: HOSPITAL | Age: 80
LOS: 5 days | Discharge: HOME | End: 2025-05-27
Payer: MEDICARE

## 2025-05-22 ENCOUNTER — ANESTHESIA (OUTPATIENT)
Dept: PERIOP | Facility: HOSPITAL | Age: 80
End: 2025-05-22
Payer: MEDICARE

## 2025-05-22 ENCOUNTER — OFFICE VISIT (OUTPATIENT)
Dept: PERIOP | Facility: HOSPITAL | Age: 80
End: 2025-05-22
Payer: MEDICARE

## 2025-05-22 ENCOUNTER — APPOINTMENT (OUTPATIENT)
Dept: GENERAL RADIOLOGY | Facility: HOSPITAL | Age: 80
End: 2025-05-22
Payer: MEDICARE

## 2025-05-22 ENCOUNTER — ANESTHESIA EVENT CONVERTED (OUTPATIENT)
Dept: ANESTHESIOLOGY | Facility: HOSPITAL | Age: 80
End: 2025-05-22
Payer: MEDICARE

## 2025-05-22 DIAGNOSIS — I48.0 PAROXYSMAL ATRIAL FIBRILLATION: ICD-10-CM

## 2025-05-22 DIAGNOSIS — Z98.890 S/P ABLATION OF ATRIAL FLUTTER: Primary | ICD-10-CM

## 2025-05-22 DIAGNOSIS — Z86.79 S/P ABLATION OF ATRIAL FLUTTER: Primary | ICD-10-CM

## 2025-05-22 LAB
ACT BLD: 135 SECONDS (ref 89–137)
ACT BLD: 193 SECONDS (ref 89–137)
ACT BLD: 222 SECONDS (ref 89–137)
ACT BLD: 256 SECONDS (ref 89–137)
ALBUMIN SERPL-MCNC: 4.1 G/DL (ref 3.5–5.2)
ANION GAP SERPL CALCULATED.3IONS-SCNC: 10.9 MMOL/L (ref 5–15)
APTT PPP: 30.3 SECONDS (ref 22.7–35.4)
ARTERIAL PATENCY WRIST A: ABNORMAL
ATMOSPHERIC PRESS: ABNORMAL MM[HG]
BASE DEFICIT: ABNORMAL
BASE DEFICIT: ABNORMAL
BASE EXCESS BLDA CALC-SCNC: -3.1 MMOL/L (ref 0–3)
BASE EXCESS BLDA CALC-SCNC: <0 MMOL/L (ref 0–3)
BASE EXCESS BLDA CALC-SCNC: <0 MMOL/L (ref 0–3)
BASOPHILS # BLD AUTO: 0.02 10*3/MM3 (ref 0–0.2)
BASOPHILS NFR BLD AUTO: 0.2 % (ref 0–1.5)
BDY SITE: ABNORMAL
BH BB BLOOD EXPIRATION DATE: NORMAL
BH BB BLOOD EXPIRATION DATE: NORMAL
BH BB BLOOD TYPE BARCODE: 5100
BH BB BLOOD TYPE BARCODE: 5100
BH BB DISPENSE STATUS: NORMAL
BH BB DISPENSE STATUS: NORMAL
BH BB PRODUCT CODE: NORMAL
BH BB PRODUCT CODE: NORMAL
BH BB UNIT NUMBER: NORMAL
BH BB UNIT NUMBER: NORMAL
BUN SERPL-MCNC: 15 MG/DL (ref 8–23)
BUN/CREAT SERPL: 16.9 (ref 7–25)
CA-I BLDA-SCNC: 1.23 MMOL/L (ref 1.12–1.32)
CA-I BLDA-SCNC: 1.25 MMOL/L (ref 1.12–1.32)
CA-I SERPL ISE-MCNC: 1.19 MMOL/L (ref 1.15–1.3)
CALCIUM SPEC-SCNC: 9.1 MG/DL (ref 8.6–10.5)
CHLORIDE SERPL-SCNC: 107 MMOL/L (ref 98–107)
CO2 BLDA-SCNC: 22 MMOL/L (ref 23–27)
CO2 BLDA-SCNC: 24 MMOL/L (ref 23–27)
CO2 SERPL-SCNC: 22.1 MMOL/L (ref 22–29)
CREAT SERPL-MCNC: 0.89 MG/DL (ref 0.57–1)
CROSSMATCH INTERPRETATION: NORMAL
CROSSMATCH INTERPRETATION: NORMAL
DEPRECATED RDW RBC AUTO: 45.8 FL (ref 37–54)
EGFRCR SERPLBLD CKD-EPI 2021: 66.5 ML/MIN/1.73
EOSINOPHIL # BLD AUTO: 0.02 10*3/MM3 (ref 0–0.4)
EOSINOPHIL NFR BLD AUTO: 0.2 % (ref 0.3–6.2)
ERYTHROCYTE [DISTWIDTH] IN BLOOD BY AUTOMATED COUNT: 13.3 % (ref 12.3–15.4)
GLUCOSE BLDC GLUCOMTR-MCNC: 109 MG/DL (ref 70–105)
GLUCOSE BLDC GLUCOMTR-MCNC: 118 MG/DL (ref 70–105)
GLUCOSE BLDC GLUCOMTR-MCNC: 122 MG/DL (ref 70–105)
GLUCOSE BLDC GLUCOMTR-MCNC: 126 MG/DL (ref 70–105)
GLUCOSE BLDC GLUCOMTR-MCNC: 132 MG/DL (ref 70–105)
GLUCOSE BLDC GLUCOMTR-MCNC: 141 MG/DL (ref 70–105)
GLUCOSE BLDC GLUCOMTR-MCNC: 142 MG/DL (ref 70–105)
GLUCOSE BLDC GLUCOMTR-MCNC: 148 MG/DL (ref 70–105)
GLUCOSE BLDC GLUCOMTR-MCNC: 148 MG/DL (ref 70–105)
GLUCOSE BLDC GLUCOMTR-MCNC: 154 MG/DL (ref 70–105)
GLUCOSE BLDC GLUCOMTR-MCNC: 158 MG/DL (ref 74–100)
GLUCOSE BLDC GLUCOMTR-MCNC: 160 MG/DL (ref 70–105)
GLUCOSE BLDC GLUCOMTR-MCNC: 179 MG/DL (ref 70–105)
GLUCOSE SERPL-MCNC: 159 MG/DL (ref 65–99)
HCO3 BLDA-SCNC: 21.3 MMOL/L (ref 22–26)
HCO3 BLDA-SCNC: 23.1 MMOL/L (ref 22–26)
HCO3 BLDA-SCNC: 24.5 MMOL/L (ref 21–28)
HCT VFR BLD AUTO: 38.8 % (ref 34–46.6)
HCT VFR BLDA CALC: 34 % (ref 38–51)
HCT VFR BLDA CALC: 35 % (ref 38–51)
HEMODILUTION: NO
HGB BLD-MCNC: 12.6 G/DL (ref 12–15.9)
HGB BLDA-MCNC: 11.6 G/DL (ref 12–17)
HGB BLDA-MCNC: 11.9 G/DL (ref 12–17)
IMM GRANULOCYTES # BLD AUTO: 0.04 10*3/MM3 (ref 0–0.05)
IMM GRANULOCYTES NFR BLD AUTO: 0.5 % (ref 0–0.5)
INHALED O2 CONCENTRATION: 52 %
INR PPP: 1.24 (ref 0.9–1.1)
LYMPHOCYTES # BLD AUTO: 0.99 10*3/MM3 (ref 0.7–3.1)
LYMPHOCYTES NFR BLD AUTO: 11.2 % (ref 19.6–45.3)
MCH RBC QN AUTO: 30 PG (ref 26.6–33)
MCHC RBC AUTO-ENTMCNC: 32.5 G/DL (ref 31.5–35.7)
MCV RBC AUTO: 92.4 FL (ref 79–97)
MODALITY: ABNORMAL
MONOCYTES # BLD AUTO: 0.31 10*3/MM3 (ref 0.1–0.9)
MONOCYTES NFR BLD AUTO: 3.5 % (ref 5–12)
NEUTROPHILS NFR BLD AUTO: 7.46 10*3/MM3 (ref 1.7–7)
NEUTROPHILS NFR BLD AUTO: 84.4 % (ref 42.7–76)
NRBC BLD AUTO-RTO: 0 /100 WBC (ref 0–0.2)
PCO2 BLDA: 37.4 MM HG (ref 35–45)
PCO2 BLDA: 39.9 MM HG (ref 35–45)
PCO2 BLDA: 53.9 MM HG (ref 35–48)
PH BLDA: 7.26 PH UNITS (ref 7.35–7.45)
PH BLDA: 7.34 PH UNITS (ref 7.35–7.45)
PH BLDA: 7.4 PH UNITS (ref 7.35–7.45)
PHOSPHATE SERPL-MCNC: 3.5 MG/DL (ref 2.5–4.5)
PLATELET # BLD AUTO: 230 10*3/MM3 (ref 140–450)
PMV BLD AUTO: 9.8 FL (ref 6–12)
PO2 BLD: 271 MM[HG] (ref 0–500)
PO2 BLDA: 141.1 MM HG (ref 83–108)
PO2 BLDA: 435 MM HG (ref 80–105)
PO2 BLDA: 521 MM HG (ref 80–105)
POTASSIUM BLDA-SCNC: 4 MMOL/L (ref 3.5–4.9)
POTASSIUM BLDA-SCNC: 4 MMOL/L (ref 3.5–4.9)
POTASSIUM SERPL-SCNC: 4.3 MMOL/L (ref 3.5–5.2)
PROTHROMBIN TIME: 15.5 SECONDS (ref 11.7–14.2)
QT INTERVAL: 510 MS
QTC INTERVAL: 503 MS
RBC # BLD AUTO: 4.2 10*6/MM3 (ref 3.77–5.28)
SAO2 % BLDCOA: 100 % (ref 95–98)
SAO2 % BLDCOA: 100 % (ref 95–98)
SAO2 % BLDCOA: 98.7 % (ref 94–98)
SODIUM BLD-SCNC: 137 MMOL/L (ref 138–146)
SODIUM BLD-SCNC: 138 MMOL/L (ref 138–146)
SODIUM SERPL-SCNC: 140 MMOL/L (ref 136–145)
UNIT  ABO: NORMAL
UNIT  ABO: NORMAL
UNIT  RH: NORMAL
UNIT  RH: NORMAL
WBC NRBC COR # BLD AUTO: 8.84 10*3/MM3 (ref 3.4–10.8)

## 2025-05-22 PROCEDURE — 25010000002 SUCCINYLCHOLINE PER 20 MG: Performed by: ANESTHESIOLOGY

## 2025-05-22 PROCEDURE — 5A2204Z RESTORATION OF CARDIAC RHYTHM, SINGLE: ICD-10-PCS

## 2025-05-22 PROCEDURE — 25010000002 PROTAMINE SULFATE PER 10 MG: Performed by: ANESTHESIOLOGY

## 2025-05-22 PROCEDURE — 82948 REAGENT STRIP/BLOOD GLUCOSE: CPT

## 2025-05-22 PROCEDURE — 85730 THROMBOPLASTIN TIME PARTIAL: CPT | Performed by: NURSE PRACTITIONER

## 2025-05-22 PROCEDURE — 63710000001 METHYLPREDNISOLONE PER 4 MG: Performed by: NURSE PRACTITIONER

## 2025-05-22 PROCEDURE — 25010000002 LIDOCAINE 1 % SOLUTION

## 2025-05-22 PROCEDURE — 82803 BLOOD GASES ANY COMBINATION: CPT | Performed by: NURSE PRACTITIONER

## 2025-05-22 PROCEDURE — 25810000003 SODIUM CHLORIDE 0.9 % SOLUTION: Performed by: ANESTHESIOLOGY

## 2025-05-22 PROCEDURE — 25010000002 AMIODARONE IN DEXTROSE 5% 360-4.14 MG/200ML-% SOLUTION: Performed by: THORACIC SURGERY (CARDIOTHORACIC VASCULAR SURGERY)

## 2025-05-22 PROCEDURE — 25010000002 CEFAZOLIN PER 500 MG: Performed by: NURSE PRACTITIONER

## 2025-05-22 PROCEDURE — 76000 FLUOROSCOPY <1 HR PHYS/QHP: CPT

## 2025-05-22 PROCEDURE — 33266 ABLATE ATRIA X10SV ENDO: CPT | Performed by: PHYSICIAN ASSISTANT

## 2025-05-22 PROCEDURE — C1729 CATH, DRAINAGE: HCPCS

## 2025-05-22 PROCEDURE — 99232 SBSQ HOSP IP/OBS MODERATE 35: CPT | Performed by: INTERNAL MEDICINE

## 2025-05-22 PROCEDURE — 71045 X-RAY EXAM CHEST 1 VIEW: CPT

## 2025-05-22 PROCEDURE — 82803 BLOOD GASES ANY COMBINATION: CPT

## 2025-05-22 PROCEDURE — 25010000002 PROPOFOL 10 MG/ML EMULSION: Performed by: ANESTHESIOLOGY

## 2025-05-22 PROCEDURE — 25010000002 GLYCOPYRROLATE 1 MG/5ML SOLUTION: Performed by: ANESTHESIOLOGY

## 2025-05-22 PROCEDURE — C1751 CATH, INF, PER/CENT/MIDLINE: HCPCS | Performed by: ANESTHESIOLOGY

## 2025-05-22 PROCEDURE — 25010000002 ACETAMINOPHEN 10 MG/ML SOLUTION: Performed by: ANESTHESIOLOGY

## 2025-05-22 PROCEDURE — P9041 ALBUMIN (HUMAN),5%, 50ML: HCPCS | Performed by: NURSE PRACTITIONER

## 2025-05-22 PROCEDURE — 25010000002 MIDAZOLAM PER 1 MG: Performed by: ANESTHESIOLOGY

## 2025-05-22 PROCEDURE — 82330 ASSAY OF CALCIUM: CPT | Performed by: NURSE PRACTITIONER

## 2025-05-22 PROCEDURE — B245ZZ4 ULTRASONOGRAPHY OF LEFT HEART, TRANSESOPHAGEAL: ICD-10-PCS

## 2025-05-22 PROCEDURE — 25010000002 SUGAMMADEX 200 MG/2ML SOLUTION: Performed by: ANESTHESIOLOGY

## 2025-05-22 PROCEDURE — 85025 COMPLETE CBC W/AUTO DIFF WBC: CPT | Performed by: NURSE PRACTITIONER

## 2025-05-22 PROCEDURE — 25010000002 HEPARIN (PORCINE) PER 1000 UNITS: Performed by: ANESTHESIOLOGY

## 2025-05-22 PROCEDURE — 85014 HEMATOCRIT: CPT

## 2025-05-22 PROCEDURE — 82330 ASSAY OF CALCIUM: CPT

## 2025-05-22 PROCEDURE — 82947 ASSAY GLUCOSE BLOOD QUANT: CPT

## 2025-05-22 PROCEDURE — 25010000002 FENTANYL CITRATE (PF) 250 MCG/5ML SOLUTION: Performed by: ANESTHESIOLOGY

## 2025-05-22 PROCEDURE — 02L74CK OCCLUSION OF LEFT ATRIAL APPENDAGE WITH EXTRALUMINAL DEVICE, PERCUTANEOUS ENDOSCOPIC APPROACH: ICD-10-PCS

## 2025-05-22 PROCEDURE — 25010000002 ALBUMIN HUMAN 5% PER 50 ML: Performed by: NURSE PRACTITIONER

## 2025-05-22 PROCEDURE — 93318 ECHO TRANSESOPHAGEAL INTRAOP: CPT | Performed by: ANESTHESIOLOGY

## 2025-05-22 PROCEDURE — 25010000002 CEFAZOLIN PER 500 MG: Performed by: ANESTHESIOLOGY

## 2025-05-22 PROCEDURE — 25010000002 METHYLPREDNISOLONE PER 125 MG: Performed by: ANESTHESIOLOGY

## 2025-05-22 PROCEDURE — 25010000002 MAGNESIUM SULFATE PER 500 MG OF MAGNESIUM: Performed by: ANESTHESIOLOGY

## 2025-05-22 PROCEDURE — 84295 ASSAY OF SERUM SODIUM: CPT

## 2025-05-22 PROCEDURE — 25810000003 SODIUM CHLORIDE 0.9 % SOLUTION 250 ML FLEX CONT: Performed by: NURSE PRACTITIONER

## 2025-05-22 PROCEDURE — C1889 IMPLANT/INSERT DEVICE, NOC: HCPCS

## 2025-05-22 PROCEDURE — 85610 PROTHROMBIN TIME: CPT | Performed by: NURSE PRACTITIONER

## 2025-05-22 PROCEDURE — 25010000002 NICARDIPINE 2.5 MG/ML SOLUTION 10 ML VIAL: Performed by: NURSE PRACTITIONER

## 2025-05-22 PROCEDURE — 25010000002 MORPHINE PER 10 MG: Performed by: NURSE PRACTITIONER

## 2025-05-22 PROCEDURE — 25010000002 ONDANSETRON PER 1 MG: Performed by: ANESTHESIOLOGY

## 2025-05-22 PROCEDURE — 85347 COAGULATION TIME ACTIVATED: CPT

## 2025-05-22 PROCEDURE — 02584ZZ DESTRUCTION OF CONDUCTION MECHANISM, PERCUTANEOUS ENDOSCOPIC APPROACH: ICD-10-PCS

## 2025-05-22 PROCEDURE — 80069 RENAL FUNCTION PANEL: CPT | Performed by: NURSE PRACTITIONER

## 2025-05-22 PROCEDURE — 33266 ABLATE ATRIA X10SV ENDO: CPT

## 2025-05-22 PROCEDURE — 93005 ELECTROCARDIOGRAM TRACING: CPT | Performed by: NURSE PRACTITIONER

## 2025-05-22 PROCEDURE — 84132 ASSAY OF SERUM POTASSIUM: CPT

## 2025-05-22 PROCEDURE — 25010000002 ACETAMINOPHEN 10 MG/ML SOLUTION: Performed by: NURSE PRACTITIONER

## 2025-05-22 PROCEDURE — 25010000002 AMIODARONE IN DEXTROSE 5% 150-4.21 MG/100ML-% SOLUTION: Performed by: THORACIC SURGERY (CARDIOTHORACIC VASCULAR SURGERY)

## 2025-05-22 DEVICE — DEV CONTRL TISS STRATAFIXSPIRALMNCRYL PLSPS2 REV3/0 15CM: Type: IMPLANTABLE DEVICE | Site: CHEST | Status: FUNCTIONAL

## 2025-05-22 DEVICE — IMPLANTABLE DEVICE: Type: IMPLANTABLE DEVICE | Site: HEART | Status: FUNCTIONAL

## 2025-05-22 RX ORDER — SODIUM CHLORIDE 9 MG/ML
40 INJECTION, SOLUTION INTRAVENOUS AS NEEDED
Status: DISCONTINUED | OUTPATIENT
Start: 2025-05-22 | End: 2025-05-22 | Stop reason: HOSPADM

## 2025-05-22 RX ORDER — NICOTINE POLACRILEX 4 MG
15 LOZENGE BUCCAL
Status: DISCONTINUED | OUTPATIENT
Start: 2025-05-22 | End: 2025-05-22 | Stop reason: HOSPADM

## 2025-05-22 RX ORDER — MORPHINE SULFATE 2 MG/ML
2 INJECTION, SOLUTION INTRAMUSCULAR; INTRAVENOUS
Status: DISPENSED | OUTPATIENT
Start: 2025-05-22 | End: 2025-05-25

## 2025-05-22 RX ORDER — SODIUM CHLORIDE 0.9 % (FLUSH) 0.9 %
30 SYRINGE (ML) INJECTION ONCE AS NEEDED
Status: DISCONTINUED | OUTPATIENT
Start: 2025-05-22 | End: 2025-05-22 | Stop reason: HOSPADM

## 2025-05-22 RX ORDER — IBUPROFEN 600 MG/1
1 TABLET ORAL
Status: DISCONTINUED | OUTPATIENT
Start: 2025-05-22 | End: 2025-05-22 | Stop reason: HOSPADM

## 2025-05-22 RX ORDER — ACETAMINOPHEN 325 MG/1
650 TABLET ORAL EVERY 4 HOURS PRN
Status: DISCONTINUED | OUTPATIENT
Start: 2025-05-23 | End: 2025-05-27 | Stop reason: HOSPADM

## 2025-05-22 RX ORDER — NOREPINEPHRINE BITARTRATE 0.03 MG/ML
.02-.06 INJECTION, SOLUTION INTRAVENOUS CONTINUOUS PRN
Status: DISCONTINUED | OUTPATIENT
Start: 2025-05-22 | End: 2025-05-23

## 2025-05-22 RX ORDER — SUCCINYLCHOLINE CHLORIDE 20 MG/ML
INJECTION INTRAMUSCULAR; INTRAVENOUS AS NEEDED
Status: DISCONTINUED | OUTPATIENT
Start: 2025-05-22 | End: 2025-05-22

## 2025-05-22 RX ORDER — PROPOFOL 10 MG/ML
VIAL (ML) INTRAVENOUS AS NEEDED
Status: DISCONTINUED | OUTPATIENT
Start: 2025-05-22 | End: 2025-05-22 | Stop reason: SURG

## 2025-05-22 RX ORDER — KETAMINE HCL IN NACL, ISO-OSM 100MG/10ML
SYRINGE (ML) INJECTION AS NEEDED
Status: DISCONTINUED | OUTPATIENT
Start: 2025-05-22 | End: 2025-05-22 | Stop reason: SURG

## 2025-05-22 RX ORDER — ACETAMINOPHEN 160 MG/5ML
650 SOLUTION ORAL EVERY 4 HOURS PRN
Status: DISCONTINUED | OUTPATIENT
Start: 2025-05-23 | End: 2025-05-27 | Stop reason: HOSPADM

## 2025-05-22 RX ORDER — NICOTINE POLACRILEX 4 MG
15 LOZENGE BUCCAL
Status: DISCONTINUED | OUTPATIENT
Start: 2025-05-22 | End: 2025-05-23

## 2025-05-22 RX ORDER — METHYLPREDNISOLONE 4 MG/1
4 TABLET ORAL
Status: COMPLETED | OUTPATIENT
Start: 2025-05-25 | End: 2025-05-25

## 2025-05-22 RX ORDER — NITROGLYCERIN 0.4 MG/1
0.4 TABLET SUBLINGUAL
Status: DISCONTINUED | OUTPATIENT
Start: 2025-05-22 | End: 2025-05-27 | Stop reason: HOSPADM

## 2025-05-22 RX ORDER — SPIRONOLACTONE 25 MG/1
50 TABLET ORAL DAILY
Status: DISCONTINUED | OUTPATIENT
Start: 2025-05-22 | End: 2025-05-23

## 2025-05-22 RX ORDER — DOCUSATE SODIUM 100 MG/1
100 CAPSULE, LIQUID FILLED ORAL 2 TIMES DAILY PRN
Status: DISCONTINUED | OUTPATIENT
Start: 2025-05-22 | End: 2025-05-27 | Stop reason: HOSPADM

## 2025-05-22 RX ORDER — DEXTROSE MONOHYDRATE 25 G/50ML
10-50 INJECTION, SOLUTION INTRAVENOUS
Status: DISCONTINUED | OUTPATIENT
Start: 2025-05-22 | End: 2025-05-22 | Stop reason: HOSPADM

## 2025-05-22 RX ORDER — SODIUM CHLORIDE 0.9 % (FLUSH) 0.9 %
3-10 SYRINGE (ML) INJECTION AS NEEDED
Status: DISCONTINUED | OUTPATIENT
Start: 2025-05-22 | End: 2025-05-22 | Stop reason: HOSPADM

## 2025-05-22 RX ORDER — MAGNESIUM SULFATE HEPTAHYDRATE 500 MG/ML
INJECTION, SOLUTION INTRAMUSCULAR; INTRAVENOUS AS NEEDED
Status: DISCONTINUED | OUTPATIENT
Start: 2025-05-22 | End: 2025-05-22 | Stop reason: SURG

## 2025-05-22 RX ORDER — METHYLPREDNISOLONE 4 MG/1
4 TABLET ORAL
Status: COMPLETED | OUTPATIENT
Start: 2025-05-22 | End: 2025-05-22

## 2025-05-22 RX ORDER — NALOXONE HCL 0.4 MG/ML
0.4 VIAL (ML) INJECTION
Status: DISCONTINUED | OUTPATIENT
Start: 2025-05-22 | End: 2025-05-27 | Stop reason: HOSPADM

## 2025-05-22 RX ORDER — METHYLPREDNISOLONE 4 MG/1
4 TABLET ORAL
Status: COMPLETED | OUTPATIENT
Start: 2025-05-23 | End: 2025-05-23

## 2025-05-22 RX ORDER — XYLITOL/YERBA SANTA
2 AEROSOL, SPRAY WITH PUMP (ML) MUCOUS MEMBRANE AS NEEDED
Status: DISCONTINUED | OUTPATIENT
Start: 2025-05-22 | End: 2025-05-27 | Stop reason: HOSPADM

## 2025-05-22 RX ORDER — FUROSEMIDE 10 MG/ML
20 INJECTION INTRAMUSCULAR; INTRAVENOUS ONCE
Status: COMPLETED | OUTPATIENT
Start: 2025-05-23 | End: 2025-05-23

## 2025-05-22 RX ORDER — HYDROCODONE BITARTRATE AND ACETAMINOPHEN 5; 325 MG/1; MG/1
1 TABLET ORAL EVERY 4 HOURS PRN
Status: DISCONTINUED | OUTPATIENT
Start: 2025-05-22 | End: 2025-05-23

## 2025-05-22 RX ORDER — METHYLPREDNISOLONE 4 MG/1
4 TABLET ORAL
Status: COMPLETED | OUTPATIENT
Start: 2025-05-26 | End: 2025-05-26

## 2025-05-22 RX ORDER — BISACODYL 10 MG
10 SUPPOSITORY, RECTAL RECTAL DAILY PRN
Status: DISCONTINUED | OUTPATIENT
Start: 2025-05-23 | End: 2025-05-27 | Stop reason: HOSPADM

## 2025-05-22 RX ORDER — MAGNESIUM HYDROXIDE 1200 MG/15ML
LIQUID ORAL AS NEEDED
Status: DISCONTINUED | OUTPATIENT
Start: 2025-05-22 | End: 2025-05-22 | Stop reason: HOSPADM

## 2025-05-22 RX ORDER — CYCLOBENZAPRINE HCL 10 MG
5 TABLET ORAL EVERY 8 HOURS PRN
Status: DISCONTINUED | OUTPATIENT
Start: 2025-05-23 | End: 2025-05-23

## 2025-05-22 RX ORDER — PANTOPRAZOLE SODIUM 40 MG/10ML
40 INJECTION, POWDER, LYOPHILIZED, FOR SOLUTION INTRAVENOUS ONCE
Status: COMPLETED | OUTPATIENT
Start: 2025-05-22 | End: 2025-05-22

## 2025-05-22 RX ORDER — ACETAMINOPHEN 10 MG/ML
INJECTION, SOLUTION INTRAVENOUS AS NEEDED
Status: DISCONTINUED | OUTPATIENT
Start: 2025-05-22 | End: 2025-05-22 | Stop reason: SURG

## 2025-05-22 RX ORDER — SODIUM CHLORIDE 0.9 % (FLUSH) 0.9 %
3 SYRINGE (ML) INJECTION EVERY 12 HOURS SCHEDULED
Status: DISCONTINUED | OUTPATIENT
Start: 2025-05-22 | End: 2025-05-22 | Stop reason: HOSPADM

## 2025-05-22 RX ORDER — CEFAZOLIN 2 G/1
INJECTION, POWDER, FOR SOLUTION INTRAMUSCULAR; INTRAVENOUS AS NEEDED
Status: DISCONTINUED | OUTPATIENT
Start: 2025-05-22 | End: 2025-05-22 | Stop reason: SURG

## 2025-05-22 RX ORDER — METHYLPREDNISOLONE 4 MG/1
8 TABLET ORAL
Status: COMPLETED | OUTPATIENT
Start: 2025-05-22 | End: 2025-05-22

## 2025-05-22 RX ORDER — AMOXICILLIN 250 MG
2 CAPSULE ORAL 2 TIMES DAILY
Status: DISCONTINUED | OUTPATIENT
Start: 2025-05-22 | End: 2025-05-27 | Stop reason: HOSPADM

## 2025-05-22 RX ORDER — PANTOPRAZOLE SODIUM 40 MG/1
40 TABLET, DELAYED RELEASE ORAL EVERY MORNING
Status: DISCONTINUED | OUTPATIENT
Start: 2025-05-23 | End: 2025-05-27 | Stop reason: HOSPADM

## 2025-05-22 RX ORDER — FENTANYL CITRATE 50 UG/ML
INJECTION, SOLUTION INTRAMUSCULAR; INTRAVENOUS AS NEEDED
Status: DISCONTINUED | OUTPATIENT
Start: 2025-05-22 | End: 2025-05-22 | Stop reason: SURG

## 2025-05-22 RX ORDER — ACETAMINOPHEN 325 MG/1
650 TABLET ORAL EVERY 4 HOURS PRN
Status: DISCONTINUED | OUTPATIENT
Start: 2025-05-22 | End: 2025-05-22 | Stop reason: HOSPADM

## 2025-05-22 RX ORDER — ONDANSETRON 2 MG/ML
INJECTION INTRAMUSCULAR; INTRAVENOUS AS NEEDED
Status: DISCONTINUED | OUTPATIENT
Start: 2025-05-22 | End: 2025-05-22 | Stop reason: SURG

## 2025-05-22 RX ORDER — ACETAMINOPHEN 10 MG/ML
1000 INJECTION, SOLUTION INTRAVENOUS EVERY 8 HOURS
Status: COMPLETED | OUTPATIENT
Start: 2025-05-22 | End: 2025-05-23

## 2025-05-22 RX ORDER — SUCCINYLCHOLINE CHLORIDE 20 MG/ML
INJECTION INTRAMUSCULAR; INTRAVENOUS AS NEEDED
Status: DISCONTINUED | OUTPATIENT
Start: 2025-05-22 | End: 2025-05-22 | Stop reason: SURG

## 2025-05-22 RX ORDER — ENOXAPARIN SODIUM 100 MG/ML
40 INJECTION SUBCUTANEOUS DAILY
Status: DISCONTINUED | OUTPATIENT
Start: 2025-05-23 | End: 2025-05-26

## 2025-05-22 RX ORDER — ACETAMINOPHEN 650 MG/1
650 SUPPOSITORY RECTAL EVERY 4 HOURS PRN
Status: DISCONTINUED | OUTPATIENT
Start: 2025-05-23 | End: 2025-05-27 | Stop reason: HOSPADM

## 2025-05-22 RX ORDER — ALPRAZOLAM 0.25 MG
0.25 TABLET ORAL ONCE
Status: COMPLETED | OUTPATIENT
Start: 2025-05-22 | End: 2025-05-22

## 2025-05-22 RX ORDER — METHYLPREDNISOLONE 4 MG/1
4 TABLET ORAL
Status: COMPLETED | OUTPATIENT
Start: 2025-05-24 | End: 2025-05-24

## 2025-05-22 RX ORDER — ALBUMIN HUMAN 50 G/1000ML
12.5 SOLUTION INTRAVENOUS AS NEEDED
Status: COMPLETED | OUTPATIENT
Start: 2025-05-22 | End: 2025-05-23

## 2025-05-22 RX ORDER — POLYETHYLENE GLYCOL 3350 17 G/17G
17 POWDER, FOR SOLUTION ORAL 2 TIMES DAILY
Status: DISCONTINUED | OUTPATIENT
Start: 2025-05-23 | End: 2025-05-27 | Stop reason: HOSPADM

## 2025-05-22 RX ORDER — IBUPROFEN 600 MG/1
1 TABLET ORAL
Status: DISCONTINUED | OUTPATIENT
Start: 2025-05-22 | End: 2025-05-23

## 2025-05-22 RX ORDER — GLYCOPYRROLATE 0.2 MG/ML
INJECTION INTRAMUSCULAR; INTRAVENOUS AS NEEDED
Status: DISCONTINUED | OUTPATIENT
Start: 2025-05-22 | End: 2025-05-22 | Stop reason: SURG

## 2025-05-22 RX ORDER — LOSARTAN POTASSIUM 50 MG/1
100 TABLET ORAL
Status: DISCONTINUED | OUTPATIENT
Start: 2025-05-22 | End: 2025-05-23

## 2025-05-22 RX ORDER — FENTANYL CITRATE 50 UG/ML
INJECTION, SOLUTION INTRAMUSCULAR; INTRAVENOUS AS NEEDED
Status: DISCONTINUED | OUTPATIENT
Start: 2025-05-22 | End: 2025-05-22

## 2025-05-22 RX ORDER — ASPIRIN 81 MG/1
81 TABLET ORAL DAILY
Status: DISCONTINUED | OUTPATIENT
Start: 2025-05-23 | End: 2025-05-27 | Stop reason: HOSPADM

## 2025-05-22 RX ORDER — HEPARIN SODIUM 1000 [USP'U]/ML
INJECTION, SOLUTION INTRAVENOUS; SUBCUTANEOUS AS NEEDED
Status: DISCONTINUED | OUTPATIENT
Start: 2025-05-22 | End: 2025-05-22 | Stop reason: SURG

## 2025-05-22 RX ORDER — ONDANSETRON 2 MG/ML
4 INJECTION INTRAMUSCULAR; INTRAVENOUS EVERY 6 HOURS PRN
Status: DISCONTINUED | OUTPATIENT
Start: 2025-05-22 | End: 2025-05-27 | Stop reason: HOSPADM

## 2025-05-22 RX ORDER — PROTAMINE SULFATE 10 MG/ML
INJECTION, SOLUTION INTRAVENOUS AS NEEDED
Status: DISCONTINUED | OUTPATIENT
Start: 2025-05-22 | End: 2025-05-22 | Stop reason: SURG

## 2025-05-22 RX ORDER — VECURONIUM BROMIDE 1 MG/ML
INJECTION, POWDER, LYOPHILIZED, FOR SOLUTION INTRAVENOUS AS NEEDED
Status: DISCONTINUED | OUTPATIENT
Start: 2025-05-22 | End: 2025-05-22 | Stop reason: SURG

## 2025-05-22 RX ORDER — METHYLPREDNISOLONE 4 MG/1
4 TABLET ORAL
Status: COMPLETED | OUTPATIENT
Start: 2025-05-27 | End: 2025-05-27

## 2025-05-22 RX ORDER — LIDOCAINE HYDROCHLORIDE 10 MG/ML
INJECTION, SOLUTION INFILTRATION; PERINEURAL AS NEEDED
Status: DISCONTINUED | OUTPATIENT
Start: 2025-05-22 | End: 2025-05-22 | Stop reason: HOSPADM

## 2025-05-22 RX ORDER — BISACODYL 5 MG/1
10 TABLET, DELAYED RELEASE ORAL DAILY PRN
Status: DISCONTINUED | OUTPATIENT
Start: 2025-05-22 | End: 2025-05-27 | Stop reason: HOSPADM

## 2025-05-22 RX ORDER — LEVOTHYROXINE SODIUM 150 UG/1
150 TABLET ORAL
Status: DISCONTINUED | OUTPATIENT
Start: 2025-05-22 | End: 2025-05-27 | Stop reason: HOSPADM

## 2025-05-22 RX ORDER — NITROGLYCERIN 0.4 MG/1
0.4 TABLET SUBLINGUAL
Status: DISCONTINUED | OUTPATIENT
Start: 2025-05-22 | End: 2025-05-22 | Stop reason: HOSPADM

## 2025-05-22 RX ORDER — METHYLPREDNISOLONE 4 MG/1
8 TABLET ORAL
Status: COMPLETED | OUTPATIENT
Start: 2025-05-23 | End: 2025-05-23

## 2025-05-22 RX ORDER — SODIUM CHLORIDE 9 MG/ML
30 INJECTION, SOLUTION INTRAVENOUS CONTINUOUS
Status: DISCONTINUED | OUTPATIENT
Start: 2025-05-22 | End: 2025-05-23

## 2025-05-22 RX ORDER — KETOROLAC TROMETHAMINE 30 MG/ML
15 INJECTION, SOLUTION INTRAMUSCULAR; INTRAVENOUS ONCE
Status: COMPLETED | OUTPATIENT
Start: 2025-05-23 | End: 2025-05-23

## 2025-05-22 RX ORDER — MIDAZOLAM HYDROCHLORIDE 1 MG/ML
INJECTION, SOLUTION INTRAMUSCULAR; INTRAVENOUS AS NEEDED
Status: DISCONTINUED | OUTPATIENT
Start: 2025-05-22 | End: 2025-05-22 | Stop reason: SURG

## 2025-05-22 RX ORDER — METHYLPREDNISOLONE 4 MG/1
8 TABLET ORAL
Status: ACTIVE | OUTPATIENT
Start: 2025-05-22 | End: 2025-05-23

## 2025-05-22 RX ORDER — SODIUM CHLORIDE 9 MG/ML
30 INJECTION, SOLUTION INTRAVENOUS CONTINUOUS PRN
Status: DISCONTINUED | OUTPATIENT
Start: 2025-05-22 | End: 2025-05-23

## 2025-05-22 RX ORDER — ATORVASTATIN CALCIUM 10 MG/1
10 TABLET, FILM COATED ORAL NIGHTLY
Status: DISCONTINUED | OUTPATIENT
Start: 2025-05-22 | End: 2025-05-27 | Stop reason: HOSPADM

## 2025-05-22 RX ORDER — EPHEDRINE SULFATE 5 MG/ML
INJECTION INTRAVENOUS AS NEEDED
Status: DISCONTINUED | OUTPATIENT
Start: 2025-05-22 | End: 2025-05-22 | Stop reason: SURG

## 2025-05-22 RX ORDER — LATANOPROST 50 UG/ML
1 SOLUTION/ DROPS OPHTHALMIC NIGHTLY
Status: DISCONTINUED | OUTPATIENT
Start: 2025-05-22 | End: 2025-05-27 | Stop reason: HOSPADM

## 2025-05-22 RX ORDER — CHLORHEXIDINE GLUCONATE ORAL RINSE 1.2 MG/ML
15 SOLUTION DENTAL ONCE
Status: COMPLETED | OUTPATIENT
Start: 2025-05-22 | End: 2025-05-22

## 2025-05-22 RX ORDER — METHYLPREDNISOLONE SODIUM SUCCINATE 1 G/16ML
INJECTION, POWDER, LYOPHILIZED, FOR SOLUTION INTRAMUSCULAR; INTRAVENOUS AS NEEDED
Status: DISCONTINUED | OUTPATIENT
Start: 2025-05-22 | End: 2025-05-22 | Stop reason: SURG

## 2025-05-22 RX ORDER — CHLORHEXIDINE GLUCONATE 500 MG/1
CLOTH TOPICAL EVERY 12 HOURS PRN
Status: DISCONTINUED | OUTPATIENT
Start: 2025-05-22 | End: 2025-05-22 | Stop reason: HOSPADM

## 2025-05-22 RX ORDER — NITROGLYCERIN 20 MG/100ML
5-50 INJECTION INTRAVENOUS CONTINUOUS PRN
Status: DISCONTINUED | OUTPATIENT
Start: 2025-05-22 | End: 2025-05-23

## 2025-05-22 RX ORDER — DEXTROSE MONOHYDRATE 25 G/50ML
10-50 INJECTION, SOLUTION INTRAVENOUS
Status: DISCONTINUED | OUTPATIENT
Start: 2025-05-22 | End: 2025-05-23

## 2025-05-22 RX ORDER — DILTIAZEM HCL 60 MG
60 TABLET ORAL EVERY 8 HOURS SCHEDULED
Status: DISCONTINUED | OUTPATIENT
Start: 2025-05-22 | End: 2025-05-23

## 2025-05-22 RX ORDER — SODIUM CHLORIDE 9 MG/ML
INJECTION, SOLUTION INTRAVENOUS CONTINUOUS PRN
Status: DISCONTINUED | OUTPATIENT
Start: 2025-05-22 | End: 2025-05-22 | Stop reason: SURG

## 2025-05-22 RX ADMIN — GLYCOPYRROLATE 0.2 MCG: 0.2 INJECTION INTRAMUSCULAR; INTRAVENOUS at 08:40

## 2025-05-22 RX ADMIN — ONDANSETRON 4 MG: 2 INJECTION, SOLUTION INTRAMUSCULAR; INTRAVENOUS at 10:23

## 2025-05-22 RX ADMIN — Medication 25 MG: at 09:32

## 2025-05-22 RX ADMIN — VECURONIUM BROMIDE 7 MG: 10 INJECTION, POWDER, LYOPHILIZED, FOR SOLUTION INTRAVENOUS at 08:57

## 2025-05-22 RX ADMIN — ACETAMINOPHEN 1000 MG: 10 INJECTION INTRAVENOUS at 08:54

## 2025-05-22 RX ADMIN — PROPOFOL 130 MG: 10 INJECTION, EMULSION INTRAVENOUS at 07:03

## 2025-05-22 RX ADMIN — METHYLPREDNISOLONE 4 MG: 4 TABLET ORAL at 15:34

## 2025-05-22 RX ADMIN — Medication 25 MG: at 07:03

## 2025-05-22 RX ADMIN — MUPIROCIN 1 APPLICATION: 20 OINTMENT TOPICAL at 06:01

## 2025-05-22 RX ADMIN — ALBUMIN (HUMAN) 12.5 G: 12.5 INJECTION, SOLUTION INTRAVENOUS at 22:11

## 2025-05-22 RX ADMIN — AMIODARONE HYDROCHLORIDE 150 MG: 1.5 INJECTION, SOLUTION INTRAVENOUS at 22:49

## 2025-05-22 RX ADMIN — METHYLPREDNISOLONE 4 MG: 4 TABLET ORAL at 19:32

## 2025-05-22 RX ADMIN — SUCCINYLCHOLINE CHLORIDE 100 MG: 20 INJECTION, SOLUTION INTRAMUSCULAR; INTRAVENOUS at 07:03

## 2025-05-22 RX ADMIN — DILTIAZEM HYDROCHLORIDE 60 MG: 60 TABLET, FILM COATED ORAL at 21:09

## 2025-05-22 RX ADMIN — GLYCOPYRROLATE 0.2 MCG: 0.2 INJECTION INTRAMUSCULAR; INTRAVENOUS at 07:08

## 2025-05-22 RX ADMIN — VECURONIUM BROMIDE 4 MG: 10 INJECTION, POWDER, LYOPHILIZED, FOR SOLUTION INTRAVENOUS at 08:13

## 2025-05-22 RX ADMIN — SODIUM CHLORIDE: 9 INJECTION, SOLUTION INTRAVENOUS at 06:48

## 2025-05-22 RX ADMIN — CEFAZOLIN 2 G: 2 INJECTION, POWDER, FOR SOLUTION INTRAMUSCULAR; INTRAVENOUS at 16:08

## 2025-05-22 RX ADMIN — LEVOTHYROXINE SODIUM 150 MCG: 0.15 TABLET ORAL at 12:46

## 2025-05-22 RX ADMIN — HYDROCODONE BITARTRATE AND ACETAMINOPHEN 1 TABLET: 5; 325 TABLET ORAL at 12:46

## 2025-05-22 RX ADMIN — CYCLOBENZAPRINE HYDROCHLORIDE 5 MG: 10 TABLET, FILM COATED ORAL at 20:19

## 2025-05-22 RX ADMIN — CEFAZOLIN 2 G: 2 INJECTION, POWDER, FOR SOLUTION INTRAMUSCULAR; INTRAVENOUS at 07:35

## 2025-05-22 RX ADMIN — ACETAMINOPHEN 1000 MG: 10 INJECTION INTRAVENOUS at 16:59

## 2025-05-22 RX ADMIN — AMIODARONE HYDROCHLORIDE 1 MG/MIN: 1.8 INJECTION, SOLUTION INTRAVENOUS at 22:54

## 2025-05-22 RX ADMIN — MORPHINE SULFATE 2 MG: 2 INJECTION, SOLUTION INTRAMUSCULAR; INTRAVENOUS at 21:40

## 2025-05-22 RX ADMIN — VECURONIUM BROMIDE 4 MG: 10 INJECTION, POWDER, LYOPHILIZED, FOR SOLUTION INTRAVENOUS at 09:42

## 2025-05-22 RX ADMIN — ATORVASTATIN CALCIUM 10 MG: 10 TABLET ORAL at 20:02

## 2025-05-22 RX ADMIN — ALBUMIN (HUMAN) 12.5 G: 12.5 INJECTION, SOLUTION INTRAVENOUS at 12:23

## 2025-05-22 RX ADMIN — EPHEDRINE SULFATE 10 MG: 5 INJECTION INTRAVENOUS at 08:47

## 2025-05-22 RX ADMIN — PANTOPRAZOLE SODIUM 40 MG: 40 INJECTION, POWDER, FOR SOLUTION INTRAVENOUS at 16:59

## 2025-05-22 RX ADMIN — HYDROCODONE BITARTRATE AND ACETAMINOPHEN 1 TABLET: 5; 325 TABLET ORAL at 20:18

## 2025-05-22 RX ADMIN — VECURONIUM BROMIDE 4 MG: 10 INJECTION, POWDER, LYOPHILIZED, FOR SOLUTION INTRAVENOUS at 07:08

## 2025-05-22 RX ADMIN — SENNOSIDES AND DOCUSATE SODIUM 2 TABLET: 50; 8.6 TABLET ORAL at 20:02

## 2025-05-22 RX ADMIN — LATANOPROST 1 DROP: 50 SOLUTION/ DROPS OPHTHALMIC at 20:03

## 2025-05-22 RX ADMIN — METHYLPREDNISOLONE 8 MG: 4 TABLET ORAL at 21:09

## 2025-05-22 RX ADMIN — METHYLPREDNISOLONE SODIUM SUCCINATE 250 MG: 1 INJECTION, POWDER, FOR SOLUTION INTRAMUSCULAR; INTRAVENOUS at 08:26

## 2025-05-22 RX ADMIN — ALPRAZOLAM 0.25 MG: 0.25 TABLET ORAL at 06:01

## 2025-05-22 RX ADMIN — FENTANYL CITRATE 100 MCG: 50 INJECTION, SOLUTION INTRAMUSCULAR; INTRAVENOUS at 07:03

## 2025-05-22 RX ADMIN — CHLORHEXIDINE GLUCONATE 15 ML: 1.2 RINSE ORAL at 06:01

## 2025-05-22 RX ADMIN — VECURONIUM BROMIDE 4 MG: 10 INJECTION, POWDER, LYOPHILIZED, FOR SOLUTION INTRAVENOUS at 08:36

## 2025-05-22 RX ADMIN — VECURONIUM BROMIDE 4 MG: 10 INJECTION, POWDER, LYOPHILIZED, FOR SOLUTION INTRAVENOUS at 07:35

## 2025-05-22 RX ADMIN — SUGAMMADEX 400 MG: 100 INJECTION, SOLUTION INTRAVENOUS at 10:34

## 2025-05-22 RX ADMIN — VECURONIUM BROMIDE 3 MG: 10 INJECTION, POWDER, LYOPHILIZED, FOR SOLUTION INTRAVENOUS at 09:23

## 2025-05-22 RX ADMIN — PROTAMINE SULFATE 50 MG: 10 INJECTION, SOLUTION INTRAVENOUS at 10:42

## 2025-05-22 RX ADMIN — MUPIROCIN 1 APPLICATION: 20 OINTMENT TOPICAL at 20:04

## 2025-05-22 RX ADMIN — HEPARIN SODIUM 10000 UNITS: 1000 INJECTION INTRAVENOUS; SUBCUTANEOUS at 08:40

## 2025-05-22 RX ADMIN — MAGNESIUM SULFATE HEPTAHYDRATE 2 G: 500 INJECTION, SOLUTION INTRAMUSCULAR; INTRAVENOUS at 08:19

## 2025-05-22 RX ADMIN — MORPHINE SULFATE 2 MG: 2 INJECTION, SOLUTION INTRAMUSCULAR; INTRAVENOUS at 23:32

## 2025-05-22 RX ADMIN — FENTANYL CITRATE 150 MCG: 50 INJECTION, SOLUTION INTRAMUSCULAR; INTRAVENOUS at 08:19

## 2025-05-22 RX ADMIN — LOSARTAN POTASSIUM 100 MG: 50 TABLET, FILM COATED ORAL at 12:46

## 2025-05-22 RX ADMIN — MIDAZOLAM 5 MG: 1 INJECTION INTRAMUSCULAR; INTRAVENOUS at 06:57

## 2025-05-22 RX ADMIN — DEXMEDETOMIDINE 0.5 MCG/KG/HR: 200 INJECTION, SOLUTION INTRAVENOUS at 06:57

## 2025-05-22 RX ADMIN — SPIRONOLACTONE 50 MG: 25 TABLET ORAL at 12:46

## 2025-05-22 RX ADMIN — ALBUMIN (HUMAN) 12.5 G: 12.5 INJECTION, SOLUTION INTRAVENOUS at 20:03

## 2025-05-22 NOTE — ANESTHESIA PROCEDURE NOTES
Central Line      Patient reassessed immediately prior to procedure    Start time: 5/22/2025 7:42 AM  Stop Time:5/22/2025 7:49 AM  Staff  Anesthesiologist: Javon Sandra MD  Preanesthetic Checklist  Completed: patient identified, IV checked, site marked, risks and benefits discussed, surgical consent, monitors and equipment checked, pre-op evaluation and timeout performed  Central Line Prep  Sterile Tech:gloves, cap, gown, mask and sterile barriers  Prep: chloraprep  Patient monitoring: blood pressure monitoring, continuous pulse oximetry and EKG  Central Line Procedure  Laterality:right  Location:internal jugular  Catheter Type:Cordis  Catheter Size:9 Fr  Guidance:ultrasound guided, landmark technique and palpation technique  PROCEDURE NOTE/ULTRASOUND INTERPRETATION.  Using ultrasound guidance the potential vascular sites for insertion of the catheter were visualized to determine the patency of the vessel to be used for vascular access.  After selecting the appropriate site for insertion, the needle was visualized under ultrasound being inserted into the internal jugular vein, followed by ultrasound confirmation of wire and catheter placement. There were no abnormalities seen on ultrasound; an image was taken; and the patient tolerated the procedure with no complications. Images: still images not obtained  Assessment  Post procedure:biopatch applied, line sutured and occlusive dressing applied  Assessement:blood return through all ports, free fluid flow, chest x-ray ordered and Rhys Test  Complications:no  Patient Tolerance:patient tolerated the procedure well with no apparent complications  Additional Notes  Sterile prep, drape, gown and gloves.  Negative rhys negative carotid, no difficulties.  Tolerated well.

## 2025-05-22 NOTE — ANESTHESIA PROCEDURE NOTES
Intra-Op Anesthesia SARA    Procedure Performed: Intra-Op Anesthesia SARA       Start Time:        End Time:      Preanesthesia Checklist:  Patient identified, IV assessed, risks and benefits discussed, monitors and equipment assessed, procedure being performed at surgeon's request and anesthesia consent obtained.    General Procedure Information  Diagnostic Indications for Echo:  assessment of surgical repair and hemodynamic monitoring  Location performed:  OR  Intubated  Bite block placed  Heart visualized  Probe Insertion:  Easy  Probe Type:  Biplane and multiplane  Modalities:  Color flow mapping, pulse wave Doppler and continuous wave Doppler    Echocardiographic and Doppler Measurements    Ventricles    Right Ventricle:  Cavity size normal.  Hypertrophy not present.  Thrombus not present.  Global function normal.  Ejection Fraction 60%.    Left Ventricle:  Cavity size normal.  Thrombus not present.  Global Function normal.  Ejection Fraction 60%.      Ventricular Regional Function:  1- Basal Anteroseptal:  normal  2- Basal Anterior:  normal  3- Basal Anterolateral:  normal  4- Basal Inferolateral:  normal  5- Basal Inferior:  normal  6- Basal Inferoseptal:  normal  7- Mid Anteroseptal:  normal  8- Mid Anterior:  normal  9- Mid Anterolateral:  normal  10- Mid Inferolateral:  normal  11- Mid Inferior:  normal  12- Mid Inferoseptal:  normal  13- Apical Anterior:  normal  14- Apical Lateral:  normal  15- Apical Inferior:  normal  16- Apical Septal:  normal  17- [place]:  normal      Valves    Aortic Valve:  Annulus normal.  Stenosis not present.  Regurgitation absent.  Leaflets normal.  Leaflet motions normal.      Mitral Valve:  Annulus normal.  Stenosis not present.  Regurgitation absent.  Leaflets normal.  Leaflet motions normal.      Tricuspid Valve:  Annulus normal.  Stenosis not present.  Regurgitation absent.  Leaflets normal.  Leaflet motions normal.    Pulmonic Valve:  Annulus normal.  Stenosis not present.   Regurgitation absent.        Aorta    Ascending Aorta:  Size normal.  Dissection not present.  Plaque thickness less than 3 mm.  Mobile plaque not present.    Aortic Arch:  Size normal.  Dissection not present.  Plaque thickness less than 3 mm.  Mobile plaque not present.    Descending Aorta:  Size normal.  Dissection not present.  Plaque thickness less than 3 mm.  Mobile plaque not present.        Atria    Right Atrium:  Size normal.    Left Atrium:  Size normal.  Spontaneous echo contrast not present.  Thrombus not present.  Tumor not present.  Device not present.    Left atrial appendage normal.      Septa        Ventricular Septum:  Intra-ventricular septum morphology normal.          Other Findings  Pleural Effusion:  none  Pulmonary Arteries:  normal      Anesthesia Information  Performed Personally  Anesthesiologist:  Javon Sandra MD      Echocardiogram Comments:       SARA PLACED EASILY NO RESISTANCE , LUBRICATED , BITE GUARD       LV NO WMA NO CLOT SEEN EF 60  RV NL SF   MV WNL   TV WNL   AV WNL   TAHIR FREE OF CLOT , CX VISIBLE AT BASE

## 2025-05-22 NOTE — ANESTHESIA PROCEDURE NOTES
Airway  Reason: elective    Date/Time: 5/22/2025 7:08 AM  Airway not difficult    General Information and Staff    Patient location during procedure: OR  Anesthesiologist: Romaine Mcclure MD    Indications and Patient Condition  Indications for airway management: airway protection    Preoxygenated: yes  MILS not maintained throughout    Mask difficulty assessment: 1 - vent by mask    Final Airway Details    Final airway type: endotracheal airway      Successful airway: EBT - double lumen left (L-sided double lumen cuffed endobronchial tube)  Cuffed: yes   Successful intubation technique: direct laryngoscopy  Adjuncts used in placement: intubating stylet  Endotracheal tube insertion site: oral  Blade: Arvirago  EBT DL size (fr): 37  Cormack-Lehane Classification: grade I - full view of glottis  Placement verified by: chest auscultation, bronchoscopy, capnometry, palpation of cuff and single lung ventilation   Measured from: lips  Number of attempts at approach: 1  Assessment: lips, teeth, and gum same as pre-op and atraumatic intubation    Additional Comments  ASA monitors applied; preoxygenated with 100% FiO2 via anesthesia face mask; induction of general anesthesia; bag-mask ventilation; patient's position optimized; laryngoscopy; left-sided double lumen cuffed EBT lubricated and correctly placed into the airway with a stylet; stylet removed; both cuffs inflated to seal with minimally occlusive airway cuff pressure; EBT connected to anesthesia circuit; atraumatic/dentition in preoperative condition; EBT well secured in place; correct placement with tracheal portion in the trachea and the bronchial portion in the left mainstem bronchus confirmed by bilateral chest rise, tube condensation, return of EtCO2 > 30 mmHg x3, and bronchoscopy

## 2025-05-22 NOTE — PROGRESS NOTES
CC--- recurrent AF post convergent procedure    Sub  Patient had surgery today with posterior wall isolation and AtriCure clip placement with convergent procedure in sinus rhythm uncomfortable        Past Medical History:   Diagnosis Date    A-fib Since 2001 Dx in [place]    Abnormal ECG 2009    Allergic 2014    Seasonal    Allergic rhinitis     Hx Allergy Shots x 4 Yrs    Aortic valve calcification 12/04/2018    Noted on SARA    Arrhythmia     Arthritis     Lumbar Spine    Atrial flutter     Breast mass seen on mammogram 03/19/2014    L 4CM Mass--Benign & Followed    Bruxism, sleep-related     Cataract     Chronic anticoagulation     Eliquis     Chronic low back pain     Hx Back Sx in 2016    Colon polyp     Coronary artery disease 2009    AFIB    DJD (degenerative joint disease), lumbar     Dyslipidemia     w/Hypertriglyceridemia--Statin/Fish Oils    Family history of premature CAD     GERD (gastroesophageal reflux disease)     Glaucoma     Heart disease     History of bone density study 03/19/14-[facility]    T-Score of 1.4 Indicating Osteopenia    History of EKG 04/2018 & 08/2018 & 12/2018    First Degree AV Block/Multiple Atrial Premature Blocks Noted on All    Hormone replacement therapy (HRT)     Premarin-0.625MG    Hyperlipidemia     Controlled w/Meds    Hypertension     Controlled w/Losartan    Hypothyroidism     Synthroid    Insomnia     Takes OTC Sleep Aid PRN    Left atrial enlargement 12/04/2018    Severe Noted on Cardioversion Report/SARA    Left breast mass 03/19/2014    4CM Noted on Mammogram--Benign     Mild tricuspid valve regurgitation 12/04/2018    Noted on SARA    Mitral valve annular calcification 12/04/2018    Moderate Noted on SARA    Mitral valve regurgitation 12/04/2018    Moderate Noted on SARA    Obesity 12/04/2018    BMI-43.3     SHANAE on CPAP 11/27/2018    AHI 10.5/h, supine 30/h    Osteoarthritis of right hip 07/09/2019    Osteopenia 03/19/2014    Noted on Dexa Report    Restless sleeper     Tosses and  Turns All Night Per Pt    Sinus congestion     Tricuspid leaflet thickened 12/04/2018    Noted on SARA    Urinary tract infection      Past Surgical History:   Procedure Laterality Date    ABLATION OF DYSRHYTHMIC FOCUS  2018    APPENDECTOMY      BACK SURGERY  2016    Spinal Fusion    BLADDER REPAIR  2001 in [place]    CARDIAC CATHETERIZATION  2015    CARDIAC CATHETERIZATION Right 4/10/2025    Procedure: Left Heart Cath;  Surgeon: Richard Burgos MD;  Location: [facility] CATH INVASIVE LOCATION;  Service: Cardiovascular;  Laterality: Right;    CARDIAC ELECTROPHYSIOLOGY PROCEDURE Right 03/12/2025    Procedure: Ablation atrial fibrillation;  Surgeon: Dre Case MD;  Location: [facility] CATH INVASIVE LOCATION;  Service: Cardiovascular;  Laterality: Right;    CARDIOVERSION  08/7/18 & 4/17/18-[facility]    Dr. Villegas--For Chronic A-Fib    CARDIOVERSION  12/4/28-[facility]    Dr. Case--See Report    CATARACT EXTRACTION  2007    COLONOSCOPY  04/13/2017    HYSTERECTOMY      Partial    JOINT REPLACEMENT  2020 2021    KNEE ARTHROSCOPY  2009    KNEE SURGERY Right 2009    OTHER SURGICAL HISTORY  12/4/18[facility]    Fluoroscopy/Trans-Septal Access to L Atrium/Selective Venography of L Superior Pulm Vein/Add Lienier Radiofrequency Ablation Along the Coronary Sinus/Synchronized Cardioversion--Dr. Case    REPLACEMENT TOTAL KNEE Right 08/30/2021    Dr. Astorga    SPINAL FUSION  2016    SPINE SURGERY  2016    SARA  12/4/18-[facility]    Mild Concentric L Ventricular Hypertrophy Noted; EF 55-60%; L Atrial Moderate-Severely Dilated, Moderat Mitral Annular Calcification with Moderate MVR Noted; Mild TVR; Normal LV Function    TONSILLECTOMY      TOTAL HIP ARTHROPLASTY Right 07/06/2020    Dr. Astorga     Family History   Problem Relation Age of Onset    Heart failure Mother     Alcohol abuse Mother     Early death Mother     Miscarriages / Stillbirths Mother     No Known Problems Father      Social History     Tobacco Use    Smoking status: Never     Passive  "exposure: Never    Smokeless tobacco: Never   Vaping Use    Vaping status: Never Used   Substance Use Topics    Alcohol use: Yes     Alcohol/week: 4.0 standard drinks of alcohol     Types: 2 Glasses of wine, 2 Drinks containing 0.5 oz of alcohol per week     Comment: \"a couple a week\"    Drug use: Never     Medications Prior to Admission   Medication Sig Dispense Refill Last Dose/Taking    amoxicillin (AMOXIL) 500 MG capsule Take 1 capsule by mouth 2 (Two) Times a Day As Needed (teeth cleanings). For teeth cleaning (Patient not taking: Reported on 4/14/2025)       apixaban (Eliquis) 5 MG tablet tablet Take 1 tablet by mouth Every 12 (Twelve) Hours. 180 tablet 1     atorvastatin (LIPITOR) 10 MG tablet Take 1 tablet by mouth every night at bedtime. 90 tablet 1     Cholecalciferol 2000 units capsule Take 1 capsule by mouth Daily.       Chromium 200 MCG capsule Take 1,000 mcg by mouth Daily.       coenzyme Q10 100 MG capsule Take 1 capsule by mouth Every Night.       dilTIAZem (CARDIZEM) 60 MG tablet Take 1 tablet by mouth 3 times a day. 90 tablet 5     dofetilide (TIKOSYN) 500 MCG capsule Take 1 capsule by mouth 2 (Two) Times a Day. 180 capsule 1     EPINEPHrine (EPIPEN) 0.3 MG/0.3ML solution auto-injector injection   3     Estrogens Conjugated (Premarin) 0.625 MG/GM vaginal cream USE 1/2 GRAM PER VAGINA 2 TO 3 TIMES A WEEK 30 g 1     latanoprost (XALATAN) 0.005 % ophthalmic solution Inject 1 drop into the eye Every Night.       levothyroxine (SYNTHROID, LEVOTHROID) 150 MCG tablet TAKE ONE TABLET BY MOUTH DAILY 90 tablet 0     Magnesium 400 MG tablet Take 1 tablet by mouth Every Night.       Multiple Vitamins-Minerals (MULTIVITAMIN ADULT) tablet Take 1 tablet by mouth Daily.       mupirocin (BACTROBAN) 2 % ointment Apply to nares (inside each nostril) twice daily as directed for procedure. 22 g 0     Omega-3 1000 MG capsule Take 1 capsule by mouth Daily.       spironolactone (ALDACTONE) 50 MG tablet Take 1 tablet by " mouth Daily. 90 tablet 1     telmisartan (MICARDIS) 80 MG tablet TAKE 1 TABLET BY MOUTH DAILY. 90 tablet 1      Allergies:  Patient has no known allergies.      Physical Exam    General:      well developed, well nourished, in no acute distress.    Head:      normocephalic and atraumatic.    Eyes:      PERRL/EOM intact, conjunctivae and sclerae clear without nystagmus.    Neck:      no  thyromegaly, trachea central with normal respiratory effort  Lungs:      clear bilaterally to auscultation.    Heart:       regular rate and rhythm, S1, S2 without murmurs, rubs, or gallops  Skin:      intact without lesions or rashes.    Psych:      alert and cooperative; normal mood and affect; normal attention span and concentration.            CBC    Results from last 7 days   Lab Units 05/22/25  1153 05/22/25  1042 05/22/25  0818 05/20/25  0810   WBC 10*3/mm3 8.84  --   --  5.49   HEMOGLOBIN g/dL 12.6  --   --  13.4   HEMOGLOBIN, POC g/dL  --  11.9* 11.6*  --    PLATELETS 10*3/mm3 230  --   --  262     BMP   Results from last 7 days   Lab Units 05/22/25  1153 05/20/25  0810   SODIUM mmol/L 140 138   POTASSIUM mmol/L 4.3 4.2   CHLORIDE mmol/L 107 102   CO2 mmol/L 22.1 24.4   BUN mg/dL 15 16   CREATININE mg/dL 0.89 0.96   GLUCOSE mg/dL 159* 107*   PHOSPHORUS mg/dL 3.5  --      Radiology(recent) XR Chest 1 View  Result Date: 5/22/2025  Impression: 1. New left atrial appendage ligation changes. 2. Right IJ central line tip extends to the mid SVC level. 3. Left basilar opacity favored to represent small left pleural effusion and left basilar atelectasis. Electronically Signed: Carmen Salvador MD  5/22/2025 12:16 PM EDT  Workstation ID: [id number]         Assessment and plan    Recurrent atrial fibrillation failed endocardial ablation and Tikosyn therapy status post convergent procedure today and patient is Hemodynamically stable and treatment as per CT surgery  Recommend anticoagulation for 8 to 12 weeks  Anticoagulation to be resumed  as per CT surgery  Hypertension hyperlipidemia  Nonobstructive coronary artery disease      Electronically signed by Dre Case MD, 05/22/25, 2:51 PM EDT.

## 2025-05-22 NOTE — ANESTHESIA PROCEDURE NOTES
Arterial Line      Patient location during procedure: OR  Start time: 5/22/2025 7:37 AM  Stop Time:5/22/2025 7:41 AM       Line placed for hemodynamic monitoring, ABGs/Labs/ISTAT and MD/Surgeon request.  Performed By   Anesthesiologist: Javon Sandra MD   Preanesthetic Checklist  Completed: patient identified, IV checked, site marked, risks and benefits discussed, surgical consent, monitors and equipment checked, pre-op evaluation and timeout performed  Arterial Line Prep    Sterile Tech: cap, gloves, sterile barriers, gown and mask  Prep: ChloraPrep  Patient monitoring: EKG, continuous pulse oximetry and blood pressure monitoring  Arterial Line Procedure   Laterality:right  Location:  radial artery  Catheter size: 20 G   Guidance: ultrasound guided and palpation technique  Number of attempts: 1  Successful placement: yes Images: still images not obtained  Post Assessment   Dressing Type: wrist guard applied, secured with tape and occlusive dressing applied.   Complications no  Circ/Move/Sens Assessment: normal and unchanged.   Patient Tolerance: patient tolerated the procedure well with no apparent complications  Additional Notes  Sterile seldinger technique, tolerated well

## 2025-05-22 NOTE — OP NOTE
Operative Note    Date of Dictation: 05/22/2025    Date of Procedure: 05/22/2025    Referring Physician: Lenny Choi,*    Preoperative diagnosis: PERSISTENT ATRIAL FIBRILLATION     Postoperative diagnosis: Same    Procedure:   MINIMALLY INVASIVE EPICARDIAL ABLATION POSTERIOR WALL OF LEFT ATRIUM VIA SUB-XYPHOID APPROACH,    EXPLORATORY LEFT THORACOSCOPY WITH EPICARDIAL OCCLUSION OF LEFT ATRIAL APPENDAGE WITH PLACEMENT OF 50mm ATRICLIP   Electrical Cardioversion    Surgeon: Lenny Choi MD     Assistants: SALOME Banks ([facility]) and Tg SCHAFFER, was responsible for performing the following activities: Cardiac Surgery First assist, surgical wound closure and their skilled assistance was necessary for the success of this case.     Anesthesia: General endotracheal anesthesia and SARA    Findings/ Complications:  No complications,  atriclip applied without issue postoperative SARA confirmed occlusion of left atrial appendage. NSR after one cardioversion.    Estimated Blood Loss:  minimal        Description of the procedure:     Patient was taken to the OR and placed in a supine position.  He was induced with general anesthesia and a double lumen ETT was placed without difficulty.  Position was confirmed bronchoscopically.  Radial a-line and a central line was placed for hemodynamic monitoring.  The patient was then positioned, an esophageal temperature probe was placed and its position confirmed with fluoro.  The patient was then prepped and draped, a timeout was performed and appropriate SCIP antibiotics were given.  Preoperative SARA confirmed thrombus free TAHIR.    A standard subxiphoid incision was made and carried sharply through the subcu fat and the fascia was opened in the midline.  The pericardium was identified and incised.  The trocar was then inserted followed by the 5mm scope.  We then identified the left and right inferior pulmonary veins to get our landmarks.  The probe was then  inserted and beginning on the right, the posterior left atrial wall was sequentially ablated to the left veins.  Multiple rows of ablation were created, we then added additional lines were there were some small gaps along the most superior line.    There appeared to be complete continuity along the entire posterior wall.  The trocar was removed and the fascia, sq, and skin were closed in separate layers.    We then collapsed the left lung, and a 5 mm port was inserted about the 2nd interspace along the midclavicular line.  Under thorascopic guidance the remaining two ports were placed along the posterior axillary line.   We were able to open the pericardium and visualize the Left atrial appendage.  The base of the TAHIR was sized, and the atriclip was carefully applied.  Once we confirmed externally that we were at the base and confirmed with SARA guidance the device was fired. The patient was then cardioverted to NSR.   A 24 fr CT was place through one of the port sites and sutured to the skin.  Wounds were closed in multiple layers.  The patient was awakened and extubated and taken to the CVCU in stable condition.    Specimen removed:  none           Disposition: CVCU           Condition: Stable.

## 2025-05-22 NOTE — ANESTHESIA POSTPROCEDURE EVALUATION
Patient: Sheree Vance    Procedure Summary       Date: 05/22/25 Room / Location: [facility] CVOR 01 / [facility] CVOR    Anesthesia Start: 0648 Anesthesia Stop: 1104    Procedure: OPEN HEART CONVERGENT HYBRID ABLATION PROCEDURE WITH LEFT ATRIAL APPENDAGE CLIPPING using size 50 Atriclip. (Chest) Diagnosis:       Paroxysmal atrial fibrillation      (Paroxysmal atrial fibrillation [I48.0])    Surgeons: Lenny Choi MD Provider: Javon Sandra MD    Anesthesia Type: general, Munira, CVL ASA Status: 3            Anesthesia Type: general, Munira, CVL    Vitals  Vitals Value Taken Time   BP     Temp 96.26 °F (35.7 °C) 05/22/25 13:19   Pulse 62 05/22/25 13:19   Resp     SpO2 97 % 05/22/25 13:19   Vitals shown include unfiled device data.        Post Anesthesia Care and Evaluation    Patient location during evaluation: ICU  Patient participation: complete - patient participated  Level of consciousness: awake  Pain scale: See nurse's notes for pain score.  Pain management: adequate    Airway patency: patent  Anesthetic complications: No anesthetic complications  PONV Status: none  Cardiovascular status: acceptable  Respiratory status: acceptable and spontaneous ventilation  Hydration status: acceptable    Comments: Patient seen and examined postoperatively; vital signs stable; SpO2 greater than or equal to 90%; cardiopulmonary status stable; nausea/vomiting adequately controlled; pain adequately controlled; no apparent anesthesia complications; patient discharged from anesthesia care when discharge criteria were met

## 2025-05-23 ENCOUNTER — APPOINTMENT (OUTPATIENT)
Dept: GENERAL RADIOLOGY | Facility: HOSPITAL | Age: 80
End: 2025-05-23
Payer: MEDICARE

## 2025-05-23 LAB
ALBUMIN SERPL-MCNC: 4.3 G/DL (ref 3.5–5.2)
ANION GAP SERPL CALCULATED.3IONS-SCNC: 13.2 MMOL/L (ref 5–15)
BUN SERPL-MCNC: 18 MG/DL (ref 8–23)
BUN/CREAT SERPL: 17.1 (ref 7–25)
CA-I SERPL ISE-MCNC: 1.16 MMOL/L (ref 1.15–1.3)
CALCIUM SPEC-SCNC: 9.3 MG/DL (ref 8.6–10.5)
CHLORIDE SERPL-SCNC: 103 MMOL/L (ref 98–107)
CO2 SERPL-SCNC: 21.8 MMOL/L (ref 22–29)
CREAT SERPL-MCNC: 1.05 MG/DL (ref 0.57–1)
DEPRECATED RDW RBC AUTO: 46.9 FL (ref 37–54)
EGFRCR SERPLBLD CKD-EPI 2021: 54.5 ML/MIN/1.73
ERYTHROCYTE [DISTWIDTH] IN BLOOD BY AUTOMATED COUNT: 13.7 % (ref 12.3–15.4)
GLUCOSE BLDC GLUCOMTR-MCNC: 142 MG/DL (ref 70–105)
GLUCOSE BLDC GLUCOMTR-MCNC: 154 MG/DL (ref 70–105)
GLUCOSE BLDC GLUCOMTR-MCNC: 157 MG/DL (ref 70–105)
GLUCOSE BLDC GLUCOMTR-MCNC: 162 MG/DL (ref 70–105)
GLUCOSE BLDC GLUCOMTR-MCNC: 171 MG/DL (ref 70–105)
GLUCOSE BLDC GLUCOMTR-MCNC: 171 MG/DL (ref 70–105)
GLUCOSE BLDC GLUCOMTR-MCNC: 174 MG/DL (ref 70–105)
GLUCOSE BLDC GLUCOMTR-MCNC: 175 MG/DL (ref 70–105)
GLUCOSE SERPL-MCNC: 152 MG/DL (ref 65–99)
HCT VFR BLD AUTO: 34 % (ref 34–46.6)
HGB BLD-MCNC: 10.9 G/DL (ref 12–15.9)
INR PPP: 1.22 (ref 0.9–1.1)
LYMPHOCYTES # BLD MANUAL: 0.94 10*3/MM3 (ref 0.7–3.1)
LYMPHOCYTES NFR BLD MANUAL: 11 % (ref 5–12)
MAGNESIUM SERPL-MCNC: 2.4 MG/DL (ref 1.6–2.4)
MCH RBC QN AUTO: 29.7 PG (ref 26.6–33)
MCHC RBC AUTO-ENTMCNC: 32.1 G/DL (ref 31.5–35.7)
MCV RBC AUTO: 92.6 FL (ref 79–97)
MONOCYTES # BLD: 1.47 10*3/MM3 (ref 0.1–0.9)
NEUTROPHILS # BLD AUTO: 10.96 10*3/MM3 (ref 1.7–7)
NEUTROPHILS NFR BLD MANUAL: 67 % (ref 42.7–76)
NEUTS BAND NFR BLD MANUAL: 15 % (ref 0–5)
PHOSPHATE SERPL-MCNC: 3.9 MG/DL (ref 2.5–4.5)
PLATELET # BLD AUTO: 228 10*3/MM3 (ref 140–450)
PMV BLD AUTO: 10.1 FL (ref 6–12)
POTASSIUM SERPL-SCNC: 4.4 MMOL/L (ref 3.5–5.2)
PROTHROMBIN TIME: 15.4 SECONDS (ref 11.7–14.2)
RBC # BLD AUTO: 3.67 10*6/MM3 (ref 3.77–5.28)
RBC MORPH BLD: NORMAL
SCAN SLIDE: NORMAL
SMALL PLATELETS BLD QL SMEAR: ADEQUATE
SODIUM SERPL-SCNC: 138 MMOL/L (ref 136–145)
VARIANT LYMPHS NFR BLD MANUAL: 1 % (ref 0–5)
VARIANT LYMPHS NFR BLD MANUAL: 6 % (ref 19.6–45.3)
WBC MORPH BLD: NORMAL
WBC NRBC COR # BLD AUTO: 13.36 10*3/MM3 (ref 3.4–10.8)

## 2025-05-23 PROCEDURE — 85610 PROTHROMBIN TIME: CPT | Performed by: NURSE PRACTITIONER

## 2025-05-23 PROCEDURE — 97166 OT EVAL MOD COMPLEX 45 MIN: CPT

## 2025-05-23 PROCEDURE — 99232 SBSQ HOSP IP/OBS MODERATE 35: CPT | Performed by: NURSE PRACTITIONER

## 2025-05-23 PROCEDURE — 25010000002 ALBUMIN HUMAN 5% PER 50 ML: Performed by: NURSE PRACTITIONER

## 2025-05-23 PROCEDURE — 80069 RENAL FUNCTION PANEL: CPT | Performed by: NURSE PRACTITIONER

## 2025-05-23 PROCEDURE — 93005 ELECTROCARDIOGRAM TRACING: CPT | Performed by: THORACIC SURGERY (CARDIOTHORACIC VASCULAR SURGERY)

## 2025-05-23 PROCEDURE — 25010000002 ENOXAPARIN PER 10 MG: Performed by: NURSE PRACTITIONER

## 2025-05-23 PROCEDURE — 82330 ASSAY OF CALCIUM: CPT | Performed by: NURSE PRACTITIONER

## 2025-05-23 PROCEDURE — 63710000001 INSULIN LISPRO (HUMAN) PER 5 UNITS

## 2025-05-23 PROCEDURE — 85007 BL SMEAR W/DIFF WBC COUNT: CPT | Performed by: NURSE PRACTITIONER

## 2025-05-23 PROCEDURE — 82948 REAGENT STRIP/BLOOD GLUCOSE: CPT

## 2025-05-23 PROCEDURE — 83735 ASSAY OF MAGNESIUM: CPT | Performed by: NURSE PRACTITIONER

## 2025-05-23 PROCEDURE — 97530 THERAPEUTIC ACTIVITIES: CPT

## 2025-05-23 PROCEDURE — 25010000002 ACETAMINOPHEN 10 MG/ML SOLUTION: Performed by: NURSE PRACTITIONER

## 2025-05-23 PROCEDURE — 25010000002 CEFAZOLIN PER 500 MG: Performed by: NURSE PRACTITIONER

## 2025-05-23 PROCEDURE — 25010000002 KETOROLAC TROMETHAMINE PER 15 MG: Performed by: THORACIC SURGERY (CARDIOTHORACIC VASCULAR SURGERY)

## 2025-05-23 PROCEDURE — 71045 X-RAY EXAM CHEST 1 VIEW: CPT

## 2025-05-23 PROCEDURE — 25010000002 FUROSEMIDE PER 20 MG: Performed by: THORACIC SURGERY (CARDIOTHORACIC VASCULAR SURGERY)

## 2025-05-23 PROCEDURE — P9041 ALBUMIN (HUMAN),5%, 50ML: HCPCS | Performed by: NURSE PRACTITIONER

## 2025-05-23 PROCEDURE — 63710000001 METHYLPREDNISOLONE PER 4 MG: Performed by: NURSE PRACTITIONER

## 2025-05-23 PROCEDURE — 97162 PT EVAL MOD COMPLEX 30 MIN: CPT

## 2025-05-23 PROCEDURE — 85025 COMPLETE CBC W/AUTO DIFF WBC: CPT | Performed by: NURSE PRACTITIONER

## 2025-05-23 RX ORDER — HYDROCODONE BITARTRATE AND ACETAMINOPHEN 7.5; 325 MG/1; MG/1
1 TABLET ORAL EVERY 4 HOURS PRN
Refills: 0 | Status: DISCONTINUED | OUTPATIENT
Start: 2025-05-23 | End: 2025-05-27 | Stop reason: HOSPADM

## 2025-05-23 RX ORDER — DILTIAZEM HCL 60 MG
60 TABLET ORAL EVERY 12 HOURS SCHEDULED
Status: DISCONTINUED | OUTPATIENT
Start: 2025-05-23 | End: 2025-05-24

## 2025-05-23 RX ORDER — SPIRONOLACTONE 25 MG/1
25 TABLET ORAL DAILY
Status: DISCONTINUED | OUTPATIENT
Start: 2025-05-24 | End: 2025-05-27 | Stop reason: HOSPADM

## 2025-05-23 RX ORDER — INSULIN LISPRO 100 [IU]/ML
2-7 INJECTION, SOLUTION INTRAVENOUS; SUBCUTANEOUS
Status: DISCONTINUED | OUTPATIENT
Start: 2025-05-23 | End: 2025-05-27 | Stop reason: HOSPADM

## 2025-05-23 RX ORDER — CYCLOBENZAPRINE HCL 10 MG
10 TABLET ORAL EVERY 8 HOURS PRN
Status: DISCONTINUED | OUTPATIENT
Start: 2025-05-23 | End: 2025-05-27 | Stop reason: HOSPADM

## 2025-05-23 RX ORDER — DEXTROSE MONOHYDRATE 25 G/50ML
25 INJECTION, SOLUTION INTRAVENOUS
Status: DISCONTINUED | OUTPATIENT
Start: 2025-05-23 | End: 2025-05-27 | Stop reason: HOSPADM

## 2025-05-23 RX ORDER — LOSARTAN POTASSIUM 25 MG/1
25 TABLET ORAL
Status: DISCONTINUED | OUTPATIENT
Start: 2025-05-24 | End: 2025-05-27 | Stop reason: HOSPADM

## 2025-05-23 RX ORDER — IBUPROFEN 600 MG/1
1 TABLET ORAL
Status: DISCONTINUED | OUTPATIENT
Start: 2025-05-23 | End: 2025-05-27 | Stop reason: HOSPADM

## 2025-05-23 RX ORDER — NICOTINE POLACRILEX 4 MG
15 LOZENGE BUCCAL
Status: DISCONTINUED | OUTPATIENT
Start: 2025-05-23 | End: 2025-05-27 | Stop reason: HOSPADM

## 2025-05-23 RX ADMIN — SENNOSIDES AND DOCUSATE SODIUM 2 TABLET: 50; 8.6 TABLET ORAL at 08:24

## 2025-05-23 RX ADMIN — METHYLPREDNISOLONE 4 MG: 4 TABLET ORAL at 08:24

## 2025-05-23 RX ADMIN — CEFAZOLIN 2 G: 2 INJECTION, POWDER, FOR SOLUTION INTRAMUSCULAR; INTRAVENOUS at 17:38

## 2025-05-23 RX ADMIN — ATORVASTATIN CALCIUM 10 MG: 10 TABLET ORAL at 20:26

## 2025-05-23 RX ADMIN — HYDROCODONE BITARTRATE AND ACETAMINOPHEN 1 TABLET: 5; 325 TABLET ORAL at 00:04

## 2025-05-23 RX ADMIN — CEFAZOLIN 2 G: 2 INJECTION, POWDER, FOR SOLUTION INTRAMUSCULAR; INTRAVENOUS at 08:24

## 2025-05-23 RX ADMIN — MUPIROCIN 1 APPLICATION: 20 OINTMENT TOPICAL at 08:23

## 2025-05-23 RX ADMIN — METHYLPREDNISOLONE 4 MG: 4 TABLET ORAL at 17:38

## 2025-05-23 RX ADMIN — DILTIAZEM HYDROCHLORIDE 60 MG: 60 TABLET, FILM COATED ORAL at 20:26

## 2025-05-23 RX ADMIN — INSULIN LISPRO 2 UNITS: 100 INJECTION, SOLUTION INTRAVENOUS; SUBCUTANEOUS at 20:25

## 2025-05-23 RX ADMIN — METHYLPREDNISOLONE 4 MG: 4 TABLET ORAL at 13:02

## 2025-05-23 RX ADMIN — CEFAZOLIN 2 G: 2 INJECTION, POWDER, FOR SOLUTION INTRAMUSCULAR; INTRAVENOUS at 23:28

## 2025-05-23 RX ADMIN — ALBUMIN (HUMAN) 12.5 G: 12.5 INJECTION, SOLUTION INTRAVENOUS at 05:08

## 2025-05-23 RX ADMIN — KETOROLAC TROMETHAMINE 15 MG: 30 INJECTION, SOLUTION INTRAMUSCULAR at 00:03

## 2025-05-23 RX ADMIN — PANTOPRAZOLE SODIUM 40 MG: 40 TABLET, DELAYED RELEASE ORAL at 08:23

## 2025-05-23 RX ADMIN — POLYETHYLENE GLYCOL 3350 17 G: 17 POWDER, FOR SOLUTION ORAL at 08:24

## 2025-05-23 RX ADMIN — LEVOTHYROXINE SODIUM 150 MCG: 0.15 TABLET ORAL at 05:08

## 2025-05-23 RX ADMIN — HYDROCODONE BITARTRATE AND ACETAMINOPHEN 1 TABLET: 5; 325 TABLET ORAL at 04:05

## 2025-05-23 RX ADMIN — ENOXAPARIN SODIUM 40 MG: 100 INJECTION SUBCUTANEOUS at 17:38

## 2025-05-23 RX ADMIN — MUPIROCIN 1 APPLICATION: 20 OINTMENT TOPICAL at 20:26

## 2025-05-23 RX ADMIN — INSULIN LISPRO 2 UNITS: 100 INJECTION, SOLUTION INTRAVENOUS; SUBCUTANEOUS at 17:38

## 2025-05-23 RX ADMIN — ASPIRIN 81 MG: 81 TABLET, COATED ORAL at 08:23

## 2025-05-23 RX ADMIN — CEFAZOLIN 2 G: 2 INJECTION, POWDER, FOR SOLUTION INTRAMUSCULAR; INTRAVENOUS at 00:27

## 2025-05-23 RX ADMIN — ACETAMINOPHEN 1000 MG: 10 INJECTION INTRAVENOUS at 00:05

## 2025-05-23 RX ADMIN — METHYLPREDNISOLONE 8 MG: 4 TABLET ORAL at 20:26

## 2025-05-23 RX ADMIN — FUROSEMIDE 20 MG: 10 INJECTION, SOLUTION INTRAMUSCULAR; INTRAVENOUS at 00:04

## 2025-05-23 RX ADMIN — LATANOPROST 1 DROP: 50 SOLUTION/ DROPS OPHTHALMIC at 20:36

## 2025-05-23 RX ADMIN — SENNOSIDES AND DOCUSATE SODIUM 2 TABLET: 50; 8.6 TABLET ORAL at 20:30

## 2025-05-23 RX ADMIN — CYCLOBENZAPRINE HYDROCHLORIDE 10 MG: 10 TABLET, FILM COATED ORAL at 17:38

## 2025-05-23 NOTE — THERAPY EVALUATION
Patient Name: Sheree Vance  : 1945    MRN: [id number]                              Today's Date: 2025       Admit Date: 2025    Visit Dx:     ICD-10-CM ICD-9-CM   1. Paroxysmal atrial fibrillation  I48.0 427.31     Patient Active Problem List   Diagnosis    Paroxysmal atrial fibrillation    Essential hypertension    Hyperlipidemia    Left ventricular diastolic dysfunction    Degenerative joint disease of right knee    Gastroesophageal reflux disease    Hypothyroidism    Knee pain, right    Lumbosacral spondylosis without myelopathy    Osteopenia    Lumbar radiculopathy    Spinal stenosis of lumbar region    Primary osteoarthritis of right hip    Arthritis of right sacroiliac joint    Allergic rhinitis    Visit for screening mammogram    Postmenopausal    Vitamin D deficiency    Atrophic vaginitis    Medicare annual wellness visit, subsequent    SHANAE (obstructive sleep apnea)    Osteoarthritis, generalized    Needs flu shot    Class 2 obesity    Rash    Tick bite of left lower leg    Visit for monitoring Tikosyn therapy    Callus of toe    Need for vaccination    Tick bite of left shoulder    Prediabetes    Palpitations    Obstructive sleep apnea syndrome    PAF (paroxysmal atrial fibrillation)    Atherosclerosis of native coronary artery of native heart without angina pectoris     Past Medical History:   Diagnosis Date    A-fib Since  Dx in [place]    Abnormal ECG     Allergic     Seasonal    Allergic rhinitis     Hx Allergy Shots x 4 Yrs    Aortic valve calcification 2018    Noted on SARA    Arrhythmia     Arthritis     Lumbar Spine    Atrial flutter     Breast mass seen on mammogram 2014    L 4CM Mass--Benign & Followed    Bruxism, sleep-related     Cataract     Chronic anticoagulation     Eliquis     Chronic low back pain     Hx Back Sx in 2016    Colon polyp     Coronary artery disease     AFIB    DJD (degenerative joint disease), lumbar     Dyslipidemia      w/Hypertriglyceridemia--Statin/Fish Oils    Family history of premature CAD     GERD (gastroesophageal reflux disease)     Glaucoma     Heart disease     History of bone density study 03/19/14-[facility]    T-Score of 1.4 Indicating Osteopenia    History of EKG 04/2018 & 08/2018 & 12/2018    First Degree AV Block/Multiple Atrial Premature Blocks Noted on All    Hormone replacement therapy (HRT)     Premarin-0.625MG    Hyperlipidemia     Controlled w/Meds    Hypertension     Controlled w/Losartan    Hypothyroidism     Synthroid    Insomnia     Takes OTC Sleep Aid PRN    Left atrial enlargement 12/04/2018    Severe Noted on Cardioversion Report/SARA    Left breast mass 03/19/2014    4CM Noted on Mammogram--Benign     Mild tricuspid valve regurgitation 12/04/2018    Noted on SARA    Mitral valve annular calcification 12/04/2018    Moderate Noted on SARA    Mitral valve regurgitation 12/04/2018    Moderate Noted on SARA    Obesity 12/04/2018    BMI-43.3     SHANAE on CPAP 11/27/2018    AHI 10.5/h, supine 30/h    Osteoarthritis of right hip 07/09/2019    Osteopenia 03/19/2014    Noted on Dexa Report    Restless sleeper     Tosses and Turns All Night Per Pt    Sinus congestion     Tricuspid leaflet thickened 12/04/2018    Noted on SARA    Urinary tract infection      Past Surgical History:   Procedure Laterality Date    ABLATION OF DYSRHYTHMIC FOCUS  2018    APPENDECTOMY      BACK SURGERY  2016    Spinal Fusion    BLADDER REPAIR  2001 in [place]    CARDIAC CATHETERIZATION  2015    CARDIAC CATHETERIZATION Right 4/10/2025    Procedure: Left Heart Cath;  Surgeon: Richard Burgos MD;  Location: [facility] CATH INVASIVE LOCATION;  Service: Cardiovascular;  Laterality: Right;    CARDIAC ELECTROPHYSIOLOGY PROCEDURE Right 03/12/2025    Procedure: Ablation atrial fibrillation;  Surgeon: Dre Case MD;  Location: [facility] CATH INVASIVE LOCATION;  Service: Cardiovascular;  Laterality: Right;    CARDIOVERSION  08/7/18 & 4/17/18-[facility]      Bakari--For Chronic A-Fib    CARDIOVERSION  12/4/28-[facility]    Dr. Case--See Report    CATARACT EXTRACTION  2007    COLONOSCOPY  04/13/2017    HYSTERECTOMY      Partial    JOINT REPLACEMENT  2020 2021    KNEE ARTHROSCOPY  2009    KNEE SURGERY Right 2009    OTHER SURGICAL HISTORY  12/4/18-[facility]    Fluoroscopy/Trans-Septal Access to L Atrium/Selective Venography of L Superior Pulm Vein/Add Lienier Radiofrequency Ablation Along the Coronary Sinus/Synchronized Cardioversion--Dr. Case    REPLACEMENT TOTAL KNEE Right 08/30/2021    Dr. Astorga    SPINAL FUSION  2016    SPINE SURGERY  2016    SARA  12/4/18-[facility]    Mild Concentric L Ventricular Hypertrophy Noted; EF 55-60%; L Atrial Moderate-Severely Dilated, Moderat Mitral Annular Calcification with Moderate MVR Noted; Mild TVR; Normal LV Function    TONSILLECTOMY      TOTAL HIP ARTHROPLASTY Right 07/06/2020    Dr. Astorga      General Information       [facility] Name 05/23/25 0956          Physical Therapy Time and Intention    Document Type evaluation  -     Mode of Treatment physical therapy  -       Row Name 05/23/25 0956          General Information    Patient Profile Reviewed yes  -     Prior Level of Function independent:;all household mobility;community mobility;gait;ADL's  has walker, WC, and canes but doesn't use any. No mobility deficits prior to admission.  -     Existing Precautions/Restrictions cardiac;fall  -     Barriers to Rehab medically complex  -       Row Name 05/23/25 0956          Living Environment    Current Living Arrangements home  -     People in Home spouse  -       Row Name 05/23/25 0956          Home Main Entrance    Number of Stairs, Main Entrance two  -     Stair Railings, Main Entrance railings safe and in good condition  -       Row Name 05/23/25 0956          Stairs Within Home, Primary    Stairs, Within Home, Primary full flight to basement. but doesn't often go down there.  -       Row Name 05/23/25 0956          Cognition     Orientation Status (Cognition) oriented x 4  -[facility] Name 05/23/25 0956          Safety Issues/Impairments Affecting Functional Mobility    Impairments Affecting Function (Mobility) strength;pain;endurance/activity tolerance  -               User Key  (r) = Recorded By, (t) = Taken By, (c) = Cosigned By      Initials Name Provider Type     Shelley Thorne PT Physical Therapist                   Mobility       Row Name 05/23/25 1322          Bed Mobility    Bed Mobility bed mobility (all) activities  -     All Activities, [place] (Bed Mobility) not tested  -     Comment, (Bed Mobility) up in chair.  -[facility] Name 05/23/25 1322          Sit-Stand Transfer    Sit-Stand [place] (Transfers) minimum assist (75% patient effort);1 person to manage equipment;1 person assist  -     Assistive Device (Sit-Stand Transfers) walker, front-wheeled  -[facility] Name 05/23/25 1322          Gait/Stairs (Locomotion)    [place] Level (Gait) minimum assist (75% patient effort);1 person assist;1 person to manage equipment  -     Patient was able to Ambulate yes  -     Distance in Feet (Gait) 100  -     Deviations/Abnormal Patterns (Gait) gait speed decreased;ghassan decreased  -               User Key  (r) = Recorded By, (t) = Taken By, (c) = Cosigned By      Initials Name Provider Type     Shelley Thorne PT Physical Therapist                   Obj/Interventions       [facility] Name 05/23/25 1323          Range of Motion Comprehensive    General Range of Motion bilateral upper extremity ROM WFL  -AH       Row Name 05/23/25 1323          Strength Comprehensive (MMT)    Comment, General Manual Muscle Testing (MMT) Assessment BLE grossly 4/5.  -[facility] Name 05/23/25 1323          Balance    Balance Assessment sitting static balance;sitting dynamic balance;standing static balance;standing dynamic balance  -     Static Sitting Balance standby assist  -     Dynamic Sitting Balance standby  assist  -     Position, Sitting Balance sitting in chair;unsupported  -     Static Standing Balance contact guard  -     Dynamic Standing Balance minimal assist  -     Position/Device Used, Standing Balance walker, front-wheeled  -AH       Row Name 05/23/25 1323          Sensory Assessment (Somatosensory)    Sensory Assessment (Somatosensory) sensation intact  -               User Key  (r) = Recorded By, (t) = Taken By, (c) = Cosigned By      Initials Name Provider Type     Shelley Thorne, PT Physical Therapist                   Goals/Plan       Row Name 05/23/25 133          Bed Mobility Goal 1 (PT)    Activity/Assistive Device (Bed Mobility Goal 1, PT) bed mobility activities, all  -     [place] Level/Cues Needed (Bed Mobility Goal 1, PT) standby assist  -     Time Frame (Bed Mobility Goal 1, PT) long term goal (LTG);2 weeks  -AH       Row Name 05/23/25 4547          Transfer Goal 1 (PT)    Activity/Assistive Device (Transfer Goal 1, PT) transfers, all  -     [place] Level/Cues Needed (Transfer Goal 1, PT) modified independence  -AH     Time Frame (Transfer Goal 1, PT) long term goal (LTG);2 weeks  -AH       Row Name 05/23/25 8417          Gait Training Goal 1 (PT)    Activity/Assistive Device (Gait Training Goal 1, PT) gait (walking locomotion)  AD use as needed  -     [place] Level (Gait Training Goal 1, PT) modified independence  -     Distance (Gait Training Goal 1, PT) 250'  -     Time Frame (Gait Training Goal 1, PT) long term goal (LTG);2 weeks  -AH       Row Name 05/23/25 6450          Stairs Goal 1 (PT)    Activity/Assistive Device (Stairs Goal 1, PT) stairs, all skills  -     [place] Level/Cues Needed (Stairs Goal 1, PT) standby assist  -     Number of Stairs (Stairs Goal 1, PT) 2  -AH     Time Frame (Stairs Goal 1, PT) long term goal (LTG);2 weeks  -AH       Row Name 05/23/25 8864          Therapy Assessment/Plan (PT)    Planned Therapy Interventions (PT)  balance training;bed mobility training;gait training;home exercise program;patient/family education;strengthening;transfer training;stair training  -               User Key  (r) = Recorded By, (t) = Taken By, (c) = Cosigned By      Initials Name Provider Type     Shelley Thorne, PT Physical Therapist                   Clinical Impression       Row Name 05/23/25 132          Pain    Pretreatment Pain Rating 2/10  -     Posttreatment Pain Rating 2/10  -     Pain Location other (see comments)  chest/incision site/chest tube site  -     Pain Management Interventions positioning techniques utilized;breathing exercises utilized  -     Response to Pain Interventions activity participation with tolerable pain  -       Row Name 05/23/25 1325          Plan of Care Review    Plan of Care Reviewed With patient  -     Outcome Evaluation Patient is a 80 y.o. female with PMHx Afib/flutter on chronic AC (Eliquis), CAD, lumbar DJD, HTN, HLD, mild valvular heart disease, SHANAE with CPAP compliance, and hypothyroidism. Hx of successful Afib ablation in 2018 and unsuccessful ablation March 2025. Pt underwent scheduled minimally invasive epicardial ablation posterior wall of L atrium (subxyphoid approach), exploratory L thoracoscopy with placement of Atriclip, and electrical cardioversion on 5/22. Pt with post op pain and endurance deficits impacting functional mobility. She was previously independent with all moblity and ADLs, no AD use. Today she need Stephanie for sit to stand and 100' gait trial with RW. PT anticipates pt will progress quickly and be safe to DC home with spouse assist. No f/u therapy recommended, and no DME needs anticipates.  -       Row Name 05/23/25 1735          Therapy Assessment/Plan (PT)    Rehab Potential (PT) good  -     Criteria for Skilled Interventions Met (PT) yes;meets criteria  -     Therapy Frequency (PT) 3 times/wk  -     Predicted Duration of Therapy Intervention (PT) until DC or  goals met.  -       Row Name 05/23/25 1324          Vital Signs    Pre Systolic BP Rehab 103  -AH     Pre Treatment Diastolic BP 52  -AH     Intra Systolic BP Rehab 120  -AH     Intra Treatment Diastolic BP 60  -AH     Post Systolic BP Rehab 128  -AH     Post Treatment Diastolic BP 57  -AH     Pretreatment Heart Rate (beats/min) 55  -AH     Intratreatment Heart Rate (beats/min) 70  -AH     Posttreatment Heart Rate (beats/min) 62  -AH     Pre SpO2 (%) 90  -AH     O2 Delivery Pre Treatment room air  -AH     Intra SpO2 (%) 90  -AH     O2 Delivery Intra Treatment room air  -AH     Post SpO2 (%) 90  -AH     O2 Delivery Post Treatment room air  -AH     Pre Patient Position Sitting  -AH     Intra Patient Position Standing  -AH     Post Patient Position Sitting  -AH       Row Name 05/23/25 1324          Positioning and Restraints    Post Treatment Position chair  -     In Chair encouraged to call for assist;call light within reach;with family/caregiver  -               User Key  (r) = Recorded By, (t) = Taken By, (c) = Cosigned By      Initials Name Provider Type     Shelley Thorne, PT Physical Therapist                   Outcome Measures       Row Name 05/23/25 1340          How much help from another person do you currently need...    Turning from your back to your side while in flat bed without using bedrails? 4  -AH     Moving from lying on back to sitting on the side of a flat bed without bedrails? 4  -AH     Moving to and from a bed to a chair (including a wheelchair)? 3  -AH     Standing up from a chair using your arms (e.g., wheelchair, bedside chair)? 3  -AH     Climbing 3-5 steps with a railing? 3  -AH     To walk in hospital room? 3  -AH     AM-PAC 6 Clicks Score (PT) 20  -       Row Name 05/23/25 1306          Functional Assessment    Outcome Measure Options AM-PAC 6 Clicks Daily Activity (OT)  -KT               User Key  (r) = Recorded By, (t) = Taken By, (c) = Cosigned By      Initials Name Provider  Type     Shelley Thorne, PT Physical Therapist    Candi Raphael OT Occupational Therapist                                 Physical Therapy Education       Title: PT OT SLP Therapies (Done)       Topic: Physical Therapy (Done)       Point: Mobility training (Done)       Learning Progress Summary            Patient Acceptance, E, VU by  at 5/23/2025 1341                      Point: Home exercise program (Done)       Learning Progress Summary            Patient Acceptance, E, VU by  at 5/23/2025 1341                      Point: Body mechanics (Done)       Learning Progress Summary            Patient Acceptance, E, VU by  at 5/23/2025 1341                      Point: Precautions (Done)       Learning Progress Summary            Patient Acceptance, E, VU by  at 5/23/2025 1341                                      User Key       Initials Effective Dates Name Provider Type Discipline     12/04/23 -  Shelley Thorne PT Physical Therapist PT                  PT Recommendation and Plan  Planned Therapy Interventions (PT): balance training, bed mobility training, gait training, home exercise program, patient/family education, strengthening, transfer training, stair training  Outcome Evaluation: Patient is a 80 y.o. female with PMHx Afib/flutter on chronic AC (Eliquis), CAD, lumbar DJD, HTN, HLD, mild valvular heart disease, SHANAE with CPAP compliance, and hypothyroidism. Hx of successful Afib ablation in 2018 and unsuccessful ablation March 2025. Pt underwent scheduled minimally invasive epicardial ablation posterior wall of L atrium (subxyphoid approach), exploratory L thoracoscopy with placement of Atriclip, and electrical cardioversion on 5/22. Pt with post op pain and endurance deficits impacting functional mobility. She was previously independent with all moblity and ADLs, no AD use. Today she need Stephanie for sit to stand and 100' gait trial with RW. PT anticipates pt will progress quickly and be safe to  DC home with spouse assist. No f/u therapy recommended, and no DME needs anticipates.     Time Calculation:         PT Charges       Row Name 05/23/25 1343             Time Calculation    Start Time 0953  -      Stop Time 1020  -      Time Calculation (min) 27 min  -      PT Non-Billable Time (min) 0 min  -      PT Received On 05/23/25  -      PT - Next Appointment 05/25/25  -      PT Goal Re-Cert Due Date 06/06/25  -         Time Calculation- PT    Total Timed Code Minutes- PT 0 minute(s)  -                User Key  (r) = Recorded By, (t) = Taken By, (c) = Cosigned By      Initials Name Provider Type     Shelley Thorne, PT Physical Therapist                  Therapy Charges for Today       Code Description Service Date Service Provider Modifiers Qty    61122289248 HC PT EVAL MOD COMPLEXITY 4 5/23/2025 Shelley Thorne, PT GP 1            PT G-Codes  Outcome Measure Options: AM-PAC 6 Clicks Daily Activity (OT)  AM-PAC 6 Clicks Score (PT): 20  AM-PAC 6 Clicks Score (OT): 20  PT Discharge Summary  Anticipated Discharge Disposition (PT): home with assist    Shelley Thorne, PT  5/23/2025

## 2025-05-23 NOTE — PROGRESS NOTES
[facility] CARDIOLOGY  [facility]        LOS:  LOS: 1 day   Patient Name: Sheree Vance  Age/Sex: 80 y.o. female  : 1945  MRN: [id number]    Day of Service: 25   Length of Stay: 1  Encounter Provider: PETE Riuz  Place of Service: [facility] CARDIOLOGY  Patient Care Team:  Latia Guerrier MD as PCP - General  Latia Guerrier MD as PCP - Family Medicine  Dre Case MD (Cardiology)  Jet Godwin MD as Consulting Physician (Ophthalmology)  Antonio Fine MD as Consulting Physician (Allergy)  Sandor Astorga MD as Consulting Physician (Orthopedic Surgery)  Sam Carlos MD as Consulting Physician (Cardiology)    Subjective:     Chief Complaint:  F/U AF    Subjective:   Patient c/o left lateral chest pain overnight now resolved except with cough.  Sitting up in chair.  Denies SOA.    Current Medications:   Scheduled Meds:aspirin, 81 mg, Oral, Daily  atorvastatin, 10 mg, Oral, Nightly  ceFAZolin, 2 g, Intravenous, Q8H  dilTIAZem, 60 mg, Oral, Q12H  enoxaparin sodium, 40 mg, Subcutaneous, Daily  insulin lispro, 2-7 Units, Subcutaneous, 4x Daily AC & at Bedtime  latanoprost, 1 drop, Both Eyes, Nightly  levothyroxine, 150 mcg, Oral, Q AM  [Held by provider] losartan, 100 mg, Oral, Q24H  methylPREDNISolone, 4 mg, Oral, TID Around Food  [START ON 2025] methylPREDNISolone, 4 mg, Oral, 4x Daily Taper  [START ON 2025] methylPREDNISolone, 4 mg, Oral, TID Around Food  [START ON 2025] methylPREDNISolone, 4 mg, Oral, Before Breakfast  [START ON 2025] methylPREDNISolone, 4 mg, Oral, Tonight  [START ON 2025] methylPREDNISolone, 4 mg, Oral, Before Breakfast  methylPREDNISolone, 8 mg, Oral, Tonight  mupirocin, 1 Application, Each Nare, BID  pantoprazole, 40 mg, Oral, QAM  polyethylene glycol, 17 g, Oral, BID  senna-docusate sodium, 2 tablet, Oral, BID  [Held by  "provider] spironolactone, 25 mg, Oral, Daily      Continuous Infusions:amiodarone, 0.5 mg/min, Last Rate: Stopped (05/23/25 0605)  niCARdipine, 5-15 mg/hr  nitroglycerin, 5-50 mcg/min  norepinephrine, 0.02-0.06 mcg/kg/min  sodium chloride, 30 mL/hr, Last Rate: 30 mL/hr (05/22/25 1623)  sodium chloride, 30 mL/hr        Allergies:  No Known Allergies    ROS    Objective:     Temp:  [97.9 °F (36.6 °C)-99.5 °F (37.5 °C)] 98.8 °F (37.1 °C)  Heart Rate:  [] 57  Resp:  [14-21] 21  BP: ()/(38-74) 104/53     Intake/Output Summary (Last 24 hours) at 5/23/2025 1515  Last data filed at 5/23/2025 0800  Gross per 24 hour   Intake 2478 ml   Output 1700 ml   Net 778 ml     Body mass index is 36.99 kg/m².      05/22/25  0526 05/23/25  0605   Weight: 97.7 kg (215 lb 6.2 oz) 101 kg (222 lb 4.8 oz)         Physical Exam:  Neuro:  CV:  Resp:  GI:  Ext:  Tele: AAOx3, no gross deficits  S1S2 RRR, no murmur  Nonlabored, faint wheeze  BS+, abd soft  Pedal pulses palp, no edema  SR                                                   Lab Review:   Results from last 7 days   Lab Units 05/23/25  0604 05/22/25  1153 05/20/25  0810   SODIUM mmol/L 138 140 138   POTASSIUM mmol/L 4.4 4.3 4.2   CHLORIDE mmol/L 103 107 102   CO2 mmol/L 21.8* 22.1 24.4   BUN mg/dL 18 15 16   CREATININE mg/dL 1.05* 0.89 0.96   GLUCOSE mg/dL 152* 159* 107*   CALCIUM mg/dL 9.3 9.1 9.7   AST (SGOT) U/L  --   --  24   ALT (SGPT) U/L  --   --  13         Results from last 7 days   Lab Units 05/23/25  0604 05/22/25  1153   WBC 10*3/mm3 13.36* 8.84   HEMOGLOBIN g/dL 10.9* 12.6   HEMATOCRIT % 34.0 38.8   PLATELETS 10*3/mm3 228 230     Results from last 7 days   Lab Units 05/23/25  0604 05/22/25  1153 05/20/25  0810   INR  1.22* 1.24* 1.27*   APTT seconds  --  30.3 29.6     Results from last 7 days   Lab Units 05/23/25  0604   MAGNESIUM mg/dL 2.4           Invalid input(s): \"LDLCALC\"            Recent Radiology:  Imaging Results (Most Recent)       Procedure Component " Value Units Date/Time    XR Chest 1 View [[id number]] Collected: 05/23/25 0716     Updated: 05/23/25 0720    Narrative:      XR CHEST 1 VW    Date of Exam: 5/23/2025 3:17 AM EDT    Indication: Post-Op Heart Surgery    Comparison: 5/22/2025    Findings:  Right IJ central venous catheter tip is at the low SVC. Stable cardiomegaly. Persistent left basilar airspace disease and small left pleural effusion. No discrete pneumothorax. Subcutaneous emphysema related to recent chest wall of the left lateral soft   tissues. Left atrial appendage clip again noted. No significant consolidation or effusion on the right.      Impression:      Impression:  1. Stable right IJ central venous catheter.  2. Persistent left basilar airspace disease and small left pleural effusion.  3. No pneumothorax.          Electronically Signed: Fernandez Anand MD    5/23/2025 7:18 AM EDT    Workstation ID: [id number]    XR Chest 1 View [[id number]] Collected: 05/22/25 1213     Updated: 05/22/25 1218    Narrative:      XR CHEST 1 VW    Date of Exam: 5/22/2025 11:55 AM EDT    Indication: Post-Op Check Line & Tube Placement    Comparison: CT chest 5/20/2025.    Findings:  Left basilar opacity which may represent combination of small left pleural effusion and atelectasis or pneumonia is a new finding. Presumed left atrial appendage ligation, new since prior. Mild cardiac enlargement.. Right IJ central line extends to the   mid SVC level. Transcutaneous pacer pad projects over the left upper thorax. No pneumothorax is identified. There is mild left chest wall subcutaneous gas. No acute osseous abnormality. Mild upper thoracic curvature toward the left.      Impression:      Impression:    1. New left atrial appendage ligation changes.  2. Right IJ central line tip extends to the mid SVC level.  3. Left basilar opacity favored to represent small left pleural effusion and left basilar atelectasis.      Electronically Signed: Carmen Salvador MD    5/22/2025 12:16  PM EDT    Workstation ID: [id number]    FL < 1 Hour [[id number]] Resulted: 05/22/25 0903     Updated: 05/22/25 0903    Narrative:      This procedure was auto-finalized with no dictation required.            ECHOCARDIOGRAM:    Results for orders placed in visit on 05/22/25    Intra-Op Anesthesia SARA    Narrative  Intra-Op Anesthesia SARA    Procedure Performed: Intra-Op Anesthesia SARA  Start Time:  End Time:    Preanesthesia Checklist:  Patient identified, IV assessed, risks and benefits discussed, monitors and equipment assessed, procedure being performed at surgeon's request and anesthesia consent obtained.    General Procedure Information  Diagnostic Indications for Echo:  assessment of surgical repair and hemodynamic monitoring  Location performed:  OR  Intubated  Bite block placed  Heart visualized  Probe Insertion:  Easy  Probe Type:  Biplane and multiplane  Modalities:  Color flow mapping, pulse wave Doppler and continuous wave Doppler    Echocardiographic and Doppler Measurements    Ventricles    Right Ventricle:  Cavity size normal.  Hypertrophy not present.  Thrombus not present.  Global function normal.  Ejection Fraction 60%.  Left Ventricle:  Cavity size normal.  Thrombus not present.  Global Function normal.  Ejection Fraction 60%.    Ventricular Regional Function:  1- Basal Anteroseptal:  normal  2- Basal Anterior:  normal  3- Basal Anterolateral:  normal  4- Basal Inferolateral:  normal  5- Basal Inferior:  normal  6- Basal Inferoseptal:  normal  7- Mid Anteroseptal:  normal  8- Mid Anterior:  normal  9- Mid Anterolateral:  normal  10- Mid Inferolateral:  normal  11- Mid Inferior:  normal  12- Mid Inferoseptal:  normal  13- Apical Anterior:  normal  14- Apical Lateral:  normal  15- Apical Inferior:  normal  16- Apical Septal:  normal  17- Orland:  normal      Valves    Aortic Valve:  Annulus normal.  Stenosis not present.  Regurgitation absent.  Leaflets normal.  Leaflet motions normal.    Mitral  Valve:  Annulus normal.  Stenosis not present.  Regurgitation absent.  Leaflets normal.  Leaflet motions normal.    Tricuspid Valve:  Annulus normal.  Stenosis not present.  Regurgitation absent.  Leaflets normal.  Leaflet motions normal.  Pulmonic Valve:  Annulus normal.  Stenosis not present.  Regurgitation absent.      Aorta    Ascending Aorta:  Size normal.  Dissection not present.  Plaque thickness less than 3 mm.  Mobile plaque not present.  Aortic Arch:  Size normal.  Dissection not present.  Plaque thickness less than 3 mm.  Mobile plaque not present.  Descending Aorta:  Size normal.  Dissection not present.  Plaque thickness less than 3 mm.  Mobile plaque not present.      Atria    Right Atrium:  Size normal.  Left Atrium:  Size normal.  Spontaneous echo contrast not present.  Thrombus not present.  Tumor not present.  Device not present.  Left atrial appendage normal.      Septa        Ventricular Septum:  Intra-ventricular septum morphology normal.        Other Findings  Pleural Effusion:  none  Pulmonary Arteries:  normal      Anesthesia Information  Performed Personally  Anesthesiologist:  Javon Sandra MD      Echocardiogram Comments:  SARA PLACED EASILY NO RESISTANCE , LUBRICATED , BITE GUARD      LV NO WMA NO CLOT SEEN EF 60  RV NL SF  MV WNL  TV WNL  AV WNL  TAHIR FREE OF CLOT , CX VISIBLE AT BASE        I reviewed the patient's new clinical results.    EKG:      Assessment:       Paroxysmal atrial fibrillation    1) PAF s/p convergence procedure with atri-clip 5/22/25  - in sinus on IV amio  - s/p redo ablation 3/2025 with early reoccurrence   - failed tikosyn  - eliquis on hold perioperatively    2) CAD  - cath 4/2025 showed mild CAD     3) Hx CVA     4) HTN     5) HLD     6) hypothyroidism     7)  SHANAE     8) chronic back pain s/p spinal fusion    Plan:   Tele shows SB/SR with HR 50-60s.  Post op EKG shows diffuse mild ST elevations.  BP has been soft; currently SBP upper 80s with IV amio on  hold.  Consider limited 2D echo for eval post op pericardial effusion.  Watch BP.  EKG in am.        Electronically signed by PETE Ruiz, 05/23/25, 3:28 PM EDT.

## 2025-05-23 NOTE — PLAN OF CARE
Problem: Adult Inpatient Plan of Care  Goal: Plan of Care Review  Outcome: Progressing  Goal: Patient-Specific Goal (Individualized)  Outcome: Progressing  Goal: Absence of Hospital-Acquired Illness or Injury  Outcome: Progressing  Intervention: Identify and Manage Fall Risk  Recent Flowsheet Documentation  Taken 5/23/2025 1600 by Coretta Yuan RN  Safety Promotion/Fall Prevention: safety round/check completed  Taken 5/23/2025 1200 by Coretta Yuan RN  Safety Promotion/Fall Prevention: safety round/check completed  Taken 5/23/2025 1000 by Coretta Yuan RN  Safety Promotion/Fall Prevention: safety round/check completed  Taken 5/23/2025 0800 by Coretta Yuan RN  Safety Promotion/Fall Prevention: safety round/check completed  Intervention: Prevent Skin Injury  Recent Flowsheet Documentation  Taken 5/23/2025 1600 by Coretta Yuan RN  Body Position: position changed independently  Taken 5/23/2025 1400 by Coretta Yuan RN  Body Position: weight shifting  Taken 5/23/2025 1200 by Coretta Yuan RN  Body Position: weight shifting  Taken 5/23/2025 1000 by Coretta Yuan RN  Body Position: weight shifting  Taken 5/23/2025 0800 by Coretta Yuan RN  Body Position: weight shifting  Skin Protection: incontinence pads utilized  Goal: Optimal Comfort and Wellbeing  Outcome: Progressing  Goal: Readiness for Transition of Care  Outcome: Progressing   Goal Outcome Evaluation:

## 2025-05-23 NOTE — PROGRESS NOTES
" LOS: 1 day   Patient Care Team:  Latia Guerrier MD as PCP - General  Latia Guerrier MD as PCP - Family Medicine  Dre Case MD (Cardiology)  Jet Godwin MD as Consulting Physician (Ophthalmology)  Antonio Fine MD as Consulting Physician (Allergy)  Sandor Astorga MD as Consulting Physician (Orthopedic Surgery)  Sam Carlos MD as Consulting Physician (Cardiology)    Chief Complaint: Post-op follow-up, s/p convergent      Subjective: Looks great.  Up walking with therapy.  On room air.  Pain is well-controlled.    Vital Signs  Temp:  [95.2 °F (35.1 °C)-99.5 °F (37.5 °C)] 98.6 °F (37 °C)  Heart Rate:  [] 61  Resp:  [14-19] 14  BP: ()/(38-74) 97/38      05/22/25  0526 05/23/25  0605   Weight: 97.7 kg (215 lb 6.2 oz) 101 kg (222 lb 4.8 oz)     Body mass index is 36.99 kg/m².    Intake/Output Summary (Last 24 hours) at 5/23/2025 0927  Last data filed at 5/23/2025 0605  Gross per 24 hour   Intake 2522.5 ml   Output 2145 ml   Net 377.5 ml     No intake/output data recorded.    Chest tube drainage last 8 hours/24 hours: 315/475    Objective:  Vital signs: (most recent): Blood pressure 104/53, pulse 57, temperature 98.8 °F (37.1 °C), temperature source Bladder, resp. rate 21, height 165.1 cm (65\"), weight 101 kg (222 lb 4.8 oz), SpO2 96%.              Physical Examination:   General appearance - alert, well appearing, and in no distress and overweight  Mental status - normal mood, behavior, speech, dress, motor activity, and thought processes  Chest - clear to auscultation, no wheezes, rales or rhonchi, symmetric air entry  Heart - bradycardic rate (50's), regular rhythm, normal S1, S2, no murmurs, rubs, clicks or gallops- AF w/ RVR overnight  Abdomen - soft, nontender, nondistended, no masses or organomegaly  bowel sounds normal  Neurological - alert, oriented, normal speech, no focal findings or movement disorder noted, motor and sensory grossly normal " bilaterally  Extremities - peripheral pulses normal, no pedal edema, no clubbing or cyanosis  Skin - normal coloration and turgor, no rashes, no suspicious skin lesions noted  Dressings CDI      Results Review:      WBC WBC   Date Value Ref Range Status   05/23/2025 13.36 (H) 3.40 - 10.80 10*3/mm3 Final   05/22/2025 8.84 3.40 - 10.80 10*3/mm3 Final      HGB Hemoglobin   Date Value Ref Range Status   05/23/2025 10.9 (L) 12.0 - 15.9 g/dL Final   05/22/2025 12.6 12.0 - 15.9 g/dL Final   05/22/2025 11.9 (L) 12.0 - 17.0 g/dL Final   05/22/2025 11.6 (L) 12.0 - 17.0 g/dL Final      HCT Hematocrit   Date Value Ref Range Status   05/23/2025 34.0 34.0 - 46.6 % Final   05/22/2025 38.8 34.0 - 46.6 % Final   05/22/2025 35 (L) 38 - 51 % Final   05/22/2025 34 (L) 38 - 51 % Final      Platelets Platelets   Date Value Ref Range Status   05/23/2025 228 140 - 450 10*3/mm3 Final   05/22/2025 230 140 - 450 10*3/mm3 Final        PT/INR:    Protime   Date Value Ref Range Status   05/23/2025 15.4 (H) 11.7 - 14.2 Seconds Final   05/22/2025 15.5 (H) 11.7 - 14.2 Seconds Final   /  INR   Date Value Ref Range Status   05/23/2025 1.22 (H) 0.90 - 1.10 Final   05/22/2025 1.24 (H) 0.90 - 1.10 Final       Sodium Sodium   Date Value Ref Range Status   05/23/2025 138 136 - 145 mmol/L Final   05/22/2025 140 136 - 145 mmol/L Final      Potassium Potassium   Date Value Ref Range Status   05/23/2025 4.4 3.5 - 5.2 mmol/L Final   05/22/2025 4.3 3.5 - 5.2 mmol/L Final      Chloride Chloride   Date Value Ref Range Status   05/23/2025 103 98 - 107 mmol/L Final   05/22/2025 107 98 - 107 mmol/L Final      Bicarbonate CO2   Date Value Ref Range Status   05/23/2025 21.8 (L) 22.0 - 29.0 mmol/L Final   05/22/2025 22.1 22.0 - 29.0 mmol/L Final      BUN BUN   Date Value Ref Range Status   05/23/2025 18 8 - 23 mg/dL Final   05/22/2025 15 8 - 23 mg/dL Final      Creatinine Creatinine   Date Value Ref Range Status   05/23/2025 1.05 (H) 0.57 - 1.00 mg/dL Final    05/22/2025 0.89 0.57 - 1.00 mg/dL Final      Calcium Calcium   Date Value Ref Range Status   05/23/2025 9.3 8.6 - 10.5 mg/dL Final   05/22/2025 9.1 8.6 - 10.5 mg/dL Final      Magnesium Magnesium   Date Value Ref Range Status   05/23/2025 2.4 1.6 - 2.4 mg/dL Final          aspirin, 81 mg, Oral, Daily  atorvastatin, 10 mg, Oral, Nightly  ceFAZolin, 2 g, Intravenous, Q8H  dilTIAZem, 60 mg, Oral, Q12H  enoxaparin sodium, 40 mg, Subcutaneous, Daily  latanoprost, 1 drop, Both Eyes, Nightly  levothyroxine, 150 mcg, Oral, Q AM  [Held by provider] losartan, 100 mg, Oral, Q24H  methylPREDNISolone, 4 mg, Oral, TID Around Food  [START ON 5/24/2025] methylPREDNISolone, 4 mg, Oral, 4x Daily Taper  [START ON 5/25/2025] methylPREDNISolone, 4 mg, Oral, TID Around Food  [START ON 5/26/2025] methylPREDNISolone, 4 mg, Oral, Before Breakfast  [START ON 5/26/2025] methylPREDNISolone, 4 mg, Oral, Tonight  [START ON 5/27/2025] methylPREDNISolone, 4 mg, Oral, Before Breakfast  methylPREDNISolone, 8 mg, Oral, Tonight  mupirocin, 1 Application, Each Nare, BID  pantoprazole, 40 mg, Oral, QAM  polyethylene glycol, 17 g, Oral, BID  senna-docusate sodium, 2 tablet, Oral, BID  spironolactone, 50 mg, Oral, Daily      amiodarone, 0.5 mg/min, Last Rate: Stopped (05/23/25 0605)  insulin, 0-100 Units/hr, Last Rate: 0.8 Units/hr (05/23/25 0812)  niCARdipine, 5-15 mg/hr  nitroglycerin, 5-50 mcg/min  norepinephrine, 0.02-0.06 mcg/kg/min  sodium chloride, 30 mL/hr, Last Rate: 30 mL/hr (05/22/25 1623)  sodium chloride, 30 mL/hr          Paroxysmal atrial fibrillation      Assessment & Plan    Paroxysmal atrial fibrillation on chronic AC (last dose Eliquis 5/19)- S/P Convergent procedure on 5/22 with Dr. Choi  Palpitations  Hypertension  Hypothyroidism  History of SHANAE with CPAP compliance      POD # 1  - Progressing well.  On room air.  Up walking with therapy and now in the chair.  - Had some AF with RVR overnight.  Rate as high as 140s, but  generally 120s.  IV Amio protocol started and she converted back to sinus shortly after.  Rate currently in the 50s.  BP has been a little soft.  Cozaar and spironolactone currently held.  Likely unable to start BB, as she has been unable to tolerate BB's in the past.  - EKG this a.m. with diffuse mild ST elevation, likely pericarditis.  On course of Medrol.   - Net +1.3 L.  Weight only up 1 pound from admission.  Likely can start home spironolactone at lower dose tomorrow.  Will hold off on diuresing today.  - Creatinine 1.05 today- watch closely   - Keep chest tubes due to increased output.  Reassess tomorrow.  - Discontinue central line, Maldonado, A-line.  - Not requiring a lot of insulin.  Will transition from IV insulin drip to SSI protocol.  - Continue routine and supportive care.   - Mobilize-continue PT OT.  She is pretty strong for her age and is ambulating well.  - Pulmonary toilet with I-S.  Encourage coughing and deep breathing.  X-ray with left basilar base airspace disease and small pleural effusion.  Chest x-ray tomorrow a.m.  - On ASA/Statin/lovenox for VTE- Was on Eliquis prior to surgery, currently held.       PETE Saavedra  05/23/25  09:27 EDT

## 2025-05-23 NOTE — PLAN OF CARE
Goal Outcome Evaluation:  Plan of Care Reviewed With: patient        Progress: improving  Outcome Evaluation: Pt is a 80 y.o. year old female admitted to [facility] on 5/22/25 for minimally invasive epicardial ablation of posterior wall of L atrium via sub-xyphoid approach. She is now POD#1 and in stable condition     Pmhx significant for afib, chronic AC on eliquis, CAD, lumbar DJD, HTN, HLD, mild valvular heart disease, SHANAE with CPAP, hypothyroid, 2018 afib ablation    At baseline, pt lives with her spouse in a H with basement and 2STE with handrail. They have a walk-in shower. Pt has walker, canes and wc from previous sx but was not using them prior to this hospitalization. She was previously independent with all ADLs and functional mobilty.    At [facility], pt is A&Ox4. Pt has had minimally invasive cardiac sx and NP recommends weight restriction to 8-10 pounds and minimize overexertion/strenuous activity, but otherwise no sternal precautions. Pt is able to CTS with min A, functional mobility with CGAx2 (one for assist and one for equipment). Able to complete functional mobility with FWW for household distances CGA. Demonstrates good insight and safety awareness. OT to follow while inpatient and recommends home with family assist with no additional OT services needed.    Anticipated Discharge Disposition (OT): home with 24/7 care

## 2025-05-23 NOTE — PLAN OF CARE
Goal Outcome Evaluation:  Plan of Care Reviewed With: patient           Outcome Evaluation: Patient is a 80 y.o. female with PMHx Afib/flutter on chronic AC (Eliquis), CAD, lumbar DJD, HTN, HLD, mild valvular heart disease, SHANAE with CPAP compliance, and hypothyroidism. Hx of successful Afib ablation in 2018 and unsuccessful ablation March 2025. Pt underwent scheduled minimally invasive epicardial ablation posterior wall of L atrium (subxyphoid approach), exploratory L thoracoscopy with placement of Atriclip, and electrical cardioversion on 5/22. Pt with post op pain and endurance deficits impacting functional mobility. She was previously independent with all moblity and ADLs, no AD use. Today she need Stephanie for sit to stand and 100' gait trial with RW. PT anticipates pt will progress quickly and be safe to DC home with spouse assist. No f/u therapy recommended, and no DME needs anticipates.    Anticipated Discharge Disposition (PT): home with assist

## 2025-05-23 NOTE — CASE MANAGEMENT/SOCIAL WORK
Discharge Planning Assessment  [facility]     Patient Name: Sheree Vance  MRN: [id number]  Today's Date: 5/23/2025    Admit Date: 5/22/2025    Plan: DC Plan: Anticipate routine home with spouse Keo pending PT/OT recommendations. [facility] 5/22/2025   Discharge Needs Assessment       Row Name 05/23/25 1731       Living Environment    People in Home spouse    Name(s) of People in Home Keo Vance    Current Living Arrangements home    Potentially Unsafe Housing Conditions none    In the past 12 months has the electric, gas, oil, or water company threatened to shut off services in your home? No    Primary Care Provided by self    Provides Primary Care For no one    Family Caregiver if Needed spouse    Family Caregiver Names Keo Vance    Quality of Family Relationships helpful;involved;supportive    Able to Return to Prior Arrangements yes       Resource/Environmental Concerns    Resource/Environmental Concerns none    Transportation Concerns none       Transportation Needs    In the past 12 months, has lack of transportation kept you from medical appointments or from getting medications? no    In the past 12 months, has lack of transportation kept you from meetings, work, or from getting things needed for daily living? No       Food Insecurity    Within the past 12 months, you worried that your food would run out before you got the money to buy more. Never true    Within the past 12 months, the food you bought just didn't last and you didn't have money to get more. Never true       Transition Planning    Patient/Family Anticipates Transition to home with family    Patient/Family Anticipated Services at Transition none    Transportation Anticipated car, drives self;family or friend will provide       Discharge Needs Assessment    Readmission Within the Last 30 Days no previous admission in last 30 days    Equipment Currently Used at Home none    Concerns to be Addressed discharge planning    Do you  want help finding or keeping work or a job? I do not need or want help    Do you want help with school or training? For example, starting or completing job training or getting a high school diploma, GED or equivalent No    Anticipated Changes Related to Illness none    Equipment Needed After Discharge none    Provided Post Acute Provider List? N/A    Provided Post Acute Provider Quality & Resource List? N/A                   Discharge Plan       Row Name 05/23/25 1732       Plan    Plan DC Plan: Anticipate routine home with spouse Keo pending PT/OT recommendations. [facility] 5/22/2025    Patient/Family in Agreement with Plan yes    Provided Post Acute Provider List? N/A    Provided Post Acute Provider Quality & Resource List? N/A    Plan Comments CM spoke with patient at bedside to discuss admission assessment and discharge planning. Patient confirms PCP and pharmacy. Patient confirms she is agreeable to meds to bed program at this time. CM updated pharmacy in [facility]). Patient denies any difficulty affording medications or food at this time. Patient denies any additional needs for services or DME at this time. Patient reports independent with ADL's and drives. Patient spouse will provide transportation when ready for DC.CM spoke with patient’s nurse and [facility] NP Elana Lynch to obtain clinical updates. Patient recovering well. Awaiting therapy recs.CM will continue to follow for any additional needs. DC Barriers: POD 1 [facility], Cardiac monitoring, central line, chest tube x1, Insulin gtt, IV abx, and monitor labs.                    Expected Discharge Date and Time       Expected Discharge Date Expected Discharge Time    May 27, 2025            Demographic Summary       Row Name 05/23/25 1739       General Information    Admission Type inpatient    Arrived From home    Required Notices Provided Important Message from Medicare    Referral Source admission list    Reason for Consult discharge planning    Preferred  Language English       Contact Information    Permission Granted to Share Info With                    Functional Status       Row Name 05/23/25 1731       Functional Status    Usual Activity Tolerance good    Current Activity Tolerance moderate       Physical Activity    On average, how many days per week do you engage in moderate to strenuous exercise (like a brisk walk)? 3 days    On average, how many minutes do you engage in exercise at this level? 30 min    Number of minutes of exercise per week 90       Functional Status, IADL    Medications independent    Meal Preparation independent    Housekeeping independent    Laundry independent    Shopping independent       Mental Status    General Appearance WDL WDL       Mental Status Summary    Recent Changes in Mental Status/Cognitive Functioning no changes       Employment/    Employment Status retired    Current or Previous Occupation not applicable                 Kalyani Raymond, RN     Office Phone: [phone]  Office Cell:     [phone]

## 2025-05-23 NOTE — THERAPY EVALUATION
Patient Name: Sheree Vance  : 1945    MRN: [id number]                              Today's Date: 2025       Admit Date: 2025    Visit Dx:     ICD-10-CM ICD-9-CM   1. Paroxysmal atrial fibrillation  I48.0 427.31     Patient Active Problem List   Diagnosis    Paroxysmal atrial fibrillation    Essential hypertension    Hyperlipidemia    Left ventricular diastolic dysfunction    Degenerative joint disease of right knee    Gastroesophageal reflux disease    Hypothyroidism    Knee pain, right    Lumbosacral spondylosis without myelopathy    Osteopenia    Lumbar radiculopathy    Spinal stenosis of lumbar region    Primary osteoarthritis of right hip    Arthritis of right sacroiliac joint    Allergic rhinitis    Visit for screening mammogram    Postmenopausal    Vitamin D deficiency    Atrophic vaginitis    Medicare annual wellness visit, subsequent    SHANAE (obstructive sleep apnea)    Osteoarthritis, generalized    Needs flu shot    Class 2 obesity    Rash    Tick bite of left lower leg    Visit for monitoring Tikosyn therapy    Callus of toe    Need for vaccination    Tick bite of left shoulder    Prediabetes    Palpitations    Obstructive sleep apnea syndrome    PAF (paroxysmal atrial fibrillation)    Atherosclerosis of native coronary artery of native heart without angina pectoris     Past Medical History:   Diagnosis Date    A-fib Since  Dx in [place]    Abnormal ECG     Allergic     Seasonal    Allergic rhinitis     Hx Allergy Shots x 4 Yrs    Aortic valve calcification 2018    Noted on SARA    Arrhythmia     Arthritis     Lumbar Spine    Atrial flutter     Breast mass seen on mammogram 2014    L 4CM Mass--Benign & Followed    Bruxism, sleep-related     Cataract     Chronic anticoagulation     Eliquis     Chronic low back pain     Hx Back Sx in 2016    Colon polyp     Coronary artery disease     AFIB    DJD (degenerative joint disease), lumbar     Dyslipidemia      w/Hypertriglyceridemia--Statin/Fish Oils    Family history of premature CAD     GERD (gastroesophageal reflux disease)     Glaucoma     Heart disease     History of bone density study 03/19/14-[facility]    T-Score of 1.4 Indicating Osteopenia    History of EKG 04/2018 & 08/2018 & 12/2018    First Degree AV Block/Multiple Atrial Premature Blocks Noted on All    Hormone replacement therapy (HRT)     Premarin-0.625MG    Hyperlipidemia     Controlled w/Meds    Hypertension     Controlled w/Losartan    Hypothyroidism     Synthroid    Insomnia     Takes OTC Sleep Aid PRN    Left atrial enlargement 12/04/2018    Severe Noted on Cardioversion Report/SARA    Left breast mass 03/19/2014    4CM Noted on Mammogram--Benign     Mild tricuspid valve regurgitation 12/04/2018    Noted on SARA    Mitral valve annular calcification 12/04/2018    Moderate Noted on SARA    Mitral valve regurgitation 12/04/2018    Moderate Noted on SARA    Obesity 12/04/2018    BMI-43.3     SHANAE on CPAP 11/27/2018    AHI 10.5/h, supine 30/h    Osteoarthritis of right hip 07/09/2019    Osteopenia 03/19/2014    Noted on Dexa Report    Restless sleeper     Tosses and Turns All Night Per Pt    Sinus congestion     Tricuspid leaflet thickened 12/04/2018    Noted on SARA    Urinary tract infection      Past Surgical History:   Procedure Laterality Date    ABLATION OF DYSRHYTHMIC FOCUS  2018    APPENDECTOMY      BACK SURGERY  2016    Spinal Fusion    BLADDER REPAIR  2001 in [place]    CARDIAC CATHETERIZATION  2015    CARDIAC CATHETERIZATION Right 4/10/2025    Procedure: Left Heart Cath;  Surgeon: Richard Brugos MD;  Location: [facility] CATH INVASIVE LOCATION;  Service: Cardiovascular;  Laterality: Right;    CARDIAC ELECTROPHYSIOLOGY PROCEDURE Right 03/12/2025    Procedure: Ablation atrial fibrillation;  Surgeon: Dre Case MD;  Location: [facility] CATH INVASIVE LOCATION;  Service: Cardiovascular;  Laterality: Right;    CARDIOVERSION  08/7/18 & 4/17/18-[facility]      Bakari--For Chronic A-Fib    CARDIOVERSION  12/4/28[facility]    Dr. Case--See Report    CATARACT EXTRACTION  2007    COLONOSCOPY  04/13/2017    HYSTERECTOMY      Partial    JOINT REPLACEMENT  2020 2021    KNEE ARTHROSCOPY  2009    KNEE SURGERY Right 2009    OTHER SURGICAL HISTORY  12/4/18-[facility]    Fluoroscopy/Trans-Septal Access to L Atrium/Selective Venography of L Superior Pulm Vein/Add Lienier Radiofrequency Ablation Along the Coronary Sinus/Synchronized Cardioversion--Dr. Case    REPLACEMENT TOTAL KNEE Right 08/30/2021    Dr. Astorga    SPINAL FUSION  2016    SPINE SURGERY  2016    SARA  12/4/18-[facility]    Mild Concentric L Ventricular Hypertrophy Noted; EF 55-60%; L Atrial Moderate-Severely Dilated, Moderat Mitral Annular Calcification with Moderate MVR Noted; Mild TVR; Normal LV Function    TONSILLECTOMY      TOTAL HIP ARTHROPLASTY Right 07/06/2020    Dr. Astorga      General Information       Row Name 05/23/25 1301          OT Time and Intention    Subjective Information complains of;fatigue  -KT     Patient Effort excellent  -KT     Symptoms Noted During/After Treatment none  -KT       Row Name 05/23/25 1301          General Information    Patient Profile Reviewed yes  -KT     Prior Level of Function independent:;ADL's;all household mobility;home management  -KT     Existing Precautions/Restrictions cardiac;fall  light lifting restriction, avoid rigorous/strenuous UE movement  -KT     Barriers to Rehab medically complex  -KT       Row Name 05/23/25 1301          Occupational Profile    Reason for Services/Referral (Occupational Profile) Pt is a 80 y.o. year old female admitted to [facility] on 5/22/25 for minimally invasive epicardial ablation of posterior wall of L atrium via sub-xyphoid approach. She is now POD#1 and in stable condition     Pmhx significant for afib, chronic AC on eliquis, CAD, lumbar DJD, HTN, HLD, mild valvular heart disease, SHANAE with CPAP, hypothyroid, 2018 afib ablation    At baseline, pt lives with  her spouse in a SSH with basement and 2STE with handrail. They have a walk-in shower. Pt has walker, canes and wc from previous sx but was not using them prior to this hospitalization. She was previously independent with all ADLs and functional mobilty.  -KT       Row Name 05/23/25 1301          Living Environment    Current Living Arrangements home  -KT     People in Home spouse  -KT       Row Name 05/23/25 1301          Home Main Entrance    Number of Stairs, Main Entrance two  -KT     Stair Railings, Main Entrance railings safe and in good condition  -KT       Row Name 05/23/25 1301          Stairs Within Home, Primary    Number of Stairs, Within Home, Primary none  -KT       Row Name 05/23/25 1301          Cognition    Orientation Status (Cognition) oriented x 4  -KT       Row Name 05/23/25 1301          Safety Issues/Impairments Affecting Functional Mobility    Safety Issues Affecting Function (Mobility) safety precautions follow-through/compliance;safety precaution awareness  -KT     Impairments Affecting Function (Mobility) balance;endurance/activity tolerance;postural/trunk control;strength  -KT               User Key  (r) = Recorded By, (t) = Taken By, (c) = Cosigned By      Initials Name Provider Type    KT Candi Marc, OT Occupational Therapist                     Mobility/ADL's       Row Name 05/23/25 1302          Transfers    Transfers sit-stand transfer;stand-sit transfer  -KT       Row Name 05/23/25 1302          Sit-Stand Transfer    Sit-Stand [place] (Transfers) minimum assist (75% patient effort);1 person to manage equipment;1 person assist  -KT     Assistive Device (Sit-Stand Transfers) walker, front-wheeled  -KT       Row Name 05/23/25 1302          Stand-Sit Transfer    Stand-Sit [place] (Transfers) contact guard;1 person assist;1 person to manage equipment  -KT     Assistive Device (Stand-Sit Transfers) walker, front-wheeled  -KT       Row Name 05/23/25 1302           Functional Mobility    Functional Mobility- Ind. Level contact guard assist;1 person + 1 person to manage equipment  -KT     Patient was able to Ambulate yes  -KT       Row Name 05/23/25 1302          Activities of Daily Living    BADL Assessment/Intervention upper body dressing;lower body dressing;toileting  -KT       Row Name 05/23/25 1302          Upper Body Dressing Assessment/Training    [place] Level (Upper Body Dressing) upper body dressing skills;contact guard assist  -KT       Row Name 05/23/25 1302          Lower Body Dressing Assessment/Training    [place] Level (Lower Body Dressing) lower body dressing skills;minimum assist (75% patient effort)  -KT       Row Name 05/23/25 1302          Toileting Assessment/Training    [place] Level (Toileting) toileting skills;minimum assist (75% patient effort)  -KT               User Key  (r) = Recorded By, (t) = Taken By, (c) = Cosigned By      Initials Name Provider Type    Candi Raphael OT Occupational Therapist                   Obj/Interventions       Row Name 05/23/25 1303          Sensory Assessment (Somatosensory)    Sensory Assessment (Somatosensory) UE sensation intact  -KT       Row Name 05/23/25 1303          Vision Assessment/Intervention    Visual Impairment/Limitations WFL  -KT       Row Name 05/23/25 1303          Range of Motion Comprehensive    General Range of Motion bilateral upper extremity ROM WFL  -KT       Row Name 05/23/25 1303          Strength Comprehensive (MMT)    General Manual Muscle Testing (MMT) Assessment upper extremity strength deficits identified  -KT     Comment, General Manual Muscle Testing (MMT) Assessment grossly 4-/5  -KT               User Key  (r) = Recorded By, (t) = Taken By, (c) = Cosigned By      Initials Name Provider Type    Candi Raphael OT Occupational Therapist                   Goals/Plan       Row Name 05/23/25 1305          Transfer Goal 1 (OT)    Activity/Assistive Device  (Transfer Goal 1, OT) toilet;commode  -KT     [place] Level/Cues Needed (Transfer Goal 1, OT) modified independence  -KT     Time Frame (Transfer Goal 1, OT) long term goal (LTG)  -KT       Row Name 05/23/25 1302          Dressing Goal 1 (OT)    Activity/Device (Dressing Goal 1, OT) dressing skills, all  -KT     [place]/Cues Needed (Dressing Goal 1, OT) modified independence  -KT     Time Frame (Dressing Goal 1, OT) long term goal (LTG)  -KT       Row Name 05/23/25 1308          Toileting Goal 1 (OT)    Activity/Device (Toileting Goal 1, OT) toileting skills, all  -KT     [place] Level/Cues Needed (Toileting Goal 1, OT) modified independence  -KT     Time Frame (Toileting Goal 1, OT) long term goal (LTG)  -KT       Row Name 05/23/25 1306          Therapy Assessment/Plan (OT)    Planned Therapy Interventions (OT) activity tolerance training;adaptive equipment training;BADL retraining;functional balance retraining;IADL retraining;neuromuscular control/coordination retraining;occupation/activity based interventions;passive ROM/stretching;patient/caregiver education/training;ROM/therapeutic exercise;transfer/mobility retraining;strengthening exercise  -KT               User Key  (r) = Recorded By, (t) = Taken By, (c) = Cosigned By      Initials Name Provider Type    Candi Raphael OT Occupational Therapist                   Clinical Impression       Row Name 05/23/25 1308          Pain Assessment    Pretreatment Pain Rating 2/10  -KT     Posttreatment Pain Rating 2/10  -KT     Pain Location chest  -KT     Pain Side/Orientation generalized  -KT       Row Name 05/23/25 130          Plan of Care Review    Plan of Care Reviewed With patient  -KT     Progress improving  -KT     Outcome Evaluation Pt is a 80 y.o. year old female admitted to [facility] on 5/22/25 for minimally invasive epicardial ablation of posterior wall of L atrium via sub-xyphoid approach. She is now POD#1 and in stable condition     Pmhx  significant for afib, chronic AC on eliquis, CAD, lumbar DJD, HTN, HLD, mild valvular heart disease, SHANAE with CPAP, hypothyroid, 2018 afib ablation    At baseline, pt lives with her spouse in a SSH with basement and 2STE with handrail. They have a walk-in shower. Pt has walker, canes and wc from previous sx but was not using them prior to this hospitalization. She was previously independent with all ADLs and functional mobilty.    At [facility], pt is A&Ox4. Pt has had minimally invasive cardiac sx and NP recommends weight restriction to 8-10 pounds and minimize overexertion/strenuous activity, but otherwise no sternal precautions. Pt is able to CTS with min A, functional mobility with CGAx2 (one for assist and one for equipment). Able to complete functional mobility with FWW for household distances CGA. Demonstrates good insight and safety awareness. OT to follow while inpatient and recommends home with family assist with no additional OT services needed.  -KT       Row Name 05/23/25 1304          Therapy Assessment/Plan (OT)    Patient/Family Therapy Goal Statement (OT) return to home  -KT     Rehab Potential (OT) good  -KT     Criteria for Skilled Therapeutic Interventions Met (OT) yes;skilled treatment is necessary  -KT     Therapy Frequency (OT) 3 times/wk  -KT     Predicted Duration of Therapy Intervention (OT) until dc  -KT       Row Name 05/23/25 1304          Therapy Plan Review/Discharge Plan (OT)    Anticipated Discharge Disposition (OT) home with 24/7 care  -KT       Row Name 05/23/25 1304          Vital Signs    Pre Systolic BP Rehab 103  -KT     Pre Treatment Diastolic BP 52  -KT     Intra Systolic BP Rehab 120  -KT     Intra Treatment Diastolic BP 60  -KT     Post Systolic BP Rehab 128  -KT     Post Treatment Diastolic BP 57  -KT       Row Name 05/23/25 1304          Positioning and Restraints    Pre-Treatment Position sitting in chair/recliner  -KT     Post Treatment Position chair  -KT     In Chair  encouraged to call for assist;exit alarm on;with family/caregiver  -KT               User Key  (r) = Recorded By, (t) = Taken By, (c) = Cosigned By      Initials Name Provider Type    Candi Raphael OT Occupational Therapist                   Outcome Measures       Row Name 05/23/25 1306          How much help from another is currently needed...    Putting on and taking off regular lower body clothing? 3  -KT     Bathing (including washing, rinsing, and drying) 3  -KT     Toileting (which includes using toilet bed pan or urinal) 3  -KT     Putting on and taking off regular upper body clothing 3  -KT     Taking care of personal grooming (such as brushing teeth) 4  -KT     Eating meals 4  -KT     AM-PAC 6 Clicks Score (OT) 20  -KT       Row Name 05/23/25 1306          Functional Assessment    Outcome Measure Options AM-PAC 6 Clicks Daily Activity (OT)  -KT               User Key  (r) = Recorded By, (t) = Taken By, (c) = Cosigned By      Initials Name Provider Type    Candi Raphael OT Occupational Therapist                      OT Recommendation and Plan  Planned Therapy Interventions (OT): activity tolerance training, adaptive equipment training, BADL retraining, functional balance retraining, IADL retraining, neuromuscular control/coordination retraining, occupation/activity based interventions, passive ROM/stretching, patient/caregiver education/training, ROM/therapeutic exercise, transfer/mobility retraining, strengthening exercise  Therapy Frequency (OT): 3 times/wk  Plan of Care Review  Plan of Care Reviewed With: patient  Progress: improving  Outcome Evaluation: Pt is a 80 y.o. year old female admitted to [facility] on 5/22/25 for minimally invasive epicardial ablation of posterior wall of L atrium via sub-xyphoid approach. She is now POD#1 and in stable condition     Pmhx significant for afib, chronic AC on eliquis, CAD, lumbar DJD, HTN, HLD, mild valvular heart disease, SHANAE with CPAP, hypothyroid,  2018 afib ablation    At baseline, pt lives with her spouse in a SSH with basement and 2STE with handrail. They have a walk-in shower. Pt has walker, canes and wc from previous sx but was not using them prior to this hospitalization. She was previously independent with all ADLs and functional mobilty.    At eval, pt is A&Ox4. Pt has had minimally invasive cardiac sx and NP recommends weight restriction to 8-10 pounds and minimize overexertion/strenuous activity, but otherwise no sternal precautions. Pt is able to CTS with min A, functional mobility with CGAx2 (one for assist and one for equipment). Able to complete functional mobility with FWW for household distances CGA. Demonstrates good insight and safety awareness. OT to follow while inpatient and recommends home with family assist with no additional OT services needed.     Time Calculation:   Evaluation Complexity (OT)  Overall Complexity of Evaluation (OT): moderate complexity     Time Calculation- OT       Row Name 05/23/25 1310             Time Calculation- OT    OT Start Time 0953  -KT      OT Stop Time 1020  -KT      OT Time Calculation (min) 27 min  -KT      Total Timed Code Minutes- OT 10 minute(s)  -KT      OT Non-Billable Time (min) 17 min  -KT      OT Received On 05/23/25  -KT      OT - Next Appointment 05/26/25  -KT      OT Goal Re-Cert Due Date 06/06/25  -KT                User Key  (r) = Recorded By, (t) = Taken By, (c) = Cosigned By      Initials Name Provider Type    KT Candi Marc OT Occupational Therapist                  Therapy Charges for Today       Code Description Service Date Service Provider Modifiers Qty    [id number]  OT THERAPEUTIC ACT EA 15 MIN 5/23/2025 Candi Marc OT GO 1    [id number]  OT EVAL MOD COMPLEXITY 3 5/23/2025 Candi Marc OT GO 1                 Candi Marc OT  5/23/2025

## 2025-05-24 ENCOUNTER — APPOINTMENT (OUTPATIENT)
Dept: GENERAL RADIOLOGY | Facility: HOSPITAL | Age: 80
End: 2025-05-24
Payer: MEDICARE

## 2025-05-24 LAB
ANION GAP SERPL CALCULATED.3IONS-SCNC: 9.7 MMOL/L (ref 5–15)
BUN SERPL-MCNC: 16 MG/DL (ref 8–23)
BUN/CREAT SERPL: 18 (ref 7–25)
CALCIUM SPEC-SCNC: 8.9 MG/DL (ref 8.6–10.5)
CHLORIDE SERPL-SCNC: 102 MMOL/L (ref 98–107)
CO2 SERPL-SCNC: 23.3 MMOL/L (ref 22–29)
CREAT SERPL-MCNC: 0.89 MG/DL (ref 0.57–1)
DEPRECATED RDW RBC AUTO: 47.5 FL (ref 37–54)
EGFRCR SERPLBLD CKD-EPI 2021: 66.5 ML/MIN/1.73
ERYTHROCYTE [DISTWIDTH] IN BLOOD BY AUTOMATED COUNT: 13.8 % (ref 12.3–15.4)
GLUCOSE BLDC GLUCOMTR-MCNC: 134 MG/DL (ref 70–105)
GLUCOSE BLDC GLUCOMTR-MCNC: 153 MG/DL (ref 70–105)
GLUCOSE BLDC GLUCOMTR-MCNC: 155 MG/DL (ref 70–105)
GLUCOSE BLDC GLUCOMTR-MCNC: 159 MG/DL (ref 70–105)
GLUCOSE SERPL-MCNC: 165 MG/DL (ref 65–99)
HCT VFR BLD AUTO: 35.3 % (ref 34–46.6)
HGB BLD-MCNC: 11.3 G/DL (ref 12–15.9)
MCH RBC QN AUTO: 30 PG (ref 26.6–33)
MCHC RBC AUTO-ENTMCNC: 32 G/DL (ref 31.5–35.7)
MCV RBC AUTO: 93.6 FL (ref 79–97)
PLATELET # BLD AUTO: 239 10*3/MM3 (ref 140–450)
PMV BLD AUTO: 10.6 FL (ref 6–12)
POTASSIUM SERPL-SCNC: 4 MMOL/L (ref 3.5–5.2)
RBC # BLD AUTO: 3.77 10*6/MM3 (ref 3.77–5.28)
SODIUM SERPL-SCNC: 135 MMOL/L (ref 136–145)
WBC NRBC COR # BLD AUTO: 15.69 10*3/MM3 (ref 3.4–10.8)

## 2025-05-24 PROCEDURE — 99232 SBSQ HOSP IP/OBS MODERATE 35: CPT | Performed by: INTERNAL MEDICINE

## 2025-05-24 PROCEDURE — 85027 COMPLETE CBC AUTOMATED: CPT | Performed by: NURSE PRACTITIONER

## 2025-05-24 PROCEDURE — 82948 REAGENT STRIP/BLOOD GLUCOSE: CPT

## 2025-05-24 PROCEDURE — 25010000002 BUMETANIDE PER 0.5 MG

## 2025-05-24 PROCEDURE — 93005 ELECTROCARDIOGRAM TRACING: CPT | Performed by: THORACIC SURGERY (CARDIOTHORACIC VASCULAR SURGERY)

## 2025-05-24 PROCEDURE — 63710000001 METHYLPREDNISOLONE PER 4 MG: Performed by: NURSE PRACTITIONER

## 2025-05-24 PROCEDURE — 71045 X-RAY EXAM CHEST 1 VIEW: CPT

## 2025-05-24 PROCEDURE — 80048 BASIC METABOLIC PNL TOTAL CA: CPT | Performed by: NURSE PRACTITIONER

## 2025-05-24 PROCEDURE — 63710000001 INSULIN LISPRO (HUMAN) PER 5 UNITS

## 2025-05-24 PROCEDURE — 25010000002 ENOXAPARIN PER 10 MG: Performed by: NURSE PRACTITIONER

## 2025-05-24 RX ORDER — GUAIFENESIN 600 MG/1
600 TABLET, EXTENDED RELEASE ORAL EVERY 12 HOURS SCHEDULED
Status: DISCONTINUED | OUTPATIENT
Start: 2025-05-24 | End: 2025-05-27 | Stop reason: HOSPADM

## 2025-05-24 RX ORDER — DILTIAZEM HCL 60 MG
60 TABLET ORAL EVERY 8 HOURS SCHEDULED
Status: DISCONTINUED | OUTPATIENT
Start: 2025-05-24 | End: 2025-05-27 | Stop reason: HOSPADM

## 2025-05-24 RX ORDER — GUAIFENESIN AND DEXTROMETHORPHAN HYDROBROMIDE 600; 30 MG/1; MG/1
1 TABLET, EXTENDED RELEASE ORAL 2 TIMES DAILY PRN
Status: DISCONTINUED | OUTPATIENT
Start: 2025-05-24 | End: 2025-05-24

## 2025-05-24 RX ORDER — BUMETANIDE 0.25 MG/ML
1 INJECTION, SOLUTION INTRAMUSCULAR; INTRAVENOUS DAILY
Status: COMPLETED | OUTPATIENT
Start: 2025-05-24 | End: 2025-05-26

## 2025-05-24 RX ADMIN — GUAIFENESIN 600 MG: 600 TABLET, EXTENDED RELEASE ORAL at 21:29

## 2025-05-24 RX ADMIN — SENNOSIDES AND DOCUSATE SODIUM 2 TABLET: 50; 8.6 TABLET ORAL at 20:04

## 2025-05-24 RX ADMIN — INSULIN LISPRO 2 UNITS: 100 INJECTION, SOLUTION INTRAVENOUS; SUBCUTANEOUS at 08:23

## 2025-05-24 RX ADMIN — HYDROCODONE BITARTRATE AND ACETAMINOPHEN 1 TABLET: 7.5; 325 TABLET ORAL at 08:36

## 2025-05-24 RX ADMIN — DILTIAZEM HYDROCHLORIDE 60 MG: 60 TABLET, FILM COATED ORAL at 08:23

## 2025-05-24 RX ADMIN — SENNOSIDES AND DOCUSATE SODIUM 2 TABLET: 50; 8.6 TABLET ORAL at 08:23

## 2025-05-24 RX ADMIN — PANTOPRAZOLE SODIUM 40 MG: 40 TABLET, DELAYED RELEASE ORAL at 08:23

## 2025-05-24 RX ADMIN — LEVOTHYROXINE SODIUM 150 MCG: 0.15 TABLET ORAL at 05:53

## 2025-05-24 RX ADMIN — CYCLOBENZAPRINE HYDROCHLORIDE 10 MG: 10 TABLET, FILM COATED ORAL at 17:18

## 2025-05-24 RX ADMIN — SPIRONOLACTONE 25 MG: 25 TABLET ORAL at 08:23

## 2025-05-24 RX ADMIN — ATORVASTATIN CALCIUM 10 MG: 10 TABLET ORAL at 20:03

## 2025-05-24 RX ADMIN — MUPIROCIN 1 APPLICATION: 20 OINTMENT TOPICAL at 20:03

## 2025-05-24 RX ADMIN — BUMETANIDE 1 MG: 0.25 INJECTION INTRAMUSCULAR; INTRAVENOUS at 11:33

## 2025-05-24 RX ADMIN — METHYLPREDNISOLONE 4 MG: 4 TABLET ORAL at 14:01

## 2025-05-24 RX ADMIN — INSULIN LISPRO 2 UNITS: 100 INJECTION, SOLUTION INTRAVENOUS; SUBCUTANEOUS at 20:00

## 2025-05-24 RX ADMIN — DILTIAZEM HYDROCHLORIDE 60 MG: 60 TABLET, FILM COATED ORAL at 20:01

## 2025-05-24 RX ADMIN — ENOXAPARIN SODIUM 40 MG: 100 INJECTION SUBCUTANEOUS at 17:19

## 2025-05-24 RX ADMIN — METHYLPREDNISOLONE 4 MG: 4 TABLET ORAL at 17:19

## 2025-05-24 RX ADMIN — INSULIN LISPRO 2 UNITS: 100 INJECTION, SOLUTION INTRAVENOUS; SUBCUTANEOUS at 17:18

## 2025-05-24 RX ADMIN — POLYETHYLENE GLYCOL 3350 17 G: 17 POWDER, FOR SOLUTION ORAL at 08:23

## 2025-05-24 RX ADMIN — MUPIROCIN 1 APPLICATION: 20 OINTMENT TOPICAL at 08:23

## 2025-05-24 RX ADMIN — ASPIRIN 81 MG: 81 TABLET, COATED ORAL at 08:23

## 2025-05-24 RX ADMIN — METHYLPREDNISOLONE 4 MG: 4 TABLET ORAL at 08:23

## 2025-05-24 RX ADMIN — POLYETHYLENE GLYCOL 3350 17 G: 17 POWDER, FOR SOLUTION ORAL at 21:24

## 2025-05-24 RX ADMIN — METHYLPREDNISOLONE 4 MG: 4 TABLET ORAL at 20:04

## 2025-05-24 RX ADMIN — LATANOPROST 1 DROP: 50 SOLUTION/ DROPS OPHTHALMIC at 20:05

## 2025-05-24 NOTE — PROGRESS NOTES
" LOS: 2 days   Patient Care Team:  Latia Guerrier MD as PCP - General  Latia Guerrier MD as PCP - Family Medicine  Dre Case MD (Cardiology)  Jet Godwin MD as Consulting Physician (Ophthalmology)  Antonio Fine MD as Consulting Physician (Allergy)  Sandor Astorga MD as Consulting Physician (Orthopedic Surgery)  Sam Carlos MD as Consulting Physician (Cardiology)    Chief Complaint:       Subjective      Vital Signs  Temp:  [97.6 °F (36.4 °C)-99 °F (37.2 °C)] 98.4 °F (36.9 °C)  Heart Rate:  [] 107  Resp:  [15-28] 18  BP: ()/(46-75) 127/65  Body mass index is 37.43 kg/m².    Intake/Output Summary (Last 24 hours) at 5/24/2025 0943  Last data filed at 5/24/2025 0619  Gross per 24 hour   Intake 580 ml   Output 860 ml   Net -280 ml     No intake/output data recorded.      Wt Readings from Last 3 Encounters:   05/24/25 102 kg (224 lb 14.4 oz)   05/20/25 100 kg (221 lb 6.4 oz)   04/14/25 99.3 kg (219 lb)         Objective:  Vital signs: (most recent): Blood pressure 127/65, pulse 107, temperature 98.4 °F (36.9 °C), temperature source Oral, resp. rate 18, height 165.1 cm (65\"), weight 102 kg (224 lb 14.4 oz), SpO2 95%.                Results Review:        WBC No results found for: \"WBCS\"   HGB Hemoglobin   Date Value Ref Range Status   05/24/2025 11.3 (L) 12.0 - 15.9 g/dL Final   05/23/2025 10.9 (L) 12.0 - 15.9 g/dL Final   05/22/2025 12.6 12.0 - 15.9 g/dL Final   05/22/2025 11.9 (L) 12.0 - 17.0 g/dL Final   05/22/2025 11.6 (L) 12.0 - 17.0 g/dL Final      HCT Hematocrit   Date Value Ref Range Status   05/24/2025 35.3 34.0 - 46.6 % Final   05/23/2025 34.0 34.0 - 46.6 % Final   05/22/2025 38.8 34.0 - 46.6 % Final   05/22/2025 35 (L) 38 - 51 % Final   05/22/2025 34 (L) 38 - 51 % Final      Platelets No results found for: \"LABPLAT\"     PT/INR:    Protime   Date Value Ref Range Status   05/23/2025 15.4 (H) 11.7 - 14.2 Seconds Final   05/22/2025 15.5 (H) 11.7 - " "14.2 Seconds Final   /  INR   Date Value Ref Range Status   05/23/2025 1.22 (H) 0.90 - 1.10 Final   05/22/2025 1.24 (H) 0.90 - 1.10 Final       Sodium Sodium   Date Value Ref Range Status   05/24/2025 135 (L) 136 - 145 mmol/L Final   05/23/2025 138 136 - 145 mmol/L Final   05/22/2025 140 136 - 145 mmol/L Final      Potassium Potassium   Date Value Ref Range Status   05/24/2025 4.0 3.5 - 5.2 mmol/L Final   05/23/2025 4.4 3.5 - 5.2 mmol/L Final   05/22/2025 4.3 3.5 - 5.2 mmol/L Final      Chloride Chloride   Date Value Ref Range Status   05/24/2025 102 98 - 107 mmol/L Final   05/23/2025 103 98 - 107 mmol/L Final   05/22/2025 107 98 - 107 mmol/L Final      Bicarbonate No results found for: \"PLASMABICARB\"   BUN BUN   Date Value Ref Range Status   05/24/2025 16 8 - 23 mg/dL Final   05/23/2025 18 8 - 23 mg/dL Final   05/22/2025 15 8 - 23 mg/dL Final      Creatinine Creatinine   Date Value Ref Range Status   05/24/2025 0.89 0.57 - 1.00 mg/dL Final   05/23/2025 1.05 (H) 0.57 - 1.00 mg/dL Final   05/22/2025 0.89 0.57 - 1.00 mg/dL Final      Calcium Calcium   Date Value Ref Range Status   05/24/2025 8.9 8.6 - 10.5 mg/dL Final   05/23/2025 9.3 8.6 - 10.5 mg/dL Final   05/22/2025 9.1 8.6 - 10.5 mg/dL Final      Magnesium Magnesium   Date Value Ref Range Status   05/23/2025 2.4 1.6 - 2.4 mg/dL Final         .imag    aspirin, 81 mg, Oral, Daily  atorvastatin, 10 mg, Oral, Nightly  dilTIAZem, 60 mg, Oral, Q12H  enoxaparin sodium, 40 mg, Subcutaneous, Daily  insulin lispro, 2-7 Units, Subcutaneous, 4x Daily AC & at Bedtime  latanoprost, 1 drop, Both Eyes, Nightly  levothyroxine, 150 mcg, Oral, Q AM  [Held by provider] losartan, 25 mg, Oral, Q24H  methylPREDNISolone, 4 mg, Oral, 4x Daily Taper  [START ON 5/25/2025] methylPREDNISolone, 4 mg, Oral, TID Around Food  [START ON 5/26/2025] methylPREDNISolone, 4 mg, Oral, Before Breakfast  [START ON 5/26/2025] methylPREDNISolone, 4 mg, Oral, Tonight  [START ON 5/27/2025] " methylPREDNISolone, 4 mg, Oral, Before Breakfast  mupirocin, 1 Application, Each Nare, BID  pantoprazole, 40 mg, Oral, QAM  polyethylene glycol, 17 g, Oral, BID  senna-docusate sodium, 2 tablet, Oral, BID  spironolactone, 25 mg, Oral, Daily               Paroxysmal atrial fibrillation      Assessment & Plan    Paroxysmal atrial fibrillation on chronic AC (last dose Eliquis 5/19)- S/P Convergent procedure on 5/22 with Dr. Choi  Palpitations  Hypertension  Hypothyroidism  History of SHANAE with CPAP compliance    POD # 2.  Some a few episodes.  Cardiology following.  I will let them adjust the antiarrhythmic medication.  Chest tube still with high output will reassess tomorrow.  Keep an extra Bumex dose. On ASA/Statin/lovenox for VTE- Was on Eliquis prior to surgery, currently held until we can DC the chest tube.     Lenny Choi MD  05/24/25  09:43 EDT

## 2025-05-24 NOTE — PLAN OF CARE
Problem: Adult Inpatient Plan of Care  Goal: Plan of Care Review  Outcome: Progressing  Goal: Patient-Specific Goal (Individualized)  Outcome: Progressing  Goal: Absence of Hospital-Acquired Illness or Injury  Outcome: Progressing  Intervention: Identify and Manage Fall Risk  Recent Flowsheet Documentation  Taken 5/24/2025 1600 by Coretta Yuan RN  Safety Promotion/Fall Prevention: safety round/check completed  Taken 5/24/2025 1400 by Coretta Yuan RN  Safety Promotion/Fall Prevention: safety round/check completed  Taken 5/24/2025 1200 by Coretta Yuan RN  Safety Promotion/Fall Prevention: safety round/check completed  Taken 5/24/2025 1000 by Coretta Yuan RN  Safety Promotion/Fall Prevention: safety round/check completed  Taken 5/24/2025 0800 by Coretta Yuan RN  Safety Promotion/Fall Prevention: safety round/check completed  Intervention: Prevent Skin Injury  Recent Flowsheet Documentation  Taken 5/24/2025 1600 by Coretta Yuan RN  Body Position: position changed independently  Taken 5/24/2025 1400 by Coretta Yuan RN  Body Position: position changed independently  Taken 5/24/2025 1200 by Coretta Yuan RN  Body Position: position changed independently  Taken 5/24/2025 1000 by Coretta Yuan RN  Body Position: position changed independently  Taken 5/24/2025 0800 by Coretta Yuan RN  Body Position: position changed independently  Skin Protection: incontinence pads utilized  Intervention: Prevent and Manage VTE (Venous Thromboembolism) Risk  Recent Flowsheet Documentation  Taken 5/24/2025 0800 by Coretta Yuan RN  VTE Prevention/Management: (lovenox) SCDs (sequential compression devices) off  Intervention: Prevent Infection  Recent Flowsheet Documentation  Taken 5/24/2025 0800 by Coretta Yuan RN  Infection Prevention: hand hygiene promoted  Goal: Optimal Comfort and Wellbeing  Outcome: Progressing  Goal: Readiness for Transition of Care  Outcome: Progressing   Goal Outcome Evaluation:

## 2025-05-24 NOTE — PROGRESS NOTES
[facility] CARDIOLOGY  [facility]        LOS:  LOS: 2 days   Patient Name: Sheree Vance  Age/Sex: 80 y.o. female  : 1945  MRN: [id number]    Day of Service: 25   Length of Stay: 2  Encounter Provider: Richard Burgos MD  Place of Service: [facility] CARDIOLOGY  Patient Care Team:  Latia Guerrier MD as PCP - General  Latia Guerrier MD as PCP - Family Medicine  Dre Case MD (Cardiology)  Jet Godwin MD as Consulting Physician (Ophthalmology)  Antonio Fine MD as Consulting Physician (Allergy)  Sandor Astorga MD as Consulting Physician (Orthopedic Surgery)  Sam Carlos MD as Consulting Physician (Cardiology)    Subjective:     Chief Complaint:  F/U AF    Subjective:   Patient c/o left lateral chest pain overnight now resolved except with cough.  Sitting up in chair.  Denies SOA.    Current Medications:   Scheduled Meds:aspirin, 81 mg, Oral, Daily  atorvastatin, 10 mg, Oral, Nightly  bumetanide, 1 mg, Intravenous, Daily  dilTIAZem, 60 mg, Oral, Q12H  enoxaparin sodium, 40 mg, Subcutaneous, Daily  insulin lispro, 2-7 Units, Subcutaneous, 4x Daily AC & at Bedtime  latanoprost, 1 drop, Both Eyes, Nightly  levothyroxine, 150 mcg, Oral, Q AM  [Held by provider] losartan, 25 mg, Oral, Q24H  methylPREDNISolone, 4 mg, Oral, 4x Daily Taper  [START ON 2025] methylPREDNISolone, 4 mg, Oral, TID Around Food  [START ON 2025] methylPREDNISolone, 4 mg, Oral, Before Breakfast  [START ON 2025] methylPREDNISolone, 4 mg, Oral, Tonight  [START ON 2025] methylPREDNISolone, 4 mg, Oral, Before Breakfast  mupirocin, 1 Application, Each Nare, BID  pantoprazole, 40 mg, Oral, QAM  polyethylene glycol, 17 g, Oral, BID  senna-docusate sodium, 2 tablet, Oral, BID  spironolactone, 25 mg, Oral, Daily      Continuous Infusions:       Allergies:  No Known Allergies    Review of Systems    Constitutional: Negative for chills, decreased appetite and malaise/fatigue.   HENT:  Negative for congestion and nosebleeds.    Eyes:  Negative for blurred vision and double vision.   Cardiovascular:  Negative for chest pain, dyspnea on exertion, irregular heartbeat, leg swelling, near-syncope, orthopnea, palpitations and paroxysmal nocturnal dyspnea.   Respiratory:  Negative for cough and shortness of breath.    Hematologic/Lymphatic: Negative for adenopathy. Does not bruise/bleed easily.   Skin:  Negative for color change and rash.   Musculoskeletal:  Negative for joint pain.   Gastrointestinal:  Negative for bloating, abdominal pain, hematemesis and hematochezia.   Genitourinary:  Negative for flank pain and hematuria.   Neurological:  Negative for dizziness and focal weakness.   Psychiatric/Behavioral:  Negative for altered mental status. The patient does not have insomnia.        Objective:     Temp:  [97.6 °F (36.4 °C)-99 °F (37.2 °C)] 98.4 °F (36.9 °C)  Heart Rate:  [] 80  Resp:  [15-28] 18  BP: ()/(46-77) 119/77     Intake/Output Summary (Last 24 hours) at 5/24/2025 1029  Last data filed at 5/24/2025 0619  Gross per 24 hour   Intake 580 ml   Output 860 ml   Net -280 ml     Body mass index is 37.43 kg/m².      05/22/25  0526 05/23/25  0605 05/24/25  0441   Weight: 97.7 kg (215 lb 6.2 oz) 101 kg (222 lb 4.8 oz) 102 kg (224 lb 14.4 oz)         Physical Exam:  Neuro:  CV:  Resp:  GI:  Ext:  Tele: AAOx3, no gross deficits  S1S2 RRR, no murmur  Nonlabored, faint wheeze  BS+, abd soft  Pedal pulses palp, no edema  SR                                                   Lab Review:   Results from last 7 days   Lab Units 05/24/25  0434 05/23/25  0604 05/22/25  1153 05/20/25  0810   SODIUM mmol/L 135* 138   < > 138   POTASSIUM mmol/L 4.0 4.4   < > 4.2   CHLORIDE mmol/L 102 103   < > 102   CO2 mmol/L 23.3 21.8*   < > 24.4   BUN mg/dL 16 18   < > 16   CREATININE mg/dL 0.89 1.05*   < > 0.96   GLUCOSE mg/dL  "165* 152*   < > 107*   CALCIUM mg/dL 8.9 9.3   < > 9.7   AST (SGOT) U/L  --   --   --  24   ALT (SGPT) U/L  --   --   --  13    < > = values in this interval not displayed.         Results from last 7 days   Lab Units 05/24/25  0434 05/23/25  0604   WBC 10*3/mm3 15.69* 13.36*   HEMOGLOBIN g/dL 11.3* 10.9*   HEMATOCRIT % 35.3 34.0   PLATELETS 10*3/mm3 239 228     Results from last 7 days   Lab Units 05/23/25  0604 05/22/25  1153 05/20/25  0810   INR  1.22* 1.24* 1.27*   APTT seconds  --  30.3 29.6     Results from last 7 days   Lab Units 05/23/25  0604   MAGNESIUM mg/dL 2.4           Invalid input(s): \"LDLCALC\"            Recent Radiology:  Imaging Results (Most Recent)       Procedure Component Value Units Date/Time    XR Chest 1 View [[id number]] Collected: 05/24/25 0759     Updated: 05/24/25 0802    Narrative:      XR CHEST 1 VW    Date of Exam: 5/24/2025 5:17 AM EDT    Indication: Post-Op Heart Surgery    Comparison: 5/23/2025 at 0306 hours    Findings:  Postoperative changes are noted. The right IJ venous sheath and central line have been removed. Residual left pleural effusion is seen. There is underlying atelectasis within the left lung base. The cardiac silhouette and mediastinum are stable. Residual   air is noted in the soft tissues overlying the left chest.      Impression:      Impression:  1.Postoperative changes are noted. The right IJ venous sheath and central line have been removed.  2.Residual left pleural effusion with underlying atelectasis within the left lung base.        Electronically Signed: Abhi Coyne MD    5/24/2025 8:00 AM EDT    Workstation ID: [id number]    XR Chest 1 View [[id number]] Collected: 05/23/25 0716     Updated: 05/23/25 0720    Narrative:      XR CHEST 1 VW    Date of Exam: 5/23/2025 3:17 AM EDT    Indication: Post-Op Heart Surgery    Comparison: 5/22/2025    Findings:  Right IJ central venous catheter tip is at the low SVC. Stable cardiomegaly. Persistent left basilar " airspace disease and small left pleural effusion. No discrete pneumothorax. Subcutaneous emphysema related to recent chest wall of the left lateral soft   tissues. Left atrial appendage clip again noted. No significant consolidation or effusion on the right.      Impression:      Impression:  1. Stable right IJ central venous catheter.  2. Persistent left basilar airspace disease and small left pleural effusion.  3. No pneumothorax.          Electronically Signed: Fernandez Anand MD    5/23/2025 7:18 AM EDT    Workstation ID: [id number]    XR Chest 1 View [[id number]] Collected: 05/22/25 1213     Updated: 05/22/25 1218    Narrative:      XR CHEST 1 VW    Date of Exam: 5/22/2025 11:55 AM EDT    Indication: Post-Op Check Line & Tube Placement    Comparison: CT chest 5/20/2025.    Findings:  Left basilar opacity which may represent combination of small left pleural effusion and atelectasis or pneumonia is a new finding. Presumed left atrial appendage ligation, new since prior. Mild cardiac enlargement.. Right IJ central line extends to the   mid SVC level. Transcutaneous pacer pad projects over the left upper thorax. No pneumothorax is identified. There is mild left chest wall subcutaneous gas. No acute osseous abnormality. Mild upper thoracic curvature toward the left.      Impression:      Impression:    1. New left atrial appendage ligation changes.  2. Right IJ central line tip extends to the mid SVC level.  3. Left basilar opacity favored to represent small left pleural effusion and left basilar atelectasis.      Electronically Signed: Carmen Salvador MD    5/22/2025 12:16 PM EDT    Workstation ID: [id number]    FL < 1 Hour [[id number]] Resulted: 05/22/25 0903     Updated: 05/22/25 0903    Narrative:      This procedure was auto-finalized with no dictation required.            ECHOCARDIOGRAM:    Results for orders placed in visit on 05/22/25    Intra-Op Anesthesia SARA    Narrative  Intra-Op Anesthesia SARA    Procedure  Performed: Intra-Op Anesthesia SARA  Start Time:  End Time:    Preanesthesia Checklist:  Patient identified, IV assessed, risks and benefits discussed, monitors and equipment assessed, procedure being performed at surgeon's request and anesthesia consent obtained.    General Procedure Information  Diagnostic Indications for Echo:  assessment of surgical repair and hemodynamic monitoring  Location performed:  OR  Intubated  Bite block placed  Heart visualized  Probe Insertion:  Easy  Probe Type:  Biplane and multiplane  Modalities:  Color flow mapping, pulse wave Doppler and continuous wave Doppler    Echocardiographic and Doppler Measurements    Ventricles    Right Ventricle:  Cavity size normal.  Hypertrophy not present.  Thrombus not present.  Global function normal.  Ejection Fraction 60%.  Left Ventricle:  Cavity size normal.  Thrombus not present.  Global Function normal.  Ejection Fraction 60%.    Ventricular Regional Function:  1- Basal Anteroseptal:  normal  2- Basal Anterior:  normal  3- Basal Anterolateral:  normal  4- Basal Inferolateral:  normal  5- Basal Inferior:  normal  6- Basal Inferoseptal:  normal  7- Mid Anteroseptal:  normal  8- Mid Anterior:  normal  9- Mid Anterolateral:  normal  10- Mid Inferolateral:  normal  11- Mid Inferior:  normal  12- Mid Inferoseptal:  normal  13- Apical Anterior:  normal  14- Apical Lateral:  normal  15- Apical Inferior:  normal  16- Apical Septal:  normal  17- [place]:  normal      Valves    Aortic Valve:  Annulus normal.  Stenosis not present.  Regurgitation absent.  Leaflets normal.  Leaflet motions normal.    Mitral Valve:  Annulus normal.  Stenosis not present.  Regurgitation absent.  Leaflets normal.  Leaflet motions normal.    Tricuspid Valve:  Annulus normal.  Stenosis not present.  Regurgitation absent.  Leaflets normal.  Leaflet motions normal.  Pulmonic Valve:  Annulus normal.  Stenosis not present.  Regurgitation absent.      Aorta    Ascending Aorta:  Size  normal.  Dissection not present.  Plaque thickness less than 3 mm.  Mobile plaque not present.  Aortic Arch:  Size normal.  Dissection not present.  Plaque thickness less than 3 mm.  Mobile plaque not present.  Descending Aorta:  Size normal.  Dissection not present.  Plaque thickness less than 3 mm.  Mobile plaque not present.      Atria    Right Atrium:  Size normal.  Left Atrium:  Size normal.  Spontaneous echo contrast not present.  Thrombus not present.  Tumor not present.  Device not present.  Left atrial appendage normal.      Septa        Ventricular Septum:  Intra-ventricular septum morphology normal.        Other Findings  Pleural Effusion:  none  Pulmonary Arteries:  normal      Anesthesia Information  Performed Personally  Anesthesiologist:  Javon Sandra MD      Echocardiogram Comments:  SARA PLACED EASILY NO RESISTANCE , LUBRICATED , BITE GUARD      LV NO WMA NO CLOT SEEN EF 60  RV NL SF  MV WNL  TV WNL  AV WNL  TAHIR FREE OF CLOT , CX VISIBLE AT BASE        I reviewed the patient's new clinical results.    EKG:      Assessment:       Paroxysmal atrial fibrillation    1) PAF s/p convergence procedure with atri-clip 5/22/25  - s/p redo ablation 3/2025 with early reoccurrence   - failed tikosyn  - eliquis on hold perioperatively    2) CAD  - cath 4/2025 showed mild CAD     3) Hx CVA     4) HTN     5) HLD     6) hypothyroidism     7)  SHANAE     8) chronic back pain s/p spinal fusion    Patient is back in atrial arrhythmias with intermittent rapid ventricular response  Denies any new complaints  Chest tube in place  Postop day 2 status post convergence procedure  Patient is currently on aspirin statin and Lovenox  Plans to resume anticoagulation therapy before discharge once the chest tube is taken out  Tmax is 97.8 pulse is 80-1 28 respirations are 16 blood pressure 108/53 to 128/65 sats are 97%  Sodium is 135 potassium is 4.0 creatinine 0.89 white cell count is 15,000 hemoglobin is 11.3  Twelve-lead EKG  shows atrial flutter with ventricular rate of 129 bpm with a normal QT interval  Patient current medications include Lipitor 10 mg p.o. once a day patient is on Cardizem 60 mg p.o. every 12 hours  Patient is on Aldactone 25 mg p.o. once a day  Losartan being on hold secondary to low blood pressure  Patient is on Lovenox for DVT prophylaxis  Discussed with patient at bedside  Further recommendation based on patient course

## 2025-05-24 NOTE — CASE MANAGEMENT/SOCIAL WORK
Continued Stay Note  [facility]     Patient Name: Sheree Vance  MRN: [id number]  Today's Date: 5/24/2025    Admit Date: 5/22/2025    Plan: Plan to return home with spouse. Convergent OHS 5/22/25.   Discharge Plan       Row Name 05/24/25 0812       Plan    Plan Plan to return home with spouse. Convergent OHS 5/22/25.    Plan Comments PT recommending home with assist.             Makenna Chamberlain RN BSN  Weekend   [facility]  Phone: [phone]  Fax: [phone]

## 2025-05-25 ENCOUNTER — APPOINTMENT (OUTPATIENT)
Dept: GENERAL RADIOLOGY | Facility: HOSPITAL | Age: 80
End: 2025-05-25
Payer: MEDICARE

## 2025-05-25 LAB
ANION GAP SERPL CALCULATED.3IONS-SCNC: 9.9 MMOL/L (ref 5–15)
BUN SERPL-MCNC: 20 MG/DL (ref 8–23)
BUN/CREAT SERPL: 27.4 (ref 7–25)
CALCIUM SPEC-SCNC: 9.4 MG/DL (ref 8.6–10.5)
CHLORIDE SERPL-SCNC: 103 MMOL/L (ref 98–107)
CO2 SERPL-SCNC: 25.1 MMOL/L (ref 22–29)
CREAT SERPL-MCNC: 0.73 MG/DL (ref 0.57–1)
DEPRECATED RDW RBC AUTO: 47.6 FL (ref 37–54)
EGFRCR SERPLBLD CKD-EPI 2021: 84.3 ML/MIN/1.73
ERYTHROCYTE [DISTWIDTH] IN BLOOD BY AUTOMATED COUNT: 13.9 % (ref 12.3–15.4)
GLUCOSE BLDC GLUCOMTR-MCNC: 114 MG/DL (ref 70–105)
GLUCOSE BLDC GLUCOMTR-MCNC: 125 MG/DL (ref 70–105)
GLUCOSE BLDC GLUCOMTR-MCNC: 145 MG/DL (ref 70–105)
GLUCOSE BLDC GLUCOMTR-MCNC: 163 MG/DL (ref 70–105)
GLUCOSE SERPL-MCNC: 155 MG/DL (ref 65–99)
HCT VFR BLD AUTO: 32.8 % (ref 34–46.6)
HGB BLD-MCNC: 10.6 G/DL (ref 12–15.9)
MCH RBC QN AUTO: 29.6 PG (ref 26.6–33)
MCHC RBC AUTO-ENTMCNC: 32.3 G/DL (ref 31.5–35.7)
MCV RBC AUTO: 91.6 FL (ref 79–97)
PLATELET # BLD AUTO: 232 10*3/MM3 (ref 140–450)
PMV BLD AUTO: 11.3 FL (ref 6–12)
POTASSIUM SERPL-SCNC: 4.7 MMOL/L (ref 3.5–5.2)
QT INTERVAL: 251 MS
QT INTERVAL: 387 MS
QT INTERVAL: 436 MS
QTC INTERVAL: 362 MS
QTC INTERVAL: 448 MS
QTC INTERVAL: 505 MS
RBC # BLD AUTO: 3.58 10*6/MM3 (ref 3.77–5.28)
SODIUM SERPL-SCNC: 138 MMOL/L (ref 136–145)
WBC NRBC COR # BLD AUTO: 14.3 10*3/MM3 (ref 3.4–10.8)

## 2025-05-25 PROCEDURE — 82948 REAGENT STRIP/BLOOD GLUCOSE: CPT

## 2025-05-25 PROCEDURE — 71045 X-RAY EXAM CHEST 1 VIEW: CPT

## 2025-05-25 PROCEDURE — 63710000001 METHYLPREDNISOLONE PER 4 MG

## 2025-05-25 PROCEDURE — 25010000002 DIGOXIN PER 500 MCG

## 2025-05-25 PROCEDURE — 63710000001 METHYLPREDNISOLONE PER 4 MG: Performed by: NURSE PRACTITIONER

## 2025-05-25 PROCEDURE — 99232 SBSQ HOSP IP/OBS MODERATE 35: CPT | Performed by: INTERNAL MEDICINE

## 2025-05-25 PROCEDURE — 25010000002 BUMETANIDE PER 0.5 MG

## 2025-05-25 PROCEDURE — 25010000002 KETOROLAC TROMETHAMINE PER 15 MG

## 2025-05-25 PROCEDURE — 97112 NEUROMUSCULAR REEDUCATION: CPT

## 2025-05-25 PROCEDURE — 93005 ELECTROCARDIOGRAM TRACING: CPT | Performed by: THORACIC SURGERY (CARDIOTHORACIC VASCULAR SURGERY)

## 2025-05-25 PROCEDURE — 93005 ELECTROCARDIOGRAM TRACING: CPT

## 2025-05-25 PROCEDURE — 63710000001 INSULIN LISPRO (HUMAN) PER 5 UNITS

## 2025-05-25 PROCEDURE — 97116 GAIT TRAINING THERAPY: CPT

## 2025-05-25 PROCEDURE — 85027 COMPLETE CBC AUTOMATED: CPT | Performed by: NURSE PRACTITIONER

## 2025-05-25 PROCEDURE — 80048 BASIC METABOLIC PNL TOTAL CA: CPT | Performed by: NURSE PRACTITIONER

## 2025-05-25 PROCEDURE — 25010000002 ENOXAPARIN PER 10 MG: Performed by: NURSE PRACTITIONER

## 2025-05-25 RX ORDER — METHYLPREDNISOLONE 4 MG/1
4 TABLET ORAL ONCE
Status: COMPLETED | OUTPATIENT
Start: 2025-05-25 | End: 2025-05-25

## 2025-05-25 RX ORDER — DIGOXIN 0.25 MG/ML
INJECTION INTRAMUSCULAR; INTRAVENOUS
Status: COMPLETED
Start: 2025-05-25 | End: 2025-05-25

## 2025-05-25 RX ORDER — KETOROLAC TROMETHAMINE 30 MG/ML
15 INJECTION, SOLUTION INTRAMUSCULAR; INTRAVENOUS EVERY 8 HOURS PRN
Status: DISCONTINUED | OUTPATIENT
Start: 2025-05-25 | End: 2025-05-26

## 2025-05-25 RX ORDER — DIGOXIN 0.25 MG/ML
250 INJECTION INTRAMUSCULAR; INTRAVENOUS EVERY 6 HOURS PRN
Status: DISCONTINUED | OUTPATIENT
Start: 2025-05-25 | End: 2025-05-27 | Stop reason: HOSPADM

## 2025-05-25 RX ORDER — DIGOXIN 0.25 MG/ML
250 INJECTION INTRAMUSCULAR; INTRAVENOUS ONCE
Status: COMPLETED | OUTPATIENT
Start: 2025-05-25 | End: 2025-05-25

## 2025-05-25 RX ADMIN — GUAIFENESIN 600 MG: 600 TABLET, EXTENDED RELEASE ORAL at 21:08

## 2025-05-25 RX ADMIN — KETOROLAC TROMETHAMINE 15 MG: 30 INJECTION, SOLUTION INTRAMUSCULAR at 17:45

## 2025-05-25 RX ADMIN — ASPIRIN 81 MG: 81 TABLET, COATED ORAL at 09:09

## 2025-05-25 RX ADMIN — INSULIN LISPRO 2 UNITS: 100 INJECTION, SOLUTION INTRAVENOUS; SUBCUTANEOUS at 21:27

## 2025-05-25 RX ADMIN — PANTOPRAZOLE SODIUM 40 MG: 40 TABLET, DELAYED RELEASE ORAL at 09:09

## 2025-05-25 RX ADMIN — HYDROCODONE BITARTRATE AND ACETAMINOPHEN 1 TABLET: 7.5; 325 TABLET ORAL at 21:08

## 2025-05-25 RX ADMIN — DIGOXIN 250 MCG: 0.25 INJECTION INTRAMUSCULAR; INTRAVENOUS at 18:37

## 2025-05-25 RX ADMIN — DILTIAZEM HYDROCHLORIDE 60 MG: 60 TABLET, FILM COATED ORAL at 21:08

## 2025-05-25 RX ADMIN — METHYLPREDNISOLONE 4 MG: 4 TABLET ORAL at 09:08

## 2025-05-25 RX ADMIN — BUMETANIDE 1 MG: 0.25 INJECTION INTRAMUSCULAR; INTRAVENOUS at 09:08

## 2025-05-25 RX ADMIN — HYDROCODONE BITARTRATE AND ACETAMINOPHEN 1 TABLET: 7.5; 325 TABLET ORAL at 14:09

## 2025-05-25 RX ADMIN — METHYLPREDNISOLONE 4 MG: 4 TABLET ORAL at 18:38

## 2025-05-25 RX ADMIN — MUPIROCIN 1 APPLICATION: 20 OINTMENT TOPICAL at 09:10

## 2025-05-25 RX ADMIN — DILTIAZEM HYDROCHLORIDE 60 MG: 60 TABLET, FILM COATED ORAL at 13:09

## 2025-05-25 RX ADMIN — SPIRONOLACTONE 25 MG: 25 TABLET ORAL at 09:09

## 2025-05-25 RX ADMIN — LATANOPROST 1 DROP: 50 SOLUTION/ DROPS OPHTHALMIC at 21:12

## 2025-05-25 RX ADMIN — ENOXAPARIN SODIUM 40 MG: 100 INJECTION SUBCUTANEOUS at 17:46

## 2025-05-25 RX ADMIN — DILTIAZEM HYDROCHLORIDE 60 MG: 60 TABLET, FILM COATED ORAL at 05:17

## 2025-05-25 RX ADMIN — MUPIROCIN 1 APPLICATION: 20 OINTMENT TOPICAL at 21:08

## 2025-05-25 RX ADMIN — METHYLPREDNISOLONE 4 MG: 4 TABLET ORAL at 17:46

## 2025-05-25 RX ADMIN — DIGOXIN 250 MCG: 0.25 INJECTION INTRAMUSCULAR; INTRAVENOUS at 15:15

## 2025-05-25 RX ADMIN — LEVOTHYROXINE SODIUM 150 MCG: 0.15 TABLET ORAL at 05:16

## 2025-05-25 RX ADMIN — METHYLPREDNISOLONE 4 MG: 4 TABLET ORAL at 13:09

## 2025-05-25 RX ADMIN — GUAIFENESIN 600 MG: 600 TABLET, EXTENDED RELEASE ORAL at 09:08

## 2025-05-25 RX ADMIN — CYCLOBENZAPRINE HYDROCHLORIDE 10 MG: 10 TABLET, FILM COATED ORAL at 23:32

## 2025-05-25 RX ADMIN — CYCLOBENZAPRINE HYDROCHLORIDE 10 MG: 10 TABLET, FILM COATED ORAL at 15:16

## 2025-05-25 RX ADMIN — ATORVASTATIN CALCIUM 10 MG: 10 TABLET ORAL at 21:08

## 2025-05-25 NOTE — THERAPY TREATMENT NOTE
Subjective: Pt agreeable to therapeutic plan of care. RN asked to end  up in bed so she could pull CT soon  Objective:     Precautions - falls, CT to water seal.    Bed mobility - Supervision sit>supine  Transfers - CGA and with rolling walker  Ambulation - 100 feet CGA and with rolling walker    Vitals:  HR 97>117 with amb    Pain:   chest hurts some some she coughs at CT site  Intervention for pain: Repositioned and Therapeutic Presence    Education: Provided education on the importance of mobility in the acute care setting, Verbal/Tactile Cues, Transfer Training, and Gait Training    Assessment: Sheree Vance presents with functional mobility impairments which indicate the need for skilled intervention. Tolerating session today without incident. Pt very cooperative and motivated. Should do well at home with assist as needed.Will continue to follow and progress as tolerated.     Plan/Recommendations:   If medically appropriate, No ongoing therapy recommended post-acute care. No therapy needs. Pt requires no DME at discharge.     Pt desires Home with family assist at discharge. Pt cooperative; agreeable to therapeutic recommendations and plan of care.         Basic Mobility 6-click:  Rollin = Total, A lot = 2, A little = 3; 4 = None  Supine>Sit:   1 = Total, A lot = 2, A little = 3; 4 = None   Sit>Stand with arms:  1 = Total, A lot = 2, A little = 3; 4 = None  Bed>Chair:   1 = Total, A lot = 2, A little = 3; 4 = None  Ambulate in room:  1 = Total, A lot = 2, A little = 3; 4 = None  3-5 Steps with railin = Total, A lot = 2, A little = 3; 4 = None  Score: 20    Post-Tx Position: Supine with HOB Elevated and Call light and personal items within reach  PPE: gloves    Therapy Charges for Today       Code Description Service Date Service Provider Modifiers Qty    06060600878 HC GAIT TRAINING EA 15 MIN 2025 Marissa Yu, PTA GP 1    59758771025 HC PT NEUROMUSC RE EDUCATION EA 15 MIN  5/25/2025 Marissa Yu PTA GP 1           PT Charges       Row Name 05/25/25 1254             Time Calculation    Start Time 0935  -      Stop Time 0951  -      Time Calculation (min) 16 min  -      PT Received On 05/25/25  -      PT - Next Appointment 05/27/25  -         Time Calculation- PT    Total Timed Code Minutes- PT 16 minute(s)  -                User Key  (r) = Recorded By, (t) = Taken By, (c) = Cosigned By      Initials Name Provider Type     Marissa Yu, LELE Physical Therapist Assistant

## 2025-05-25 NOTE — PLAN OF CARE
Problem: Adult Inpatient Plan of Care  Goal: Plan of Care Review  Outcome: Progressing  Goal: Patient-Specific Goal (Individualized)  Outcome: Progressing  Goal: Absence of Hospital-Acquired Illness or Injury  Outcome: Progressing  Intervention: Identify and Manage Fall Risk  Recent Flowsheet Documentation  Taken 5/25/2025 1800 by Coretta Yuan RN  Safety Promotion/Fall Prevention:   safety round/check completed   nonskid shoes/slippers when out of bed   room organization consistent   fall prevention program maintained   clutter free environment maintained   gait belt  Taken 5/25/2025 1600 by Coretta Yuan RN  Safety Promotion/Fall Prevention: safety round/check completed  Taken 5/25/2025 1400 by Coretta Yuan RN  Safety Promotion/Fall Prevention: safety round/check completed  Taken 5/25/2025 1200 by Coretta Yuan RN  Safety Promotion/Fall Prevention: safety round/check completed  Taken 5/25/2025 1000 by Coretta Yuan RN  Safety Promotion/Fall Prevention: safety round/check completed  Taken 5/25/2025 0800 by Coretta Yuan RN  Safety Promotion/Fall Prevention: safety round/check completed  Intervention: Prevent Skin Injury  Recent Flowsheet Documentation  Taken 5/25/2025 1600 by Coretta Yuan RN  Body Position: position changed independently  Taken 5/25/2025 1400 by Coretta Yuan RN  Body Position: position changed independently  Taken 5/25/2025 1200 by Coretta Yuan RN  Body Position: position changed independently  Taken 5/25/2025 1000 by Coretta Yuan RN  Body Position: position changed independently  Taken 5/25/2025 0800 by Coretta Yuan RN  Body Position: position changed independently  Skin Protection: incontinence pads utilized  Intervention: Prevent Infection  Recent Flowsheet Documentation  Taken 5/25/2025 1800 by Coretta Yuan RN  Infection Prevention:   single patient room provided   rest/sleep promoted   hand hygiene promoted  Goal: Optimal Comfort and Wellbeing  Outcome: Progressing  Goal: Readiness  for Transition of Care  Outcome: Progressing     Problem: Comorbidity Management  Goal: Blood Pressure in Desired Range  Outcome: Progressing     Problem: Skin Injury Risk Increased  Goal: Skin Health and Integrity  Outcome: Progressing  Intervention: Optimize Skin Protection  Recent Flowsheet Documentation  Taken 5/25/2025 1800 by Coretta Yuan RN  Activity Management:   up to bedside commode   activity minimized   ambulated in room   up in chair  Taken 5/25/2025 1300 by Coretta Yuan RN  Activity Management: ambulated outside room  Taken 5/25/2025 0800 by Coretta Yuan RN  Activity Management: ambulated outside room  Pressure Reduction Techniques: frequent weight shift encouraged  Pressure Reduction Devices: pressure-redistributing mattress utilized  Skin Protection: incontinence pads utilized     Problem: Fall Injury Risk  Goal: Absence of Fall and Fall-Related Injury  Outcome: Progressing  Intervention: Promote Injury-Free Environment  Recent Flowsheet Documentation  Taken 5/25/2025 1800 by Coretta Yuan RN  Safety Promotion/Fall Prevention:   safety round/check completed   nonskid shoes/slippers when out of bed   room organization consistent   fall prevention program maintained   clutter free environment maintained   gait belt  Taken 5/25/2025 1600 by Coretta Yuan RN  Safety Promotion/Fall Prevention: safety round/check completed  Taken 5/25/2025 1400 by Coretta Yuan RN  Safety Promotion/Fall Prevention: safety round/check completed  Taken 5/25/2025 1200 by Coretta Yuan RN  Safety Promotion/Fall Prevention: safety round/check completed  Taken 5/25/2025 1000 by Coretta Yuan RN  Safety Promotion/Fall Prevention: safety round/check completed  Taken 5/25/2025 0800 by Coretta Yuan RN  Safety Promotion/Fall Prevention: safety round/check completed   Goal Outcome Evaluation:

## 2025-05-25 NOTE — PROGRESS NOTES
" LOS: 3 days   Patient Care Team:  Latia Guerrier MD as PCP - General  Latia Guerrier MD as PCP - Family Medicine  Dre Case MD (Cardiology)  Jet Godwin MD as Consulting Physician (Ophthalmology)  Antonio Fine MD as Consulting Physician (Allergy)  Sandor Astorga MD as Consulting Physician (Orthopedic Surgery)  Sam Carlos MD as Consulting Physician (Cardiology)    Chief Complaint:       Subjective      Vital Signs  Temp:  [97.6 °F (36.4 °C)-98.1 °F (36.7 °C)] 98 °F (36.7 °C)  Heart Rate:  [] 74  Resp:  [16-23] 23  BP: (100-127)/(49-85) 112/62  Body mass index is 36.74 kg/m².    Intake/Output Summary (Last 24 hours) at 5/25/2025 0946  Last data filed at 5/25/2025 0600  Gross per 24 hour   Intake 480 ml   Output 1200 ml   Net -720 ml     No intake/output data recorded.      Wt Readings from Last 3 Encounters:   05/25/25 100 kg (220 lb 12.8 oz)   05/20/25 100 kg (221 lb 6.4 oz)   04/14/25 99.3 kg (219 lb)         Objective:  Vital signs: (most recent): Blood pressure 112/62, pulse 74, temperature 98 °F (36.7 °C), temperature source Oral, resp. rate 23, height 165.1 cm (65\"), weight 100 kg (220 lb 12.8 oz), SpO2 97%.                Results Review:        WBC No results found for: \"WBCS\"   HGB Hemoglobin   Date Value Ref Range Status   05/25/2025 10.6 (L) 12.0 - 15.9 g/dL Final   05/24/2025 11.3 (L) 12.0 - 15.9 g/dL Final   05/23/2025 10.9 (L) 12.0 - 15.9 g/dL Final   05/22/2025 12.6 12.0 - 15.9 g/dL Final   05/22/2025 11.9 (L) 12.0 - 17.0 g/dL Final      HCT Hematocrit   Date Value Ref Range Status   05/25/2025 32.8 (L) 34.0 - 46.6 % Final   05/24/2025 35.3 34.0 - 46.6 % Final   05/23/2025 34.0 34.0 - 46.6 % Final   05/22/2025 38.8 34.0 - 46.6 % Final   05/22/2025 35 (L) 38 - 51 % Final      Platelets No results found for: \"LABPLAT\"     PT/INR:    Protime   Date Value Ref Range Status   05/23/2025 15.4 (H) 11.7 - 14.2 Seconds Final   05/22/2025 15.5 (H) " "11.7 - 14.2 Seconds Final   /  INR   Date Value Ref Range Status   05/23/2025 1.22 (H) 0.90 - 1.10 Final   05/22/2025 1.24 (H) 0.90 - 1.10 Final       Sodium Sodium   Date Value Ref Range Status   05/25/2025 138 136 - 145 mmol/L Final   05/24/2025 135 (L) 136 - 145 mmol/L Final   05/23/2025 138 136 - 145 mmol/L Final   05/22/2025 140 136 - 145 mmol/L Final      Potassium Potassium   Date Value Ref Range Status   05/25/2025 4.7 3.5 - 5.2 mmol/L Final   05/24/2025 4.0 3.5 - 5.2 mmol/L Final   05/23/2025 4.4 3.5 - 5.2 mmol/L Final   05/22/2025 4.3 3.5 - 5.2 mmol/L Final      Chloride Chloride   Date Value Ref Range Status   05/25/2025 103 98 - 107 mmol/L Final   05/24/2025 102 98 - 107 mmol/L Final   05/23/2025 103 98 - 107 mmol/L Final   05/22/2025 107 98 - 107 mmol/L Final      Bicarbonate No results found for: \"PLASMABICARB\"   BUN BUN   Date Value Ref Range Status   05/25/2025 20 8 - 23 mg/dL Final   05/24/2025 16 8 - 23 mg/dL Final   05/23/2025 18 8 - 23 mg/dL Final   05/22/2025 15 8 - 23 mg/dL Final      Creatinine Creatinine   Date Value Ref Range Status   05/25/2025 0.73 0.57 - 1.00 mg/dL Final   05/24/2025 0.89 0.57 - 1.00 mg/dL Final   05/23/2025 1.05 (H) 0.57 - 1.00 mg/dL Final   05/22/2025 0.89 0.57 - 1.00 mg/dL Final      Calcium Calcium   Date Value Ref Range Status   05/25/2025 9.4 8.6 - 10.5 mg/dL Final   05/24/2025 8.9 8.6 - 10.5 mg/dL Final   05/23/2025 9.3 8.6 - 10.5 mg/dL Final   05/22/2025 9.1 8.6 - 10.5 mg/dL Final      Magnesium Magnesium   Date Value Ref Range Status   05/23/2025 2.4 1.6 - 2.4 mg/dL Final         .imag    aspirin, 81 mg, Oral, Daily  atorvastatin, 10 mg, Oral, Nightly  bumetanide, 1 mg, Intravenous, Daily  dilTIAZem, 60 mg, Oral, Q8H  enoxaparin sodium, 40 mg, Subcutaneous, Daily  guaiFENesin, 600 mg, Oral, Q12H  insulin lispro, 2-7 Units, Subcutaneous, 4x Daily AC & at Bedtime  latanoprost, 1 drop, Both Eyes, Nightly  levothyroxine, 150 mcg, Oral, Q AM  [Held by provider] " losartan, 25 mg, Oral, Q24H  methylPREDNISolone, 4 mg, Oral, TID Around Food  [START ON 5/26/2025] methylPREDNISolone, 4 mg, Oral, Before Breakfast  [START ON 5/26/2025] methylPREDNISolone, 4 mg, Oral, Tonight  [START ON 5/27/2025] methylPREDNISolone, 4 mg, Oral, Before Breakfast  mupirocin, 1 Application, Each Nare, BID  pantoprazole, 40 mg, Oral, QAM  polyethylene glycol, 17 g, Oral, BID  senna-docusate sodium, 2 tablet, Oral, BID  spironolactone, 25 mg, Oral, Daily               Paroxysmal atrial fibrillation      Assessment & Plan    Paroxysmal atrial fibrillation on chronic AC (last dose Eliquis 5/19)- S/P Convergent procedure on 5/22 with Dr. Choi  Palpitations  Hypertension  Hypothyroidism  History of SHANAE with CPAP compliance     POD # 3. Some a few episodes of afib. Better after the increase dose of cardizem.  Cardiology following.DC chest tubes. Keep an extra Bumex dose. On ASA/Statin/lovenox for VTE. Restart Eliquis tomorrow.    Lenny Choi MD  05/25/25  09:46 EDT

## 2025-05-25 NOTE — PLAN OF CARE
Assessment: Sheree Vance presents with functional mobility impairments which indicate the need for skilled intervention. Tolerating session today without incident. Pt very cooperative and motivated. Should do well at home with assist as needed.Will continue to follow and progress as tolerated.

## 2025-05-25 NOTE — PROGRESS NOTES
[facility] CARDIOLOGY  [facility]        LOS:  LOS: 3 days   Patient Name: Sheree Vance  Age/Sex: 80 y.o. female  : 1945  MRN: [id number]    Day of Service: 25   Length of Stay: 3  Encounter Provider: Richard Burgos MD  Place of Service: [facility] CARDIOLOGY  Patient Care Team:  Latia Guerrier MD as PCP - General  Latia Guerrier MD as PCP - Family Medicine  Dre Case MD (Cardiology)  Jet Godwin MD as Consulting Physician (Ophthalmology)  Antonio Fine MD as Consulting Physician (Allergy)  Sandor Astorga MD as Consulting Physician (Orthopedic Surgery)  Sam Carlos MD as Consulting Physician (Cardiology)    Subjective:     Chief Complaint:  F/U AF    Subjective:   Patient c/o left lateral chest pain overnight now resolved except with cough.  Sitting up in chair.  Denies SOA.    Current Medications:   Scheduled Meds:aspirin, 81 mg, Oral, Daily  atorvastatin, 10 mg, Oral, Nightly  bumetanide, 1 mg, Intravenous, Daily  dilTIAZem, 60 mg, Oral, Q8H  enoxaparin sodium, 40 mg, Subcutaneous, Daily  guaiFENesin, 600 mg, Oral, Q12H  insulin lispro, 2-7 Units, Subcutaneous, 4x Daily AC & at Bedtime  latanoprost, 1 drop, Both Eyes, Nightly  levothyroxine, 150 mcg, Oral, Q AM  [Held by provider] losartan, 25 mg, Oral, Q24H  methylPREDNISolone, 4 mg, Oral, TID Around Food  [START ON 2025] methylPREDNISolone, 4 mg, Oral, Before Breakfast  [START ON 2025] methylPREDNISolone, 4 mg, Oral, Tonight  [START ON 2025] methylPREDNISolone, 4 mg, Oral, Before Breakfast  mupirocin, 1 Application, Each Nare, BID  pantoprazole, 40 mg, Oral, QAM  polyethylene glycol, 17 g, Oral, BID  senna-docusate sodium, 2 tablet, Oral, BID  spironolactone, 25 mg, Oral, Daily      Continuous Infusions:       Allergies:  No Known Allergies    Review of Systems   Constitutional: Negative for chills,  decreased appetite and malaise/fatigue.   HENT:  Negative for congestion and nosebleeds.    Eyes:  Negative for blurred vision and double vision.   Cardiovascular:  Negative for chest pain, dyspnea on exertion, irregular heartbeat, leg swelling, near-syncope, orthopnea, palpitations and paroxysmal nocturnal dyspnea.   Respiratory:  Negative for cough and shortness of breath.    Hematologic/Lymphatic: Negative for adenopathy. Does not bruise/bleed easily.   Skin:  Negative for color change and rash.   Musculoskeletal:  Negative for joint pain.   Gastrointestinal:  Negative for bloating, abdominal pain, hematemesis and hematochezia.   Genitourinary:  Negative for flank pain and hematuria.   Neurological:  Negative for dizziness and focal weakness.   Psychiatric/Behavioral:  Negative for altered mental status. The patient does not have insomnia.        Objective:     Temp:  [97.6 °F (36.4 °C)-98.1 °F (36.7 °C)] 98 °F (36.7 °C)  Heart Rate:  [] 74  Resp:  [16-23] 23  BP: (100-127)/(49-85) 112/62     Intake/Output Summary (Last 24 hours) at 5/25/2025 0808  Last data filed at 5/25/2025 0600  Gross per 24 hour   Intake 480 ml   Output 1200 ml   Net -720 ml     Body mass index is 36.74 kg/m².      05/23/25  0605 05/24/25  0441 05/25/25  0517   Weight: 101 kg (222 lb 4.8 oz) 102 kg (224 lb 14.4 oz) 100 kg (220 lb 12.8 oz)         Physical Exam:  Neuro:  CV:  Resp:  GI:  Ext:  Tele: AAOx3, no gross deficits  S1S2 RRR, no murmur  Nonlabored, faint wheeze  BS+, abd soft  Pedal pulses palp, no edema  SR                                                   Lab Review:   Results from last 7 days   Lab Units 05/25/25  0404 05/24/25  0434 05/22/25  1153 05/20/25  0810   SODIUM mmol/L 138 135*   < > 138   POTASSIUM mmol/L 4.7 4.0   < > 4.2   CHLORIDE mmol/L 103 102   < > 102   CO2 mmol/L 25.1 23.3   < > 24.4   BUN mg/dL 20 16   < > 16   CREATININE mg/dL 0.73 0.89   < > 0.96   GLUCOSE mg/dL 155* 165*   < > 107*   CALCIUM mg/dL 9.4  "8.9   < > 9.7   AST (SGOT) U/L  --   --   --  24   ALT (SGPT) U/L  --   --   --  13    < > = values in this interval not displayed.         Results from last 7 days   Lab Units 05/25/25  0402 05/24/25  0434   WBC 10*3/mm3 14.30* 15.69*   HEMOGLOBIN g/dL 10.6* 11.3*   HEMATOCRIT % 32.8* 35.3   PLATELETS 10*3/mm3 232 239     Results from last 7 days   Lab Units 05/23/25  0604 05/22/25  1153 05/20/25  0810   INR  1.22* 1.24* 1.27*   APTT seconds  --  30.3 29.6     Results from last 7 days   Lab Units 05/23/25  0604   MAGNESIUM mg/dL 2.4           Invalid input(s): \"LDLCALC\"            Recent Radiology:  Imaging Results (Most Recent)       Procedure Component Value Units Date/Time    XR Chest 1 View [[id number]] Collected: 05/24/25 0759     Updated: 05/24/25 0802    Narrative:      XR CHEST 1 VW    Date of Exam: 5/24/2025 5:17 AM EDT    Indication: Post-Op Heart Surgery    Comparison: 5/23/2025 at 0306 hours    Findings:  Postoperative changes are noted. The right IJ venous sheath and central line have been removed. Residual left pleural effusion is seen. There is underlying atelectasis within the left lung base. The cardiac silhouette and mediastinum are stable. Residual   air is noted in the soft tissues overlying the left chest.      Impression:      Impression:  1.Postoperative changes are noted. The right IJ venous sheath and central line have been removed.  2.Residual left pleural effusion with underlying atelectasis within the left lung base.        Electronically Signed: Abhi Coyne MD    5/24/2025 8:00 AM EDT    Workstation ID: [id number]    XR Chest 1 View [[id number]] Collected: 05/23/25 0716     Updated: 05/23/25 0720    Narrative:      XR CHEST 1 VW    Date of Exam: 5/23/2025 3:17 AM EDT    Indication: Post-Op Heart Surgery    Comparison: 5/22/2025    Findings:  Right IJ central venous catheter tip is at the low SVC. Stable cardiomegaly. Persistent left basilar airspace disease and small left pleural " effusion. No discrete pneumothorax. Subcutaneous emphysema related to recent chest wall of the left lateral soft   tissues. Left atrial appendage clip again noted. No significant consolidation or effusion on the right.      Impression:      Impression:  1. Stable right IJ central venous catheter.  2. Persistent left basilar airspace disease and small left pleural effusion.  3. No pneumothorax.          Electronically Signed: Fernandez Anand MD    5/23/2025 7:18 AM EDT    Workstation ID: [id number]    XR Chest 1 View [[id number]] Collected: 05/22/25 1213     Updated: 05/22/25 1218    Narrative:      XR CHEST 1 VW    Date of Exam: 5/22/2025 11:55 AM EDT    Indication: Post-Op Check Line & Tube Placement    Comparison: CT chest 5/20/2025.    Findings:  Left basilar opacity which may represent combination of small left pleural effusion and atelectasis or pneumonia is a new finding. Presumed left atrial appendage ligation, new since prior. Mild cardiac enlargement.. Right IJ central line extends to the   mid SVC level. Transcutaneous pacer pad projects over the left upper thorax. No pneumothorax is identified. There is mild left chest wall subcutaneous gas. No acute osseous abnormality. Mild upper thoracic curvature toward the left.      Impression:      Impression:    1. New left atrial appendage ligation changes.  2. Right IJ central line tip extends to the mid SVC level.  3. Left basilar opacity favored to represent small left pleural effusion and left basilar atelectasis.      Electronically Signed: Carmen Salvador MD    5/22/2025 12:16 PM EDT    Workstation ID: [id number]    FL < 1 Hour [[id number]] Resulted: 05/22/25 0903     Updated: 05/22/25 0903    Narrative:      This procedure was auto-finalized with no dictation required.            ECHOCARDIOGRAM:    Results for orders placed in visit on 05/22/25    Intra-Op Anesthesia SARA    Narrative  Intra-Op Anesthesia SARA    Procedure Performed: Intra-Op Anesthesia SARA  Start  Time:  End Time:    Preanesthesia Checklist:  Patient identified, IV assessed, risks and benefits discussed, monitors and equipment assessed, procedure being performed at surgeon's request and anesthesia consent obtained.    General Procedure Information  Diagnostic Indications for Echo:  assessment of surgical repair and hemodynamic monitoring  Location performed:  OR  Intubated  Bite block placed  Heart visualized  Probe Insertion:  Easy  Probe Type:  Biplane and multiplane  Modalities:  Color flow mapping, pulse wave Doppler and continuous wave Doppler    Echocardiographic and Doppler Measurements    Ventricles    Right Ventricle:  Cavity size normal.  Hypertrophy not present.  Thrombus not present.  Global function normal.  Ejection Fraction 60%.  Left Ventricle:  Cavity size normal.  Thrombus not present.  Global Function normal.  Ejection Fraction 60%.    Ventricular Regional Function:  1- Basal Anteroseptal:  normal  2- Basal Anterior:  normal  3- Basal Anterolateral:  normal  4- Basal Inferolateral:  normal  5- Basal Inferior:  normal  6- Basal Inferoseptal:  normal  7- Mid Anteroseptal:  normal  8- Mid Anterior:  normal  9- Mid Anterolateral:  normal  10- Mid Inferolateral:  normal  11- Mid Inferior:  normal  12- Mid Inferoseptal:  normal  13- Apical Anterior:  normal  14- Apical Lateral:  normal  15- Apical Inferior:  normal  16- Apical Septal:  normal  17- [place]:  normal      Valves    Aortic Valve:  Annulus normal.  Stenosis not present.  Regurgitation absent.  Leaflets normal.  Leaflet motions normal.    Mitral Valve:  Annulus normal.  Stenosis not present.  Regurgitation absent.  Leaflets normal.  Leaflet motions normal.    Tricuspid Valve:  Annulus normal.  Stenosis not present.  Regurgitation absent.  Leaflets normal.  Leaflet motions normal.  Pulmonic Valve:  Annulus normal.  Stenosis not present.  Regurgitation absent.      Aorta    Ascending Aorta:  Size normal.  Dissection not present.  Plaque  thickness less than 3 mm.  Mobile plaque not present.  Aortic Arch:  Size normal.  Dissection not present.  Plaque thickness less than 3 mm.  Mobile plaque not present.  Descending Aorta:  Size normal.  Dissection not present.  Plaque thickness less than 3 mm.  Mobile plaque not present.      Atria    Right Atrium:  Size normal.  Left Atrium:  Size normal.  Spontaneous echo contrast not present.  Thrombus not present.  Tumor not present.  Device not present.  Left atrial appendage normal.      Septa        Ventricular Septum:  Intra-ventricular septum morphology normal.        Other Findings  Pleural Effusion:  none  Pulmonary Arteries:  normal      Anesthesia Information  Performed Personally  Anesthesiologist:  Javon Sandra MD      Echocardiogram Comments:  SARA PLACED EASILY NO RESISTANCE , LUBRICATED , BITE GUARD      LV NO WMA NO CLOT SEEN EF 60  RV NL SF  MV WNL  TV WNL  AV WNL  TAHIR FREE OF CLOT , CX VISIBLE AT BASE        I reviewed the patient's new clinical results.    EKG:      Assessment:       Paroxysmal atrial fibrillation    1) PAF s/p convergence procedure with atri-clip 5/22/25  - s/p redo ablation 3/2025 with early reoccurrence   - failed tikosyn  - eliquis on hold perioperatively    2) CAD  - cath 4/2025 showed mild CAD     3) Hx CVA     4) HTN     5) HLD     6) hypothyroidism     7)  SHANAE     8) chronic back pain s/p spinal fusion    Patient is back in atrial arrhythmias atrial flutter mostly with controlled ventricular response  Denies any new complaints  Chest tube in place  Postop day 3 status post convergence procedure  Patient is currently on aspirin statin and Lovenox  Plans to resume anticoagulation therapy before discharge once the chest tube is taken out  Denies any new symptoms  Tmax is 98 pulse is 74 respirations are 20 blood pressure is 112/62 sats are 98%  Sodium is 138 potassium is 4.7 creatinine 0.73 hemoglobin is 10.6  Plans to discontinue chest tube today  Sodium is 135  potassium is 4.0 creatinine 0.89 white cell count is 15,000 hemoglobin is 11.3  Twelve-lead EKG with atrial flutter with controlled ventricular response  Current medications include Lipitor 10 mg p.o. once a day Cardizem 60 mg p.o. every 8 hours patient is on Aldactone 25 mg p.o. once a day patient is on Lovenox for DVT prophylaxis  Patient is on Lovenox for DVT prophylaxis  Discussed with patient at bedside  Further recommendation based on patient course

## 2025-05-26 LAB
ANION GAP SERPL CALCULATED.3IONS-SCNC: 10 MMOL/L (ref 5–15)
BUN SERPL-MCNC: 27 MG/DL (ref 8–23)
BUN/CREAT SERPL: 28.7 (ref 7–25)
CALCIUM SPEC-SCNC: 9.2 MG/DL (ref 8.6–10.5)
CHLORIDE SERPL-SCNC: 101 MMOL/L (ref 98–107)
CO2 SERPL-SCNC: 26 MMOL/L (ref 22–29)
CREAT SERPL-MCNC: 0.94 MG/DL (ref 0.57–1)
DEPRECATED RDW RBC AUTO: 46.9 FL (ref 37–54)
EGFRCR SERPLBLD CKD-EPI 2021: 62.2 ML/MIN/1.73
ERYTHROCYTE [DISTWIDTH] IN BLOOD BY AUTOMATED COUNT: 13.8 % (ref 12.3–15.4)
GLUCOSE BLDC GLUCOMTR-MCNC: 122 MG/DL (ref 70–105)
GLUCOSE BLDC GLUCOMTR-MCNC: 127 MG/DL (ref 70–105)
GLUCOSE BLDC GLUCOMTR-MCNC: 137 MG/DL (ref 70–105)
GLUCOSE BLDC GLUCOMTR-MCNC: 142 MG/DL (ref 70–105)
GLUCOSE SERPL-MCNC: 170 MG/DL (ref 65–99)
HCT VFR BLD AUTO: 34.4 % (ref 34–46.6)
HGB BLD-MCNC: 11.1 G/DL (ref 12–15.9)
MCH RBC QN AUTO: 29.6 PG (ref 26.6–33)
MCHC RBC AUTO-ENTMCNC: 32.3 G/DL (ref 31.5–35.7)
MCV RBC AUTO: 91.7 FL (ref 79–97)
PLATELET # BLD AUTO: 259 10*3/MM3 (ref 140–450)
PMV BLD AUTO: 10.5 FL (ref 6–12)
POTASSIUM SERPL-SCNC: 4.8 MMOL/L (ref 3.5–5.2)
RBC # BLD AUTO: 3.75 10*6/MM3 (ref 3.77–5.28)
SODIUM SERPL-SCNC: 137 MMOL/L (ref 136–145)
WBC NRBC COR # BLD AUTO: 12.65 10*3/MM3 (ref 3.4–10.8)

## 2025-05-26 PROCEDURE — 85027 COMPLETE CBC AUTOMATED: CPT | Performed by: NURSE PRACTITIONER

## 2025-05-26 PROCEDURE — 82948 REAGENT STRIP/BLOOD GLUCOSE: CPT

## 2025-05-26 PROCEDURE — 93010 ELECTROCARDIOGRAM REPORT: CPT | Performed by: INTERNAL MEDICINE

## 2025-05-26 PROCEDURE — 25010000002 BUMETANIDE PER 0.5 MG

## 2025-05-26 PROCEDURE — 63710000001 METHYLPREDNISOLONE PER 4 MG: Performed by: NURSE PRACTITIONER

## 2025-05-26 PROCEDURE — 80048 BASIC METABOLIC PNL TOTAL CA: CPT | Performed by: NURSE PRACTITIONER

## 2025-05-26 PROCEDURE — 93005 ELECTROCARDIOGRAM TRACING: CPT

## 2025-05-26 RX ADMIN — APIXABAN 5 MG: 5 TABLET, FILM COATED ORAL at 20:53

## 2025-05-26 RX ADMIN — ASPIRIN 81 MG: 81 TABLET, COATED ORAL at 08:42

## 2025-05-26 RX ADMIN — METHYLPREDNISOLONE 4 MG: 4 TABLET ORAL at 08:42

## 2025-05-26 RX ADMIN — SPIRONOLACTONE 25 MG: 25 TABLET ORAL at 08:42

## 2025-05-26 RX ADMIN — GUAIFENESIN 600 MG: 600 TABLET, EXTENDED RELEASE ORAL at 08:42

## 2025-05-26 RX ADMIN — PANTOPRAZOLE SODIUM 40 MG: 40 TABLET, DELAYED RELEASE ORAL at 07:08

## 2025-05-26 RX ADMIN — MUPIROCIN 1 APPLICATION: 20 OINTMENT TOPICAL at 20:54

## 2025-05-26 RX ADMIN — METHYLPREDNISOLONE 4 MG: 4 TABLET ORAL at 20:53

## 2025-05-26 RX ADMIN — GUAIFENESIN 600 MG: 600 TABLET, EXTENDED RELEASE ORAL at 20:53

## 2025-05-26 RX ADMIN — DILTIAZEM HYDROCHLORIDE 60 MG: 60 TABLET, FILM COATED ORAL at 05:40

## 2025-05-26 RX ADMIN — BUMETANIDE 1 MG: 0.25 INJECTION INTRAMUSCULAR; INTRAVENOUS at 08:42

## 2025-05-26 RX ADMIN — CYCLOBENZAPRINE HYDROCHLORIDE 10 MG: 10 TABLET, FILM COATED ORAL at 21:53

## 2025-05-26 RX ADMIN — HYDROCODONE BITARTRATE AND ACETAMINOPHEN 1 TABLET: 7.5; 325 TABLET ORAL at 20:52

## 2025-05-26 RX ADMIN — LEVOTHYROXINE SODIUM 150 MCG: 0.15 TABLET ORAL at 05:40

## 2025-05-26 RX ADMIN — SENNOSIDES AND DOCUSATE SODIUM 2 TABLET: 50; 8.6 TABLET ORAL at 08:42

## 2025-05-26 RX ADMIN — DILTIAZEM HYDROCHLORIDE 60 MG: 60 TABLET, FILM COATED ORAL at 21:53

## 2025-05-26 RX ADMIN — LATANOPROST 1 DROP: 50 SOLUTION/ DROPS OPHTHALMIC at 20:54

## 2025-05-26 RX ADMIN — DILTIAZEM HYDROCHLORIDE 60 MG: 60 TABLET, FILM COATED ORAL at 15:30

## 2025-05-26 RX ADMIN — APIXABAN 5 MG: 5 TABLET, FILM COATED ORAL at 11:31

## 2025-05-26 RX ADMIN — ATORVASTATIN CALCIUM 10 MG: 10 TABLET ORAL at 20:53

## 2025-05-26 RX ADMIN — MUPIROCIN 1 APPLICATION: 20 OINTMENT TOPICAL at 08:43

## 2025-05-26 NOTE — PROGRESS NOTES
" LOS: 4 days   Patient Care Team:  Latia Guerrier MD as PCP - General  Latia Guerrier MD as PCP - Family Medicine  Dre Case MD (Cardiology)  Jet Godwin MD as Consulting Physician (Ophthalmology)  Antonio Fine MD as Consulting Physician (Allergy)  Sandor Astorga MD as Consulting Physician (Orthopedic Surgery)  Sam Carlos MD as Consulting Physician (Cardiology)    Chief Complaint:       Subjective      Vital Signs  Temp:  [97.3 °F (36.3 °C)-98.3 °F (36.8 °C)] 97.3 °F (36.3 °C)  Heart Rate:  [] 79  Resp:  [18-24] 18  BP: ()/(49-88) 111/69  Body mass index is 35.99 kg/m².    Intake/Output Summary (Last 24 hours) at 5/26/2025 0951  Last data filed at 5/26/2025 0330  Gross per 24 hour   Intake --   Output 2300 ml   Net -2300 ml     No intake/output data recorded.      Wt Readings from Last 3 Encounters:   05/26/25 98.1 kg (216 lb 4.3 oz)   05/20/25 100 kg (221 lb 6.4 oz)   04/14/25 99.3 kg (219 lb)         Objective:  Vital signs: (most recent): Blood pressure 111/69, pulse 79, temperature 97.3 °F (36.3 °C), temperature source Axillary, resp. rate 18, height 165.1 cm (65\"), weight 98.1 kg (216 lb 4.3 oz), SpO2 99%.                Results Review:        WBC No results found for: \"WBCS\"   HGB Hemoglobin   Date Value Ref Range Status   05/26/2025 11.1 (L) 12.0 - 15.9 g/dL Final   05/25/2025 10.6 (L) 12.0 - 15.9 g/dL Final   05/24/2025 11.3 (L) 12.0 - 15.9 g/dL Final      HCT Hematocrit   Date Value Ref Range Status   05/26/2025 34.4 34.0 - 46.6 % Final   05/25/2025 32.8 (L) 34.0 - 46.6 % Final   05/24/2025 35.3 34.0 - 46.6 % Final      Platelets No results found for: \"LABPLAT\"     PT/INR:  No results found for: \"PROTIME\"/No results found for: \"INR\"    Sodium Sodium   Date Value Ref Range Status   05/26/2025 137 136 - 145 mmol/L Final   05/25/2025 138 136 - 145 mmol/L Final   05/24/2025 135 (L) 136 - 145 mmol/L Final      Potassium Potassium   Date Value " "Ref Range Status   05/26/2025 4.8 3.5 - 5.2 mmol/L Final   05/25/2025 4.7 3.5 - 5.2 mmol/L Final   05/24/2025 4.0 3.5 - 5.2 mmol/L Final      Chloride Chloride   Date Value Ref Range Status   05/26/2025 101 98 - 107 mmol/L Final   05/25/2025 103 98 - 107 mmol/L Final   05/24/2025 102 98 - 107 mmol/L Final      Bicarbonate No results found for: \"PLASMABICARB\"   BUN BUN   Date Value Ref Range Status   05/26/2025 27 (H) 8 - 23 mg/dL Final   05/25/2025 20 8 - 23 mg/dL Final   05/24/2025 16 8 - 23 mg/dL Final      Creatinine Creatinine   Date Value Ref Range Status   05/26/2025 0.94 0.57 - 1.00 mg/dL Final   05/25/2025 0.73 0.57 - 1.00 mg/dL Final   05/24/2025 0.89 0.57 - 1.00 mg/dL Final      Calcium Calcium   Date Value Ref Range Status   05/26/2025 9.2 8.6 - 10.5 mg/dL Final   05/25/2025 9.4 8.6 - 10.5 mg/dL Final   05/24/2025 8.9 8.6 - 10.5 mg/dL Final      Magnesium No results found for: \"MG\"      .imag    apixaban, 5 mg, Oral, Q12H  aspirin, 81 mg, Oral, Daily  atorvastatin, 10 mg, Oral, Nightly  dilTIAZem, 60 mg, Oral, Q8H  guaiFENesin, 600 mg, Oral, Q12H  insulin lispro, 2-7 Units, Subcutaneous, 4x Daily AC & at Bedtime  latanoprost, 1 drop, Both Eyes, Nightly  levothyroxine, 150 mcg, Oral, Q AM  [Held by provider] losartan, 25 mg, Oral, Q24H  methylPREDNISolone, 4 mg, Oral, Tonight  [START ON 5/27/2025] methylPREDNISolone, 4 mg, Oral, Before Breakfast  mupirocin, 1 Application, Each Nare, BID  pantoprazole, 40 mg, Oral, QAM  polyethylene glycol, 17 g, Oral, BID  senna-docusate sodium, 2 tablet, Oral, BID  spironolactone, 25 mg, Oral, Daily               Paroxysmal atrial fibrillation      Assessment & Plan    Paroxysmal atrial fibrillation on chronic AC (last dose Eliquis 5/19)- S/P Convergent procedure on 5/22 with Dr. Choi  Palpitations  Hypertension  Hypothyroidism  History of SHANAE with CPAP compliance     POD # 4.  Some a few episodes of afib. Cardiology following. On ASA/Statin/Eliquis.  May be home " tomorrow.     Lenny Choi MD  05/26/25  09:51 EDT

## 2025-05-26 NOTE — PLAN OF CARE
Problem: Adult Inpatient Plan of Care  Goal: Plan of Care Review  Outcome: Progressing  Goal: Patient-Specific Goal (Individualized)  Outcome: Progressing  Goal: Absence of Hospital-Acquired Illness or Injury  Outcome: Progressing  Intervention: Identify and Manage Fall Risk  Recent Flowsheet Documentation  Taken 5/26/2025 1615 by Sandor Siddiqi RN  Safety Promotion/Fall Prevention:   activity supervised   clutter free environment maintained   fall prevention program maintained   nonskid shoes/slippers when out of bed   safety round/check completed   room organization consistent  Taken 5/26/2025 1400 by Sandor Siddiqi RN  Safety Promotion/Fall Prevention:   activity supervised   clutter free environment maintained   fall prevention program maintained   nonskid shoes/slippers when out of bed   safety round/check completed   room organization consistent  Taken 5/26/2025 1215 by Sandor Siddiqi RN  Safety Promotion/Fall Prevention:   activity supervised   clutter free environment maintained   fall prevention program maintained   nonskid shoes/slippers when out of bed   safety round/check completed   room organization consistent  Taken 5/26/2025 0842 by Sandor Siddiqi RN  Safety Promotion/Fall Prevention:   activity supervised   clutter free environment maintained   fall prevention program maintained   nonskid shoes/slippers when out of bed   safety round/check completed   room organization consistent  Intervention: Prevent Skin Injury  Recent Flowsheet Documentation  Taken 5/26/2025 1615 by Sandor Siddiqi RN  Body Position: position changed independently  Skin Protection: incontinence pads utilized  Taken 5/26/2025 1400 by Sandor Siddiqi RN  Body Position: position changed independently  Skin Protection: incontinence pads utilized  Taken 5/26/2025 1215 by Sandor Siddiqi RN  Body Position: position changed independently  Skin Protection: incontinence pads utilized  Taken 5/26/2025 0842 by Devang  MELECIO Patten  Body Position: position changed independently  Skin Protection: incontinence pads utilized  Intervention: Prevent Infection  Recent Flowsheet Documentation  Taken 5/26/2025 1615 by Sandor Siddiqi RN  Infection Prevention:   personal protective equipment utilized   hand hygiene promoted  Taken 5/26/2025 1400 by Sandor Siddiqi RN  Infection Prevention:   personal protective equipment utilized   hand hygiene promoted  Taken 5/26/2025 1215 by Sandor Siddiqi RN  Infection Prevention:   personal protective equipment utilized   hand hygiene promoted  Taken 5/26/2025 0842 by Sandor Siddiqi RN  Infection Prevention:   personal protective equipment utilized   hand hygiene promoted  Goal: Optimal Comfort and Wellbeing  Outcome: Progressing  Intervention: Provide Person-Centered Care  Recent Flowsheet Documentation  Taken 5/26/2025 1215 by Sandor Siddiqi RN  Trust Relationship/Rapport:   thoughts/feelings acknowledged   reassurance provided   questions answered   choices provided   care explained   emotional support provided   empathic listening provided   questions encouraged  Taken 5/26/2025 0842 by Sandor Siddiqi RN  Trust Relationship/Rapport:   thoughts/feelings acknowledged   questions encouraged   questions answered   emotional support provided   choices provided   care explained   empathic listening provided   reassurance provided  Goal: Readiness for Transition of Care  Outcome: Progressing     Problem: Comorbidity Management  Goal: Blood Pressure in Desired Range  Outcome: Progressing  Intervention: Maintain Blood Pressure Management  Recent Flowsheet Documentation  Taken 5/26/2025 1615 by Sandor Siddiqi RN  Medication Review/Management: medications reviewed  Taken 5/26/2025 1400 by Sandor Siddiqi RN  Medication Review/Management: medications reviewed  Taken 5/26/2025 1215 by Sandor Siddiqi RN  Medication Review/Management: medications reviewed  Taken 5/26/2025 0842 by Devang  MELECIO Patten  Medication Review/Management: medications reviewed     Problem: Skin Injury Risk Increased  Goal: Skin Health and Integrity  Outcome: Progressing  Intervention: Optimize Skin Protection  Recent Flowsheet Documentation  Taken 5/26/2025 1615 by Sandor Siddiqi RN  Activity Management: up in chair  Pressure Reduction Techniques:   frequent weight shift encouraged   weight shift assistance provided   pressure points protected  Pressure Reduction Devices: pressure-redistributing mattress utilized  Skin Protection: incontinence pads utilized  Taken 5/26/2025 1400 by Sandor Siddiqi RN  Pressure Reduction Techniques:   frequent weight shift encouraged   weight shift assistance provided   pressure points protected  Pressure Reduction Devices: pressure-redistributing mattress utilized  Skin Protection: incontinence pads utilized  Taken 5/26/2025 1215 by Sandor Siddiqi RN  Pressure Reduction Techniques:   frequent weight shift encouraged   weight shift assistance provided   pressure points protected  Pressure Reduction Devices: pressure-redistributing mattress utilized  Skin Protection: incontinence pads utilized  Taken 5/26/2025 0842 by Sandor Siddiqi RN  Pressure Reduction Techniques:   frequent weight shift encouraged   weight shift assistance provided   pressure points protected  Pressure Reduction Devices: pressure-redistributing mattress utilized  Skin Protection: incontinence pads utilized     Problem: Fall Injury Risk  Goal: Absence of Fall and Fall-Related Injury  Outcome: Progressing  Intervention: Identify and Manage Contributors  Recent Flowsheet Documentation  Taken 5/26/2025 1615 by Sandor Siddiqi RN  Medication Review/Management: medications reviewed  Taken 5/26/2025 1400 by Sandor Siddiqi RN  Medication Review/Management: medications reviewed  Taken 5/26/2025 1215 by Sandor Siddiqi RN  Medication Review/Management: medications reviewed  Taken 5/26/2025 0842 by Sandor Siddiqi  RN  Medication Review/Management: medications reviewed  Intervention: Promote Injury-Free Environment  Recent Flowsheet Documentation  Taken 5/26/2025 1615 by Sandor Siddiqi RN  Safety Promotion/Fall Prevention:   activity supervised   clutter free environment maintained   fall prevention program maintained   nonskid shoes/slippers when out of bed   safety round/check completed   room organization consistent  Taken 5/26/2025 1400 by Sandor Siddiqi, RN  Safety Promotion/Fall Prevention:   activity supervised   clutter free environment maintained   fall prevention program maintained   nonskid shoes/slippers when out of bed   safety round/check completed   room organization consistent  Taken 5/26/2025 1215 by Sandor Siddiqi, RN  Safety Promotion/Fall Prevention:   activity supervised   clutter free environment maintained   fall prevention program maintained   nonskid shoes/slippers when out of bed   safety round/check completed   room organization consistent  Taken 5/26/2025 0842 by Sandor Siddiqi, RN  Safety Promotion/Fall Prevention:   activity supervised   clutter free environment maintained   fall prevention program maintained   nonskid shoes/slippers when out of bed   safety round/check completed   room organization consistent   Goal Outcome Evaluation:

## 2025-05-27 ENCOUNTER — READMISSION MANAGEMENT (OUTPATIENT)
Dept: CALL CENTER | Facility: HOSPITAL | Age: 80
End: 2025-05-27
Payer: MEDICARE

## 2025-05-27 VITALS
SYSTOLIC BLOOD PRESSURE: 114 MMHG | WEIGHT: 218.4 LBS | OXYGEN SATURATION: 98 % | HEIGHT: 65 IN | HEART RATE: 93 BPM | DIASTOLIC BLOOD PRESSURE: 72 MMHG | TEMPERATURE: 98.1 F | RESPIRATION RATE: 17 BRPM | BODY MASS INDEX: 36.39 KG/M2

## 2025-05-27 LAB
GLUCOSE BLDC GLUCOMTR-MCNC: 121 MG/DL (ref 70–105)
GLUCOSE BLDC GLUCOMTR-MCNC: 134 MG/DL (ref 70–105)
QT INTERVAL: 302 MS
QT INTERVAL: 345 MS
QT INTERVAL: 352 MS
QTC INTERVAL: 378 MS
QTC INTERVAL: 414 MS
QTC INTERVAL: 440 MS

## 2025-05-27 PROCEDURE — 63710000001 METHYLPREDNISOLONE PER 4 MG: Performed by: NURSE PRACTITIONER

## 2025-05-27 PROCEDURE — 82948 REAGENT STRIP/BLOOD GLUCOSE: CPT

## 2025-05-27 PROCEDURE — 93005 ELECTROCARDIOGRAM TRACING: CPT

## 2025-05-27 PROCEDURE — 99232 SBSQ HOSP IP/OBS MODERATE 35: CPT | Performed by: INTERNAL MEDICINE

## 2025-05-27 PROCEDURE — 97530 THERAPEUTIC ACTIVITIES: CPT

## 2025-05-27 PROCEDURE — 97535 SELF CARE MNGMENT TRAINING: CPT

## 2025-05-27 RX ORDER — HYDROCODONE BITARTRATE AND ACETAMINOPHEN 5; 325 MG/1; MG/1
1 TABLET ORAL EVERY 6 HOURS PRN
Qty: 20 TABLET | Refills: 0 | Status: SHIPPED | OUTPATIENT
Start: 2025-05-27 | End: 2025-06-03

## 2025-05-27 RX ADMIN — APIXABAN 5 MG: 5 TABLET, FILM COATED ORAL at 08:43

## 2025-05-27 RX ADMIN — DILTIAZEM HYDROCHLORIDE 60 MG: 60 TABLET, FILM COATED ORAL at 05:21

## 2025-05-27 RX ADMIN — SPIRONOLACTONE 25 MG: 25 TABLET ORAL at 08:43

## 2025-05-27 RX ADMIN — ASPIRIN 81 MG: 81 TABLET, COATED ORAL at 08:43

## 2025-05-27 RX ADMIN — GUAIFENESIN 600 MG: 600 TABLET, EXTENDED RELEASE ORAL at 08:43

## 2025-05-27 RX ADMIN — METHYLPREDNISOLONE 4 MG: 4 TABLET ORAL at 07:16

## 2025-05-27 RX ADMIN — SENNOSIDES AND DOCUSATE SODIUM 2 TABLET: 50; 8.6 TABLET ORAL at 08:43

## 2025-05-27 RX ADMIN — LEVOTHYROXINE SODIUM 150 MCG: 0.15 TABLET ORAL at 05:21

## 2025-05-27 RX ADMIN — PANTOPRAZOLE SODIUM 40 MG: 40 TABLET, DELAYED RELEASE ORAL at 07:16

## 2025-05-27 RX ADMIN — MUPIROCIN 1 APPLICATION: 20 OINTMENT TOPICAL at 08:43

## 2025-05-27 RX ADMIN — DILTIAZEM HYDROCHLORIDE 60 MG: 60 TABLET, FILM COATED ORAL at 14:02

## 2025-05-27 NOTE — CONSULTS
Patient educated on cardiac rehab program. Education materials given to patient and questions answered. Will follow up with patient once discharged.

## 2025-05-27 NOTE — PROGRESS NOTES
CC--- recurrent AF post convergent procedure    Sub  Patient had surgery  with posterior wall isolation and AtriCure clip placement with convergent procedure and Patient is back to atypical flutter with controlled ventricular rate and sitting in a chair and comfortable          Past Medical History:   Diagnosis Date    A-fib Since 2001 Dx in [place]    Abnormal ECG 2009    Allergic 2014    Seasonal    Allergic rhinitis     Hx Allergy Shots x 4 Yrs    Aortic valve calcification 12/04/2018    Noted on [facility]    Arrhythmia     Arthritis     Lumbar Spine    Atrial flutter     Breast mass seen on mammogram 03/19/2014    L 4CM Mass--Benign & Followed    Bruxism, sleep-related     Cataract     Chronic anticoagulation     Eliquis     Chronic low back pain     Hx Back Sx in 2016    Colon polyp     Coronary artery disease 2009    AFIB    DJD (degenerative joint disease), lumbar     Dyslipidemia     w/Hypertriglyceridemia--Statin/Fish Oils    Family history of premature CAD     GERD (gastroesophageal reflux disease)     Glaucoma     Heart disease     History of bone density study 03/19/14-[facility]    T-Score of 1.4 Indicating Osteopenia    History of EKG 04/2018 & 08/2018 & 12/2018    First Degree AV Block/Multiple Atrial Premature Blocks Noted on All    Hormone replacement therapy (HRT)     Premarin-0.625MG    Hyperlipidemia     Controlled w/Meds    Hypertension     Controlled w/Losartan    Hypothyroidism     Synthroid    Insomnia     Takes OTC Sleep Aid PRN    Left atrial enlargement 12/04/2018    Severe Noted on Cardioversion Report/[facility]    Left breast mass 03/19/2014    4CM Noted on Mammogram--Benign     Mild tricuspid valve regurgitation 12/04/2018    Noted on [facility]    Mitral valve annular calcification 12/04/2018    Moderate Noted on [facility]    Mitral valve regurgitation 12/04/2018    Moderate Noted on [facility]    Obesity 12/04/2018    BMI-43.3     SHANAE on CPAP 11/27/2018    AHI 10.5/h, supine 30/h    Osteoarthritis of right hip 07/09/2019     Osteopenia 03/19/2014    Noted on Dexa Report    Restless sleeper     Tosses and Turns All Night Per Pt    Sinus congestion     Tricuspid leaflet thickened 12/04/2018    Noted on [facility]    Urinary tract infection      Past Surgical History:   Procedure Laterality Date    ABLATION OF DYSRHYTHMIC FOCUS  2018    APPENDECTOMY      BACK SURGERY  2016    Spinal Fusion    BLADDER REPAIR  2001 in [place]    CARDIAC CATHETERIZATION  2015    CARDIAC CATHETERIZATION Right 4/10/2025    Procedure: Left Heart Cath;  Surgeon: Richard Burgos MD;  Location:  [facility];  Service: Cardiovascular;  Laterality: Right;    CARDIAC ELECTROPHYSIOLOGY PROCEDURE Right 03/12/2025    Procedure: Ablation atrial fibrillation;  Surgeon: Dre Case MD;  Location:  [facility];  Service: Cardiovascular;  Laterality: Right;    CARDIOVERSION  08/7/18 & 4/17/18-[facility]    Dr. Villegas--For Chronic A-Fib    CARDIOVERSION  12/4/28-[facility]    Dr. Case--See Report    CATARACT EXTRACTION  2007    COLONOSCOPY  04/13/2017    HYSTERECTOMY      Partial    JOINT REPLACEMENT  2020 2021    KNEE ARTHROSCOPY  2009    KNEE SURGERY Right 2009    OTHER SURGICAL HISTORY  12/4/18-[facility]    Fluoroscopy/Trans-Septal Access to L Atrium/Selective Venography of L Superior Pulm Vein/Add Lienier Radiofrequency Ablation Along the Coronary Sinus/Synchronized Cardioversion--Dr. Case    REPLACEMENT TOTAL KNEE Right 08/30/2021    Dr. Astorga    SPINAL FUSION  2016    SPINE SURGERY  2016    [facility]  12/4/18-[facility]    Mild Concentric L Ventricular Hypertrophy Noted; EF 55-60%; L Atrial Moderate-Severely Dilated, Moderat Mitral Annular Calcification with Moderate MVR Noted; Mild TVR; Normal LV Function    TONSILLECTOMY      TOTAL HIP ARTHROPLASTY Right 07/06/2020    Dr. Astorga     Family History   Problem Relation Age of Onset    Heart failure Mother     Alcohol abuse Mother     Early death Mother     Miscarriages / Stillbirths Mother     No Known  "Problems Father      Social History     Tobacco Use    Smoking status: Never     Passive exposure: Never    Smokeless tobacco: Never   Vaping Use    Vaping status: Never Used   Substance Use Topics    Alcohol use: Yes     Alcohol/week: 4.0 standard drinks of alcohol     Types: 2 Glasses of wine, 2 Drinks containing 0.5 oz of alcohol per week     Comment: \"a couple a week\"    Drug use: Never     Medications Prior to Admission   Medication Sig Dispense Refill Last Dose/Taking    amoxicillin (AMOXIL) 500 MG capsule Take 1 capsule by mouth 2 (Two) Times a Day As Needed (teeth cleanings). For teeth cleaning (Patient not taking: Reported on 4/14/2025)       apixaban (Eliquis) 5 MG tablet tablet Take 1 tablet by mouth Every 12 (Twelve) Hours. 180 tablet 1     atorvastatin (LIPITOR) 10 MG tablet Take 1 tablet by mouth every night at bedtime. 90 tablet 1     Cholecalciferol 2000 units capsule Take 1 capsule by mouth Daily.       Chromium 200 MCG capsule Take 1,000 mcg by mouth Daily.       coenzyme Q10 100 MG capsule Take 1 capsule by mouth Every Night.       dilTIAZem (CARDIZEM) 60 MG tablet Take 1 tablet by mouth 3 times a day. 90 tablet 5     dofetilide (TIKOSYN) 500 MCG capsule Take 1 capsule by mouth 2 (Two) Times a Day. 180 capsule 1     EPINEPHrine (EPIPEN) 0.3 MG/0.3ML solution auto-injector injection   3     Estrogens Conjugated (Premarin) 0.625 MG/GM vaginal cream USE 1/2 GRAM PER VAGINA 2 TO 3 TIMES A WEEK 30 g 1     latanoprost (XALATAN) 0.005 % ophthalmic solution Inject 1 drop into the eye Every Night.       levothyroxine (SYNTHROID, LEVOTHROID) 150 MCG tablet TAKE ONE TABLET BY MOUTH DAILY 90 tablet 0     Magnesium 400 MG tablet Take 1 tablet by mouth Every Night.       Multiple Vitamins-Minerals (MULTIVITAMIN ADULT) tablet Take 1 tablet by mouth Daily.       mupirocin (BACTROBAN) 2 % ointment Apply to nares (inside each nostril) twice daily as directed for procedure. 22 g 0     Omega-3 1000 MG capsule Take 1 " capsule by mouth Daily.       spironolactone (ALDACTONE) 50 MG tablet Take 1 tablet by mouth Daily. 90 tablet 1     telmisartan (MICARDIS) 80 MG tablet TAKE 1 TABLET BY MOUTH DAILY. 90 tablet 1      Allergies:  Patient has no known allergies.      Physical Exam    General:      well developed, well nourished, in no acute distress.    Head:      normocephalic and atraumatic.    Eyes:      PERRL/EOM intact, conjunctivae and sclerae clear without nystagmus.    Neck:      no  thyromegaly, trachea central with normal respiratory effort  Lungs:      clear bilaterally to auscultation.    Heart:       irregular rate and rhythm, S1, S2 without murmurs, rubs, or gallops  Skin:      intact without lesions or rashes.    Psych:      alert and cooperative; normal mood and affect; normal attention span and concentration.            CBC    Results from last 7 days   Lab Units 05/26/25 0410 05/25/25  0402 05/24/25  0434 05/23/25  0604 05/22/25  1153 05/22/25  1042 05/22/25  0818   WBC 10*3/mm3 12.65* 14.30* 15.69* 13.36* 8.84  --   --    HEMOGLOBIN g/dL 11.1* 10.6* 11.3* 10.9* 12.6  --   --    HEMOGLOBIN, POC g/dL  --   --   --   --   --  11.9* 11.6*   PLATELETS 10*3/mm3 259 232 239 228 230  --   --      BMP   Results from last 7 days   Lab Units 05/26/25 0410 05/25/25  0404 05/24/25  0434 05/23/25  0604 05/22/25  1153   SODIUM mmol/L 137 138 135* 138 140   POTASSIUM mmol/L 4.8 4.7 4.0 4.4 4.3   CHLORIDE mmol/L 101 103 102 103 107   CO2 mmol/L 26.0 25.1 23.3 21.8* 22.1   BUN mg/dL 27* 20 16 18 15   CREATININE mg/dL 0.94 0.73 0.89 1.05* 0.89   GLUCOSE mg/dL 170* 155* 165* 152* 159*   MAGNESIUM mg/dL  --   --   --  2.4  --    PHOSPHORUS mg/dL  --   --   --  3.9 3.5     Radiology(recent) XR Chest 1 View  Result Date: 5/25/2025  Impression: Similar small left pleural effusion. No visible pneumothorax. Electronically Signed: Rubens Hussein MD  5/25/2025 4:17 PM EDT  Workstation ID: [id number]    XR Chest 1 View  Result Date:  5/25/2025  Impression: 1.Interval removal of left chest tube. No pneumothorax. 2.Small left pleural effusion. Electronically Signed: Johnny Huddleston MD  5/25/2025 3:38 PM EDT  Workstation ID: [id number]          Assessment and plan    Recurrent atrial fibrillation failed endocardial ablation and Tikosyn therapy status post convergent procedure With early recurrence of atypical flutter with rate controlled and patient can be discharged home and followed as an outpatient in 2 to 3 weeks to decide on further therapy  Recommend anticoagulation for 8 to 12 weeks  Anticoagulation  resumed ry  Hypertension hyperlipidemia  Nonobstructive coronary artery disease      Electronically signed by Dre Case MD, 05/27/25, 8:52 AM EDT.

## 2025-05-27 NOTE — PLAN OF CARE
Goal Outcome Evaluation:  Assessment: Sheree Vance presents with ADL impairments affecting function including balance, endurance / activity tolerance, postural / trunk control, and strength. Demonstrated functioning below baseline abilities indicate the need for continued skilled intervention while inpatient. Pt required occasional cues for posture in walking to prevent forward leaning without walker. Pt encouraged to use walker at home if she notes increased fatigue or weakness with mobility to maximize safety in functional mobility- pt agreeable. Completed toileting with mod I of increased time. Pt progressing well and appears to be returned to baseline. Plan is to d/c to home today if medically cleared, awaiting MD rounding. Tolerating session today without incident. Will continue to follow and progress as tolerated.     Plan/Recommendations:   No ongoing therapy recommended post-acute care. No therapy needs.    Pt desires Home with family assist at discharge. Pt cooperative; agreeable to therapeutic recommendations and plan of care.

## 2025-05-27 NOTE — PROGRESS NOTES
" LOS: 5 days   Patient Care Team:  Latia Guerrier MD as PCP - General  Latia Guerrier MD as PCP - Family Medicine  rDe Case MD (Cardiology)  Jet Godwin MD as Consulting Physician (Ophthalmology)  Antonio Fine MD as Consulting Physician (Allergy)  Sandor Astorga MD as Consulting Physician (Orthopedic Surgery)  Sam Carlos MD as Consulting Physician (Cardiology)    Chief Complaint: Post-op follow-up, s/p convergent      Subjective: Looks great.  Up to the chair.  Denies pain.  On room air.    Vital Signs  Temp:  [97.8 °F (36.6 °C)-98.9 °F (37.2 °C)] 97.8 °F (36.6 °C)  Heart Rate:  [] 113  Resp:  [18-23] 18  BP: ()/(42-81) 136/81      05/25/25  0517 05/26/25  0500 05/27/25  0500   Weight: 100 kg (220 lb 12.8 oz) 98.1 kg (216 lb 4.3 oz) 99.1 kg (218 lb 6.4 oz)     Body mass index is 36.34 kg/m².  No intake or output data in the 24 hours ending 05/27/25 0953  No intake/output data recorded.      Objective:  Vital signs: (most recent): Blood pressure 117/68, pulse 113, temperature 97.7 °F (36.5 °C), temperature source Oral, resp. rate 17, height 165.1 cm (65\"), weight 99.1 kg (218 lb 6.4 oz), SpO2 97%.              Physical Examination:   General appearance - alert, well appearing, and in no distress  Mental status - normal mood, behavior, speech, dress, motor activity, and thought processes  Chest - clear to auscultation, no wheezes, rales or rhonchi, symmetric air entry  Heart - A-fib rate 90s-110s  Abdomen - soft, nontender, nondistended, no masses or organomegaly  bowel sounds normal, + BM  Neurological - alert, oriented, normal speech, no focal findings or movement disorder noted, motor and sensory grossly normal bilaterally  Extremities - peripheral pulses normal, generalized trace edema, no clubbing or cyanosis  Skin - normal coloration and turgor, no rashes, no suspicious skin lesions noted  Incisions healing well.  No redness or " "drainage.    Results Review:      WBC WBC   Date Value Ref Range Status   05/26/2025 12.65 (H) 3.40 - 10.80 10*3/mm3 Final   05/25/2025 14.30 (H) 3.40 - 10.80 10*3/mm3 Final      HGB Hemoglobin   Date Value Ref Range Status   05/26/2025 11.1 (L) 12.0 - 15.9 g/dL Final   05/25/2025 10.6 (L) 12.0 - 15.9 g/dL Final      HCT Hematocrit   Date Value Ref Range Status   05/26/2025 34.4 34.0 - 46.6 % Final   05/25/2025 32.8 (L) 34.0 - 46.6 % Final      Platelets Platelets   Date Value Ref Range Status   05/26/2025 259 140 - 450 10*3/mm3 Final   05/25/2025 232 140 - 450 10*3/mm3 Final        PT/INR:  No results found for: \"PROTIME\"/No results found for: \"INR\"    Sodium Sodium   Date Value Ref Range Status   05/26/2025 137 136 - 145 mmol/L Final   05/25/2025 138 136 - 145 mmol/L Final      Potassium Potassium   Date Value Ref Range Status   05/26/2025 4.8 3.5 - 5.2 mmol/L Final   05/25/2025 4.7 3.5 - 5.2 mmol/L Final      Chloride Chloride   Date Value Ref Range Status   05/26/2025 101 98 - 107 mmol/L Final   05/25/2025 103 98 - 107 mmol/L Final      Bicarbonate CO2   Date Value Ref Range Status   05/26/2025 26.0 22.0 - 29.0 mmol/L Final   05/25/2025 25.1 22.0 - 29.0 mmol/L Final      BUN BUN   Date Value Ref Range Status   05/26/2025 27 (H) 8 - 23 mg/dL Final   05/25/2025 20 8 - 23 mg/dL Final      Creatinine Creatinine   Date Value Ref Range Status   05/26/2025 0.94 0.57 - 1.00 mg/dL Final   05/25/2025 0.73 0.57 - 1.00 mg/dL Final      Calcium Calcium   Date Value Ref Range Status   05/26/2025 9.2 8.6 - 10.5 mg/dL Final   05/25/2025 9.4 8.6 - 10.5 mg/dL Final      Magnesium No results found for: \"MG\"       apixaban, 5 mg, Oral, Q12H  aspirin, 81 mg, Oral, Daily  atorvastatin, 10 mg, Oral, Nightly  dilTIAZem, 60 mg, Oral, Q8H  guaiFENesin, 600 mg, Oral, Q12H  insulin lispro, 2-7 Units, Subcutaneous, 4x Daily AC & at Bedtime  latanoprost, 1 drop, Both Eyes, Nightly  levothyroxine, 150 mcg, Oral, Q AM  [Held by provider] " losartan, 25 mg, Oral, Q24H  pantoprazole, 40 mg, Oral, QAM  polyethylene glycol, 17 g, Oral, BID  senna-docusate sodium, 2 tablet, Oral, BID  spironolactone, 25 mg, Oral, Daily             Paroxysmal atrial fibrillation      Assessment & Plan    Paroxysmal atrial fibrillation on chronic AC (last dose Eliquis 5/19)- S/P Convergent procedure on 5/22 with Dr. Choi  Palpitations  Hypertension  Hypothyroidism  History of SHANAE with CPAP compliance      POD # 5  - She has progressed well.  On room air.  Denies pain.  Feels ready to go home.  - Has been NSR with episodes of AF. Currently AF with mostly controlled rates.  - Blood pressures controlled on Cardizem.  - Eliquis resumed.  EP recommends AC for 8 to 12 weeks.  Is to follow-up in office in 2 to 3 weeks to determine further treatment for A-fib.  - Currently net -1.9 L.  Daily weight up 1.5 kg since admission.  No gross edema on exam.  UOP good with spironolactone daily.  - On ASA/statin/no BB due to history of bradycardia/Eliquis  - Continue routine care.   - Will discuss discharge with Dr. Choi.  I think she is ready to get out of here.  Anticipate home with family at discharge.      PETE Saavedra  05/27/25  09:53 EDT

## 2025-05-27 NOTE — THERAPY TREATMENT NOTE
Subjective: Pt agreeable to therapeutic plan of care.  Cognition: oriented to Person, Place, Time, and Situation, arousal/alertness: Alert and Attentive, safety/judgement: good, and awareness of deficits: good awareness of safety precautions and good awareness of deficits    Objective:     Precautions - cardiac, falls    Bed Mobility: N/A or Not attempted. - pt up in recliner at start of OT session  Functional Transfers: Modified-Independent/distant spv. Pt able to complete CTS from recliner with distant spv for safety.      Balance: unsupported and standing Modified-Independent/distant spv - completed functional mobility, toileting and STS with distant spv. Provided cues on ideal body position with functional mobility as pt is noted to lean forward slightly. When prompted, pt was able to maintain upright throughout, improving safety with mobility.   Functional Ambulation: Modified-Independent/distant spv for household distances without FWW while navigating obstacles as needed. Good safety and balance throughout.     Toileting: Modified-Independent  ADL Position:  hospital commode  ADL Comments: Able to complete toilet transfer mod I and toileting mod I. Distant spv for safety and education as needed. Pt seems to be returned to baseline independence with mod I for increased time/pacing.     Vitals: WNL    Pain: 0 [place] Location: none  Interventions for pain: N/A  Education: Provided education on the importance of mobility in the acute care setting, Verbal/Tactile Cues, ADL training, Transfer Training, Energy conservation strategies, and Post-Op Precautions    Assessment: Sheree Vance presents with ADL impairments affecting function including balance, endurance / activity tolerance, postural / trunk control, and strength. Demonstrated functioning below baseline abilities indicate the need for continued skilled intervention while inpatient. Pt required occasional cues for posture in walking to prevent  forward leaning without walker. Pt encouraged to use walker at home if she notes increased fatigue or weakness with mobility to maximize safety in functional mobility- pt agreeable. Completed toileting with mod I of increased time. Pt progressing well and appears to be returned to baseline. Plan is to d/c to home today if medically cleared, awaiting MD rounding. Tolerating session today without incident. Will continue to follow and progress as tolerated.     Plan/Recommendations:   No ongoing therapy recommended post-acute care. No therapy needs.    Pt desires Home with family assist at discharge. Pt cooperative; agreeable to therapeutic recommendations and plan of care.     Modified Cathi: N/A = No pre-op stroke/TIA    Post-Tx Position: Up in Chair and Call light and personal items within reach  PPE: gloves and surgical mask    Therapy Charges for Today       Code Description Service Date Service Provider Modifiers Qty    59336394824  OT THERAPEUTIC ACT EA 15 MIN 5/27/2025 Candi Marc OT GO 1    [id number]  OT SELF CARE/MGMT/TRAIN EA 15 MIN 5/27/2025 Candi Marc OT GO 1           Time Calculation- OT       Row Name 05/27/25 1140             Time Calculation- OT    OT Start Time 1120  -KT      OT Stop Time 1138  -KT      OT Time Calculation (min) 18 min  -KT      Total Timed Code Minutes- OT 18 minute(s)  -KT      OT Received On 05/27/25  -KT      OT - Next Appointment 05/29/25  -KT                User Key  (r) = Recorded By, (t) = Taken By, (c) = Cosigned By      Initials Name Provider Type    Candi Raphael OT Occupational Therapist

## 2025-05-27 NOTE — DISCHARGE SUMMARY
Date of Admission: 05/22/2025  Date of Discharge:  5/27/2025    Discharge Diagnosis:   Paroxysmal atrial fibrillation on chronic AC s/p Convergent procedure on 5/22/25 with Dr. Choi  Hypertension  Hypothyroidism  History of SHANAE with CPAP compliance    Presenting Problem/History of Present Illness:  Paroxysmal atrial fibrillation on chronic AC   Hypertension  Hypothyroidism  History of SHANAE with CPAP compliance    Hospital Course:  Patient is a 80 y.o. female who was admitted to the hospital on 5/22/25 for elective same day surgery. That morning she was taken to the Operating Room and under general anesthesia underwent minimally invasive epicardial ablation of the posterior wall of the left atrium via sub-xyphoid approach, exploratory left thoracoscopy with epicardial occlusion of the left atrial appendage with placement of a 50 mm atriclip, and electrical cardioversion by Dr. Choi. Patient tolerated the procedure well, was extubated in the Operating Room, and was transported to the Cardiovascular Care Unit in stable condition. Later that evening she went back into atrial fibrillation with RVR, was started on IV Amiodarone, and converted back to sinus rhythm shortly after. On post-operative day #1 her central line, deutsch catheter, and arterial line were removed. On post-operative day #3 her chest tube was removed. She continued to have some episodes of atrial fibrillation, but did better once her Cardizem was increased. On post-operative day #4 her Eliquis was restarted. The rest of her hospital course was uneventful and one of gradual improvement. On post-operative day #5, on 5/27/25, she was deemed stable for discharge home.          Procedures Performed:  5/22/25 Dr. Choi  Minimally invasive epicardial ablation of the posterior wall of the left atrium via sub-xyphoid approach  Exploratory left thoracoscopy with epicardial occlusion of the left atrial appendage with placement of a 50 mm  atriclip  Electrical cardioversion        Consults:   Consults       Date and Time Order Name Status Description    5/22/2025 11:45 AM Inpatient Cardiac Electrophysiology Consult              Pertinent Test Results:    Lab Results   Component Value Date    WBC 12.65 (H) 05/26/2025    HGB 11.1 (L) 05/26/2025    HCT 34.4 05/26/2025    MCV 91.7 05/26/2025     05/26/2025      Lab Results   Component Value Date    GLUCOSE 170 (H) 05/26/2025    CALCIUM 9.2 05/26/2025     05/26/2025    K 4.8 05/26/2025    CO2 26.0 05/26/2025     05/26/2025    BUN 27 (H) 05/26/2025    CREATININE 0.94 05/26/2025    EGFRIFAFRI >60 07/13/2021    EGFRIFNONA 55 (L) 10/11/2021    BCR 28.7 (H) 05/26/2025    ANIONGAP 10.0 05/26/2025     Lab Results   Component Value Date    INR 1.22 (H) 05/23/2025    PROTIME 15.4 (H) 05/23/2025       Condition on Discharge:  Stable     Vital Signs  Temp:  [97.7 °F (36.5 °C)-98.5 °F (36.9 °C)] 98.1 °F (36.7 °C)  Heart Rate:  [] 93  Resp:  [17-23] 17  BP: ()/(42-81) 114/72      Discharge Disposition: Home or Self Care    Discharge Medications     Discharge Medications        New Medications        Instructions Start Date   HYDROcodone-acetaminophen 5-325 MG per tablet  Commonly known as: NORCO   1 tablet, Oral, Every 6 Hours PRN             Continue These Medications        Instructions Start Date   amoxicillin 500 MG capsule  Commonly known as: AMOXIL   500 mg, Oral, 2 Times Daily PRN, For teeth cleaning      apixaban 5 MG tablet tablet  Commonly known as: Eliquis   5 mg, Oral, Every 12 Hours      atorvastatin 10 MG tablet  Commonly known as: LIPITOR   10 mg, Oral, Every Night at Bedtime      Cholecalciferol 50 MCG (2000 UT) capsule   1 capsule, Daily      Chromium 200 MCG capsule   1 mg, Daily      coenzyme Q10 100 MG capsule   100 mg, Nightly      dilTIAZem 60 MG tablet  Commonly known as: CARDIZEM   60 mg, Oral, 3 times daily      EPINEPHrine 0.3 MG/0.3ML solution auto-injector  injection  Commonly known as: EPIPEN       latanoprost 0.005 % ophthalmic solution  Commonly known as: XALATAN   1 drop, Nightly      levothyroxine 150 MCG tablet  Commonly known as: SYNTHROID, LEVOTHROID   150 mcg, Oral, Daily      Magnesium 400 MG tablet   1 tablet, Nightly      Multivitamin Adult tablet tablet  Generic drug: multivitamin with minerals   Take 1 tablet by mouth Daily.      Omega-3 1000 MG capsule   1 capsule, Daily      Premarin 0.625 MG/GM vaginal cream  Generic drug: Estrogens Conjugated   USE 1/2 GRAM PER VAGINA 2 TO 3 TIMES A WEEK      spironolactone 50 MG tablet  Commonly known as: ALDACTONE   50 mg, Oral, Daily             Stop These Medications      dofetilide 500 MCG capsule  Commonly known as: TIKOSYN     mupirocin 2 % ointment  Commonly known as: BACTROBAN     telmisartan 80 MG tablet  Commonly known as: MICARDIS              Discharge Diet: Heart healthy    Activity at Discharge:   1. No driving for 2 weeks and off narcotic pain medications.  2. Shower daily. Clean incisions with warm water and antibacterial soap only. Do not put any lotion or ointments on incisions.  3. Ambulate for 10 minutes at least 3 times a day.  4. No heavy lifting > 10lbs until seen in office.   5. Take all medications as prescribed.      Follow-up Appointments  Future Appointments   Date Time Provider Department Center   6/19/2025  1:30 PM Zohra Lazar APRN [facility]   7/3/2025  2:00 PM Dre Case MD [facility]   7/25/2025  2:00 PM [facility]     Additional Instructions for the Follow-ups that You Need to Schedule       Call MD With Problems / Concerns   As directed      Instructions:  Call office at [phone] for any drainage, increased redness, or fever over 100.5    Order Comments: Instructions:  Call office at [phone] for any drainage, increased redness, or fever over 100.5         Discharge Follow-up with PCP   As directed        Currently Documented PCP:    Latia Guerrier MD    PCP Phone Number:    [phone]     Follow Up Details: in 1 week        Discharge Follow-up with Specialty: Cardiologist PETE/PA; 1 Week   As directed      Specialty: Cardiologist PETE/PA   Follow Up: 1 Week   Follow Up Details: bring all prescription bottles to appointment, call for appointment        Discharge Follow-up with Specified Provider: Cardiologist; 1 Month   As directed      To: Cardiologist   Follow Up: 1 Month   Follow Up Details: call for appointment, bring all medication bottles to appointment        Discharge Follow-up with Specified Provider: PETE Jorgensen; 3 Weeks   As directed      To: PETE Jorgensen   Follow Up: 3 Weeks   Follow Up Details: Appointment made                Test Results Pending at Discharge: None       SARBJIT Liz  05/27/25  17:47 EDT

## 2025-05-27 NOTE — OUTREACH NOTE
Prep Survey      Flowsheet Row Responses   [facility] patient discharged from? [place]   Is LACE score < 7 ? Yes   Eligibility [facility]   Date of Admission 05/22/25   Date of Discharge 05/27/25   Discharge diagnosis Paroxysmal atrial fibrillation   Does the patient have one of the following disease processes/diagnoses(primary or secondary)? Other   Prep survey completed? Yes            Jesi BETHEA - Registered Nurse

## 2025-05-28 ENCOUNTER — NURSE TRIAGE (OUTPATIENT)
Dept: CALL CENTER | Facility: HOSPITAL | Age: 80
End: 2025-05-28
Payer: MEDICARE

## 2025-05-28 ENCOUNTER — TRANSITIONAL CARE MANAGEMENT TELEPHONE ENCOUNTER (OUTPATIENT)
Dept: CALL CENTER | Facility: HOSPITAL | Age: 80
End: 2025-05-28
Payer: MEDICARE

## 2025-05-28 NOTE — PROGRESS NOTES
CALLED AND SPOKE TO THE PATIENT. SHE IS SCHEDULED FOR 06/16/2025. YOU HAD NOTHING BEFORE THEN. SHE WAS OKAY WITH THAT BECAUSE SHE SAID SHE HAS SO MANY APPOINTMENTS SHE HAS TO GO TO.

## 2025-05-28 NOTE — PROGRESS NOTES
Please assist the patient to schedule her hospital follow-up appointment with me either this week or next week.  You may use the acute slot for it if needed.  Thank you.

## 2025-05-28 NOTE — TELEPHONE ENCOUNTER
Surgical procedure 05/22/2025    Where tube was removed there were 2 stitches and stitches were  removed before discharge    The drain site was on her side    The drainage is increasing She has changed her clothes 5 times today When she coughs or when she moves drainage gushes out of her side.  Drainage is pink tinged. Large amount, gushes out    Procedure  Minimally invasive epicardial ablation of the posterior wall of the left atrium via sub-xyphoid approach  Exploratory left thoracoscopy with epicardial occlusion of the left atrial appendage with placement of a 50 mm atriclip  Electrical cardioversion     Advised to call surgeon on call.  Reason for Disposition   [1] Caller has URGENT question AND [2] triager unable to answer question    Additional Information   Negative: Sounds like a life-threatening emergency to the triager   Negative: Chest pain   Negative: Difficulty breathing   Negative: Acting confused (e.g., disoriented, slurred speech) or excessively sleepy   Negative: Post-Op tonsil and adenoid surgery, symptoms or questions about   Negative: Surgical incision symptoms and questions   Negative: [1] Pain or burning with passing urine (urination) AND [2] male   Negative: [1] Pain or burning with passing urine (urination) AND [2] female   Negative: Constipation   Negative: New or worsening leg (calf, thigh) pain   Negative: New or worsening leg swelling   Negative: Dizziness is severe, or persists > 24 hours after surgery   Negative: Pain, redness, swelling, or pus at IV Site   Negative: Symptoms arising from use of a urinary catheter (e.g., Coude, Maldonado)   Negative: Cast problems or questions   Negative: Medication question   Negative: [1] Widespread rash AND [2] bright red, sunburn-like   Negative: [1] SEVERE headache AND [2] after spinal (epidural) anesthesia   Negative: [1] Vomiting AND [2] persists > 4 hours   Negative: [1] Vomiting AND [2] abdomen looks much more swollen than usual   Negative: [1]  "Drinking very little AND [2] dehydration suspected (e.g., no urine > 12 hours, very dry mouth, very lightheaded)   Negative: Patient sounds very sick or weak to the triager   Negative: Sounds like a serious complication to the triager   Negative: Fever > 100.4 F (38.0 C)   Negative: [1] SEVERE post-op pain (e.g., excruciating, pain scale 8-10) AND [2] not controlled with pain medications    Answer Assessment - Initial Assessment Questions  1. SYMPTOM: \"What's the main symptom you're concerned about?\" (e.g., pain, fever, vomiting)      Drainage from drainage tube site  2. ONSET: \"When did *No Answer*  start?\"        today  3. SURGERY: \"What surgery did you have?\"      Minimally invasive epicardial ablation of the posterior wall of the left atrium via sub-xyphoid approach  · Exploratory left thoracoscopy with epicardial occlusion of the left atrial appendage with placement of a 50 mm atriclip  · Electrical cardioversion     4. DATE of SURGERY: \"When was the surgery?\"    05/22/2025  5. ANESTHESIA: \" What type of anesthesia did you have?\" (e.g., general, spinal, epidural, local)     general  6. PAIN: \"Is there any pain?\" If Yes, ask: \"How bad is it?\"  (Scale 1-10; or mild, moderate, severe)       4/10  7. FEVER: \"Do you have a fever?\" If Yes, ask: \"What is your temperature, how was it measured, and when did it start?\"      No fever  8. VOMITING: \"Is there any vomiting?\" If Yes, ask: \"How many times?\"      denies  9. BLEEDING: \"Is there any bleeding?\" If Yes, ask: \"How much?\" and \"Where?\"      *No Answer*  10. OTHER SYMPTOMS: \"Do you have any other symptoms?\" (e.g., drainage from wound, painful urination, constipation)      Drainage from drain site large amount gushes out when she coughs or when she moves   She has changed clothes 5 times today    Protocols used: Post-Op Symptoms and Questions-ADULT-AH    "

## 2025-05-28 NOTE — OUTREACH NOTE
Call Center TCM Note      Flowsheet Row Responses   [facility] patient discharged from? [facility]   Does the patient have one of the following disease processes/diagnoses(primary or secondary)? Other   TCM attempt successful? Yes   Call start time 1355   Call end time 1359   Discharge diagnosis Paroxysmal atrial fibrillation   Is patient permission given to speak with other caregiver? Yes   List who call center can speak with Keo Rajiv---Spouse   Person spoke with today (if not patient) and relationship Keo Rajiv---Spouse   Medication alerts for this patient Norco   Meds reviewed with patient/caregiver? Yes   Is the patient having any side effects they believe may be caused by any medication additions or changes? No   Does the patient have all medications ordered at discharge? Yes   Prescription comments No questions or concerns with medications.  states Norco is effective for pain relief.   Is the patient taking all medications as directed (includes completed medication regime)? Yes   Does the patient have an appointment with their PCP within 7-14 days of discharge? No appointments available   Nursing Interventions PCP office requested to make appointment - message sent   Has home health visited the patient within 72 hours of discharge? N/A   Psychosocial issues? No   Comments  states patient had a shower this morning and is doing well. She is following the list of instructions for post op care. No questions or concerns.   Did the patient receive a copy of their discharge instructions? Yes   Nursing interventions Reviewed instructions with patient  [with spouse--Keo]   What is the patient's perception of their health status since discharge? Improving   Is the patient/caregiver able to teach back signs and symptoms related to disease process for when to call PCP? Yes   Is the patient/caregiver able to teach back signs and symptoms related to disease process for when to call 911? Yes    Is the patient/caregiver able to teach back the hierarchy of who to call/visit for symptoms/problems? PCP, Specialist, Home health nurse, Urgent Care, ED, 911 Yes   If the patient is a current smoker, are they able to teach back resources for cessation? Not a smoker   TCM call completed? Yes   Wrap up additional comments Patient has a POST OP Cardiac Surgery f/u appt on 6/19/25 at 1:30 PM with PETE Gonsalez.  No appts available to schedule a [facility] f/u appt with PCP, Dr. Latia Guerrier. Will send message to PCP office.   Call end time [id number]   Would this patient benefit from a Referral to [facility] Social Work? No   Is the patient interested in additional calls from an ambulatory ? No            Vicky GA - Registered Nurse    5/28/2025, 14:00 EDT            Vicky Wilkins Nurse

## 2025-05-29 ENCOUNTER — TELEPHONE (OUTPATIENT)
Dept: CARDIAC SURGERY | Facility: CLINIC | Age: 80
End: 2025-05-29
Payer: MEDICARE

## 2025-05-29 NOTE — TELEPHONE ENCOUNTER
"Pt called with concerns of pink drainage coming from drainage tube sites. I asked pt if the drainage had any odor and pt denies any smell and said that her wounds are healing well. Pt was concerned with the amount of fluid draining from sites especially after using her breathing machine. Pt stated she had to use a anurag pad to soak up the fluid there was so much. She said it was a \"surge\" of drainage. I spoke with Dr. Choi and per him, he advised that pt stop using her machine and contact us this time tomorrow if drainage continues in large amount like this morning. Pt verbalized understanding.   "

## 2025-05-30 NOTE — PROGRESS NOTES
Cardiology Office Follow Up Visit      Primary Care Provider:  Latia Guerrier MD    Reason for f/u:     Hospital Follow-Up      Subjective       History of Present Illness       Sheree Vance is a 80 y.o. female seen in clinic today for hospital follow-up.    Patient has a past cardiac history of PAF status post ablation 3/2025 with early recurrence and has failed Tikosyn in the past.  She is anticoagulated with Eliquis.  Last cath 4/2025 showed mild CAD.  SARA 2/2025 showed an EF of 56 to 60% with grade 1 diastolic dysfunction, mild to moderate MR, mild TR, and moderate to severe LAE.  Patient underwent convergence procedure with atrial clip 5/22/25.  She had early recurrence of atypical atrial flutter postoperatively which was rate controlled on discharge.  She was planned for continued anticoagulation for 8 to 12 weeks.  PMH includes HTN, HLD, SHANAE, hypothyroidism, history of CVA, and chronic back pain.    Patient reports some mild chest discomfort for couple days postop that has resolved.  She tells me that she felt dizzy and nauseated last week but that this is resolved.  However she feels very tired.  She has been using her stationary stepper machine at home to be more active.  She denies shortness of breath.  She reports SBPs in the 120s at home, but has noticed her heart rate staying up in the 120s for long periods of time over the past 3 days.        ASSESSMENT/PLAN:      Diagnoses and all orders for this visit:    1. Atrial flutter, unspecified type (Primary)  -     ECG 12 Lead    Other orders  -     metoprolol tartrate (LOPRESSOR) 25 MG tablet; Take 0.5 tablets by mouth 2 (Two) Times a Day.  Dispense: 30 tablet; Refill: 5            MEDICAL DECISION MAKING:    EKG shows persistent atrial flutter with RVR in the 120s today.  Her heart rate has returned to 88 bpm by the time of my exam.  She does note recent prolonged resting rates recently in the 120s at home via smart watch.  Will add metoprolol to  tartrate 12.5 mg p.o. twice daily.  Consider post op cardioversion.  Follow-up with Dr. Case in the next 4 weeks for rhythm strategy.  In the interim we have discussed s/s in which patient should present to the ER.      RTC in 4 weeks.     Past Medical History:   Diagnosis Date    A-fib Since 2001 Dx in [place]    Abnormal ECG 2009    Allergic 2014    Seasonal    Allergic rhinitis     Hx Allergy Shots x 4 Yrs    Aortic valve calcification 12/04/2018    Noted on SARA    Arrhythmia     Arthritis     Lumbar Spine    Atrial flutter     Breast mass seen on mammogram 03/19/2014    L 4CM Mass--Benign & Followed    Bruxism, sleep-related     Cataract     Chronic anticoagulation     Eliquis     Chronic low back pain     Hx Back Sx in 2016    Colon polyp     Coronary artery disease 2009    AFIB    DJD (degenerative joint disease), lumbar     Dyslipidemia     w/Hypertriglyceridemia--Statin/Fish Oils    Family history of premature CAD     GERD (gastroesophageal reflux disease)     Glaucoma     Heart disease     History of bone density study 03/19/14-[facility]    T-Score of 1.4 Indicating Osteopenia    History of EKG 04/2018 & 08/2018 & 12/2018    First Degree AV Block/Multiple Atrial Premature Blocks Noted on All    Hormone replacement therapy (HRT)     Premarin-0.625MG    Hyperlipidemia     Controlled w/Meds    Hypertension     Controlled w/Losartan    Hypothyroidism     Synthroid    Insomnia     Takes OTC Sleep Aid PRN    Left atrial enlargement 12/04/2018    Severe Noted on Cardioversion Report/SARA    Left breast mass 03/19/2014    4CM Noted on Mammogram--Benign     Mild tricuspid valve regurgitation 12/04/2018    Noted on SARA    Mitral valve annular calcification 12/04/2018    Moderate Noted on SARA    Mitral valve regurgitation 12/04/2018    Moderate Noted on SARA    Obesity 12/04/2018    BMI-43.3     SHANAE on CPAP 11/27/2018    AHI 10.5/h, supine 30/h    Osteoarthritis of right hip 07/09/2019    Osteopenia 03/19/2014    Noted on  Dexa Report    Restless sleeper     Tosses and Turns All Night Per Pt    Sinus congestion     Tricuspid leaflet thickened 12/04/2018    Noted on SARA    Urinary tract infection        Past Surgical History:   Procedure Laterality Date    ABLATION OF DYSRHYTHMIC FOCUS  2018    APPENDECTOMY      BACK SURGERY  2016    Spinal Fusion    BLADDER REPAIR  2001 in [place]    CARDIAC ABLATION N/A 5/22/2025    Procedure: OPEN HEART CONVERGENT HYBRID ABLATION PROCEDURE WITH LEFT ATRIAL APPENDAGE CLIPPING using size 50 Atriclip.;  Surgeon: Lenny Choi MD;  Location: [facility];  Service: Cardiothoracic;  Laterality: N/A;    CARDIAC CATHETERIZATION  2015    CARDIAC CATHETERIZATION Right 4/10/2025    Procedure: Left Heart Cath;  Surgeon: Richard Burgos MD;  Location:  [facility];  Service: Cardiovascular;  Laterality: Right;    CARDIAC ELECTROPHYSIOLOGY PROCEDURE Right 03/12/2025    Procedure: Ablation atrial fibrillation;  Surgeon: Dre Case MD;  Location:  [facility];  Service: Cardiovascular;  Laterality: Right;    CARDIOVERSION  08/7/18 & 4/17/18-[facility]    Dr. Villegas--For Chronic A-Fib    CARDIOVERSION  12/4/28-[facility]    Dr. Case--See Report    CATARACT EXTRACTION  2007    COLONOSCOPY  04/13/2017    HYSTERECTOMY      Partial    JOINT REPLACEMENT  2020 2021    KNEE ARTHROSCOPY  2009    KNEE SURGERY Right 2009    OTHER SURGICAL HISTORY  12/4/18-[facility]    Fluoroscopy/Trans-Septal Access to L Atrium/Selective Venography of L Superior Pulm Vein/Add Lienier Radiofrequency Ablation Along the Coronary Sinus/Synchronized Cardioversion--Dr. Case    REPLACEMENT TOTAL KNEE Right 08/30/2021    Dr. Astorga    SPINAL FUSION  2016    SPINE SURGERY  2016    SARA  12/4/18-[facility]    Mild Concentric L Ventricular Hypertrophy Noted; EF 55-60%; L Atrial Moderate-Severely Dilated, Moderat Mitral Annular Calcification with Moderate MVR Noted; Mild TVR; Normal LV Function    TONSILLECTOMY       TOTAL HIP ARTHROPLASTY Right 07/06/2020    Dr. Astorga         Current Outpatient Medications:     amoxicillin (AMOXIL) 500 MG capsule, Take 1 capsule by mouth 2 (Two) Times a Day As Needed (teeth cleanings). For teeth cleaning, Disp: , Rfl:     apixaban (Eliquis) 5 MG tablet tablet, Take 1 tablet by mouth Every 12 (Twelve) Hours., Disp: 180 tablet, Rfl: 1    atorvastatin (LIPITOR) 10 MG tablet, Take 1 tablet by mouth every night at bedtime., Disp: 90 tablet, Rfl: 1    dilTIAZem (CARDIZEM) 60 MG tablet, Take 1 tablet by mouth 3 times a day., Disp: 90 tablet, Rfl: 5    EPINEPHrine (EPIPEN) 0.3 MG/0.3ML solution auto-injector injection, , Disp: , Rfl: 3    Estrogens Conjugated (Premarin) 0.625 MG/GM vaginal cream, USE 1/2 GRAM PER VAGINA 2 TO 3 TIMES A WEEK, Disp: 30 g, Rfl: 1    latanoprost (XALATAN) 0.005 % ophthalmic solution, Inject 1 drop into the eye Every Night., Disp: , Rfl:     levothyroxine (SYNTHROID, LEVOTHROID) 150 MCG tablet, TAKE ONE TABLET BY MOUTH DAILY, Disp: 90 tablet, Rfl: 0    spironolactone (ALDACTONE) 50 MG tablet, Take 1 tablet by mouth Daily., Disp: 90 tablet, Rfl: 1    Cholecalciferol 2000 units capsule, Take 1 capsule by mouth Daily. (Patient not taking: Reported on 6/9/2025), Disp: , Rfl:     Chromium 200 MCG capsule, Take 1,000 mcg by mouth Daily. (Patient not taking: Reported on 6/9/2025), Disp: , Rfl:     coenzyme Q10 100 MG capsule, Take 1 capsule by mouth Every Night. (Patient not taking: Reported on 6/9/2025), Disp: , Rfl:     Magnesium 400 MG tablet, Take 1 tablet by mouth Every Night. (Patient not taking: Reported on 6/9/2025), Disp: , Rfl:     metoprolol tartrate (LOPRESSOR) 25 MG tablet, Take 0.5 tablets by mouth 2 (Two) Times a Day., Disp: 30 tablet, Rfl: 5    Multiple Vitamins-Minerals (MULTIVITAMIN ADULT) tablet, Take 1 tablet by mouth Daily. (Patient not taking: Reported on 6/9/2025), Disp: , Rfl:     Omega-3 1000 MG capsule, Take 1 capsule by mouth Daily. (Patient not taking:  "Reported on 6/9/2025), Disp: , Rfl:   No current facility-administered medications for this visit.    Facility-Administered Medications Ordered in Other Visits:     Chlorhexidine Gluconate Cloth 2 % pads, , Topical, Q12H PRN, Zohra Lazar, APRN    Social History     Socioeconomic History    Marital status:      Spouse name: Papi    Number of children: 1   Tobacco Use    Smoking status: Never     Passive exposure: Never    Smokeless tobacco: Never   Vaping Use    Vaping status: Never Used   Substance and Sexual Activity    Alcohol use: Yes     Alcohol/week: 4.0 standard drinks of alcohol     Types: 2 Glasses of wine, 2 Drinks containing 0.5 oz of alcohol per week     Comment: \"a couple a week\"    Drug use: Never    Sexual activity: Yes     Partners: Male     Birth control/protection: Hysterectomy     Comment: Hyst       Family History   Problem Relation Age of Onset    Heart failure Mother     Alcohol abuse Mother     Early death Mother     Miscarriages / Stillbirths Mother     No Known Problems Father        The following portions of the patient's history were reviewed and updated as appropriate: allergies, current medications, past family history, past medical history, past social history, past surgical history and problem list.    ROS  /88 (BP Location: Right arm, Patient Position: Sitting, Cuff Size: Adult)   Pulse (!) 128 Comment: ekg  Resp 16   Ht 165.1 cm (65\")   Wt 95.3 kg (210 lb)   SpO2 98%   BMI 34.95 kg/m² .  Objective     Physical Exam    Physical Exam:  Neuro:  CV:  Resp:  GI:  Ext:  Pysch: AAOx3, no gross deficits  Irregular S1S2 RRR, no murmur  Non-labored, CTA  BS+, abd soft  Pedal pulses palp, no edema  Calm and cooperative       Procedures    EKG ordered by and reviewed by me in office  EKG shows persistent atrial flutter with RVR.  Prior EKG shows CVR.       "

## 2025-06-02 ENCOUNTER — TELEPHONE (OUTPATIENT)
Dept: CARDIAC SURGERY | Facility: CLINIC | Age: 80
End: 2025-06-02
Payer: MEDICARE

## 2025-06-02 ENCOUNTER — TELEPHONE (OUTPATIENT)
Dept: FAMILY MEDICINE CLINIC | Facility: CLINIC | Age: 80
End: 2025-06-02
Payer: MEDICARE

## 2025-06-02 RX ORDER — DOXYCYCLINE 100 MG/1
200 CAPSULE ORAL ONCE
Qty: 2 CAPSULE | Refills: 0 | Status: SHIPPED | OUTPATIENT
Start: 2025-06-02 | End: 2025-06-02

## 2025-06-02 NOTE — TELEPHONE ENCOUNTER
Called pt and advised per Vivi MAJANO for tick bite pt would need to contact PCP office. And Vivi would review incision site picture with Zohra FREEMAN and would call pt with instructions.

## 2025-06-02 NOTE — TELEPHONE ENCOUNTER
Reviewed incision with Zohra FREEMAN. Advised patient per Zohra ROMANN. To continue cleaning incision with antibacterial soap and paint with betadine. Leave open to air. If conditions worsens or does not improve patient to notify office.

## 2025-06-02 NOTE — TELEPHONE ENCOUNTER
Patient found tick on back of leg. Raised red bump but no other symptoms at this time.  Patient concerned about the bite because she was released from hospital after heart surgery last Tuesday.  Asking if there is anything she needs to do or be mindful of.

## 2025-06-02 NOTE — TELEPHONE ENCOUNTER
Please advise the patient that I sent a prescription for a prophylactic dose of doxycycline.  It is a one-time dose of antibiotic after removing the tick.  Patient needs to monitor the symptoms and if there is any rash down the road or any systemic symptoms like headache, nausea, vomiting, or abdominal pain she will need to be seen and evaluated.  Thank you.

## 2025-06-09 ENCOUNTER — OFFICE VISIT (OUTPATIENT)
Dept: CARDIOLOGY | Facility: CLINIC | Age: 80
End: 2025-06-09
Payer: MEDICARE

## 2025-06-09 VITALS
WEIGHT: 210 LBS | BODY MASS INDEX: 34.99 KG/M2 | SYSTOLIC BLOOD PRESSURE: 119 MMHG | OXYGEN SATURATION: 98 % | DIASTOLIC BLOOD PRESSURE: 88 MMHG | HEIGHT: 65 IN | RESPIRATION RATE: 16 BRPM | HEART RATE: 128 BPM

## 2025-06-09 DIAGNOSIS — I48.92 ATRIAL FLUTTER, UNSPECIFIED TYPE: Primary | ICD-10-CM

## 2025-06-09 PROCEDURE — 93000 ELECTROCARDIOGRAM COMPLETE: CPT | Performed by: NURSE PRACTITIONER

## 2025-06-09 PROCEDURE — 99214 OFFICE O/P EST MOD 30 MIN: CPT | Performed by: NURSE PRACTITIONER

## 2025-06-09 PROCEDURE — 3074F SYST BP LT 130 MM HG: CPT | Performed by: NURSE PRACTITIONER

## 2025-06-09 PROCEDURE — 3079F DIAST BP 80-89 MM HG: CPT | Performed by: NURSE PRACTITIONER

## 2025-06-09 RX ORDER — METOPROLOL TARTRATE 25 MG/1
12.5 TABLET, FILM COATED ORAL 2 TIMES DAILY
Qty: 30 TABLET | Refills: 5 | Status: SHIPPED | OUTPATIENT
Start: 2025-06-09

## 2025-06-16 ENCOUNTER — LAB (OUTPATIENT)
Dept: FAMILY MEDICINE CLINIC | Facility: CLINIC | Age: 80
End: 2025-06-16
Payer: MEDICARE

## 2025-06-16 ENCOUNTER — OFFICE VISIT (OUTPATIENT)
Dept: FAMILY MEDICINE CLINIC | Facility: CLINIC | Age: 80
End: 2025-06-16
Payer: MEDICARE

## 2025-06-16 VITALS
BODY MASS INDEX: 34.87 KG/M2 | OXYGEN SATURATION: 95 % | HEIGHT: 65 IN | WEIGHT: 209.3 LBS | TEMPERATURE: 98.2 F | RESPIRATION RATE: 16 BRPM | SYSTOLIC BLOOD PRESSURE: 140 MMHG | DIASTOLIC BLOOD PRESSURE: 84 MMHG | HEART RATE: 104 BPM

## 2025-06-16 DIAGNOSIS — I48.0 PAROXYSMAL ATRIAL FIBRILLATION: Primary | ICD-10-CM

## 2025-06-16 DIAGNOSIS — E03.9 ACQUIRED HYPOTHYROIDISM: ICD-10-CM

## 2025-06-16 DIAGNOSIS — I10 ESSENTIAL HYPERTENSION: ICD-10-CM

## 2025-06-16 DIAGNOSIS — R73.03 PREDIABETES: ICD-10-CM

## 2025-06-16 LAB
ALBUMIN SERPL-MCNC: 4.1 G/DL (ref 3.5–5.2)
ALBUMIN/GLOB SERPL: 1.3 G/DL
ALP SERPL-CCNC: 222 U/L (ref 39–117)
ALT SERPL W P-5'-P-CCNC: 13 U/L (ref 1–33)
ANION GAP SERPL CALCULATED.3IONS-SCNC: 13 MMOL/L (ref 5–15)
AST SERPL-CCNC: 20 U/L (ref 1–32)
BASOPHILS # BLD AUTO: 0.04 10*3/MM3 (ref 0–0.2)
BASOPHILS NFR BLD AUTO: 0.4 % (ref 0–1.5)
BILIRUB SERPL-MCNC: 0.5 MG/DL (ref 0–1.2)
BUN SERPL-MCNC: 12 MG/DL (ref 8–23)
BUN/CREAT SERPL: 13.6 (ref 7–25)
CALCIUM SPEC-SCNC: 9.7 MG/DL (ref 8.6–10.5)
CHLORIDE SERPL-SCNC: 97 MMOL/L (ref 98–107)
CO2 SERPL-SCNC: 26 MMOL/L (ref 22–29)
CREAT SERPL-MCNC: 0.88 MG/DL (ref 0.57–1)
DEPRECATED RDW RBC AUTO: 43.1 FL (ref 37–54)
EGFRCR SERPLBLD CKD-EPI 2021: 67.4 ML/MIN/1.73
EOSINOPHIL # BLD AUTO: 0.03 10*3/MM3 (ref 0–0.4)
EOSINOPHIL NFR BLD AUTO: 0.3 % (ref 0.3–6.2)
ERYTHROCYTE [DISTWIDTH] IN BLOOD BY AUTOMATED COUNT: 12.6 % (ref 12.3–15.4)
GLOBULIN UR ELPH-MCNC: 3.1 GM/DL
GLUCOSE SERPL-MCNC: 124 MG/DL (ref 65–99)
HBA1C MFR BLD: 5.4 % (ref 4.8–5.6)
HCT VFR BLD AUTO: 38.8 % (ref 34–46.6)
HGB BLD-MCNC: 12.1 G/DL (ref 12–15.9)
IMM GRANULOCYTES # BLD AUTO: 0.05 10*3/MM3 (ref 0–0.05)
IMM GRANULOCYTES NFR BLD AUTO: 0.5 % (ref 0–0.5)
LYMPHOCYTES # BLD AUTO: 1.33 10*3/MM3 (ref 0.7–3.1)
LYMPHOCYTES NFR BLD AUTO: 13.1 % (ref 19.6–45.3)
MCH RBC QN AUTO: 29.3 PG (ref 26.6–33)
MCHC RBC AUTO-ENTMCNC: 31.2 G/DL (ref 31.5–35.7)
MCV RBC AUTO: 93.9 FL (ref 79–97)
MONOCYTES # BLD AUTO: 1.23 10*3/MM3 (ref 0.1–0.9)
MONOCYTES NFR BLD AUTO: 12.1 % (ref 5–12)
NEUTROPHILS NFR BLD AUTO: 7.5 10*3/MM3 (ref 1.7–7)
NEUTROPHILS NFR BLD AUTO: 73.6 % (ref 42.7–76)
NRBC BLD AUTO-RTO: 0 /100 WBC (ref 0–0.2)
PLATELET # BLD AUTO: 457 10*3/MM3 (ref 140–450)
PMV BLD AUTO: 9.7 FL (ref 6–12)
POTASSIUM SERPL-SCNC: 4.3 MMOL/L (ref 3.5–5.2)
PROT SERPL-MCNC: 7.2 G/DL (ref 6–8.5)
RBC # BLD AUTO: 4.13 10*6/MM3 (ref 3.77–5.28)
SODIUM SERPL-SCNC: 136 MMOL/L (ref 136–145)
TSH SERPL DL<=0.05 MIU/L-ACNC: 2.03 UIU/ML (ref 0.27–4.2)
WBC NRBC COR # BLD AUTO: 10.18 10*3/MM3 (ref 3.4–10.8)

## 2025-06-16 PROCEDURE — 1126F AMNT PAIN NOTED NONE PRSNT: CPT | Performed by: FAMILY MEDICINE

## 2025-06-16 PROCEDURE — 3079F DIAST BP 80-89 MM HG: CPT | Performed by: FAMILY MEDICINE

## 2025-06-16 PROCEDURE — G2211 COMPLEX E/M VISIT ADD ON: HCPCS | Performed by: FAMILY MEDICINE

## 2025-06-16 PROCEDURE — 1159F MED LIST DOCD IN RCRD: CPT | Performed by: FAMILY MEDICINE

## 2025-06-16 PROCEDURE — 80053 COMPREHEN METABOLIC PANEL: CPT | Performed by: FAMILY MEDICINE

## 2025-06-16 PROCEDURE — 83036 HEMOGLOBIN GLYCOSYLATED A1C: CPT | Performed by: FAMILY MEDICINE

## 2025-06-16 PROCEDURE — 1160F RVW MEDS BY RX/DR IN RCRD: CPT | Performed by: FAMILY MEDICINE

## 2025-06-16 PROCEDURE — 3077F SYST BP >= 140 MM HG: CPT | Performed by: FAMILY MEDICINE

## 2025-06-16 PROCEDURE — 99214 OFFICE O/P EST MOD 30 MIN: CPT | Performed by: FAMILY MEDICINE

## 2025-06-16 PROCEDURE — 85025 COMPLETE CBC W/AUTO DIFF WBC: CPT | Performed by: FAMILY MEDICINE

## 2025-06-16 PROCEDURE — 36415 COLL VENOUS BLD VENIPUNCTURE: CPT | Performed by: FAMILY MEDICINE

## 2025-06-16 PROCEDURE — 84443 ASSAY THYROID STIM HORMONE: CPT | Performed by: FAMILY MEDICINE

## 2025-06-16 RX ORDER — LEVOTHYROXINE SODIUM 150 UG/1
150 TABLET ORAL DAILY
Qty: 90 TABLET | Refills: 0 | Status: SHIPPED | OUTPATIENT
Start: 2025-06-16

## 2025-06-16 NOTE — PROGRESS NOTES
Subjective   Chief Complaint   Patient presents with    Hospital Follow Up Visit     Paroxysmal atrial fibrillation - BHF D/C 5/27/25    Hypertension    Hypothyroidism    Atrial Fibrillation    Med Refdevi Vance is a 80 y.o. female.     Patient Care Team:  Latia Guerrier MD as PCP - General  Latia Guerrier MD as PCP - Family Medicine  Dre Case MD (Cardiology)  Jet Godwin MD as Consulting Physician (Ophthalmology)  Antonio Fine MD as Consulting Physician (Allergy)  Sandor Astorga MD as Consulting Physician (Orthopedic Surgery)  Sam Carlos MD as Consulting Physician (Cardiology)    History of Present Illness  She is coming in today to follow-up on her recent hospital admission.  Patient underwent a minimally invasive epicardial ablation posterior wall of the left atrium via sup xiphoid approach as well as exploratory left thoracoscopy with epicardial occlusion of the left atrial appendage.  Patient previously had ablation done due to atrial fibrillation in 2018 and 2 years ago she was started on Tikosyn.  She became rather symptomatic if it comes to her recurrent A-fib earlier this year and she had repeated ablation done in 3/2025 which was not successful, at that point she was referred to see a cardiothoracic surgeon and above-named procedure was done.  Patient is currently off Tikosyn.  She is still on Eliquis and plan is to keep her on it for another 4 to 6 weeks.  She is scheduled to follow-up with the cardiothoracic office later this week.  She still has the feelings in the chest of atrial fibrillation, but she denies any feeling of the rapid heartbeat.  She feels still somehow rundown, but slowly improving.       The following portions of the patient's history were reviewed and updated as appropriate: allergies, current medications, past family history, past medical history, past social history, past surgical history, and problem  list.  Past Medical History:   Diagnosis Date    A-fib Since 2001 Dx in [place]    Abnormal ECG 2009    Allergic 2014    Seasonal    Allergic rhinitis     Hx Allergy Shots x 4 Yrs    Aortic valve calcification 12/04/2018    Noted on SARA    Arrhythmia     Arthritis 2008    Lumbar Spine    Atrial flutter     Breast mass seen on mammogram 03/19/2014    L 4CM Mass--Benign & Followed    Bruxism, sleep-related     Cataract     Chronic anticoagulation     Eliquis     Chronic low back pain     Hx Back Sx in 2016    Colon polyp 2001    Coronary artery disease 2009    AFIB    DJD (degenerative joint disease), lumbar     Dyslipidemia     w/Hypertriglyceridemia--Statin/Fish Oils    Family history of premature CAD     GERD (gastroesophageal reflux disease)     Glaucoma 2007    Heart disease     History of bone density study 03/19/14-[facility]    T-Score of 1.4 Indicating Osteopenia    History of EKG 04/2018 & 08/2018 & 12/2018    First Degree AV Block/Multiple Atrial Premature Blocks Noted on All    Hormone replacement therapy (HRT)     Premarin-0.625MG    Hyperlipidemia     Controlled w/Meds    Hypertension     Controlled w/Losartan    Hypothyroidism     Synthroid    Insomnia     Takes OTC Sleep Aid PRN    Left atrial enlargement 12/04/2018    Severe Noted on Cardioversion Report/SARA    Left breast mass 03/19/2014    4CM Noted on Mammogram--Benign     Mild tricuspid valve regurgitation 12/04/2018    Noted on SARA    Mitral valve annular calcification 12/04/2018    Moderate Noted on SARA    Mitral valve regurgitation 12/04/2018    Moderate Noted on SARA    Obesity 12/04/2018    BMI-43.3     SHANAE on CPAP 11/27/2018    AHI 10.5/h, supine 30/h    Osteoarthritis of right hip 07/09/2019    Osteopenia 03/19/2014    Noted on Dexa Report    Restless sleeper     Tosses and Turns All Night Per Pt    Sinus congestion     Tricuspid leaflet thickened 12/04/2018    Noted on ASRA    Urinary tract infection      Past Surgical History:   Procedure Laterality  Date    ABLATION OF DYSRHYTHMIC FOCUS  2018    APPENDECTOMY      BACK SURGERY  2016    Spinal Fusion    BLADDER REPAIR  2001 in [place]    CARDIAC ABLATION N/A 05/22/2025    Procedure: OPEN HEART CONVERGENT HYBRID ABLATION PROCEDURE WITH LEFT ATRIAL APPENDAGE CLIPPING using size 50 Atriclip.;  Surgeon: Lenny Choi MD;  Location: [facility] CVOR;  Service: Cardiothoracic;  Laterality: N/A;    CARDIAC CATHETERIZATION  2015    CARDIAC CATHETERIZATION Right 04/10/2025    Procedure: Left Heart Cath;  Surgeon: Richard Burgos MD;  Location: [facility] CATH INVASIVE LOCATION;  Service: Cardiovascular;  Laterality: Right;    CARDIAC ELECTROPHYSIOLOGY PROCEDURE Right 03/12/2025    Procedure: Ablation atrial fibrillation;  Surgeon: Dre Case MD;  Location: [facility] CATH INVASIVE LOCATION;  Service: Cardiovascular;  Laterality: Right;    CARDIAC SURGERY  2025    CARDIOVERSION  08/7/18 & 4/17/18-[facility]    Dr. Villegas--For Chronic A-Fib    CARDIOVERSION  12/4/28-[facility]    Dr. Case--See Report    CATARACT EXTRACTION  2007    COLONOSCOPY  04/13/2017    HYSTERECTOMY      Partial    JOINT REPLACEMENT  2020 2021    KNEE ARTHROSCOPY  2009    KNEE SURGERY Right 2009    OTHER SURGICAL HISTORY  12/4/18-[facility]    Fluoroscopy/Trans-Septal Access to L Atrium/Selective Venography of L Superior Pulm Vein/Add Lienier Radiofrequency Ablation Along the Coronary Sinus/Synchronized Cardioversion--Dr. Case    REPLACEMENT TOTAL KNEE Right 08/30/2021    Dr. Astorga    SPINAL FUSION  2016    SPINE SURGERY  2016    SARA  12/4/18-[facility]    Mild Concentric L Ventricular Hypertrophy Noted; EF 55-60%; L Atrial Moderate-Severely Dilated, Moderat Mitral Annular Calcification with Moderate MVR Noted; Mild TVR; Normal LV Function    TONSILLECTOMY      TOTAL HIP ARTHROPLASTY Right 07/06/2020    Dr. Astorga     The patient has a family history of  Family History   Problem Relation Age of Onset    Heart failure Mother     Alcohol abuse Mother     Early death  "Mother     Miscarriages / Stillbirths Mother     No Known Problems Father      Social History     Socioeconomic History    Marital status:      Spouse name: Papi    Number of children: 1   Tobacco Use    Smoking status: Never     Passive exposure: Never    Smokeless tobacco: Never   Vaping Use    Vaping status: Never Used   Substance and Sexual Activity    Alcohol use: Yes     Alcohol/week: 4.0 standard drinks of alcohol     Types: 2 Glasses of wine, 2 Drinks containing 0.5 oz of alcohol per week     Comment: \"a couple a week\"    Drug use: Never    Sexual activity: Yes     Partners: Male     Birth control/protection: Hysterectomy     Comment: Hyst       Review of Systems   Constitutional:  Negative for activity change, fatigue and fever.   Respiratory:  Negative for cough, shortness of breath and wheezing.    Cardiovascular:  Negative for chest pain, palpitations and leg swelling.   Gastrointestinal:  Negative for constipation, diarrhea and indigestion.   Skin:  Negative for color change, dry skin and rash.   Neurological:  Negative for tremors and headache.     Visit Vitals  /84 (BP Location: Left arm, Patient Position: Sitting, Cuff Size: Adult)   Pulse 104   Temp 98.2 °F (36.8 °C) (Infrared)   Resp 16   Ht 165.1 cm (65\")   Wt 94.9 kg (209 lb 4.8 oz)   SpO2 95%   BMI 34.83 kg/m²              Current Outpatient Medications:     amoxicillin (AMOXIL) 500 MG capsule, Take 1 capsule by mouth 2 (Two) Times a Day As Needed (teeth cleanings). For teeth cleaning, Disp: , Rfl:     apixaban (Eliquis) 5 MG tablet tablet, Take 1 tablet by mouth Every 12 (Twelve) Hours., Disp: 180 tablet, Rfl: 1    atorvastatin (LIPITOR) 10 MG tablet, Take 1 tablet by mouth every night at bedtime., Disp: 90 tablet, Rfl: 1    dilTIAZem (CARDIZEM) 60 MG tablet, Take 1 tablet by mouth 3 times a day., Disp: 90 tablet, Rfl: 5    Estrogens Conjugated (Premarin) 0.625 MG/GM vaginal cream, USE 1/2 GRAM PER VAGINA 2 TO 3 TIMES A WEEK, " Disp: 30 g, Rfl: 1    latanoprost (XALATAN) 0.005 % ophthalmic solution, Inject 1 drop into the eye Every Night., Disp: , Rfl:     levothyroxine (SYNTHROID, LEVOTHROID) 150 MCG tablet, Take 1 tablet by mouth Daily., Disp: 90 tablet, Rfl: 0    metoprolol tartrate (LOPRESSOR) 25 MG tablet, Take 0.5 tablets by mouth 2 (Two) Times a Day., Disp: 30 tablet, Rfl: 5    spironolactone (ALDACTONE) 50 MG tablet, Take 1 tablet by mouth Daily., Disp: 90 tablet, Rfl: 1  No current facility-administered medications for this visit.    Facility-Administered Medications Ordered in Other Visits:     Chlorhexidine Gluconate Cloth 2 % pads, , Topical, Q12H PRN, Zohra Lazar, EPTE    Objective   Physical Exam  Vitals and nursing note reviewed.   Constitutional:       General: She is not in acute distress.     Appearance: Normal appearance. She is well-developed. She is not ill-appearing.   HENT:      Head: Normocephalic and atraumatic.   Cardiovascular:      Rate and Rhythm: Normal rate and regular rhythm.      Heart sounds: Normal heart sounds. No murmur heard.     No gallop.   Pulmonary:      Effort: Pulmonary effort is normal. No respiratory distress.      Breath sounds: Normal breath sounds. No wheezing, rhonchi or rales.   Chest:      Chest wall: No tenderness.   Musculoskeletal:      Cervical back: Normal range of motion and neck supple.   Neurological:      General: No focal deficit present.      Mental Status: She is alert and oriented to person, place, and time. Mental status is at baseline.   Psychiatric:         Mood and Affect: Mood normal.         XR Chest 1 View  Result Date: 5/25/2025  Impression: Impression: Similar small left pleural effusion. No visible pneumothorax. Electronically Signed: Rubens Hussein MD  5/25/2025 4:17 PM EDT  Workstation ID: [id number]    XR Chest 1 View  Result Date: 5/25/2025  Impression: Impression: 1.Interval removal of left chest tube. No pneumothorax. 2.Small left pleural  effusion. Electronically Signed: Johnny Huddleston MD  5/25/2025 3:38 PM EDT  Workstation ID: [id number]    XR Chest 1 View  Result Date: 5/24/2025  Impression: Impression: 1.Postoperative changes are noted. The right IJ venous sheath and central line have been removed. 2.Residual left pleural effusion with underlying atelectasis within the left lung base. Electronically Signed: Abhi Coyne MD  5/24/2025 8:00 AM EDT  Workstation ID: [id number]    CT Chest Without Contrast Diagnostic  Result Date: 5/23/2025  Impression: Impression: 1. No acute intrathoracic process. 2. Coronary artery calcifications. 3. Ectatic ascending aorta measuring 3.9 cm. 4. Pectus excavatum. Electronically Signed: Fernandez Anand MD  5/23/2025 9:11 AM EDT  Workstation ID: [id number]    XR Chest 1 View  Result Date: 5/23/2025  Impression: Impression: 1. Stable right IJ central venous catheter. 2. Persistent left basilar airspace disease and small left pleural effusion. 3. No pneumothorax. Electronically Signed: Fernandez Anand MD  5/23/2025 7:18 AM EDT  Workstation ID: [id number]    XR Chest 1 View  Result Date: 5/22/2025  Impression: Impression: 1. New left atrial appendage ligation changes. 2. Right IJ central line tip extends to the mid SVC level. 3. Left basilar opacity favored to represent small left pleural effusion and left basilar atelectasis. Electronically Signed: Carmen Salvador MD  5/22/2025 12:16 PM EDT  Workstation ID: [id number]    XR Chest PA & Lateral  Result Date: 5/21/2025  Impression: Impression: No acute process. Electronically Signed: Fernandez Anand MD  5/21/2025 3:43 PM EDT  Workstation ID: [id number]      FOLLOWING LABS WERE REVIEWED TODAY:  CMP          5/24/2025    04:34 5/25/2025    04:04 5/26/2025    04:10   CMP   Glucose 165  155  170    BUN 16  20  27    Creatinine 0.89  0.73  0.94    EGFR 66.5  84.3  62.2    Sodium 135  138  137    Potassium 4.0  4.7  4.8    Chloride 102  103  101    Calcium 8.9  9.4  9.2    BUN/Creatinine Ratio 18.0   27.4  28.7    Anion Gap 9.7  9.9  10.0      CBC          5/25/2025    04:02 5/26/2025    04:10 6/16/2025    14:43   CBC   WBC 14.30  12.65  10.18    RBC 3.58  3.75  4.13    Hemoglobin 10.6  11.1  12.1    Hematocrit 32.8  34.4  38.8    MCV 91.6  91.7  93.9    MCH 29.6  29.6  29.3    MCHC 32.3  32.3  31.2    RDW 13.9  13.8  12.6    Platelets 232  259  457      TSH          7/24/2024    11:06 1/24/2025    13:15   TSH   TSH 3.270  3.800            Assessment & Plan   Diagnoses and all orders for this visit:    1. Paroxysmal atrial fibrillation (Primary)  -     Comprehensive Metabolic Panel  -     CBC Auto Differential    2. Essential hypertension  -     Comprehensive Metabolic Panel  -     CBC Auto Differential    3. Prediabetes  -     Hemoglobin A1c    4. Acquired hypothyroidism  -     TSH Rfx On Abnormal To Free T4; Future  -     levothyroxine (SYNTHROID, LEVOTHROID) 150 MCG tablet; Take 1 tablet by mouth Daily.  Dispense: 90 tablet; Refill: 0        I reviewed her available hospital records.  Patient underwent a minimally invasive epicardial ablation posterior wall of the left atrium via sup xiphoid approach as well as exploratory left thoracoscopy with epicardial occlusion of the left atrial appendage.  She is scheduled to follow-up with the cardiothoracic office later this week.  She also has a follow-up appointment with cardiology down the road.  Medication refill was given.      Return in about 1 month (around 7/16/2025) for Next scheduled follow up.    Requested Prescriptions     Signed Prescriptions Disp Refills    levothyroxine (SYNTHROID, LEVOTHROID) 150 MCG tablet 90 tablet 0     Sig: Take 1 tablet by mouth Daily.       Latia Guerrier MD  06/16/2025  14:14 EDT

## 2025-06-18 ENCOUNTER — DOCUMENTATION (OUTPATIENT)
Dept: CARDIAC REHAB | Facility: HOSPITAL | Age: 80
End: 2025-06-18
Payer: MEDICARE

## 2025-06-18 NOTE — PROGRESS NOTES
Pt states interested in CR.  Pt in afib and has MD on 6/19/25 wants to discuss with MD before signing up with cardiac rehab,  CR staff will call again in one week

## 2025-06-19 ENCOUNTER — TELEPHONE (OUTPATIENT)
Dept: CARDIOLOGY | Facility: CLINIC | Age: 80
End: 2025-06-19
Payer: MEDICARE

## 2025-06-19 ENCOUNTER — HOSPITAL ENCOUNTER (OUTPATIENT)
Dept: CARDIOLOGY | Facility: HOSPITAL | Age: 80
Discharge: HOME | End: 2025-06-19
Admitting: INTERNAL MEDICINE
Payer: MEDICARE

## 2025-06-19 ENCOUNTER — OFFICE VISIT (OUTPATIENT)
Dept: CARDIAC SURGERY | Facility: CLINIC | Age: 80
End: 2025-06-19
Payer: MEDICARE

## 2025-06-19 VITALS
OXYGEN SATURATION: 98 % | BODY MASS INDEX: 34.82 KG/M2 | WEIGHT: 209 LBS | TEMPERATURE: 93 F | RESPIRATION RATE: 18 BRPM | DIASTOLIC BLOOD PRESSURE: 83 MMHG | SYSTOLIC BLOOD PRESSURE: 174 MMHG | HEART RATE: 130 BPM | HEIGHT: 65 IN

## 2025-06-19 DIAGNOSIS — I48.0 PAROXYSMAL ATRIAL FIBRILLATION: Primary | ICD-10-CM

## 2025-06-19 LAB
QT INTERVAL: 381 MS
QTC INTERVAL: 541 MS

## 2025-06-19 PROCEDURE — 93005 ELECTROCARDIOGRAM TRACING: CPT | Performed by: INTERNAL MEDICINE

## 2025-06-19 PROCEDURE — 1160F RVW MEDS BY RX/DR IN RCRD: CPT | Performed by: NURSE PRACTITIONER

## 2025-06-19 PROCEDURE — 3077F SYST BP >= 140 MM HG: CPT | Performed by: NURSE PRACTITIONER

## 2025-06-19 PROCEDURE — 1159F MED LIST DOCD IN RCRD: CPT | Performed by: NURSE PRACTITIONER

## 2025-06-19 PROCEDURE — 99024 POSTOP FOLLOW-UP VISIT: CPT | Performed by: NURSE PRACTITIONER

## 2025-06-19 PROCEDURE — 3079F DIAST BP 80-89 MM HG: CPT | Performed by: NURSE PRACTITIONER

## 2025-06-19 RX ORDER — METOPROLOL SUCCINATE 50 MG/1
50 TABLET, EXTENDED RELEASE ORAL DAILY
Qty: 30 TABLET | Refills: 1 | Status: SHIPPED | OUTPATIENT
Start: 2025-06-19

## 2025-06-19 NOTE — PROGRESS NOTES
"CARDIOVASCULAR SURGERY FOLLOW-UP PROGRESS NOTE  Chief Complaint: Post-op Follow Up        HPI:   Dear Latia Mendez MD and colleagues:    It was nice to see Sheree Vance in follow up today after cardiac surgery.  As you know, she is a 80 y.o. female with paroxysmal atrial fib with chronic anticoagulation, HTN, HLD, and hx treated SHANAE who underwent minimally invasive epicardial ablation posterior wall of left atruium via subxiphoid approach, exploratory left thoracotomy with epicardial occlusion of left atrial appendage with 50 mm AtriClip at [facility] by Dr. Choi on 5/22/2025. She did well postoperatively and continues to do well but has returned to atrial fib in the 130s. She also has a cardiac rhythm kaylan as well that shows her HRs in the 130s. She remains on Eliquis. She comes in today with concerns regarding palpitations/ tachycardia.  Her activity level has been fair. She has seen cardiology.  She has not started cardiac rehab but has been called to start. She is hypertensive today as well, 174/83 with HR 130s. She is alone for her appt today.    Answers submitted by the patient for this visit:  Post Operative Visit (Submitted on 6/19/2025)  Chief Complaint: Follow-up  Pain Control: not requiring pain medication  Fever: no fever  Diet: nausea  Activity: impaired due to weakness  Operative Site Issues: No    Physical Exam:         /83 (BP Location: Left arm, Patient Position: Sitting, Cuff Size: Large Adult)   Pulse (!) 130   Temp 93 °F (33.9 °C) (Skin)   Resp 18   Ht 165.1 cm (65\")   Wt 94.8 kg (209 lb)   SpO2 98%   BMI 34.78 kg/m²   Heart:  irregularly irregular rhythm, tachycardia  Lungs:  clear to auscultation bilaterally  Extremities:  no edema  Incision(s):  subxiphoid chest incision, exposure suture removed, left lateral chest tube exit site with slight slough noted    Assessment/Plan:     S/P Convergent procedure. Overall, she is doing well but likely has returned to " atrial fib.  She is on diltiazem 60 mg TID and metoprolol tartrate 12.5 mg bid. She had been on Tikosyn but it has not been restarted. I called Dr. Case and have escribed Toprol XL 50 mg daily and stopped metoprolol tartrate. EKG ordered as well. She is to call his office to make an appt. She verbalized understanding. I removed the exposed surgery without difficulty. She should continue to paint her chest tube exit site with betadine. I will see her as needed.    No significant post-op complications  Post-op atrial fibrillation--on Eliquis, Toprol, diltiazem    Keep incisions clean and dry  OK to drive if not taking narcotic pain medicine  OK to begin cardiac rehab  Follow-up as scheduled with cardiology  Follow-up as scheduled with PCP  Follow-up with CT surgery prn    Continue lifting restriction of 10 lbs until 6 weeks and 50 lbs until 12 weeks from the date of surgery, no excessive jarring motions or twisting motions until 12 weeks from the date of surgery.    Thank you for allowing me to participate in the care of your patient.    Regards,  PETE Almeida

## 2025-06-19 NOTE — LETTER
June 19, 2025     Latia Guerrier MD  [address]  [address]  [place] [address]    Patient: Sheree Vance   YOB: 1945   Date of Visit: 6/19/2025     Dear Latia Guerrier MD:       Thank you for referring Sheree Vance to me for evaluation. Below are the relevant portions of my assessment and plan of care.    If you have questions, please do not hesitate to call me. I look forward to following Sheree along with you.         Sincerely,        PETE Almeida        CC: MD Nagi Jiménez Tabitha L, APRN  06/19/25 [id number]  Sign when Signing Visit  CARDIOVASCULAR SURGERY FOLLOW-UP PROGRESS NOTE  Chief Complaint: Post-op Follow Up        HPI:   Dear Latia Mendez MD and colleagues:    It was nice to see Sheree Vance in follow up today after cardiac surgery.  As you know, she is a 80 y.o. female with paroxysmal atrial fib with chronic anticoagulation, HTN, HLD, and hx treated SHANAE who underwent minimally invasive epicardial ablation posterior wall of left atruium via subxiphoid approach, exploratory left thoracotomy with epicardial occlusion of left atrial appendage with 50 mm AtriClip at [facility] by Dr. Choi on 5/22/2025. She did well postoperatively and continues to do well but has returned to atrial fib in the 130s. She also has a cardiac rhythm kaylan as well that shows her HRs in the 130s. She remains on Eliquis. She comes in today with concerns regarding palpitations/ tachycardia.  Her activity level has been fair. She has seen cardiology.  She has not started cardiac rehab but has been called to start. She is hypertensive today as well, 174/83 with HR 130s. She is alone for her appt today.    Answers submitted by the patient for this visit:  Post Operative Visit (Submitted on 6/19/2025)  Chief Complaint: Follow-up  Pain Control: not requiring pain medication  Fever: no fever  Diet: nausea  Activity: impaired due to  "weakness  Operative Site Issues: No    Physical Exam:         /83 (BP Location: Left arm, Patient Position: Sitting, Cuff Size: Large Adult)   Pulse (!) 130   Temp 93 °F (33.9 °C) (Skin)   Resp 18   Ht 165.1 cm (65\")   Wt 94.8 kg (209 lb)   SpO2 98%   BMI 34.78 kg/m²   Heart:  irregularly irregular rhythm, tachycardia  Lungs:  clear to auscultation bilaterally  Extremities:  no edema  Incision(s):  subxiphoid chest incision, exposure suture removed, left lateral chest tube exit site with slight slough noted    Assessment/Plan:     S/P Convergent procedure. Overall, she is doing well but likely has returned to atrial fib.  She is on diltiazem 60 mg TID and metoprolol tartrate 12.5 mg bid. She had been on Tikosyn but it has not been restarted. I called Dr. Case and have escribed Toprol XL 50 mg daily and stopped metoprolol tartrate. EKG ordered as well. She is to call his office to make an appt. She verbalized understanding. I removed the exposed surgery without difficulty. She should continue to paint her chest tube exit site with betadine. I will see her as needed.    No significant post-op complications  Post-op atrial fibrillation--on Eliquis, Toprol, diltiazem    Keep incisions clean and dry  OK to drive if not taking narcotic pain medicine  OK to begin cardiac rehab  Follow-up as scheduled with cardiology  Follow-up as scheduled with PCP  Follow-up with CT surgery prn    Continue lifting restriction of 10 lbs until 6 weeks and 50 lbs until 12 weeks from the date of surgery, no excessive jarring motions or twisting motions until 12 weeks from the date of surgery.    Thank you for allowing me to participate in the care of your patient.    Regards,  Zohra Lazar, PETE        "

## 2025-06-26 ENCOUNTER — TELEPHONE (OUTPATIENT)
Dept: CARDIAC REHAB | Facility: HOSPITAL | Age: 80
End: 2025-06-26
Payer: MEDICARE

## 2025-06-26 ENCOUNTER — TELEPHONE (OUTPATIENT)
Dept: CARDIOLOGY | Facility: CLINIC | Age: 80
End: 2025-06-26
Payer: MEDICARE

## 2025-06-26 NOTE — TELEPHONE ENCOUNTER
LMOM to discuss coming in tomorrow at 12pm for an appointment. OK for HUB to release. If date and time don't work please transfer to the office.

## 2025-06-26 NOTE — TELEPHONE ENCOUNTER
"Caller: Sheree Vance \"Sheree Vance\"    Relationship to patient: Self    Best call back number: [phone]    Patient is needing: PT WANTED TO CALL AND LET DR. GANT KNOW THAT PT HAS HAD EKG DONE AND WANTS TO KNOW IF SHE NEEDS TO BE SEEN SOONER THAN APPT ON 07.18.2025. CALL TO LET PT KNOW WHAT SHE NEEDS TO DO NEXT.           "
LMOM to see if patient can come in tomorrow at 12pm to see Dr. Lenore NOWAK for the HUB to release.   
0

## 2025-06-27 ENCOUNTER — TELEPHONE (OUTPATIENT)
Dept: CARDIAC REHAB | Facility: HOSPITAL | Age: 80
End: 2025-06-27
Payer: MEDICARE

## 2025-06-27 ENCOUNTER — OFFICE VISIT (OUTPATIENT)
Dept: CARDIOLOGY | Facility: CLINIC | Age: 80
End: 2025-06-27
Payer: MEDICARE

## 2025-06-27 VITALS
BODY MASS INDEX: 34.82 KG/M2 | SYSTOLIC BLOOD PRESSURE: 118 MMHG | HEIGHT: 65 IN | OXYGEN SATURATION: 96 % | DIASTOLIC BLOOD PRESSURE: 72 MMHG | HEART RATE: 98 BPM | WEIGHT: 209 LBS

## 2025-06-27 DIAGNOSIS — I48.4 ATYPICAL ATRIAL FLUTTER: Primary | ICD-10-CM

## 2025-06-27 DIAGNOSIS — I48.0 PAF (PAROXYSMAL ATRIAL FIBRILLATION): ICD-10-CM

## 2025-06-27 DIAGNOSIS — I10 ESSENTIAL HYPERTENSION: ICD-10-CM

## 2025-06-27 DIAGNOSIS — R00.2 PALPITATIONS: ICD-10-CM

## 2025-06-27 PROCEDURE — 1160F RVW MEDS BY RX/DR IN RCRD: CPT | Performed by: INTERNAL MEDICINE

## 2025-06-27 PROCEDURE — 93000 ELECTROCARDIOGRAM COMPLETE: CPT | Performed by: INTERNAL MEDICINE

## 2025-06-27 PROCEDURE — 3074F SYST BP LT 130 MM HG: CPT | Performed by: INTERNAL MEDICINE

## 2025-06-27 PROCEDURE — 3078F DIAST BP <80 MM HG: CPT | Performed by: INTERNAL MEDICINE

## 2025-06-27 PROCEDURE — 1159F MED LIST DOCD IN RCRD: CPT | Performed by: INTERNAL MEDICINE

## 2025-06-27 PROCEDURE — 99214 OFFICE O/P EST MOD 30 MIN: CPT | Performed by: INTERNAL MEDICINE

## 2025-06-27 NOTE — PROGRESS NOTES
CC--Atrial fibrillation, hypertension      SUB--80-year-old pleasant patient well-known to me underwent convergent procedure in May 2025 with recurrence of arrhythmia and comes in for follow-up.  Patient had pulsed field ablation in March 2025 having recurrent arrhythmia despite medical treatment and underwent convergent procedure along with atrial clip placement  Patient was noted to be in atypical flutter and sent for evaluation to our office      EP findings below for reference      Findings     Patient had   moderate to severe left atrial enlargement   Prior cryoablation was done on this patient and mapping today revealed complete antral isolation  Small area of focal activation was noted inside the left superior pulmonary vein and activation was eccentric highly suspicious for epicardial to endocardial bridge which was targeted with PFA ablation  Patient presented with atrial fibrillation and substrate ablation was done with pulsed field ablation with posterior wall and posterior roof isolation and post ablation cardioversion, patient went to sinus rhythm without induction of atrial fibrillation  Different types of atrial tachycardias and atrial flutters could be induced post completion of PFA ablation  Concentric atrial flutter was noted with possible conduction to cavotricuspid isthmus with no success with isthmus ablation and repeat mapping revealed broad area of septal activation early both on the left side septum and right side septum with presence of interatrial septal aneurysm  Patient also has a PFO  Septal access however was done in the posterior septum for ablation purposes  After extensive mapping of the flutter with proximal to distal conduction, biatrial septal flutter was suspected with possible endocardial epicardial.  And cardioversion was done to sinus rhythm  Repeat pacing did not induce a focal atrial tachycardia coming from lateral mitral annulus which was targeted with ablation with no  further induction with a cycle length of 460 ms  After completion of this ablation repeat pacing was done down to 300 ms from mid and proximal coronary sinus without induction of atrial fibrillation or any defined atrial arrhythmia       Previous note for reference below  79-year-old pleasant patient well-known to me with persistent AF in the past underwent ablation 2018 and had early recurrence needing Tikosyn initiation and she was stable for several years until recently she has noticed increasing episodes of atrial fibrillation despite Tikosyn intake and came for evaluation.  Patient had previous cardiac catheterization without significant coronary artery stenosis and recent echocardiography shows normal EF  Patient feels poorly with intermittent atrial fibrillation  Not a candidate for drug therapy escalation because of prior history of drug-induced bradycardia and beta-blockers had to be stopped in the past        My previous history is attached below for reference only which has been reviewed       patient is 77 years old white female whose past medical history significant for hypertension, Persistent atrial fibrillation,  obesity  underwent AF ablation in 2018 with early recurrence with initiation of Tikosyn to sinus rhythm with resolution of symptoms and Tikosyn was stopped with the last office visit and started noticing recurrent atrial fibrillation in last 2 to 3 weeks with symptoms of palpitations and mild fatigue --patient has severe left atrial enlargement needing antiarrhythmic treatment to optimize into sinus rhythm-- during past hospitalization CT surgery consult was also requested for possible future convergence procedure because of severe left atrial enlargement.In 2009, patient was diagnosed with paroxysmal atrial fibrillation.  Patient was treated with beta-blockers initially.  Patient was started on flecainide later on.  In  2017, patient underwent cardiac catheterization at [facility]  [place] and coronary arteries were normal.  LV function was preserved.  Patient was started on Eliquis because of recurrence of atrial fibrillation.   chads vasc  score---3   patient was hypertensive and is on multiple medication  Post re initiation of tikosyn to sinus and feels well  No new sx  Patient remains in functional class I with no new symptoms and came for follow-up        Past Medical History:     Reviewed history from 01/08/2019 and no changes required:        HTN        Paroxysmal  A-fib - Dr. Lugo        Seasonal allergies - on allergy shots per ENT        Hyperlipidemia        Hypothyroidism        Chronic low back pain        Colonoscopy in 4/13/2017        Mammogram in 7/31/2018 - negative        Pneumonia shot in the past - both Pneumovax and Prevnar        Shingle shot in the past        Osteopenia - DEXA on 6/23/2017 - L/S: +0.5 and Hip: -0.8        GYN HM per GYN - Dr. Hopkins        Negative Hepatitis C screening in 10/2017                   Physical Exam    General:      well developed, well nourished, in no acute distress.    Head:      normocephalic and atraumatic.    Eyes:      PERRL/EOM intact, conjunctivae and sclerae clear without nystagmus.    Neck:      no  thyromegaly, trachea central with normal respiratory effort  Lungs:      clear bilaterally to auscultation.    Heart:       irregular rate and rhythm, S1, S2 without murmurs, rubs, or gallops  Skin:      intact without lesions or rashes.    Psych:      alert and cooperative; normal mood and affect; normal attention span and concentration.             assessment plan    Post convergent epicardial ablation with AtriCure placement with recurrent atypical flutter noted  Currently anticoagulated with apixaban  Prior endocardial ablation for atrial fibrillation  Hypothyroidism on levothyroxine  Hypertension on Toprol, Aldactone   Atypical flutter being treated with Cardizem  Recent potassium 4.3, hemoglobin normal platelets 457 and  creatinine normal TSH normal    Refractory atrial arrhythmia despite convergent procedure recently in the form of atrial flutter and therapeutic options discussed with the patient--sx intermittent , CPM  Re evaluate in 6 weeks and decide on therapy.  Patient is functionally doing very well able to exercise and feels better and claims intermittently she is in sinus rhythm at home and I will reevaluate in 6 weeks and decide on further treatment        ECG 12 Lead    Date/Time: 6/27/2025 12:33 PM  Performed by: Dre Case MD    Authorized by: Dre Case MD  Comparison: compared with previous ECG   Similar to previous ECG  Rhythm: atrial flutter  Rate: normal  Conduction: conduction normal  Other findings: non-specific ST-T wave changes      Electronically signed by Dre Case MD, 06/27/25, 12:33 PM EDT.

## 2025-06-27 NOTE — LETTER
June 27, 2025     Latia Guerrier MD  [address]  [address]  [place] [id number]    Patient: Sheree Vance   YOB: 1945   Date of Visit: 6/27/2025     Dear Latia Guerrier MD:       Thank you for referring Sheree Vance to me for evaluation. Below are the relevant portions of my assessment and plan of care.    If you have questions, please do not hesitate to call me. I look forward to following Sheree along with you.         Sincerely,        Dre Case MD        CC: No Recipients    Dre Case MD  06/27/25 1242  Sign when Signing Visit  CC--Atrial fibrillation, hypertension      SUB--80-year-old pleasant patient well-known to me underwent convergent procedure in May 2025 with recurrence of arrhythmia and comes in for follow-up.  Patient had pulsed field ablation in March 2025 having recurrent arrhythmia despite medical treatment and underwent convergent procedure along with atrial clip placement  Patient was noted to be in atypical flutter and sent for evaluation to our office      EP findings below for reference      Findings     Patient had   moderate to severe left atrial enlargement   Prior cryoablation was done on this patient and mapping today revealed complete antral isolation  Small area of focal activation was noted inside the left superior pulmonary vein and activation was eccentric highly suspicious for epicardial to endocardial bridge which was targeted with PFA ablation  Patient presented with atrial fibrillation and substrate ablation was done with pulsed field ablation with posterior wall and posterior roof isolation and post ablation cardioversion, patient went to sinus rhythm without induction of atrial fibrillation  Different types of atrial tachycardias and atrial flutters could be induced post completion of PFA ablation  Concentric atrial flutter was noted with possible conduction to cavotricuspid isthmus with no success with isthmus ablation and  repeat mapping revealed broad area of septal activation early both on the left side septum and right side septum with presence of interatrial septal aneurysm  Patient also has a PFO  Septal access however was done in the posterior septum for ablation purposes  After extensive mapping of the flutter with proximal to distal conduction, biatrial septal flutter was suspected with possible endocardial epicardial.  And cardioversion was done to sinus rhythm  Repeat pacing did not induce a focal atrial tachycardia coming from lateral mitral annulus which was targeted with ablation with no further induction with a cycle length of 460 ms  After completion of this ablation repeat pacing was done down to 300 ms from mid and proximal coronary sinus without induction of atrial fibrillation or any defined atrial arrhythmia       Previous note for reference below  79-year-old pleasant patient well-known to me with persistent AF in the past underwent ablation 2018 and had early recurrence needing Tikosyn initiation and she was stable for several years until recently she has noticed increasing episodes of atrial fibrillation despite Tikosyn intake and came for evaluation.  Patient had previous cardiac catheterization without significant coronary artery stenosis and recent echocardiography shows normal EF  Patient feels poorly with intermittent atrial fibrillation  Not a candidate for drug therapy escalation because of prior history of drug-induced bradycardia and beta-blockers had to be stopped in the past        My previous history is attached below for reference only which has been reviewed       patient is 77 years old white female whose past medical history significant for hypertension, Persistent atrial fibrillation,  obesity  underwent AF ablation in 2018 with early recurrence with initiation of Tikosyn to sinus rhythm with resolution of symptoms and Tikosyn was stopped with the last office visit and started noticing recurrent  atrial fibrillation in last 2 to 3 weeks with symptoms of palpitations and mild fatigue --patient has severe left atrial enlargement needing antiarrhythmic treatment to optimize into sinus rhythm-- during past hospitalization CT surgery consult was also requested for possible future convergence procedure because of severe left atrial enlargement.In 2009, patient was diagnosed with paroxysmal atrial fibrillation.  Patient was treated with beta-blockers initially.  Patient was started on flecainide later on.  In  2017, patient underwent cardiac catheterization at [facility] and coronary arteries were normal.  LV function was preserved.  Patient was started on Eliquis because of recurrence of atrial fibrillation.   chads vasc  score---3   patient was hypertensive and is on multiple medication  Post re initiation of tikosyn to sinus and feels well  No new sx  Patient remains in functional class I with no new symptoms and came for follow-up        Past Medical History:     Reviewed history from 01/08/2019 and no changes required:        HTN        Paroxysmal  A-fib - Dr. Lugo        Seasonal allergies - on allergy shots per ENT        Hyperlipidemia        Hypothyroidism        Chronic low back pain        Colonoscopy in 4/13/2017        Mammogram in 7/31/2018 - negative        Pneumonia shot in the past - both Pneumovax and Prevnar        Shingle shot in the past        Osteopenia - DEXA on 6/23/2017 - L/S: +0.5 and Hip: -0.8        GYN HM per GYN - Dr. Hopkins        Negative Hepatitis C screening in 10/2017                   Physical Exam    General:      well developed, well nourished, in no acute distress.    Head:      normocephalic and atraumatic.    Eyes:      PERRL/EOM intact, conjunctivae and sclerae clear without nystagmus.    Neck:      no  thyromegaly, trachea central with normal respiratory effort  Lungs:      clear bilaterally to auscultation.    Heart:       irregular rate and rhythm, S1,  S2 without murmurs, rubs, or gallops  Skin:      intact without lesions or rashes.    Psych:      alert and cooperative; normal mood and affect; normal attention span and concentration.             assessment plan    Post convergent epicardial ablation with AtriCure placement with recurrent atypical flutter noted  Currently anticoagulated with apixaban  Prior endocardial ablation for atrial fibrillation  Hypothyroidism on levothyroxine  Hypertension on Toprol, Aldactone   Atypical flutter being treated with Cardizem  Recent potassium 4.3, hemoglobin normal platelets 457 and creatinine normal TSH normal    Refractory atrial arrhythmia despite convergent procedure recently in the form of atrial flutter and therapeutic options discussed with the patient--sx intermittent , CPM  Re evaluate in 6 weeks and decide on therapy.  Patient is functionally doing very well able to exercise and feels better and claims intermittently she is in sinus rhythm at home and I will reevaluate in 6 weeks and decide on further treatment        ECG 12 Lead    Date/Time: 6/27/2025 12:33 PM  Performed by: Dre Case MD    Authorized by: Dre Case MD  Comparison: compared with previous ECG   Similar to previous ECG  Rhythm: atrial flutter  Rate: normal  Conduction: conduction normal  Other findings: non-specific ST-T wave changes      Electronically signed by Dre Case MD, 06/27/25, 12:33 PM EDT.

## 2025-07-07 ENCOUNTER — TELEPHONE (OUTPATIENT)
Dept: CARDIOLOGY | Facility: CLINIC | Age: 80
End: 2025-07-07
Payer: MEDICARE

## 2025-07-07 NOTE — TELEPHONE ENCOUNTER
"     Caller: Sheree Vance \"Sheree Vance\"    Relationship to patient: Self    Best call back number:  [phone]    Patient is needing:  PATIENT IS STILL HAVING NAUSEA SINCE HER ABLATION. SHE CAN'T EAT AND SHE'S BEEN RELYING ON PROTEIN SHAKES . SHE SAID SHE TAKES A FEW BITES AND FEELS FULL . SHE'S NOT SURE IF ITS SIDE EFFECTS FROM MEDICATION. SHE'S BEEN HAVING ATRIAL FLUTTER FOR THE PAST 10 DAYS. TODAYS HR 87 BUT BP IS GOOD.          "

## 2025-07-09 ENCOUNTER — TELEPHONE (OUTPATIENT)
Dept: CARDIAC SURGERY | Facility: CLINIC | Age: 80
End: 2025-07-09
Payer: MEDICARE

## 2025-07-09 NOTE — TELEPHONE ENCOUNTER
Spoke with po she is going to call PCP if worsening nausea she is aware to keep upcoming f/u on 7/15/25.

## 2025-07-09 NOTE — TELEPHONE ENCOUNTER
"  Caller: Sheree Vance \"Sheree Vance\"     Relationship: SELF    Best call back number: [phone]    What is your medical concern? PT STATES 1 WEEK AFTER SURGERY SHE STARTED TO GET REALLY NAUSEATED. PT STATES SHE WAKES UP STILL WITH NAUSEA. SHE WOULD LIKE A CALL BACK REGARDING THIS ISSUE SHE'S UNABLE TO EXERCISE AND GETS WINDED REALLY EASY- DR. GANT ASKED SHE CALL     How long has this issue been going on? SINCE MAY  AT SURGERY    Is your provider already aware of this issue? NO    Have you been treated for this issue? NO      "

## 2025-07-10 NOTE — TELEPHONE ENCOUNTER
Spoke with patient.advised to f/u with pcp for nausea. She reports appt with pcp 7/25. Discussed soa. She reports atrial flutter. She is not taking diltiazem tid she reports taking it bid. Appt with cardiology 7/15/25  Advised if Zohra FREEMAN has any further recommendation office will be in touch.  Pt verbalized understanding and this was agreeable.

## 2025-07-15 ENCOUNTER — OFFICE VISIT (OUTPATIENT)
Dept: CARDIOLOGY | Facility: CLINIC | Age: 80
End: 2025-07-15
Payer: MEDICARE

## 2025-07-15 VITALS
DIASTOLIC BLOOD PRESSURE: 87 MMHG | BODY MASS INDEX: 32.99 KG/M2 | HEIGHT: 65 IN | SYSTOLIC BLOOD PRESSURE: 120 MMHG | OXYGEN SATURATION: 97 % | WEIGHT: 198 LBS | HEART RATE: 127 BPM

## 2025-07-15 DIAGNOSIS — E78.2 MIXED HYPERLIPIDEMIA: ICD-10-CM

## 2025-07-15 DIAGNOSIS — I48.0 PAROXYSMAL ATRIAL FIBRILLATION: Primary | ICD-10-CM

## 2025-07-15 DIAGNOSIS — I10 ESSENTIAL HYPERTENSION: ICD-10-CM

## 2025-07-15 PROCEDURE — 3074F SYST BP LT 130 MM HG: CPT | Performed by: NURSE PRACTITIONER

## 2025-07-15 PROCEDURE — 1159F MED LIST DOCD IN RCRD: CPT | Performed by: NURSE PRACTITIONER

## 2025-07-15 PROCEDURE — 1160F RVW MEDS BY RX/DR IN RCRD: CPT | Performed by: NURSE PRACTITIONER

## 2025-07-15 PROCEDURE — 3079F DIAST BP 80-89 MM HG: CPT | Performed by: NURSE PRACTITIONER

## 2025-07-15 PROCEDURE — 93000 ELECTROCARDIOGRAM COMPLETE: CPT | Performed by: NURSE PRACTITIONER

## 2025-07-15 PROCEDURE — 99214 OFFICE O/P EST MOD 30 MIN: CPT | Performed by: NURSE PRACTITIONER

## 2025-07-15 RX ORDER — OMEPRAZOLE 40 MG/1
40 CAPSULE, DELAYED RELEASE ORAL DAILY
Qty: 30 CAPSULE | Refills: 1 | Status: SHIPPED | OUTPATIENT
Start: 2025-07-15

## 2025-07-15 RX ORDER — ONDANSETRON 4 MG/1
4 TABLET, ORALLY DISINTEGRATING ORAL EVERY 8 HOURS PRN
Qty: 30 TABLET | Refills: 0 | Status: SHIPPED | OUTPATIENT
Start: 2025-07-15

## 2025-07-15 NOTE — PROGRESS NOTES
[facility] CARDIOLOGY      REASON FOR FOLLOW-UP:            No chief complaint on file.        Dear Latia Guerrier MD        History of Present Illness   Sheree Vance is a 80-year-old female known to Dr. Case with history of atrial arrhythmias in the form of fibrillation/flutter, history of hypertension, hyperlipidemia.  She underwent pulsed field ablation March 2025 with recurrence of arrhythmia.  In May 2025 she underwent convergent procedure with atrial clip per Dr. Choi.    Ms. Vance phoned our office 7/7/2025 to report that she has had nausea since her procedure in May.  She was advised that her convergent procedure would not be causing her to have abdominal discomfort and nausea.  She was advised to follow-up with her primary care physician.  She phoned cardiothoracic surgery office on 7/9/2025 to report nausea as well-she was directed to her primary care.  She continues to report today ongoing nausea, decreased food intake-she is trying to supplement with protein drinks.  She has some abdominal discomfort on the right lower quadrant.  She reports normal BMs.  In EP follow-up on 6/27/2025 there is no mention of nausea.    EKG in the office today shows atrial fibrillation with ventricular rate of 127 bpm.  She denies any symptoms of chest discomfort, shortness of breath, dizziness, lightheadedness, excessive fatigue or lower extremity edema.      ASSESSMENT:  Persistent recurrent atrial fibrillation/flutter  Convergent epicardial ablation May 2025 with atrial clip placement  Elevated chads vascular score  Coagulopathy secondary to Eliquis  Hypothyroidism  Primary hypertension    PLAN:  Patient was advised to follow-up with primary care for her ongoing nausea and abdominal discomfort  A prescription for as needed Zofran and daily omeprazole was sent  Continue current CV plan of care to include apixaban, atorvastatin, diltiazem, Toprol XL, Aldactone  Will discuss  any further strategies with Dr. Case and notify the patient  Follow-up as scheduled          CHF Guideline Directed Medical Therapy  Beta Blocker:   ARNI/ACE/ARB:   SGLT 2 inhibitors:   Diuretics:   Aldosterone Antagonist:   Vasodilators & Nitrates:       Diagnoses and all orders for this visit:    1. Paroxysmal atrial fibrillation (Primary)    2. Essential hypertension    3. Mixed hyperlipidemia    Other orders  -     ondansetron ODT (ZOFRAN-ODT) 4 MG disintegrating tablet; Place 1 tablet on the tongue Every 8 (Eight) Hours As Needed for Nausea or Vomiting.  Dispense: 30 tablet; Refill: 0  -     omeprazole (priLOSEC) 40 MG capsule; Take 1 capsule by mouth Daily.  Dispense: 30 capsule; Refill: 1  -     ECG 12 Lead          The following portions of the patient's history were reviewed and updated as appropriate: allergies, current medications, past family history, past medical history, past social history, past surgical history, and problem list.    REVIEW OF SYSTEMS:    Review of Systems   All other systems reviewed and are negative.      Vitals:    07/15/25 1410   BP: 120/87   Pulse: (!) 127   SpO2: 97%         PHYSICAL EXAM:    General: Alert, cooperative, no distress, appears stated age  Head:  Normocephalic, atraumatic, mucous membranes moist  Eyes:  Conjunctiva/corneas clear, EOM's intact     Neck:  Supple,  no JVD or bruit     Lungs: Clear to auscultation bilaterally, no wheezes rhonchi rales are noted  Chest wall: No tenderness  Musculoskeletal:   Ambulates freely without assistance  Heart::  Irregularly irregular rate and rhythm, S1 and S2 normal, no murmur, rub or gallop  Abdomen: Soft, non-tender, nondistended, bowel sounds active, no abdominal bruit  Extremities: No cyanosis, clubbing, or edema   Pulses: 2+ and symmetric all extremities  Skin:  No rashes or lesions  Neuro/psych: A&O x3. CN II through XII are grossly intact with appropriate affect        Past Medical History:   Diagnosis Date    A-fib  Since 2001 Dx in [place]    Abnormal ECG 2009    Allergic 2014    Seasonal    Allergic rhinitis     Hx Allergy Shots x 4 Yrs    Aortic valve calcification 12/04/2018    Noted on SARA    Arrhythmia     Arthritis 2008    Lumbar Spine    Atrial flutter     Breast mass seen on mammogram 03/19/2014    L 4CM Mass--Benign & Followed    Bruxism, sleep-related     Cataract     Chronic anticoagulation     Eliquis     Chronic low back pain     Hx Back Sx in 2016    Colon polyp 2001    Coronary artery disease 2009    AFIB    DJD (degenerative joint disease), lumbar     Dyslipidemia     w/Hypertriglyceridemia--Statin/Fish Oils    Family history of premature CAD     GERD (gastroesophageal reflux disease)     Glaucoma 2007    Heart disease     History of bone density study 03/19/14-[facility]    T-Score of 1.4 Indicating Osteopenia    History of EKG 04/2018 & 08/2018 & 12/2018    First Degree AV Block/Multiple Atrial Premature Blocks Noted on All    Hormone replacement therapy (HRT)     Premarin-0.625MG    Hyperlipidemia     Controlled w/Meds    Hypertension     Controlled w/Losartan    Hypothyroidism     Synthroid    Insomnia     Takes OTC Sleep Aid PRN    Left atrial enlargement 12/04/2018    Severe Noted on Cardioversion Report/SARA    Left breast mass 03/19/2014    4CM Noted on Mammogram--Benign     Mild tricuspid valve regurgitation 12/04/2018    Noted on SARA    Mitral valve annular calcification 12/04/2018    Moderate Noted on SARA    Mitral valve regurgitation 12/04/2018    Moderate Noted on SARA    Obesity 12/04/2018    BMI-43.3     SHANAE on CPAP 11/27/2018    AHI 10.5/h, supine 30/h    Osteoarthritis of right hip 07/09/2019    Osteopenia 03/19/2014    Noted on Dexa Report    Restless sleeper     Tosses and Turns All Night Per Pt    Sinus congestion     Tricuspid leaflet thickened 12/04/2018    Noted on SARA    Urinary tract infection        Past Surgical History:   Procedure Laterality Date    ABLATION OF DYSRHYTHMIC FOCUS  2018     APPENDECTOMY      BACK SURGERY  2016    Spinal Fusion    BLADDER REPAIR  2001 in [place]    CARDIAC ABLATION N/A 05/22/2025    Procedure: OPEN HEART CONVERGENT HYBRID ABLATION PROCEDURE WITH LEFT ATRIAL APPENDAGE CLIPPING using size 50 Atriclip.;  Surgeon: Lenny Choi MD;  Location: [facility] CVOR;  Service: Cardiothoracic;  Laterality: N/A;    CARDIAC CATHETERIZATION  2015    CARDIAC CATHETERIZATION Right 04/10/2025    Procedure: Left Heart Cath;  Surgeon: Richard Burgos MD;  Location: [facility] CATH INVASIVE LOCATION;  Service: Cardiovascular;  Laterality: Right;    CARDIAC ELECTROPHYSIOLOGY PROCEDURE Right 03/12/2025    Procedure: Ablation atrial fibrillation;  Surgeon: Dre Case MD;  Location: [facility] CATH INVASIVE LOCATION;  Service: Cardiovascular;  Laterality: Right;    CARDIAC SURGERY  2025    CARDIOVERSION  08/7/18 & 4/17/18-[facility]    Dr. Villegas--For Chronic A-Fib    CARDIOVERSION  12/4/28-[facility]    Dr. Case--See Report    CATARACT EXTRACTION  2007    COLONOSCOPY  04/13/2017    HYSTERECTOMY      Partial    JOINT REPLACEMENT  2020 2021    KNEE ARTHROSCOPY  2009    KNEE SURGERY Right 2009    OTHER SURGICAL HISTORY  12/4/18-[facility]    Fluoroscopy/Trans-Septal Access to L Atrium/Selective Venography of L Superior Pulm Vein/Add Lienier Radiofrequency Ablation Along the Coronary Sinus/Synchronized Cardioversion--Dr. Case    REPLACEMENT TOTAL KNEE Right 08/30/2021    Dr. Astorga    SPINAL FUSION  2016    SPINE SURGERY  2016    SARA  12/4/18-[facility]    Mild Concentric L Ventricular Hypertrophy Noted; EF 55-60%; L Atrial Moderate-Severely Dilated, Moderat Mitral Annular Calcification with Moderate MVR Noted; Mild TVR; Normal LV Function    TONSILLECTOMY      TOTAL HIP ARTHROPLASTY Right 07/06/2020    Dr. Astorga         Current Outpatient Medications:     amoxicillin (AMOXIL) 500 MG capsule, Take 1 capsule by mouth 2 (Two) Times a Day As Needed (teeth cleanings). For teeth cleaning, Disp: , Rfl:     apixaban  "(Eliquis) 5 MG tablet tablet, Take 1 tablet by mouth Every 12 (Twelve) Hours., Disp: 180 tablet, Rfl: 1    atorvastatin (LIPITOR) 10 MG tablet, Take 1 tablet by mouth every night at bedtime., Disp: 90 tablet, Rfl: 1    dilTIAZem (CARDIZEM) 60 MG tablet, Take 1 tablet by mouth 3 times a day., Disp: 90 tablet, Rfl: 5    Estrogens Conjugated (Premarin) 0.625 MG/GM vaginal cream, USE 1/2 GRAM PER VAGINA 2 TO 3 TIMES A WEEK, Disp: 30 g, Rfl: 1    latanoprost (XALATAN) 0.005 % ophthalmic solution, Inject 1 drop into the eye Every Night., Disp: , Rfl:     levothyroxine (SYNTHROID, LEVOTHROID) 150 MCG tablet, Take 1 tablet by mouth Daily., Disp: 90 tablet, Rfl: 0    metoprolol succinate XL (Toprol XL) 50 MG 24 hr tablet, Take 1 tablet by mouth Daily., Disp: 30 tablet, Rfl: 1    spironolactone (ALDACTONE) 50 MG tablet, Take 1 tablet by mouth Daily., Disp: 90 tablet, Rfl: 1    omeprazole (priLOSEC) 40 MG capsule, Take 1 capsule by mouth Daily., Disp: 30 capsule, Rfl: 1    ondansetron ODT (ZOFRAN-ODT) 4 MG disintegrating tablet, Place 1 tablet on the tongue Every 8 (Eight) Hours As Needed for Nausea or Vomiting., Disp: 30 tablet, Rfl: 0  No current facility-administered medications for this visit.    Facility-Administered Medications Ordered in Other Visits:     Chlorhexidine Gluconate Cloth 2 % pads, , Topical, Q12H PRN, Zohra Lazar, APRN    No Known Allergies    Family History   Problem Relation Age of Onset    Heart failure Mother     Alcohol abuse Mother     Early death Mother     Miscarriages / Stillbirths Mother     No Known Problems Father        Social History     Tobacco Use    Smoking status: Never     Passive exposure: Never    Smokeless tobacco: Never   Substance Use Topics    Alcohol use: Yes     Alcohol/week: 4.0 standard drinks of alcohol     Types: 2 Glasses of wine, 2 Drinks containing 0.5 oz of alcohol per week     Comment: \"a couple a week\"           Current Electrocardiogram:    ECG 12 " "Lead    Date/Time: 7/15/2025 9:36 AM  Performed by: Carolyn Hooks APRN    Authorized by: Carolyn Hooks APRN  Comparison: compared with previous ECG from 6/27/2025  Comparison to previous ECG: Previously flutter  Rhythm: atrial fibrillation  BPM: 127              EMR Dragon/Transcription:   \"Dictated utilizing Dragon dictation\".     Copied text in this note has been reviewed by me and is accurate as of 07/16/25.    "

## 2025-07-17 DIAGNOSIS — I10 ESSENTIAL HYPERTENSION: ICD-10-CM

## 2025-07-17 RX ORDER — SPIRONOLACTONE 50 MG/1
50 TABLET, FILM COATED ORAL DAILY
Qty: 90 TABLET | Refills: 0 | Status: SHIPPED | OUTPATIENT
Start: 2025-07-17

## 2025-07-25 ENCOUNTER — OFFICE VISIT (OUTPATIENT)
Dept: FAMILY MEDICINE CLINIC | Facility: CLINIC | Age: 80
End: 2025-07-25
Payer: MEDICARE

## 2025-07-25 VITALS
SYSTOLIC BLOOD PRESSURE: 138 MMHG | DIASTOLIC BLOOD PRESSURE: 88 MMHG | HEIGHT: 65 IN | RESPIRATION RATE: 16 BRPM | HEART RATE: 85 BPM | WEIGHT: 199 LBS | OXYGEN SATURATION: 95 % | BODY MASS INDEX: 33.15 KG/M2 | TEMPERATURE: 98.2 F

## 2025-07-25 DIAGNOSIS — E03.9 ACQUIRED HYPOTHYROIDISM: ICD-10-CM

## 2025-07-25 DIAGNOSIS — E78.2 MIXED HYPERLIPIDEMIA: ICD-10-CM

## 2025-07-25 DIAGNOSIS — I10 ESSENTIAL HYPERTENSION: Primary | ICD-10-CM

## 2025-07-25 DIAGNOSIS — I48.0 PAROXYSMAL ATRIAL FIBRILLATION: ICD-10-CM

## 2025-07-25 DIAGNOSIS — K21.9 GASTROESOPHAGEAL REFLUX DISEASE, UNSPECIFIED WHETHER ESOPHAGITIS PRESENT: ICD-10-CM

## 2025-07-25 PROBLEM — R21 RASH: Status: RESOLVED | Noted: 2022-05-19 | Resolved: 2025-07-25

## 2025-07-25 PROBLEM — Z23 NEED FOR VACCINATION: Status: RESOLVED | Noted: 2024-01-17 | Resolved: 2025-07-25

## 2025-07-25 PROBLEM — Z23 NEEDS FLU SHOT: Status: RESOLVED | Noted: 2021-10-11 | Resolved: 2025-07-25

## 2025-07-25 RX ORDER — LEVOTHYROXINE SODIUM 150 UG/1
150 TABLET ORAL DAILY
Qty: 90 TABLET | Refills: 1 | Status: SHIPPED | OUTPATIENT
Start: 2025-07-25

## 2025-07-25 NOTE — PROGRESS NOTES
Subjective   Chief Complaint   Patient presents with    Follow-up     6 month  Pt c/o ongoing nausea and feeling full without eating since surgery on 5/22/25    Hyperlipidemia    Hypothyroidism    Hypertension    Atrial Fibrillation    Med Refill     Sheree Vance is a 80 y.o. adult.     Patient Care Team:  Latia Guerrier MD as PCP - General  Latia Guerrier MD as PCP - Family Medicine  Dre Case MD (Cardiology)  Jet Godwin MD as Consulting Physician (Ophthalmology)  Antonio Fine MD as Consulting Physician (Allergy)  Sandor Astorga MD as Consulting Physician (Orthopedic Surgery)  Sam Carlos MD as Consulting Physician (Cardiology)    History of Present Illness     History of Present Illness  The patient presents to follow-up on her chronic medical problems and medications including hypertension, hyperlipidemia, hypothyroidism, atrial fibrillation and GI issues. She underwent open heart convergent hybrid ablation with left atrial appendage clipping on 05/22/2025. A week post-surgery, she experienced severe nausea, which persisted for 3 days in the third week after surgery. For the past 2 months, she has been waking up feeling nauseous, but without vomiting. She describes a sensation of fullness that prevents her from eating, leading to a reliance on protein drinks and cold foods like cheesecake and pudding. She has lost her appetite and has been unable to eat much. Her heart surgeon recommended stomach tests, but she has not had any issues for the past 20 years. She was prescribed omeprazole for acid reflux a week ago, which she takes with food every morning. She was also given medication for nausea, which she took twice daily for 4 days before discontinuing it due to improvement in her symptoms. She has been able to eat more at dinner over the past few days. She continues to take omeprazole daily and the nausea medication as needed, with the last dose  taken on Monday. She reports that her nausea has improved, but her appetite remains poor. She has lost approximately 28 pounds, dropping from 227 in 1/2025 to 199 pounds now. She visited her cardiologist's office about 10 days ago and was found to be in atrial fibrillation (A-fib). She monitors her heart rate and blood pressure daily, noting that her heart rate has been stable since her last visit. She is currently on Eliquis, a blood thinner, and Cardizem. She was previously on Tikosyn but discontinued it prior to her surgery. She has a follow-up appointment with her cardiologist on 08/14/2025.    She takes amoxicillin only when she goes to the dentist.     Social History:  Diet: She has reduced her food intake as she has aged.    PAST SURGICAL HISTORY:  - Open heart convergent hybrid ablation with left atrial appendage clipping on 05/22/2025  - Upper endoscopy for acid reflux over 20 years ago    The following portions of the patient's history were reviewed and updated as appropriate: allergies, current medications, past family history, past medical history, past social history, past surgical history, and problem list.  Past Medical History:   Diagnosis Date    A-fib Since 2001 Dx in [place]    Abnormal ECG 2009    Allergic 2014    Seasonal    Allergic rhinitis     Hx Allergy Shots x 4 Yrs    Aortic valve calcification 12/04/2018    Noted on SARA    Arrhythmia     Arthritis 2008    Lumbar Spine    Atrial flutter     Breast mass seen on mammogram 03/19/2014    L 4CM Mass--Benign & Followed    Bruxism, sleep-related     Cataract     Chronic anticoagulation     Eliquis     Chronic low back pain     Hx Back Sx in 2016    Colon polyp 2001    Coronary artery disease 2009    AFIB    DJD (degenerative joint disease), lumbar     Dyslipidemia     w/Hypertriglyceridemia--Statin/Fish Oils    Family history of premature CAD     GERD (gastroesophageal reflux disease)     Glaucoma 2007    Heart disease     History of bone density  study 03/19/14-[facility]    T-Score of 1.4 Indicating Osteopenia    History of EKG 04/2018 & 08/2018 & 12/2018    First Degree AV Block/Multiple Atrial Premature Blocks Noted on All    Hormone replacement therapy (HRT)     Premarin-0.625MG    Hyperlipidemia     Controlled w/Meds    Hypertension     Controlled w/Losartan    Hypothyroidism     Synthroid    Insomnia     Takes OTC Sleep Aid PRN    Left atrial enlargement 12/04/2018    Severe Noted on Cardioversion Report/SARA    Left breast mass 03/19/2014    4CM Noted on Mammogram--Benign     Mild tricuspid valve regurgitation 12/04/2018    Noted on SARA    Mitral valve annular calcification 12/04/2018    Moderate Noted on SARA    Mitral valve regurgitation 12/04/2018    Moderate Noted on SARA    Obesity 12/04/2018    BMI-43.3     SHANAE on CPAP 11/27/2018    AHI 10.5/h, supine 30/h    Osteoarthritis of right hip 07/09/2019    Osteopenia 03/19/2014    Noted on Dexa Report    Restless sleeper     Tosses and Turns All Night Per Pt    Sinus congestion     Tricuspid leaflet thickened 12/04/2018    Noted on SARA    Urinary tract infection      Past Surgical History:   Procedure Laterality Date    ABLATION OF DYSRHYTHMIC FOCUS  2018    APPENDECTOMY      BACK SURGERY  2016    Spinal Fusion    BLADDER REPAIR  2001 in [place]    CARDIAC ABLATION N/A 05/22/2025    Procedure: OPEN HEART CONVERGENT HYBRID ABLATION PROCEDURE WITH LEFT ATRIAL APPENDAGE CLIPPING using size 50 Atriclip.;  Surgeon: Lenny Choi MD;  Location: [facility];  Service: Cardiothoracic;  Laterality: N/A;    CARDIAC CATHETERIZATION  2015    CARDIAC CATHETERIZATION Right 04/10/2025    Procedure: Left Heart Cath;  Surgeon: Richard Burgos MD;  Location: [facility] CATH INVASIVE LOCATION;  Service: Cardiovascular;  Laterality: Right;    CARDIAC ELECTROPHYSIOLOGY PROCEDURE Right 03/12/2025    Procedure: Ablation atrial fibrillation;  Surgeon: Dre Case MD;  Location: [facility] CATH INVASIVE LOCATION;  Service:  "Cardiovascular;  Laterality: Right;    CARDIAC SURGERY  2025    CARDIOVERSION  08/7/18 & 4/17/18-[facility]    Dr. Villegas--For Chronic A-Fib    CARDIOVERSION  12/4/28-[facility]    Dr. Case--See Report    CATARACT EXTRACTION  2007    COLONOSCOPY  04/13/2017    HYSTERECTOMY      Partial    JOINT REPLACEMENT  2020 2021    KNEE ARTHROSCOPY  2009    KNEE SURGERY Right 2009    OTHER SURGICAL HISTORY  12/4/18-[facility]    Fluoroscopy/Trans-Septal Access to L Atrium/Selective Venography of L Superior Pulm Vein/Add Lienier Radiofrequency Ablation Along the Coronary Sinus/Synchronized Cardioversion--Dr. Case    REPLACEMENT TOTAL KNEE Right 08/30/2021    Dr. Astorga    SPINAL FUSION  2016    SPINE SURGERY  2016    SARA  12/4/18-[facility]    Mild Concentric L Ventricular Hypertrophy Noted; EF 55-60%; L Atrial Moderate-Severely Dilated, Moderat Mitral Annular Calcification with Moderate MVR Noted; Mild TVR; Normal LV Function    TONSILLECTOMY      TOTAL HIP ARTHROPLASTY Right 07/06/2020    Dr. Astorga     The patient has a family history of  Family History   Problem Relation Age of Onset    Heart failure Mother     Alcohol abuse Mother     Early death Mother     Miscarriages / Stillbirths Mother     No Known Problems Father      Social History     Socioeconomic History    Marital status:      Spouse name: Papi    Number of children: 1   Tobacco Use    Smoking status: Never     Passive exposure: Never    Smokeless tobacco: Never   Vaping Use    Vaping status: Never Used   Substance and Sexual Activity    Alcohol use: Yes     Alcohol/week: 4.0 standard drinks of alcohol     Types: 2 Glasses of wine, 2 Drinks containing 0.5 oz of alcohol per week     Comment: \"a couple a week\"    Drug use: Never    Sexual activity: Yes     Partners: Male     Birth control/protection: Hysterectomy     Comment: st       Review of Systems   Constitutional:  Positive for unexpected weight loss. Negative for activity change, appetite change, fatigue, fever and " "unexpected weight gain.   Respiratory:  Negative for cough, shortness of breath and wheezing.    Cardiovascular:  Negative for chest pain, palpitations and leg swelling.   Gastrointestinal:  Positive for nausea. Negative for abdominal pain, constipation, diarrhea, vomiting and indigestion.   Skin:  Negative for color change, dry skin and rash.   Neurological:  Negative for tremors and headache.     Visit Vitals  /88 (BP Location: Left arm, Patient Position: Sitting, Cuff Size: Adult)   Pulse 85   Temp 98.2 °F (36.8 °C) (Infrared)   Resp 16   Ht 165.1 cm (65\")   Wt 90.3 kg (199 lb)   SpO2 95%   BMI 33.12 kg/m²       BMI is >= 30 and <35. (Class 1 Obesity). The following options were offered after discussion;: exercise counseling/recommendations      Current Outpatient Medications:     amoxicillin (AMOXIL) 500 MG capsule, Take 1 capsule by mouth 2 (Two) Times a Day As Needed (teeth cleanings). For teeth cleaning, Disp: , Rfl:     apixaban (Eliquis) 5 MG tablet tablet, Take 1 tablet by mouth Every 12 (Twelve) Hours., Disp: 180 tablet, Rfl: 1    atorvastatin (LIPITOR) 10 MG tablet, Take 1 tablet by mouth every night at bedtime., Disp: 90 tablet, Rfl: 1    dilTIAZem (CARDIZEM) 60 MG tablet, Take 1 tablet by mouth 3 times a day., Disp: 90 tablet, Rfl: 5    Estrogens Conjugated (Premarin) 0.625 MG/GM vaginal cream, USE 1/2 GRAM PER VAGINA 2 TO 3 TIMES A WEEK, Disp: 30 g, Rfl: 1    latanoprost (XALATAN) 0.005 % ophthalmic solution, Inject 1 drop into the eye Every Night., Disp: , Rfl:     levothyroxine (SYNTHROID, LEVOTHROID) 150 MCG tablet, Take 1 tablet by mouth Daily., Disp: 90 tablet, Rfl: 1    metoprolol succinate XL (Toprol XL) 50 MG 24 hr tablet, Take 1 tablet by mouth Daily., Disp: 30 tablet, Rfl: 1    omeprazole (priLOSEC) 40 MG capsule, Take 1 capsule by mouth Daily., Disp: 30 capsule, Rfl: 1    ondansetron ODT (ZOFRAN-ODT) 4 MG disintegrating tablet, Place 1 tablet on the tongue Every 8 (Eight) Hours As " Needed for Nausea or Vomiting., Disp: 30 tablet, Rfl: 0    spironolactone (ALDACTONE) 50 MG tablet, TAKE 1 TABLET BY MOUTH DAILY., Disp: 90 tablet, Rfl: 0  No current facility-administered medications for this visit.    Facility-Administered Medications Ordered in Other Visits:     Chlorhexidine Gluconate Cloth 2 % pads, , Topical, Q12H PRN, Zohra Lazar, APRN    Objective   Physical Exam  Vitals and nursing note reviewed.   Constitutional:       General: She is not in acute distress.     Appearance: Normal appearance. She is well-developed. She is not ill-appearing.   HENT:      Head: Normocephalic and atraumatic.   Cardiovascular:      Rate and Rhythm: Normal rate. Rhythm irregular.      Heart sounds: Normal heart sounds. No murmur heard.     No gallop.   Pulmonary:      Effort: Pulmonary effort is normal. No respiratory distress.      Breath sounds: Normal breath sounds. No wheezing, rhonchi or rales.   Chest:      Chest wall: No tenderness.   Musculoskeletal:      Cervical back: Normal range of motion and neck supple.   Neurological:      General: No focal deficit present.      Mental Status: She is alert and oriented to person, place, and time. Mental status is at baseline.   Psychiatric:         Mood and Affect: Mood normal.         FOLLOWING LABS WERE REVIEWED TODAY:  CMP          5/25/2025    04:04 5/26/2025    04:10 6/16/2025    14:43   CMP   Glucose 155  170  124    BUN 20  27  12.0    Creatinine 0.73  0.94  0.88    EGFR 84.3  62.2  67.4    Sodium 138  137  136    Potassium 4.7  4.8  4.3    Chloride 103  101  97    Calcium 9.4  9.2  9.7    Total Protein   7.2    Albumin   4.1    Globulin   3.1    Total Bilirubin   0.5    Alkaline Phosphatase   222    AST (SGOT)   20    ALT (SGPT)   13    Albumin/Globulin Ratio   1.3    BUN/Creatinine Ratio 27.4  28.7  13.6    Anion Gap 9.9  10.0  13.0      CBC          5/25/2025    04:02 5/26/2025    04:10 6/16/2025    14:43   CBC   WBC 14.30  12.65  10.18     RBC 3.58  3.75  4.13    Hemoglobin 10.6  11.1  12.1    Hematocrit 32.8  34.4  38.8    MCV 91.6  91.7  93.9    MCH 29.6  29.6  29.3    MCHC 32.3  32.3  31.2    RDW 13.9  13.8  12.6    Platelets 232  259  457      Lipid Panel          1/24/2025    13:15   Lipid Panel   Total Cholesterol 175    Triglycerides 46    HDL Cholesterol 73    VLDL Cholesterol 9    LDL Cholesterol  93    LDL/HDL Ratio 1.27      TSH          1/24/2025    13:15 6/16/2025    14:43   TSH   TSH 3.800  2.030          Assessment & Plan   Diagnoses and all orders for this visit:    1. Essential hypertension (Primary)    2. Mixed hyperlipidemia    3. Acquired hypothyroidism  -     levothyroxine (SYNTHROID, LEVOTHROID) 150 MCG tablet; Take 1 tablet by mouth Daily.  Dispense: 90 tablet; Refill: 1    4. Paroxysmal atrial fibrillation    5. Gastroesophageal reflux disease, unspecified whether esophagitis present  -     Ambulatory Referral to Gastroenterology        I reviewed her chronic medical problems and her medications.  I also reviewed her recent blood work results.  We addressed her GI issues and she reports improvement since starting omeprazole regularly.  She will continue that and she may also take Zofran as needed.  I also gave her referral to see the GI in case her symptoms do not resolve over time.  She is scheduled to follow-up with her cardiologist next month.  She will continue to monitor her blood pressure periodically.      Return in about 6 months (around 1/25/2026) for Medicare Wellness.    Requested Prescriptions     Signed Prescriptions Disp Refills    levothyroxine (SYNTHROID, LEVOTHROID) 150 MCG tablet 90 tablet 1     Sig: Take 1 tablet by mouth Daily.       Latia Guerrier MD  07/25/2025  13:49 EDT      Patient or patient representative verbalized consent for the use of Ambient Listening during the visit with  Latia Guerrier MD for chart documentation. 7/25/2025  13:49 EDT

## 2025-08-07 ENCOUNTER — EXTERNAL PBMM DATA (OUTPATIENT)
Dept: PHARMACY | Facility: OTHER | Age: 80
End: 2025-08-07
Payer: MEDICARE

## 2025-08-15 ENCOUNTER — PREP FOR SURGERY (OUTPATIENT)
Dept: OTHER | Facility: HOSPITAL | Age: 80
End: 2025-08-15
Payer: MEDICARE

## 2025-08-15 ENCOUNTER — OFFICE VISIT (OUTPATIENT)
Dept: CARDIOLOGY | Facility: CLINIC | Age: 80
End: 2025-08-15
Payer: MEDICARE

## 2025-08-15 VITALS
HEIGHT: 65 IN | OXYGEN SATURATION: 99 % | DIASTOLIC BLOOD PRESSURE: 85 MMHG | HEART RATE: 76 BPM | BODY MASS INDEX: 33.49 KG/M2 | WEIGHT: 201 LBS | SYSTOLIC BLOOD PRESSURE: 130 MMHG

## 2025-08-15 DIAGNOSIS — I48.19 PERSISTENT ATRIAL FIBRILLATION: ICD-10-CM

## 2025-08-15 DIAGNOSIS — R00.2 PALPITATIONS: Primary | ICD-10-CM

## 2025-08-15 DIAGNOSIS — I48.4 ATYPICAL ATRIAL FLUTTER: ICD-10-CM

## 2025-08-15 DIAGNOSIS — E78.2 MIXED HYPERLIPIDEMIA: ICD-10-CM

## 2025-08-15 DIAGNOSIS — I10 ESSENTIAL HYPERTENSION: ICD-10-CM

## 2025-08-15 PROCEDURE — 3079F DIAST BP 80-89 MM HG: CPT | Performed by: INTERNAL MEDICINE

## 2025-08-15 PROCEDURE — 1160F RVW MEDS BY RX/DR IN RCRD: CPT | Performed by: INTERNAL MEDICINE

## 2025-08-15 PROCEDURE — 3075F SYST BP GE 130 - 139MM HG: CPT | Performed by: INTERNAL MEDICINE

## 2025-08-15 PROCEDURE — 99214 OFFICE O/P EST MOD 30 MIN: CPT | Performed by: INTERNAL MEDICINE

## 2025-08-15 PROCEDURE — 93000 ELECTROCARDIOGRAM COMPLETE: CPT | Performed by: INTERNAL MEDICINE

## 2025-08-15 PROCEDURE — 1159F MED LIST DOCD IN RCRD: CPT | Performed by: INTERNAL MEDICINE

## 2025-08-15 RX ORDER — METOPROLOL SUCCINATE 50 MG/1
50 TABLET, EXTENDED RELEASE ORAL DAILY
Qty: 90 TABLET | Refills: 1 | Status: SHIPPED | OUTPATIENT
Start: 2025-08-15

## 2025-08-20 ENCOUNTER — OFFICE VISIT (OUTPATIENT)
Age: 80
End: 2025-08-20
Payer: MEDICARE

## 2025-08-20 VITALS — HEIGHT: 65 IN | BODY MASS INDEX: 33.49 KG/M2 | RESPIRATION RATE: 20 BRPM | WEIGHT: 201 LBS

## 2025-08-20 DIAGNOSIS — B35.1 ONYCHOMYCOSIS: ICD-10-CM

## 2025-08-20 DIAGNOSIS — I89.0 LYMPHEDEMA: Primary | ICD-10-CM

## 2025-08-20 DIAGNOSIS — L84 HELOMA DURUM: ICD-10-CM

## 2025-08-21 ENCOUNTER — EXTERNAL PBMM DATA (OUTPATIENT)
Dept: PHARMACY | Facility: OTHER | Age: 80
End: 2025-08-21
Payer: MEDICARE

## (undated) DEVICE — 1000ML,PRESSURE INFUSER W/STOPCOCK: Brand: MEDLINE

## (undated) DEVICE — ELECTRD DEFIB M/FUNC PROPADZ RADIOL 2PK

## (undated) DEVICE — EXTENSION SET, MALE LUER LOCK ADAPTER WITH RETRACTABLE COLLAR

## (undated) DEVICE — Device: Brand: REFERENCE PATCH CARTO 3

## (undated) DEVICE — PULSED FIELD ABLATION CATHETER: Brand: FARAWAVE™

## (undated) DEVICE — BLD SAW STERN L 32.0MM H 6.0MM

## (undated) DEVICE — SENSR CERBRL O2 PK/2

## (undated) DEVICE — 3M™ IOBAN™ 2 ANTIMICROBIAL INCISE DRAPE 6651EZ: Brand: IOBAN™ 2

## (undated) DEVICE — SOL ISO/ALC 70PCT 4OZ

## (undated) DEVICE — ENDOPATH XCEL BLADELESS TROCARS WITH STABILITY SLEEVES: Brand: ENDOPATH XCEL

## (undated) DEVICE — DRSNG SURESITE WNDW 4X4.5

## (undated) DEVICE — PK MINOR LAPAROTOMY 50

## (undated) DEVICE — TOWEL,OR,DSP,ST,BLUE,STD,4/PK,20PK/CS: Brand: MEDLINE

## (undated) DEVICE — GOWN,SIRUS,NONRNF,SETINSLV,2XL,18/CS: Brand: MEDLINE

## (undated) DEVICE — INTENDED FOR TISSUE SEPARATION, AND OTHER PROCEDURES THAT REQUIRE A SHARP SURGICAL BLADE TO PUNCTURE OR CUT.: Brand: BARD-PARKER ® CARBON RIB-BACK BLADES

## (undated) DEVICE — Device: Brand: RFP-100A CONNECTOR CABLE

## (undated) DEVICE — DECANTER BAG 9": Brand: MEDLINE INDUSTRIES, INC.

## (undated) DEVICE — TUBING, SUCTION, 1/4" X 20', STRAIGHT: Brand: MEDLINE INDUSTRIES, INC.

## (undated) DEVICE — OASIS DRAIN, SINGLE, INLINE & ATS COMPATIBLE: Brand: OASIS

## (undated) DEVICE — APPL CHLORAPREP HI/LITE 26ML ORNG

## (undated) DEVICE — SUT PDS2 0 CT1 27IN Z340H MF VIL

## (undated) DEVICE — DEV COAG EPISENSE GUIDED 6130 W/VISITRAX 3CM SGL

## (undated) DEVICE — DRSNG WND GZ PAD BORDERED 4X8IN STRL

## (undated) DEVICE — OPTIFOAM GENTLE SA, BORDERED, 6X6: Brand: MEDLINE

## (undated) DEVICE — TBG IV DRIP CHAMBER MACRO SGL 72IN

## (undated) DEVICE — ENDOPATH 5MM ENDOSCOPIC BLUNT TIP DISSECTORS (12 POUCHES CONTAINING 3 DISSECTORS EACH): Brand: ENDOPATH

## (undated) DEVICE — SOUNDSTAR ECO 8F G CATHETER: Brand: SOUNDSTAR

## (undated) DEVICE — 5 MM CURVED DISSECTORS WITH MONOPOLAR CAUTERY: Brand: ENDOPATH

## (undated) DEVICE — ENDOPATH XCEL WITH OPTIVIEW TECHNOLOGY BLADELESS TROCARS WITH STABILITY SLEEVES: Brand: ENDOPATH XCEL OPTIVIEW

## (undated) DEVICE — NEEDLE, QUINCKE 22GX3.5": Brand: MEDLINE INDUSTRIES, INC.

## (undated) DEVICE — SKIN PREP TRAY W/CHG: Brand: MEDLINE INDUSTRIES, INC.

## (undated) DEVICE — CATH DIAG IMPULSE FR4 5F 100CM

## (undated) DEVICE — STEERABLE SHEATH CLEAR: Brand: FARADRIVE™

## (undated) DEVICE — ENDOPATH XCEL UNIVERSAL TROCAR STABLILITY SLEEVES: Brand: ENDOPATH XCEL

## (undated) DEVICE — PINNACLE INTRODUCER SHEATH: Brand: PINNACLE

## (undated) DEVICE — VLV PRT BRONCH LIP LTX DISP

## (undated) DEVICE — LABEL SHEET CUSTOM 2X2 YELLOW: Brand: MEDLINE INDUSTRIES, INC.

## (undated) DEVICE — INTRO PERFORMER CHECKFLO/LG RAD/BND NO/GW 18F .038IN 30CM

## (undated) DEVICE — 3M™ IOBAN™ 2 ANTIMICROBIAL INCISE DRAPE 6650EZ: Brand: IOBAN™ 2

## (undated) DEVICE — ELECTRD DEFIB M/FUNC STATPADZ PK/2

## (undated) DEVICE — Device: Brand: NRG TRANSSEPTAL NEEDLE

## (undated) DEVICE — Device: Brand: TORAYGUIDE GUIDEWIRE

## (undated) DEVICE — SUT PDS 0 CT1 36IN Z346H

## (undated) DEVICE — PK TRY HEART CATH 50

## (undated) DEVICE — DRAPE,REIN 53X77,STERILE: Brand: MEDLINE

## (undated) DEVICE — Device: Brand: WEBSTER CS

## (undated) DEVICE — TB PROSHIELD PROTECT PLSTC CLR

## (undated) DEVICE — VLV SXN ENDO DISP STRL

## (undated) DEVICE — TTL1LYR 16FR10ML 100%SIL TMPST TR: Brand: MEDLINE

## (undated) DEVICE — TR BAND RADIAL ARTERY COMPRESSION DEVICE: Brand: TR BAND

## (undated) DEVICE — TBG INSUFFLATION LUER LOCK: Brand: MEDLINE INDUSTRIES, INC.

## (undated) DEVICE — ESOPHAGEAL STETHOSCOPE W/TEMPERATURE SENSOR: Brand: DEROYAL

## (undated) DEVICE — MARYLAND JAW LAPAROSCOPIC SEALER/DIVIDER COATED: Brand: LIGASURE

## (undated) DEVICE — BI-DIRECTIONAL NAVIGATION CATHETER, NAV, D-F: Brand: QDOT MICRO

## (undated) DEVICE — PAD E/S GRND SGL/FOIL 9FT/CORD DISP

## (undated) DEVICE — SHT AIR TRANSFR COMFRT GLIDE LAT 40X80IN

## (undated) DEVICE — MEDI-VAC YANKAUER SUCTION HANDLE W/BULBOUS TIP: Brand: CARDINAL HEALTH

## (undated) DEVICE — PENCL ES MEGADINE EZ/CLEAN BUTN W/HOLSTR 10FT

## (undated) DEVICE — SET PRIMARY GRVTY 10DP MALE LL 104IN

## (undated) DEVICE — SUT SILK 0 CT1 CR8 18IN C021D

## (undated) DEVICE — TX ECO EXT CABLE: Brand: TX ECO EXT CABLE

## (undated) DEVICE — DGW .035 FC J3MM 260CM TEF: Brand: EMERALD

## (undated) DEVICE — DRP INCISE CARDIO W/ADHS 115X85X151IN LG STRL

## (undated) DEVICE — PREF.GUIDING SHEATH W/MULT.CRV: Brand: PREFACE

## (undated) DEVICE — ST ACC MICROPUNCTURE STFF/CANN PLAT/TP 4F 21G 40CM

## (undated) DEVICE — Device

## (undated) DEVICE — CATH DIAG IMPULSE FL3.5 5F 100CM

## (undated) DEVICE — CABL PACE BIPOL THRSHLD SHROUD PIN 8FT

## (undated) DEVICE — ELECTRD BLD EZ CLN STD 6.5IN

## (undated) DEVICE — GLIDESHEATH SLENDER ACCESS KIT: Brand: GLIDESHEATH SLENDER

## (undated) DEVICE — SPNG GZ WOVN 4X4IN 12PLY 10/BX STRL

## (undated) DEVICE — ENDOCUT SCISSOR TIP, DISPOSABLE: Brand: RENEW

## (undated) DEVICE — ELECTRD BLD EZ CLN STD 2.5IN

## (undated) DEVICE — SPONGE,LAP,12"X12",XR,ST,5/PK,40PK/CS: Brand: MEDLINE

## (undated) DEVICE — OCTA,PERSEID,2-2-2-2-2,F-CURVE: Brand: OCTARAY MAPPING CATHETER

## (undated) DEVICE — BLANKT WARM UNDER/BDY FUL/ACC A/ 90X206CM

## (undated) DEVICE — ANTIBACTERIAL UNDYED BRAIDED (POLYGLACTIN 910), SYNTHETIC ABSORBABLE SUTURE: Brand: COATED VICRYL

## (undated) DEVICE — GLV SURG BIOGEL LTX PF 8

## (undated) DEVICE — ADHS SKIN SURG TISS VISC PREMIERPRO EXOFIN HI/VISC 1ML

## (undated) DEVICE — LAPAROSCOPIC DISSECTOR: Brand: DEROYAL

## (undated) DEVICE — 28 FR STRAIGHT – SOFT PVC CATHETER: Brand: PVC THORACIC CATHETERS

## (undated) DEVICE — IRRIGATOR BULB ASEPTO 60CC STRL